# Patient Record
Sex: FEMALE | Race: WHITE | Employment: OTHER | URBAN - METROPOLITAN AREA
[De-identification: names, ages, dates, MRNs, and addresses within clinical notes are randomized per-mention and may not be internally consistent; named-entity substitution may affect disease eponyms.]

---

## 2017-01-12 ENCOUNTER — PATIENT MESSAGE (OUTPATIENT)
Dept: NEPHROLOGY | Facility: CLINIC | Age: 62
End: 2017-01-12

## 2017-01-12 ENCOUNTER — PATIENT MESSAGE (OUTPATIENT)
Dept: INTERNAL MEDICINE CLINIC | Facility: CLINIC | Age: 62
End: 2017-01-12

## 2017-01-12 NOTE — TELEPHONE ENCOUNTER
From: Jesus Crane  To: Griselda Moncada MD  Sent: 1/12/2017 8:22 AM CST  Subject: Non-Urgent Medical Question    I have mouth sores that continue to worsen along with dry mouth. I started an antiseptic mouth wash. Not sure if that will even work.  I a

## 2017-01-13 RX ORDER — LEVOTHYROXINE SODIUM 0.03 MG/1
TABLET ORAL
Qty: 90 TABLET | Refills: 1 | Status: SHIPPED | OUTPATIENT
Start: 2017-01-13 | End: 2017-08-14

## 2017-01-13 NOTE — TELEPHONE ENCOUNTER
Discussed with patient about mouth sore - given on prednisone will prescribe nystatin oral suspension BID swish and swallow    Weight stable. Instructed to follow low salt diet and monitor fluid intake    Take OTC ranitidine daily .  If that doesn't help

## 2017-01-14 ENCOUNTER — PATIENT MESSAGE (OUTPATIENT)
Dept: INTERNAL MEDICINE CLINIC | Facility: CLINIC | Age: 62
End: 2017-01-14

## 2017-01-14 NOTE — TELEPHONE ENCOUNTER
From: Chidi Carrillo  To: Liya Peña MD  Sent: 1/12/2017 7:59 PM CST  Subject: Non-Urgent Medical Question    [facility] levothoroxine. I tried to order on the refilll tab and it does not work. Please refill.

## 2017-01-14 NOTE — TELEPHONE ENCOUNTER
From: Antwan Simental  To: Greta Callahan MD  Sent: 1/14/2017 6:38 AM CST  Subject: Non-Urgent Medical Question    I spoke with Dr. Christianne Cheney, about some issues I am having with the prednisone.  She indicated to me that you have ordered some blood work that

## 2017-01-14 NOTE — TELEPHONE ENCOUNTER
See 1/10/17 refill encounter. Levothyroxine already refilled. Email sent to patient. Pharmacy      [id number] - [place], [address] [address]  [address], [phone], [phone]       Medication Detail         Disp

## 2017-01-14 NOTE — TELEPHONE ENCOUNTER
Please see patient email. Patient just had similar labs done by Dr Dev Medina on 12/26/16. When would you like patient to get your labs done? Please advise.

## 2017-01-19 ENCOUNTER — TELEPHONE (OUTPATIENT)
Dept: NEPHROLOGY | Facility: CLINIC | Age: 62
End: 2017-01-19

## 2017-01-19 NOTE — TELEPHONE ENCOUNTER
Prescription sent on 1/13/17 never went to the pharmacy due to some typo in the directions. Re ordered and re sent to [facility]. Patient contacted and advised.

## 2017-01-19 NOTE — TELEPHONE ENCOUNTER
Pt states that she still has sores in mouth and nose and thought Dr. Marcos Sanabria was going to call in RX. Please advise. See also 1/14/17 patient email.

## 2017-01-23 ENCOUNTER — LAB ENCOUNTER (OUTPATIENT)
Dept: LAB | Age: 62
End: 2017-01-23
Attending: INTERNAL MEDICINE
Payer: MEDICARE

## 2017-01-23 DIAGNOSIS — I10 ESSENTIAL HYPERTENSION: ICD-10-CM

## 2017-01-23 DIAGNOSIS — N04.9 NEPHROTIC SYNDROME: ICD-10-CM

## 2017-01-23 DIAGNOSIS — E78.2 MIXED HYPERLIPIDEMIA: ICD-10-CM

## 2017-01-23 DIAGNOSIS — N05.1 FSGS (FOCAL SEGMENTAL GLOMERULOSCLEROSIS): ICD-10-CM

## 2017-01-23 LAB
ALBUMIN SERPL BCP-MCNC: 3.4 G/DL (ref 3.5–4.8)
ALBUMIN/GLOB SERPL: 1.4 {RATIO} (ref 1–2)
ALP SERPL-CCNC: 53 U/L (ref 32–100)
ALT SERPL-CCNC: 46 U/L (ref 14–54)
ANION GAP SERPL CALC-SCNC: 11 MMOL/L (ref 0–18)
AST SERPL-CCNC: 17 U/L (ref 15–41)
BASOPHILS # BLD: 0 K/UL (ref 0–0.2)
BASOPHILS NFR BLD: 0 %
BILIRUB SERPL-MCNC: 0.7 MG/DL (ref 0.3–1.2)
BUN SERPL-MCNC: 29 MG/DL (ref 8–20)
BUN/CREAT SERPL: 31.9 (ref 10–20)
CALCIUM SERPL-MCNC: 9.2 MG/DL (ref 8.5–10.5)
CHLORIDE SERPL-SCNC: 99 MMOL/L (ref 95–110)
CHOLEST SERPL-MCNC: 249 MG/DL (ref 110–200)
CO2 SERPL-SCNC: 27 MMOL/L (ref 22–32)
CREAT SERPL-MCNC: 0.91 MG/DL (ref 0.5–1.5)
CREAT UR-MCNC: 89.1 MG/DL
EOSINOPHIL # BLD: 0 K/UL (ref 0–0.7)
EOSINOPHIL NFR BLD: 0 %
ERYTHROCYTE [DISTWIDTH] IN BLOOD BY AUTOMATED COUNT: 14.5 % (ref 11–15)
GLOBULIN PLAS-MCNC: 2.5 G/DL (ref 2.5–3.7)
GLUCOSE SERPL-MCNC: 290 MG/DL (ref 70–99)
HCT VFR BLD AUTO: 41.9 % (ref 35–48)
HDLC SERPL-MCNC: 46 MG/DL
HGB BLD-MCNC: 14.4 G/DL (ref 12–16)
LDLC SERPL CALC-MCNC: 135 MG/DL (ref 0–99)
LYMPHOCYTES # BLD: 1.7 K/UL (ref 1–4)
LYMPHOCYTES NFR BLD: 13 %
MCH RBC QN AUTO: 32.1 PG (ref 27–32)
MCHC RBC AUTO-ENTMCNC: 34.3 G/DL (ref 32–37)
MCV RBC AUTO: 93.5 FL (ref 80–100)
MICROALBUMIN UR-MCNC: 100.8 MG/DL (ref 0–1.8)
MICROALBUMIN/CREAT UR: 1131.3 MG/G{CREAT} (ref 0–20)
MONOCYTES # BLD: 0.6 K/UL (ref 0–1)
MONOCYTES NFR BLD: 4 %
NEUTROPHILS # BLD AUTO: 10.6 K/UL (ref 1.8–7.7)
NEUTROPHILS NFR BLD: 82 %
NONHDLC SERPL-MCNC: 203 MG/DL
OSMOLALITY UR CALC.SUM OF ELEC: 300 MOSM/KG (ref 275–295)
PHOSPHATE SERPL-MCNC: 3.3 MG/DL (ref 2.4–4.7)
PLATELET # BLD AUTO: 259 K/UL (ref 140–400)
PMV BLD AUTO: 8.7 FL (ref 7.4–10.3)
POTASSIUM SERPL-SCNC: 4.1 MMOL/L (ref 3.3–5.1)
PROT SERPL-MCNC: 5.9 G/DL (ref 5.9–8.4)
RBC # BLD AUTO: 4.49 M/UL (ref 3.7–5.4)
SODIUM SERPL-SCNC: 137 MMOL/L (ref 136–144)
TRIGL SERPL-MCNC: 340 MG/DL (ref 1–149)
WBC # BLD AUTO: 12.9 K/UL (ref 4–11)

## 2017-01-23 PROCEDURE — 82043 UR ALBUMIN QUANTITATIVE: CPT

## 2017-01-23 PROCEDURE — 84100 ASSAY OF PHOSPHORUS: CPT

## 2017-01-23 PROCEDURE — 80061 LIPID PANEL: CPT

## 2017-01-23 PROCEDURE — 36415 COLL VENOUS BLD VENIPUNCTURE: CPT

## 2017-01-23 PROCEDURE — 80053 COMPREHEN METABOLIC PANEL: CPT

## 2017-01-23 PROCEDURE — 82570 ASSAY OF URINE CREATININE: CPT

## 2017-01-23 PROCEDURE — 85025 COMPLETE CBC W/AUTO DIFF WBC: CPT

## 2017-01-26 ENCOUNTER — OFFICE VISIT (OUTPATIENT)
Dept: NEPHROLOGY | Facility: CLINIC | Age: 62
End: 2017-01-26

## 2017-01-26 VITALS
DIASTOLIC BLOOD PRESSURE: 78 MMHG | WEIGHT: 178.38 LBS | HEIGHT: 62.25 IN | SYSTOLIC BLOOD PRESSURE: 122 MMHG | HEART RATE: 73 BPM | BODY MASS INDEX: 32.41 KG/M2 | TEMPERATURE: 99 F

## 2017-01-26 DIAGNOSIS — N05.1 FSGS (FOCAL SEGMENTAL GLOMERULOSCLEROSIS): Primary | ICD-10-CM

## 2017-01-26 PROCEDURE — 99214 OFFICE O/P EST MOD 30 MIN: CPT | Performed by: INTERNAL MEDICINE

## 2017-01-26 PROCEDURE — G0463 HOSPITAL OUTPT CLINIC VISIT: HCPCS | Performed by: INTERNAL MEDICINE

## 2017-01-26 RX ORDER — RANITIDINE 150 MG/1
150 TABLET ORAL AS NEEDED
COMMUNITY
End: 2017-08-14

## 2017-01-26 RX ORDER — FUROSEMIDE 40 MG/1
40 TABLET ORAL DAILY
Qty: 30 TABLET | Refills: 1 | Status: SHIPPED | OUTPATIENT
Start: 2017-01-26 | End: 2017-03-23

## 2017-01-26 RX ORDER — PREDNISONE 10 MG/1
20 TABLET ORAL 2 TIMES DAILY
COMMUNITY
End: 2017-01-27

## 2017-01-26 NOTE — PATIENT INSTRUCTIONS
Follow up in 4 weeks  Call back in 2 weeks with BP readings  Reduce lasix to 40 mg daily   Reduce prednisone to 20 mg twice a day   Increase nystatin to four times a day   Blood and urine test prior to next visit

## 2017-01-26 NOTE — PROGRESS NOTES
Progress Note     Allie Hair is a 64 yrs old female with pmh of HL, multiple sclerosis (35 yrs ), restless leg syndrome, nephrolithiasis x 3, tobacco abuse who presented today for follow up    Recent lab work showed BUN/Cr 32/0.86 mg/dl with an eGF H/H. RF wnl and ESR 23. 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  22% of gloms globally scle by mouth 2 (two) times daily. Disp: 180 tablet Rfl: 2   Potassium Chloride ER (KLOR-CON M10) 10 MEQ Oral Tab CR Take 1 tablet (10 mEq total) by mouth 2 (two) times daily.  Disp: 60 tablet Rfl: 5   carvedilol (COREG) 6.25 MG Oral Tab Take 1 tablet (6.25 mg t for ear drainage, hearing loss and nasal drainage  Eyes:  Negative for eye discharge and vision loss  Cardiovascular:  Negative for chest pain, sob, irregular heartbeat/palpitations  Respiratory:  Negative for cough, dyspnea and wheezing  Gastrointestinal: BID   - pending response duration of treatment varies from 4 months to longer  - negative VANESSA, ANCA and Hepatitis B and C and HIV non reactive. - C3 and C4 within range.  CBC normal H/H and coags wnl  - RF wnl and ESR 23  - SPEP/DARRYL negative for monoclonal

## 2017-01-27 DIAGNOSIS — I10 ESSENTIAL HYPERTENSION: Primary | ICD-10-CM

## 2017-01-27 RX ORDER — PREDNISONE 10 MG/1
20 TABLET ORAL 2 TIMES DAILY
Qty: 60 TABLET | Refills: 2 | Status: SHIPPED | OUTPATIENT
Start: 2017-01-27 | End: 2017-01-31

## 2017-01-27 NOTE — TELEPHONE ENCOUNTER
Pt requesting refills for Lasix 20 mg, Prednisone, Nystatin, Cardevilol -- Please go over dosages with pt. Pls call. Thank you.

## 2017-01-27 NOTE — TELEPHONE ENCOUNTER
Furosemide was refilled on 1/26/17 #30 + 1 refill. Carvedilol was refilled on 12/28/16 #60 + 6 refills by Dr. Terry Martino. LOV 1/26/17.

## 2017-01-30 ENCOUNTER — HOSPITAL ENCOUNTER (EMERGENCY)
Facility: HOSPITAL | Age: 62
Discharge: HOME | End: 2017-01-31
Attending: EMERGENCY MEDICINE
Payer: MEDICARE

## 2017-01-30 ENCOUNTER — TELEPHONE (OUTPATIENT)
Dept: INTERNAL MEDICINE CLINIC | Facility: CLINIC | Age: 62
End: 2017-01-30

## 2017-01-30 DIAGNOSIS — T38.0X5A STEROID-INDUCED HYPERGLYCEMIA: Primary | ICD-10-CM

## 2017-01-30 DIAGNOSIS — R73.9 STEROID-INDUCED HYPERGLYCEMIA: Primary | ICD-10-CM

## 2017-01-30 LAB — GLUCOSE BLDC GLUCOMTR-MCNC: 434 MG/DL (ref 70–99)

## 2017-01-30 PROCEDURE — 85007 BL SMEAR W/DIFF WBC COUNT: CPT | Performed by: EMERGENCY MEDICINE

## 2017-01-30 PROCEDURE — 96372 THER/PROPH/DIAG INJ SC/IM: CPT

## 2017-01-30 PROCEDURE — 85025 COMPLETE CBC W/AUTO DIFF WBC: CPT | Performed by: EMERGENCY MEDICINE

## 2017-01-30 PROCEDURE — 82962 GLUCOSE BLOOD TEST: CPT

## 2017-01-30 PROCEDURE — 81001 URINALYSIS AUTO W/SCOPE: CPT | Performed by: EMERGENCY MEDICINE

## 2017-01-30 PROCEDURE — 80048 BASIC METABOLIC PNL TOTAL CA: CPT | Performed by: EMERGENCY MEDICINE

## 2017-01-30 PROCEDURE — 96360 HYDRATION IV INFUSION INIT: CPT

## 2017-01-30 PROCEDURE — 99284 EMERGENCY DEPT VISIT MOD MDM: CPT

## 2017-01-30 NOTE — TELEPHONE ENCOUNTER
Patient stated since today she has been having vaginal itching with slight white discharge. Patient has been taking Prednisone since late November. She is also developing a swollen face in the last 2 weeks.   Patient has also started taking Nystatin for

## 2017-01-30 NOTE — TELEPHONE ENCOUNTER
Pt  Calling to discuss recent test results  said based on results  may need cholesterol medication adjusted    Is ready for refill  [facility]

## 2017-01-30 NOTE — TELEPHONE ENCOUNTER
Pt said she has possible yeast infection  Has 2/8 appt with Dr Evie Ordonez   [facility]

## 2017-01-30 NOTE — TELEPHONE ENCOUNTER
Patient stated wants Dr. Evie Ordonez to review her most recent labs. Instructed patient she has refills at the pharmacy for her Crestor and patient stated understood.

## 2017-01-31 ENCOUNTER — TELEPHONE (OUTPATIENT)
Dept: INTERNAL MEDICINE CLINIC | Facility: CLINIC | Age: 62
End: 2017-01-31

## 2017-01-31 ENCOUNTER — OFFICE VISIT (OUTPATIENT)
Dept: INTERNAL MEDICINE CLINIC | Facility: CLINIC | Age: 62
End: 2017-01-31

## 2017-01-31 VITALS
BODY MASS INDEX: 32.81 KG/M2 | WEIGHT: 182.88 LBS | HEART RATE: 79 BPM | RESPIRATION RATE: 18 BRPM | HEIGHT: 62.5 IN | DIASTOLIC BLOOD PRESSURE: 73 MMHG | SYSTOLIC BLOOD PRESSURE: 115 MMHG

## 2017-01-31 VITALS
OXYGEN SATURATION: 94 % | WEIGHT: 178 LBS | BODY MASS INDEX: 30.39 KG/M2 | DIASTOLIC BLOOD PRESSURE: 73 MMHG | RESPIRATION RATE: 20 BRPM | TEMPERATURE: 98 F | HEART RATE: 74 BPM | HEIGHT: 64 IN | SYSTOLIC BLOOD PRESSURE: 152 MMHG

## 2017-01-31 DIAGNOSIS — E11.9 TYPE 2 DIABETES MELLITUS WITHOUT COMPLICATION, WITH LONG-TERM CURRENT USE OF INSULIN (HCC): Primary | ICD-10-CM

## 2017-01-31 DIAGNOSIS — Z79.4 TYPE 2 DIABETES MELLITUS WITHOUT COMPLICATION, WITH LONG-TERM CURRENT USE OF INSULIN (HCC): Primary | ICD-10-CM

## 2017-01-31 LAB
ANION GAP SERPL CALC-SCNC: 11 MMOL/L (ref 0–18)
BACTERIA UR QL AUTO: NEGATIVE /HPF
BASOPHILS # BLD: 0 K/UL (ref 0–0.2)
BASOPHILS NFR BLD: 0 %
BILIRUB UR QL: NEGATIVE
BUN SERPL-MCNC: 27 MG/DL (ref 8–20)
BUN/CREAT SERPL: 31 (ref 10–20)
CALCIUM SERPL-MCNC: 8.8 MG/DL (ref 8.5–10.5)
CHLORIDE SERPL-SCNC: 92 MMOL/L (ref 95–110)
CLARITY UR: CLEAR
CO2 SERPL-SCNC: 27 MMOL/L (ref 22–32)
COLOR UR: YELLOW
CREAT SERPL-MCNC: 0.87 MG/DL (ref 0.5–1.5)
EOSINOPHIL # BLD: 0.1 K/UL (ref 0–0.7)
EOSINOPHIL NFR BLD: 1 %
ERYTHROCYTE [DISTWIDTH] IN BLOOD BY AUTOMATED COUNT: 14.3 % (ref 11–15)
GLUCOSE BLDC GLUCOMTR-MCNC: 369 MG/DL (ref 70–99)
GLUCOSE BLDC GLUCOMTR-MCNC: 391 MG/DL (ref 70–99)
GLUCOSE SERPL-MCNC: 474 MG/DL (ref 70–99)
GLUCOSE UR-MCNC: >=500 MG/DL
HCT VFR BLD AUTO: 44.6 % (ref 35–48)
HGB BLD-MCNC: 15.3 G/DL (ref 12–16)
KETONES UR-MCNC: NEGATIVE MG/DL
LEUKOCYTE ESTERASE UR QL STRIP.AUTO: NEGATIVE
LYMPHOCYTES # BLD: 1.2 K/UL (ref 1–4)
LYMPHOCYTES NFR BLD: 9 %
MCH RBC QN AUTO: 32.2 PG (ref 27–32)
MCHC RBC AUTO-ENTMCNC: 34.4 G/DL (ref 32–37)
MCV RBC AUTO: 93.7 FL (ref 80–100)
MONOCYTES # BLD: 0.5 K/UL (ref 0–1)
MONOCYTES NFR BLD: 4 %
NEUTROPHILS # BLD AUTO: 11.4 K/UL (ref 1.8–7.7)
NEUTROPHILS NFR BLD: 85 %
NEUTS BAND NFR BLD: 1 %
NITRITE UR QL STRIP.AUTO: NEGATIVE
OSMOLALITY UR CALC.SUM OF ELEC: 296 MOSM/KG (ref 275–295)
PH UR: 6 [PH] (ref 5–8)
PLATELET # BLD AUTO: 230 K/UL (ref 140–400)
PMV BLD AUTO: 9.1 FL (ref 7.4–10.3)
POTASSIUM SERPL-SCNC: 4.6 MMOL/L (ref 3.3–5.1)
PROT UR-MCNC: 30 MG/DL
RBC # BLD AUTO: 4.75 M/UL (ref 3.7–5.4)
RBC #/AREA URNS AUTO: 2 /HPF
SODIUM SERPL-SCNC: 130 MMOL/L (ref 136–144)
SP GR UR STRIP: 1.03 (ref 1–1.03)
UROBILINOGEN UR STRIP-ACNC: <2
VIT C UR-MCNC: NEGATIVE MG/DL
WBC # BLD AUTO: 13.3 K/UL (ref 4–11)
WBC #/AREA URNS AUTO: 0 /HPF

## 2017-01-31 PROCEDURE — G0463 HOSPITAL OUTPT CLINIC VISIT: HCPCS | Performed by: INTERNAL MEDICINE

## 2017-01-31 PROCEDURE — 82962 GLUCOSE BLOOD TEST: CPT

## 2017-01-31 PROCEDURE — 99214 OFFICE O/P EST MOD 30 MIN: CPT | Performed by: INTERNAL MEDICINE

## 2017-01-31 RX ORDER — INSULIN ASPART 100 [IU]/ML
10 INJECTION, SOLUTION INTRAVENOUS; SUBCUTANEOUS ONCE
Status: DISCONTINUED | OUTPATIENT
Start: 2017-01-31 | End: 2017-01-31

## 2017-01-31 RX ORDER — PEN NEEDLE, DIABETIC 30 GX3/16"
NEEDLE, DISPOSABLE MISCELLANEOUS
Qty: 100 EACH | Refills: 0 | Status: SHIPPED | OUTPATIENT
Start: 2017-01-31 | End: 2017-03-20

## 2017-01-31 RX ORDER — PREDNISONE 20 MG/1
20 TABLET ORAL 2 TIMES DAILY
COMMUNITY
End: 2017-02-23 | Stop reason: DRUGHIGH

## 2017-01-31 RX ORDER — INSULIN LISPRO 100 [IU]/ML
INJECTION, SUSPENSION SUBCUTANEOUS
Qty: 5 PEN | Refills: 3 | Status: SHIPPED | OUTPATIENT
Start: 2017-01-31 | End: 2017-03-21

## 2017-01-31 RX ORDER — FLUCONAZOLE 150 MG/1
150 TABLET ORAL ONCE
Qty: 1 TABLET | Refills: 0 | Status: SHIPPED | OUTPATIENT
Start: 2017-01-31 | End: 2017-01-31

## 2017-01-31 NOTE — ASSESSMENT & PLAN NOTE
Newly diagnosed blood sugars elevations. Most likely related to the prednisone. Home sugars yesterday about 500 and in the ER about 479. She was initially given 15 units of insulin and then another 10 units to return home.   She has been drinking plenty

## 2017-01-31 NOTE — TELEPHONE ENCOUNTER
Pt informed. Verbalized good understanding of all with intent to comply. rx's sent. Pt agreed to date and time.

## 2017-01-31 NOTE — ED PROVIDER NOTES
Patient Seen in: [facility] AND [facility] Emergency Department    History   Patient presents with:  Hyperglycemia (metabolic)    Stated Complaint: hyperglycemia    HPI    The patient is a 60-year-old female recently diagnosed with nephrotic syndrome and has bee Losartan Potassium 50 MG Oral Tab,  Take 1 tablet (50 mg total) by mouth 2 (two) times daily. Potassium Chloride ER (KLOR-CON M10) 10 MEQ Oral Tab CR,  Take 1 tablet (10 mEq total) by mouth 2 (two) times daily.    carvedilol (COREG) 6.25 MG Oral Tab,  Anaya Alberto stated complaint: hyperglycemia  Other systems are as noted in HPI. Constitutional and vital signs reviewed. All other systems reviewed and negative except as noted above.     PSFH elements reviewed from today and agreed except as otherwise stated in BUN 27 (*)     BUN/CREA Ratio 31.0 (*)     Calculated Osmolality 296 (*)     All other components within normal limits   URINALYSIS WITH CULTURE REFLEX - Abnormal; Notable for the following:     Protein Urine 30  (*)     Glucose Urine >=500 (*)     Blood U

## 2017-01-31 NOTE — PROGRESS NOTES
HPI:    Patient ID: Sulma Robles is a 58year old female. Diabetes  She presents for her initial diabetic visit. She has type 2 diabetes mellitus. Her disease course has been improving. There are no hypoglycemic associated symptoms.  Associated sympt Hematological: Negative. Psychiatric/Behavioral: Negative. Current Outpatient Prescriptions:  predniSONE 20 MG Oral Tab Take 20 mg by mouth 2 (two) times daily.  Disp:  Rfl:    Insulin Lispro Prot & Lispro (HUMALOG MIX 75/25 KWIKPEN) (75- by mouth nightly. Total of 6 mg. Disp:  Rfl:    Cholecalciferol (VITAMIN D) 2000 UNITS Oral Cap Take  by mouth.  Disp:  Rfl:    SPIRIVA HANDIHALER 18 MCG Inhalation Cap INHALE THE CONTENTS OF 1 CAPSULE VIA HANDIHALER SAME TIME EVERY DAY Disp: 90 capsule Rfl Normal range of motion. She exhibits edema. She exhibits no tenderness. Lymphadenopathy:     She has no cervical adenopathy. Neurological: She is alert and oriented to person, place, and time. She has normal reflexes. No cranial nerve deficit.  She exhi the defined types were placed in this encounter.        Meds This Visit:  Signed Prescriptions Disp Refills    Insulin Lispro Prot & Lispro (HUMALOG MIX 75/25 KWIKPEN) (75-25) 100 UNIT/ML SC SUPN 5 pen 3      Sig: 15 units 2 times a day with meals        Pa

## 2017-01-31 NOTE — TELEPHONE ENCOUNTER
Spoke with patient and advised of BOLIVAR message to take 20 units of Novolog Mix. Patient states did not give self insulin yet, called when she read the number as directed to call if over 250.      Should patient check before bedtime, asking what she should do

## 2017-01-31 NOTE — TELEPHONE ENCOUNTER
Patient calling her sugar is 347. Doctor directions are to call doctor if greater that 250 call doctor.

## 2017-01-31 NOTE — TELEPHONE ENCOUNTER
Dr. Gaetano Osman, please see message below and review pending rx. Thank you. Please advise on directions for new pen also.

## 2017-01-31 NOTE — ED INITIAL ASSESSMENT (HPI)
Pt checked her glucose at home and the reading was 471. Ate at 2130. Pt on prednisone for kidney disease. Dosage recently reduced. Sores in mouth. Red rash to chest. Dr Florez is her pmd.  Accucheck in triage [phone].

## 2017-01-31 NOTE — TELEPHONE ENCOUNTER
Patient states her blood sugar just now checked was 347    Denies any change in vision, lightheadedness, increases in urination.      Lynced BOLIVAR:   [1/31/2017 5:53 PM] Janusz Padilla MD, Karen Crabtree @[place]:   please adv to take 20 units of novolog mix if she has not

## 2017-01-31 NOTE — TELEPHONE ENCOUNTER
Doctor called the patient last night and was told to go to the ER. Patient Blood sugar were 500. She had to go to the ER had labs and got an insulin shot. It went down slowly.  This morning she  Took her blood sugar reading and it was 282 at 8:13 AM. They

## 2017-01-31 NOTE — TELEPHONE ENCOUNTER
Pt was seen in the office today. Per Shirin the nuvalog mix flex pen is not covered by the insurance. Pt humalog mix flex pen is covered. Does doctor want to change? Per Juanpablo LLANOS) they will call the pharmacy back.

## 2017-01-31 NOTE — ED NOTES
Dr. Hopson Beauty updated about pt's bg after insulin. Pt verbalized that she is going to eat when gets home. Yousif Joaquim said to give additional 5u of Novolin. Pt. Was given crackers and orange juice to go home. pt verbalized understanding about hypoglycemia.

## 2017-01-31 NOTE — PATIENT INSTRUCTIONS
Problem List Items Addressed This Visit        Unprioritized    T2DM (type 2 diabetes mellitus) ([facility] [address] - Primary     Newly diagnosed blood sugars elevations. Most likely related to the prednisone. Home sugars yesterday about 500 and in the ER about 479.

## 2017-01-31 NOTE — TELEPHONE ENCOUNTER
Please order novolog mix 70/30  15 units 2 times a day-5 pens  Have her  pens and see me at 12.45 pm at [facility] today.   Please order pen needles for  -use bid- 100

## 2017-01-31 NOTE — TELEPHONE ENCOUNTER
BOLIVAR pls see note below. Pt states seen in ER as advised, was given insulin SQ and instructed to f/u with PCP today. No availability and only wants to see BOLIVAR. Pt reports glucose this AM fasting-282. Pt does not have insulin at home, requesting rx from BOLIVAR.

## 2017-01-31 NOTE — ED NOTES
Pt had her lab drawn a week ago,and was calling to find out what were her certain labs, and got a call from the dr monsivais her bg reading was high. The pt checked her bg at home with her 's accu check machine, and it was reading \"high\".  Pt verbalized r

## 2017-02-01 NOTE — TELEPHONE ENCOUNTER
Pt calling back stating that she is waiting for the nurse to call back. .. please advise pt state that she has'nt eating anything

## 2017-02-01 NOTE — TELEPHONE ENCOUNTER
Via VoAPPs with BOLIVAR:     [1/31/2017 6:23 PM] Janusz Padilla MD, Karen Crabtree @[place]:   no check tomorrow before breakfast  [1/31/2017 6:25 PM] Phuongaidee Yaniv @[place]:   ok , I will let her know.  Thank You Doctor   [1/31/2017 6:25 PM] Janusz Padilla MD, Karen Crabtree @[place]:

## 2017-02-01 NOTE — TELEPHONE ENCOUNTER
Please note/advise. Thank you. Pt seeking insulin dose advice. Pt states that she has not given any insulin this morning. Pt called with her BS results.      this morning    BS yesterday was 347 before dinner   BS at 9:30pm 391  BS at 11pm 337

## 2017-02-06 ENCOUNTER — OFFICE VISIT (OUTPATIENT)
Dept: DERMATOLOGY CLINIC | Facility: CLINIC | Age: 62
End: 2017-02-06

## 2017-02-06 DIAGNOSIS — L57.0 ACTINIC KERATOSIS: ICD-10-CM

## 2017-02-06 DIAGNOSIS — B37.0 ORAL CANDIDIASIS: ICD-10-CM

## 2017-02-06 DIAGNOSIS — B07.9 VIRAL WARTS, UNSPECIFIED TYPE: Primary | ICD-10-CM

## 2017-02-06 DIAGNOSIS — D23.9 BENIGN NEOPLASM OF SKIN, UNSPECIFIED LOCATION: ICD-10-CM

## 2017-02-06 PROCEDURE — 99213 OFFICE O/P EST LOW 20 MIN: CPT | Performed by: DERMATOLOGY

## 2017-02-06 PROCEDURE — 17110 DESTRUCTION B9 LES UP TO 14: CPT | Performed by: DERMATOLOGY

## 2017-02-06 RX ORDER — FLUCONAZOLE 150 MG/1
TABLET ORAL
Qty: 2 TABLET | Refills: 3 | Status: SHIPPED | OUTPATIENT
Start: 2017-02-06 | End: 2017-05-03

## 2017-02-06 RX ORDER — FLUCONAZOLE 150 MG/1
TABLET ORAL
Refills: 0 | COMMUNITY
Start: 2017-01-31 | End: 2017-02-06

## 2017-02-06 NOTE — PROGRESS NOTES
rf diflucan--take weekly x 1--still red inflamed at lateral tongue and tender per pt--.   Pt to f/u with Dr. Kaylee Mahmood

## 2017-02-07 ENCOUNTER — TELEPHONE (OUTPATIENT)
Dept: DERMATOLOGY CLINIC | Facility: CLINIC | Age: 62
End: 2017-02-07

## 2017-02-07 RX ORDER — UREA 40 %
CREAM (GRAM) TOPICAL
Qty: 90 G | Refills: 12 | Status: SHIPPED | OUTPATIENT
Start: 2017-02-07 | End: 2017-02-08

## 2017-02-07 NOTE — TELEPHONE ENCOUNTER
Patient informed with verbal understanding.  She will get OTC cream. Per patient, rx cream too expensive

## 2017-02-07 NOTE — TELEPHONE ENCOUNTER
Discussed over-the-counter urea cream Rx sent for 40% -20% over-the-counter generally with foot care products for dry skin, warts cracking peeling

## 2017-02-07 NOTE — TELEPHONE ENCOUNTER
Pt states KMT mentioned a cream (RX or OTC) that she was going to prescribe but pharmacy doesn't have it - kindly advise. Routine post-cryotherapy care discussed with pt.

## 2017-02-08 ENCOUNTER — OFFICE VISIT (OUTPATIENT)
Dept: INTERNAL MEDICINE CLINIC | Facility: CLINIC | Age: 62
End: 2017-02-08

## 2017-02-08 VITALS
HEIGHT: 62.5 IN | SYSTOLIC BLOOD PRESSURE: 114 MMHG | BODY MASS INDEX: 32.54 KG/M2 | OXYGEN SATURATION: 94 % | RESPIRATION RATE: 24 BRPM | WEIGHT: 181.38 LBS | HEART RATE: 75 BPM | DIASTOLIC BLOOD PRESSURE: 72 MMHG | TEMPERATURE: 98 F

## 2017-02-08 DIAGNOSIS — Z79.4 TYPE 2 DIABETES MELLITUS WITHOUT COMPLICATION, WITH LONG-TERM CURRENT USE OF INSULIN (HCC): Primary | ICD-10-CM

## 2017-02-08 DIAGNOSIS — E11.9 TYPE 2 DIABETES MELLITUS WITHOUT COMPLICATION, WITH LONG-TERM CURRENT USE OF INSULIN (HCC): Primary | ICD-10-CM

## 2017-02-08 DIAGNOSIS — T38.0X5A STEROID MYOPATHY: ICD-10-CM

## 2017-02-08 DIAGNOSIS — Z78.0 MENOPAUSE: ICD-10-CM

## 2017-02-08 DIAGNOSIS — G72.0 STEROID MYOPATHY: ICD-10-CM

## 2017-02-08 DIAGNOSIS — E78.2 MIXED HYPERLIPIDEMIA: ICD-10-CM

## 2017-02-08 PROCEDURE — G0463 HOSPITAL OUTPT CLINIC VISIT: HCPCS | Performed by: INTERNAL MEDICINE

## 2017-02-08 PROCEDURE — 99214 OFFICE O/P EST MOD 30 MIN: CPT | Performed by: INTERNAL MEDICINE

## 2017-02-08 RX ORDER — CLONAZEPAM 0.5 MG/1
0.5 TABLET ORAL NIGHTLY PRN
Qty: 30 TABLET | Refills: 3 | Status: SHIPPED | OUTPATIENT
Start: 2017-02-08 | End: 2017-07-06

## 2017-02-09 NOTE — ASSESSMENT & PLAN NOTE
Continue the Crestor at 20 mg 1 tablet once daily. We will continue to monitor diet and treat the diabetes to help the triglycerides improved. Recheck the lipid panel in about 8 weeks.

## 2017-02-09 NOTE — ASSESSMENT & PLAN NOTE
Blood sugars are still quite elevated. She has been following the sliding scale as per her last instructions. Changed the sliding scale to specific numbers at this time. Take 20 units of the Humalog mix with breakfast as well as with dinner.   Follow a s

## 2017-02-09 NOTE — ASSESSMENT & PLAN NOTE
Bilateral lower extremity weakness and difficulty standing up and climbing. This is expected from having been on steroids for a little while now.   Lower extremity exercises to strengthen the thighs–the quadriceps as well as the hamstrings and calf muscles

## 2017-02-09 NOTE — PATIENT INSTRUCTIONS
Problem List Items Addressed This Visit        Unprioritized    Hyperlipidemia     Continue the Crestor at 20 mg 1 tablet once daily. We will continue to monitor diet and treat the diabetes to help the triglycerides improved.   Recheck the lipid panel in a

## 2017-02-15 ENCOUNTER — PATIENT MESSAGE (OUTPATIENT)
Dept: INTERNAL MEDICINE CLINIC | Facility: CLINIC | Age: 62
End: 2017-02-15

## 2017-02-15 NOTE — TELEPHONE ENCOUNTER
From: Jenny Ortiz  To: Yaneth Anna MD  Sent: 2/15/2017 1:09 AM CST  Subject: Non-Urgent Medical Question     2/10 units 2/11 units 2/12 units 2/13 units 2/14 units   Before Breakfast 161 24 138 22 155 24 163 24 168 24  Before Dinner 122 22 100 22 9

## 2017-02-15 NOTE — PROGRESS NOTES
Vladimir Glasgow is a 58year old female. HPI:     CC:  Patient presents with:  Warts: LOV 3/13/2015. Pt presenting for f/u with warts to bilateral hands. Pt had warts treated in the past with cryotherapy.         Allergies:  Review of patient's allergies tablet (50 mg total) by mouth 2 (two) times daily. Disp: 180 tablet Rfl: 2   Potassium Chloride ER (KLOR-CON M10) 10 MEQ Oral Tab CR Take 1 tablet (10 mEq total) by mouth 2 (two) times daily.  Disp: 60 tablet Rfl: 5   carvedilol (COREG) 6.25 MG Oral Tab Nicolette Hong Past Surgical History    APPENDECTOMY  1985    KNEE SURGERY            APPENDECTOMY         Social History   Marital Status:   Spouse Name: N/A    Years of Education: N/A  Number of Children: N/A     Occupational History  None o Multiple light to medium brown, well marginated, uniformly pigmented, macules and papules 6 mm and less scattered on exam. pigmented lesions examined with dermoscopy benign-appearing patterns.      Waxy tannish keratotic papules scattered, cherry-red vasc or p.r.n.

## 2017-02-20 ENCOUNTER — LAB ENCOUNTER (OUTPATIENT)
Dept: LAB | Age: 62
End: 2017-02-20
Attending: INTERNAL MEDICINE
Payer: MEDICARE

## 2017-02-20 DIAGNOSIS — N05.1 FSGS (FOCAL SEGMENTAL GLOMERULOSCLEROSIS): ICD-10-CM

## 2017-02-20 DIAGNOSIS — E78.2 MIXED HYPERLIPIDEMIA: ICD-10-CM

## 2017-02-20 LAB
ALBUMIN SERPL BCP-MCNC: 3.2 G/DL (ref 3.5–4.8)
ALBUMIN/GLOB SERPL: 1.2 {RATIO} (ref 1–2)
ALP SERPL-CCNC: 60 U/L (ref 32–100)
ALT SERPL-CCNC: 52 U/L (ref 14–54)
ANION GAP SERPL CALC-SCNC: 9 MMOL/L (ref 0–18)
AST SERPL-CCNC: 18 U/L (ref 15–41)
BASOPHILS # BLD: 0 K/UL (ref 0–0.2)
BASOPHILS NFR BLD: 0 %
BILIRUB SERPL-MCNC: 0.6 MG/DL (ref 0.3–1.2)
BUN SERPL-MCNC: 22 MG/DL (ref 8–20)
BUN/CREAT SERPL: 30.1 (ref 10–20)
CALCIUM SERPL-MCNC: 8.8 MG/DL (ref 8.5–10.5)
CHLORIDE SERPL-SCNC: 97 MMOL/L (ref 95–110)
CHOLEST SERPL-MCNC: 225 MG/DL (ref 110–200)
CO2 SERPL-SCNC: 29 MMOL/L (ref 22–32)
CREAT SERPL-MCNC: 0.73 MG/DL (ref 0.5–1.5)
CREAT UR-MCNC: 141.1 MG/DL
EOSINOPHIL # BLD: 0 K/UL (ref 0–0.7)
EOSINOPHIL NFR BLD: 0 %
ERYTHROCYTE [DISTWIDTH] IN BLOOD BY AUTOMATED COUNT: 14.4 % (ref 11–15)
GLOBULIN PLAS-MCNC: 2.6 G/DL (ref 2.5–3.7)
GLUCOSE SERPL-MCNC: 98 MG/DL (ref 70–99)
HCT VFR BLD AUTO: 42.9 % (ref 35–48)
HDLC SERPL-MCNC: 44 MG/DL
HGB BLD-MCNC: 14.7 G/DL (ref 12–16)
LDLC SERPL CALC-MCNC: 142 MG/DL (ref 0–99)
LYMPHOCYTES # BLD: 2.9 K/UL (ref 1–4)
LYMPHOCYTES NFR BLD: 22 %
MAGNESIUM SERPL-MCNC: 2.1 MG/DL (ref 1.8–2.5)
MCH RBC QN AUTO: 32.7 PG (ref 27–32)
MCHC RBC AUTO-ENTMCNC: 34.2 G/DL (ref 32–37)
MCV RBC AUTO: 95.9 FL (ref 80–100)
MICROALBUMIN UR-MCNC: 62.4 MG/DL (ref 0–1.8)
MICROALBUMIN/CREAT UR: 442.2 MG/G{CREAT} (ref 0–20)
MONOCYTES # BLD: 0.8 K/UL (ref 0–1)
MONOCYTES NFR BLD: 6 %
NEUTROPHILS # BLD AUTO: 9.1 K/UL (ref 1.8–7.7)
NEUTROPHILS NFR BLD: 71 %
NONHDLC SERPL-MCNC: 181 MG/DL
OSMOLALITY UR CALC.SUM OF ELEC: 283 MOSM/KG (ref 275–295)
PHOSPHATE SERPL-MCNC: 3.5 MG/DL (ref 2.4–4.7)
PLATELET # BLD AUTO: 275 K/UL (ref 140–400)
PMV BLD AUTO: 8.6 FL (ref 7.4–10.3)
POTASSIUM SERPL-SCNC: 3.8 MMOL/L (ref 3.3–5.1)
PROT SERPL-MCNC: 5.8 G/DL (ref 5.9–8.4)
RBC # BLD AUTO: 4.48 M/UL (ref 3.7–5.4)
SODIUM SERPL-SCNC: 135 MMOL/L (ref 136–144)
TRIGL SERPL-MCNC: 197 MG/DL (ref 1–149)
WBC # BLD AUTO: 12.8 K/UL (ref 4–11)

## 2017-02-20 PROCEDURE — 84100 ASSAY OF PHOSPHORUS: CPT

## 2017-02-20 PROCEDURE — 80053 COMPREHEN METABOLIC PANEL: CPT

## 2017-02-20 PROCEDURE — 80061 LIPID PANEL: CPT

## 2017-02-20 PROCEDURE — 82570 ASSAY OF URINE CREATININE: CPT

## 2017-02-20 PROCEDURE — 85025 COMPLETE CBC W/AUTO DIFF WBC: CPT

## 2017-02-20 PROCEDURE — 83735 ASSAY OF MAGNESIUM: CPT

## 2017-02-20 PROCEDURE — 82043 UR ALBUMIN QUANTITATIVE: CPT

## 2017-02-20 PROCEDURE — 36415 COLL VENOUS BLD VENIPUNCTURE: CPT

## 2017-02-23 ENCOUNTER — TELEPHONE (OUTPATIENT)
Dept: INTERNAL MEDICINE CLINIC | Facility: CLINIC | Age: 62
End: 2017-02-23

## 2017-02-23 ENCOUNTER — OFFICE VISIT (OUTPATIENT)
Dept: NEPHROLOGY | Facility: CLINIC | Age: 62
End: 2017-02-23

## 2017-02-23 ENCOUNTER — PATIENT MESSAGE (OUTPATIENT)
Dept: INTERNAL MEDICINE CLINIC | Facility: CLINIC | Age: 62
End: 2017-02-23

## 2017-02-23 VITALS
HEART RATE: 76 BPM | TEMPERATURE: 98 F | DIASTOLIC BLOOD PRESSURE: 74 MMHG | WEIGHT: 184 LBS | HEIGHT: 64 IN | BODY MASS INDEX: 31.41 KG/M2 | SYSTOLIC BLOOD PRESSURE: 115 MMHG

## 2017-02-23 DIAGNOSIS — I10 ESSENTIAL HYPERTENSION: Primary | ICD-10-CM

## 2017-02-23 DIAGNOSIS — N04.9 NEPHROTIC SYNDROME: ICD-10-CM

## 2017-02-23 PROCEDURE — 99215 OFFICE O/P EST HI 40 MIN: CPT | Performed by: INTERNAL MEDICINE

## 2017-02-23 PROCEDURE — G0463 HOSPITAL OUTPT CLINIC VISIT: HCPCS | Performed by: INTERNAL MEDICINE

## 2017-02-23 RX ORDER — PREDNISONE 10 MG/1
10 TABLET ORAL 2 TIMES DAILY
Qty: 60 TABLET | Refills: 1 | Status: SHIPPED | OUTPATIENT
Start: 2017-02-23 | End: 2017-04-13 | Stop reason: DRUGHIGH

## 2017-02-23 RX ORDER — LORATADINE 10 MG/1
10 CAPSULE, LIQUID FILLED ORAL DAILY
Qty: 10 CAPSULE | Refills: 0 | Status: SHIPPED | OUTPATIENT
Start: 2017-02-23 | End: 2017-03-25

## 2017-02-23 RX ORDER — LEVOFLOXACIN 500 MG/1
500 TABLET, FILM COATED ORAL DAILY
Qty: 7 TABLET | Refills: 0 | Status: SHIPPED | OUTPATIENT
Start: 2017-02-23 | End: 2017-03-05

## 2017-02-23 RX ORDER — CARVEDILOL 6.25 MG/1
3.12 TABLET ORAL 2 TIMES DAILY WITH MEALS
Qty: 60 TABLET | Refills: 6 | Status: SHIPPED | OUTPATIENT
Start: 2017-02-23 | End: 2017-02-27 | Stop reason: DRUGHIGH

## 2017-02-23 NOTE — TELEPHONE ENCOUNTER
Levaquin 500 mg p.o. daily for 7 days. Claritin 10 mg 1 tablet once daily for 10 days.   Needs to be seen in about 5-7 days

## 2017-02-23 NOTE — PROGRESS NOTES
Progress Note     Brad Larson is a 64 yrs old female with pmh of HL, multiple sclerosis (35 yrs ), restless leg syndrome, nephrolithiasis x 3, tobacco abuse who presented today for follow up    Recent lab work showed BUN/Cr 32/0.86 mg/dl with an eGF H/H. RF wnl and ESR 23. 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  22% of gloms globally scle Anxiety.  Disp: 30 tablet Rfl: 3   Insulin Pen Needle (PEN NEEDLES) 31G X 8 MM Does not apply Misc 15 units BID  PLEASE DISPENSE PEN NEEDLES APPROPRIATE FOR NOVOLOG FLEXPEN Disp: 100 each Rfl: 0   Insulin Lispro Prot & Lispro (HUMALOG MIX 75/25 KWIKPEN) (75 Rfl: SPIRIVA HANDIHALER 18 MCG Inhalation Cap INHALE THE CONTENTS OF 1 CAPSULE VIA HANDIHALER SAME TIME EVERY DAY Disp: 90 capsule Rfl: 2   aspirin 81 MG Oral Tab Take 81 mg by mouth daily.  Disp:  Rfl:    SPIRIVA HANDIHALER 18 MCG Inhalation Cap INHALE responsive no acute distress noted  Head/Face: normocephalic  Eyes/Vision: normal extraocular motion is intact  Nose/Mouth/Throat: mucous membranes are moist    Neck/Thyroid: neck is supple without adenopathy  Lymphatic: no abnormal cervical, supraclavicul Encounter  Microalb/Creat Ratio, Random Urine [E]    Meds This Visit:    Signed Prescriptions Disp Refills    predniSONE 10 MG Oral Tab 60 tablet 1      Sig: Take 1 tablet (10 mg total) by mouth 2 (two) times daily.       PredniSONE 5 MG Oral Tab EC 60 tabl

## 2017-02-23 NOTE — TELEPHONE ENCOUNTER
Reason for Call/Chief Complaint: runny nose and cough  Onset: 1 week  Nursing Assessment/Associated Symptoms: runny nose, productive cough, green/yellow and brown mucous. Pt denies fever, sinus pressure, HA. Has coughing fits.   Requesting rx for Levaquin

## 2017-02-23 NOTE — TELEPHONE ENCOUNTER
Pt states has possible sinus infection  Declined appt-requesting antibiotic   requesting same antibiotic previously prescribed-did not have name of medication       Confirmed [facility]

## 2017-02-23 NOTE — PATIENT INSTRUCTIONS
Reduce carvedilol to 3.125 mg twice a day dose   Reduce prednisone to 15 mg twice a day   Monitor blood sugars and follow up with Dr. Mancia Keep for insulin dosing  Follow up in 4 weeks    urine test before next visit

## 2017-02-24 ENCOUNTER — PATIENT MESSAGE (OUTPATIENT)
Dept: NEPHROLOGY | Facility: CLINIC | Age: 62
End: 2017-02-24

## 2017-02-24 ENCOUNTER — TELEPHONE (OUTPATIENT)
Dept: NEPHROLOGY | Facility: CLINIC | Age: 62
End: 2017-02-24

## 2017-02-24 NOTE — TELEPHONE ENCOUNTER
From: Vladimir Glasgow  To: David Rodriguez MD  Sent: 2/24/2017 12:58 AM CST  Subject: Non-Urgent Medical Question    I can not split the carvedilol 6.25mg in half. I can cut it cleanly but it crumbles a bit. Plus I do not have many left.  Could you just

## 2017-02-24 NOTE — TELEPHONE ENCOUNTER
Patient sent a My Chart message to Dr. Mayda Cervantes to address this morning.  Waiting for Dr. Rudolph Marquez's response

## 2017-02-27 ENCOUNTER — TELEPHONE (OUTPATIENT)
Dept: INTERNAL MEDICINE CLINIC | Facility: CLINIC | Age: 62
End: 2017-02-27

## 2017-02-27 RX ORDER — LOSARTAN POTASSIUM 50 MG/1
50 TABLET ORAL DAILY
Qty: 90 TABLET | Refills: 2 | Status: SHIPPED | OUTPATIENT
Start: 2017-02-27 | End: 2018-02-23

## 2017-02-27 RX ORDER — CARVEDILOL 3.12 MG/1
3.12 TABLET ORAL 2 TIMES DAILY WITH MEALS
Qty: 60 TABLET | Refills: 2 | Status: SHIPPED | OUTPATIENT
Start: 2017-02-27 | End: 2017-05-24

## 2017-02-27 RX ORDER — LOSARTAN POTASSIUM 25 MG/1
25 TABLET ORAL EVERY EVENING
Qty: 30 TABLET | Refills: 1 | Status: SHIPPED | OUTPATIENT
Start: 2017-02-27 | End: 2017-04-21

## 2017-02-27 NOTE — PROGRESS NOTES
HPI:    Patient ID: Chidi Carrillo is a 58year old female. Diabetes  She presents for her follow-up diabetic visit. She has type 2 diabetes mellitus. Her disease course has been improving. There are no hypoglycemic associated symptoms.  Pertinent nega Positive for appetite change and fatigue. HENT: Negative. Eyes: Negative. Respiratory: Negative. Cardiovascular: Negative. Gastrointestinal: Negative. Endocrine: Negative. Genitourinary: Negative.     Musculoskeletal: Positive for back p Breath Activated INHALE 1 PUFF BY MOUTH DAILY Disp:  Rfl: 0   PRAMIPEXOLE DIHYDROCHLORIDE 1 MG Oral Tab TAKE 1 TABLET BY MOUTH 3 TIMES A DAY (Patient taking differently: Two tablets at nighttime) Disp: 270 tablet Rfl: 2   TRAZODONE  MG Oral Tab TAKE height 5' 2.5\" (1.588 m), weight 181 lb 6.4 oz (82.283 kg), SpO2 94 %, not currently breastfeeding. Body mass index is 32.63 kg/(m^2). PHYSICAL EXAM:   Physical Exam   Constitutional: She is oriented to person, place, and time.  She appears well-develope -weakness-unable to stand up from chair,difficulty with climbing stairs. unsteady gait. Skin: No rash noted. No erythema. Psychiatric: She has a normal mood and affect. Her behavior is normal. Thought content normal.   Nursing note and vitals reviewed. Menopause         Relevant Orders     XR DEXA BONE DENSITOMETRY (CPT=77080)          Return in about 6 weeks (around 3/22/2017).   PT UNDERSTANDS AND AGREES TO FOLLOW DIRECTIONS AND ADVICE     Orders Placed This Encounter  CBC W Differential W Platelet [E]

## 2017-02-27 NOTE — TELEPHONE ENCOUNTER
Sent the new prescription for coreg 3.125 mg BID    If you taking losartan 50 mg in am and 25 mg in evening than continue that dose.   I Sent the prescription of losartan 25 mg in evening

## 2017-02-27 NOTE — TELEPHONE ENCOUNTER
From: Chidi Carrillo  To: Liya Peña MD  Sent: 2/23/2017 3:38 PM CST  Subject: Non-Urgent Medical Question    I met with Dr. Roxy De Dios today and my protein continues to go down so she will be reducing the steroid from 20 mg 2x per day to 15 mg 2x per day.

## 2017-02-27 NOTE — TELEPHONE ENCOUNTER
Pt is asking if Pasha Guerrier really needs to see her 5-7 days. Pt states she is feeling better. Pt states she emailed a [employer] message also.      Encounter Messages  Expand All  Collapse All     Non-Urgent Medical Question      From   Nathan Morales    T

## 2017-02-27 NOTE — TELEPHONE ENCOUNTER
Dr. Rojas Goetz sent a new prescription for Carvedilil 3.125mg tablet to [facility] in [place].

## 2017-03-01 NOTE — TELEPHONE ENCOUNTER
The blood sugar starts dropping very slowly after the prednisone dose is lowered. I will need some blood sugar readings to drop this insulins. I do need to see her back to evaluate.   She does have a sliding scale to follow sewed please advised not to red

## 2017-03-01 NOTE — TELEPHONE ENCOUNTER
See 2/23/17 patient email encounter. From   Moku    To   Ron Martell MD    Sent   2/28/2017 12:13 AM         Yes the antibiotic along with the Claritin started working immediately.  What a relief.  And the cough is subsiding.  The real thi

## 2017-03-01 NOTE — TELEPHONE ENCOUNTER
[facility] Please transfer to ext [phone] or [phone]

## 2017-03-06 RX ORDER — TIOTROPIUM BROMIDE 18 UG/1
CAPSULE ORAL; RESPIRATORY (INHALATION)
Qty: 90 CAPSULE | Refills: 2 | Status: SHIPPED | OUTPATIENT
Start: 2017-03-06 | End: 2017-03-20

## 2017-03-08 ENCOUNTER — TELEPHONE (OUTPATIENT)
Dept: INTERNAL MEDICINE CLINIC | Facility: CLINIC | Age: 62
End: 2017-03-08

## 2017-03-08 NOTE — TELEPHONE ENCOUNTER
Cheryle/KHADAR stating that pt will need a PA on rx crestor.  They also would like to know if pt has tried and failed another other medication request?

## 2017-03-10 NOTE — TELEPHONE ENCOUNTER
Contacted [employer] spoke with rep Monk additional information provided to rep, claim under clinical review response time up to 48 hours. REF# [id number].

## 2017-03-13 ENCOUNTER — HOSPITAL ENCOUNTER (OUTPATIENT)
Dept: BONE DENSITY | Facility: HOSPITAL | Age: 62
Discharge: HOME | End: 2017-03-13
Attending: INTERNAL MEDICINE
Payer: MEDICARE

## 2017-03-13 DIAGNOSIS — G72.0 STEROID MYOPATHY: ICD-10-CM

## 2017-03-13 DIAGNOSIS — Z78.0 MENOPAUSE: ICD-10-CM

## 2017-03-13 DIAGNOSIS — T38.0X5A STEROID MYOPATHY: ICD-10-CM

## 2017-03-13 PROCEDURE — 77080 DXA BONE DENSITY AXIAL: CPT

## 2017-03-15 ENCOUNTER — PATIENT MESSAGE (OUTPATIENT)
Dept: INTERNAL MEDICINE CLINIC | Facility: CLINIC | Age: 62
End: 2017-03-15

## 2017-03-15 RX ORDER — OXCARBAZEPINE 300 MG/1
TABLET, FILM COATED ORAL
Qty: 405 TABLET | Refills: 1 | Status: SHIPPED | OUTPATIENT
Start: 2017-03-15 | End: 2017-07-11

## 2017-03-20 ENCOUNTER — OFFICE VISIT (OUTPATIENT)
Dept: INTERNAL MEDICINE CLINIC | Facility: CLINIC | Age: 62
End: 2017-03-20

## 2017-03-20 VITALS
HEIGHT: 64 IN | DIASTOLIC BLOOD PRESSURE: 76 MMHG | WEIGHT: 188 LBS | RESPIRATION RATE: 18 BRPM | HEART RATE: 87 BPM | SYSTOLIC BLOOD PRESSURE: 137 MMHG | BODY MASS INDEX: 32.1 KG/M2

## 2017-03-20 DIAGNOSIS — T38.0X5A STEROID MYOPATHY: ICD-10-CM

## 2017-03-20 DIAGNOSIS — E78.2 MIXED HYPERLIPIDEMIA: Primary | ICD-10-CM

## 2017-03-20 DIAGNOSIS — E11.9 TYPE 2 DIABETES MELLITUS WITHOUT COMPLICATION, WITH LONG-TERM CURRENT USE OF INSULIN (HCC): ICD-10-CM

## 2017-03-20 DIAGNOSIS — G72.0 STEROID MYOPATHY: ICD-10-CM

## 2017-03-20 DIAGNOSIS — G35 MULTIPLE SCLEROSIS (HCC): ICD-10-CM

## 2017-03-20 DIAGNOSIS — Z79.4 TYPE 2 DIABETES MELLITUS WITHOUT COMPLICATION, WITH LONG-TERM CURRENT USE OF INSULIN (HCC): ICD-10-CM

## 2017-03-20 PROCEDURE — 99214 OFFICE O/P EST MOD 30 MIN: CPT | Performed by: INTERNAL MEDICINE

## 2017-03-20 PROCEDURE — G0463 HOSPITAL OUTPT CLINIC VISIT: HCPCS | Performed by: INTERNAL MEDICINE

## 2017-03-20 RX ORDER — BUDESONIDE AND FORMOTEROL FUMARATE DIHYDRATE 160; 4.5 UG/1; UG/1
AEROSOL RESPIRATORY (INHALATION)
Qty: 1 INHALER | Refills: 6 | Status: SHIPPED | OUTPATIENT
Start: 2017-03-20 | End: 2017-05-03

## 2017-03-20 RX ORDER — LEVOFLOXACIN 500 MG/1
500 TABLET, FILM COATED ORAL DAILY
Qty: 10 TABLET | Refills: 0 | Status: SHIPPED | OUTPATIENT
Start: 2017-03-20 | End: 2017-03-30

## 2017-03-20 RX ORDER — PEN NEEDLE, DIABETIC 30 GX3/16"
NEEDLE, DISPOSABLE MISCELLANEOUS
Qty: 200 EACH | Refills: 3 | Status: SHIPPED | OUTPATIENT
Start: 2017-03-20 | End: 2017-06-14

## 2017-03-21 ENCOUNTER — PATIENT MESSAGE (OUTPATIENT)
Dept: INTERNAL MEDICINE CLINIC | Facility: CLINIC | Age: 62
End: 2017-03-21

## 2017-03-21 ENCOUNTER — APPOINTMENT (OUTPATIENT)
Dept: LAB | Age: 62
End: 2017-03-21
Attending: INTERNAL MEDICINE
Payer: MEDICARE

## 2017-03-21 DIAGNOSIS — N04.9 NEPHROTIC SYNDROME: ICD-10-CM

## 2017-03-21 LAB
CREAT UR-MCNC: 65.5 MG/DL
MICROALBUMIN UR-MCNC: 26.3 MG/DL (ref 0–1.8)
MICROALBUMIN/CREAT UR: 401.5 MG/G{CREAT} (ref 0–20)

## 2017-03-21 PROCEDURE — 82570 ASSAY OF URINE CREATININE: CPT

## 2017-03-21 PROCEDURE — 82043 UR ALBUMIN QUANTITATIVE: CPT

## 2017-03-21 NOTE — TELEPHONE ENCOUNTER
From: Shyam Méndez  To: Jose Sidhu MD  Sent: 3/21/2017 7:58 AM CDT  Subject: Non-Urgent Medical Question    I do not need any additional diabetes supplies other than the pen needles which I believe you ordered.  I do need the insulin, I think that m

## 2017-03-21 NOTE — ASSESSMENT & PLAN NOTE
Ongoing problems with muscle cramps, restless leg syndrome as well as muscle aches and pains–gradually worsening weakness after starting on the steroids. Referral for evaluation per Dr. GUTIERREZ–[phone] for an appointment.

## 2017-03-21 NOTE — ASSESSMENT & PLAN NOTE
Patient has been on rosuvastatin at 20 mg 1 tablet once daily and tolerating it well. Numerous crypts and has been requested as her insurance preferences have changed.   Will wait to see if the forms provided to her today will be effective in prior authori

## 2017-03-21 NOTE — ASSESSMENT & PLAN NOTE
Sugars seem stable. She has been on Humalog mix 7525 about 22 units 2 times daily with a sliding scale for sugars higher or lower. She has not had any significant low sugar reactions below ED. All blood sugars are below 180 for most part.   She does have

## 2017-03-21 NOTE — PROGRESS NOTES
HPI:    Patient ID: Glenroy Posadas is a 58year old female. HPI Comments: dexa scan    CONCLUSION:   1. Measured values for the are and PA lumbar spine are consistent with osteopenia and are associated with mildly elevated fracture risk for age.   2. M glucose is taken between 6-7 am. Her breakfast blood glucose range is generally 140-180 mg/dl. Her dinner blood glucose is taken between 6-7 pm. Her dinner blood glucose range is generally 140-180 mg/dl.  An ACE inhibitor/angiotensin II receptor blocker is OXCARBAZEPINE 300 MG Oral Tab TAKE 2 TABLETS BY MOUTH EVERY MORNING AND 2 & 1/2 TABLETS AT BEDTIME Disp: 405 tablet Rfl: 1   carvedilol (COREG) 3.125 MG Oral Tab Take 1 tablet (3.125 mg total) by mouth 2 (two) times daily with meals.  Disp: 60 tablet Rfl: Disp:  Rfl:    SPIRIVA HANDIHALER 18 MCG Inhalation Cap INHALE THE CONTENTS OF 1 CAPSULE VIA HANDIHALER SAME TIME EVERY DAY Disp: 90 capsule Rfl: 2   aspirin 81 MG Oral Tab Take 81 mg by mouth daily.  Disp:  Rfl:    Insulin Lispro Prot & Lispro (HUMALOG MIX person, place, and time. She has normal reflexes. No cranial nerve deficit. She exhibits normal muscle tone. Coordination normal.   Skin: No rash noted. No erythema. Psychiatric: She has a normal mood and affect.  Her behavior is normal. Thought content n completed per Dr. Patricia Haywood. Proteinuria still continues to be an issue. 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               Return in about 4 weeks (around 4/17/2

## 2017-03-21 NOTE — TELEPHONE ENCOUNTER
Called patient printed after visit summary from yesterday 3/20/17 and mailed to patient. Gave patient Dr Teodora Bull number per note below. Pen needles already sent to pharmacy on 3/20/17. Advised to return in about 4 weeks (around 4/17/2017).  Appointment made f

## 2017-03-21 NOTE — ASSESSMENT & PLAN NOTE
Increasing weakness both limb as well as pelvic girdle. Patient can tolerate some amount of exercise but has had increasing reluctance to follow through. Advised to follow-up with Dr. Tiffanie Smart for evaluation for physical therapy.

## 2017-03-21 NOTE — TELEPHONE ENCOUNTER
From: Vladimir Glasgow  To: Travis Cheney MD  Sent: 3/15/2017 11:40 PM CDT  Subject: Non-Urgent Medical Question    I just recently discovered that [facility] is not a preferred provider, therefore my costs were higher,under [facility].  So, effective March

## 2017-03-21 NOTE — PATIENT INSTRUCTIONS
Problem List Items Addressed This Visit        Unprioritized    Hyperlipidemia - Primary     Patient has been on rosuvastatin at 20 mg 1 tablet once daily and tolerating it well.   Numerous crypts and has been requested as her insurance preferences have alexus

## 2017-03-23 ENCOUNTER — OFFICE VISIT (OUTPATIENT)
Dept: NEPHROLOGY | Facility: CLINIC | Age: 62
End: 2017-03-23

## 2017-03-23 VITALS
BODY MASS INDEX: 32.44 KG/M2 | SYSTOLIC BLOOD PRESSURE: 115 MMHG | RESPIRATION RATE: 18 BRPM | WEIGHT: 190 LBS | HEIGHT: 64 IN | HEART RATE: 97 BPM | DIASTOLIC BLOOD PRESSURE: 80 MMHG | TEMPERATURE: 98 F

## 2017-03-23 DIAGNOSIS — N18.30 CKD (CHRONIC KIDNEY DISEASE), STAGE III (HCC): ICD-10-CM

## 2017-03-23 DIAGNOSIS — N05.1 FSGS (FOCAL SEGMENTAL GLOMERULOSCLEROSIS): Primary | ICD-10-CM

## 2017-03-23 PROCEDURE — 99214 OFFICE O/P EST MOD 30 MIN: CPT | Performed by: INTERNAL MEDICINE

## 2017-03-23 PROCEDURE — G0463 HOSPITAL OUTPT CLINIC VISIT: HCPCS | Performed by: INTERNAL MEDICINE

## 2017-03-23 RX ORDER — FUROSEMIDE 40 MG/1
TABLET ORAL
Qty: 135 TABLET | Refills: 1 | Status: SHIPPED | OUTPATIENT
Start: 2017-03-23 | End: 2017-04-21

## 2017-03-23 RX ORDER — FUROSEMIDE 40 MG/1
60 TABLET ORAL DAILY
Qty: 30 TABLET | Refills: 1 | Status: SHIPPED | OUTPATIENT
Start: 2017-03-23 | End: 2017-03-23

## 2017-03-23 NOTE — PROGRESS NOTES
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 22% of gloms globally sclerosed.  Negative IFA and EM showed diffuse effacement of podocyte foot process and no immune deposits iden Potassium 25 MG Oral Tab Take 1 tablet (25 mg total) by mouth every evening. In addition to 50 mg in morning Disp: 30 tablet Rfl: 1   Losartan Potassium 50 MG Oral Tab Take 1 tablet (50 mg total) by mouth daily.  Disp: 90 tablet Rfl: 2   predniSONE 10 MG Or INHALE THE CONTENTS OF 1 CAPSULE VIA HANDIHALER SAME TIME EVERY DAY Disp: 90 capsule Rfl: 2   aspirin 81 MG Oral Tab Take 81 mg by mouth daily.  Disp:  Rfl:    fluconazole 150 MG Oral Tab Take 1 po x 1, repeat in 1 week Disp: 2 tablet Rfl: 3   RaNITidine HC pulses normal  Skin/Hair: no abnormal bruising noted  Back/Spine: no abnormalities noted  Musculoskeletal: joint aches - linda knee   Extremities: + 2-3 edema  - bit worse  Neurological:  Grossly normal       ASSESSMENT/PLAN:     1.  Nephrotic syndome: initia

## 2017-03-23 NOTE — PATIENT INSTRUCTIONS
Reduce prednisone to 10 mg twice a day for 2 weeks  Reduce to prednisone to 5 mg BID   Follow up in 4 weeks   Increase lasix 60 mg daily  Daily weights  Blood work and urine test

## 2017-03-23 NOTE — TELEPHONE ENCOUNTER
Called [employer] spoke with rep Jerrica Goddard to check status of PA for Rosuvastatin Calcium; it was denied. Rep is faxing over denial letter.

## 2017-03-24 RX ORDER — INSULIN LISPRO 100 [IU]/ML
INJECTION, SUSPENSION SUBCUTANEOUS
Qty: 10 PEN | Refills: 3 | Status: SHIPPED | OUTPATIENT
Start: 2017-03-24 | End: 2017-11-01

## 2017-03-24 NOTE — TELEPHONE ENCOUNTER
PA denied. Plan states patient must try and fail two formulary alternatives; atorvastatin, lovastatin, pravastatin, and simvastatin.

## 2017-03-25 NOTE — TELEPHONE ENCOUNTER
Please let pt know this. change crestor to lipitor 10 mgs a day and recheck cmp,lipid panel and cpk in 6 weeks

## 2017-03-28 NOTE — TELEPHONE ENCOUNTER
Spoke with patient and she was able to get the Rosuvastatin. She cannot take lipitor because it does not work for her. Patient states lipid levels stayed elevated and she was not successful on that medication.  Instructed to have labs done in 6 weeks; hamida

## 2017-04-05 ENCOUNTER — TELEPHONE (OUTPATIENT)
Dept: INTERNAL MEDICINE CLINIC | Facility: CLINIC | Age: 62
End: 2017-04-05

## 2017-04-05 NOTE — TELEPHONE ENCOUNTER
Per she an appt in 4 wks but BOLIVAR told her to do her 6 wks blood test.  Pt would rayo to know what to do then. Visit first then blood test or move her appt in 2 wlks?   Pt would like to know also what consist in Physical and Paps visit compare to an Office v

## 2017-04-13 ENCOUNTER — PATIENT MESSAGE (OUTPATIENT)
Dept: NEPHROLOGY | Facility: CLINIC | Age: 62
End: 2017-04-13

## 2017-04-13 RX ORDER — PREDNISONE 1 MG/1
5 TABLET ORAL 2 TIMES DAILY
Qty: 180 TABLET | Refills: 0 | Status: SHIPPED | OUTPATIENT
Start: 2017-04-13 | End: 2017-04-21

## 2017-04-13 NOTE — TELEPHONE ENCOUNTER
From: Bernice Garcia  To: Timothy Dubose MD  Sent: 4/13/2017 7:09 AM CDT  Subject: Non-Urgent Medical Question    The increase in lasix from 40 to 60, did not result in a decrease in ankle swelling. I started 5mg of predisone as of 4/7/17.  I will n

## 2017-04-13 NOTE — TELEPHONE ENCOUNTER
Please sent prednisone 5 mg twice a day for 90 days prescription and update med list and let patient know     thanks

## 2017-04-18 ENCOUNTER — APPOINTMENT (OUTPATIENT)
Dept: LAB | Age: 62
End: 2017-04-18
Attending: INTERNAL MEDICINE
Payer: MEDICARE

## 2017-04-18 DIAGNOSIS — N05.1 FSGS (FOCAL SEGMENTAL GLOMERULOSCLEROSIS): ICD-10-CM

## 2017-04-18 DIAGNOSIS — N18.30 CKD (CHRONIC KIDNEY DISEASE), STAGE III (HCC): ICD-10-CM

## 2017-04-18 PROCEDURE — 82570 ASSAY OF URINE CREATININE: CPT

## 2017-04-18 PROCEDURE — 82043 UR ALBUMIN QUANTITATIVE: CPT

## 2017-04-18 PROCEDURE — 36415 COLL VENOUS BLD VENIPUNCTURE: CPT

## 2017-04-18 PROCEDURE — 80069 RENAL FUNCTION PANEL: CPT

## 2017-04-21 ENCOUNTER — OFFICE VISIT (OUTPATIENT)
Dept: NEPHROLOGY | Facility: CLINIC | Age: 62
End: 2017-04-21

## 2017-04-21 ENCOUNTER — TELEPHONE (OUTPATIENT)
Dept: NEPHROLOGY | Facility: CLINIC | Age: 62
End: 2017-04-21

## 2017-04-21 VITALS
SYSTOLIC BLOOD PRESSURE: 96 MMHG | WEIGHT: 191 LBS | TEMPERATURE: 98 F | HEIGHT: 64 IN | BODY MASS INDEX: 32.61 KG/M2 | HEART RATE: 76 BPM | DIASTOLIC BLOOD PRESSURE: 62 MMHG

## 2017-04-21 DIAGNOSIS — N05.1 FSGS (FOCAL SEGMENTAL GLOMERULOSCLEROSIS): Primary | ICD-10-CM

## 2017-04-21 DIAGNOSIS — R80.9 PROTEINURIA, UNSPECIFIED TYPE: ICD-10-CM

## 2017-04-21 PROCEDURE — G0463 HOSPITAL OUTPT CLINIC VISIT: HCPCS | Performed by: INTERNAL MEDICINE

## 2017-04-21 PROCEDURE — 99214 OFFICE O/P EST MOD 30 MIN: CPT | Performed by: INTERNAL MEDICINE

## 2017-04-21 RX ORDER — FUROSEMIDE 20 MG/1
20 TABLET ORAL DAILY
Qty: 90 TABLET | Refills: 1 | Status: SHIPPED | OUTPATIENT
Start: 2017-04-21 | End: 2017-08-24 | Stop reason: DRUGHIGH

## 2017-04-21 RX ORDER — FUROSEMIDE 40 MG/1
40 TABLET ORAL DAILY
Qty: 90 TABLET | Refills: 1 | Status: SHIPPED | OUTPATIENT
Start: 2017-04-21 | End: 2017-08-24 | Stop reason: ALTCHOICE

## 2017-04-21 RX ORDER — LOSARTAN POTASSIUM 25 MG/1
25 TABLET ORAL EVERY EVENING
Qty: 90 TABLET | Refills: 1 | Status: SHIPPED | OUTPATIENT
Start: 2017-04-21 | End: 2017-10-17

## 2017-04-21 RX ORDER — PREDNISONE 1 MG/1
5 TABLET ORAL 2 TIMES DAILY
Qty: 180 TABLET | Refills: 0 | Status: SHIPPED | OUTPATIENT
Start: 2017-04-21 | End: 2017-04-24

## 2017-04-21 NOTE — PROGRESS NOTES
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 22% of gloms globally sclerosed.  Negative IFA and EM showed diffuse effacement of podocyte foot process and no immune deposits iden carvedilol (COREG) 3.125 MG Oral Tab Take 1 tablet (3.125 mg total) by mouth 2 (two) times daily with meals. Disp: 60 tablet Rfl: 2   Losartan Potassium 50 MG Oral Tab Take 1 tablet (50 mg total) by mouth daily.  Disp: 90 tablet Rfl: 2   ClonazePAM 0.5 MG Inhaler Rfl: 6   fluconazole 150 MG Oral Tab Take 1 po x 1, repeat in 1 week Disp: 2 tablet Rfl: 3   Methylcellulose, Laxative, (CITRUCEL) 500 MG Oral Tab Take 500 mg by mouth as needed.  Disp:  Rfl:    Dicyclomine HCl 20 MG Oral Tab TAKE 1 TABLET BY MOUTH noted  Back/Spine: no abnormalities noted  Musculoskeletal: joint aches - linda knee   Extremities: + 2-3 edema  - bit worse  Neurological:  Grossly normal       ASSESSMENT/PLAN:     1.  Nephrotic syndome: initial 24 hrs urine protein 3 grams and UACR 8 gms by mouth daily.          Imaging & Referrals:  None     4/21/2017  Leonel Rodriguez MD

## 2017-04-21 NOTE — PATIENT INSTRUCTIONS
Prednisone 5 mg in am and 2.5 mg in pm  Restrict water to 1.5 liters a day   Blood and urine test ordered

## 2017-04-21 NOTE — TELEPHONE ENCOUNTER
Dr. Maris Hylton printed a script for pt's furosemide. Not sure if pt needs it faxed to pharmacy or if she wanted the script? LMTCB.

## 2017-04-21 NOTE — TELEPHONE ENCOUNTER
Pt retd call. She would like RX for furosemide 20mg #30 w/ refills sent to pharmacy. She also wanted to make sure that RX for losartan was called into pharmacy. No need to call back unless further questions.       Current outpatient prescriptions:   •  L

## 2017-04-23 ENCOUNTER — PATIENT MESSAGE (OUTPATIENT)
Dept: NEPHROLOGY | Facility: CLINIC | Age: 62
End: 2017-04-23

## 2017-04-24 ENCOUNTER — OFFICE VISIT (OUTPATIENT)
Dept: INTERNAL MEDICINE CLINIC | Facility: CLINIC | Age: 62
End: 2017-04-24

## 2017-04-24 VITALS
DIASTOLIC BLOOD PRESSURE: 77 MMHG | RESPIRATION RATE: 16 BRPM | HEIGHT: 64 IN | BODY MASS INDEX: 32.27 KG/M2 | SYSTOLIC BLOOD PRESSURE: 136 MMHG | HEART RATE: 73 BPM | WEIGHT: 189 LBS

## 2017-04-24 DIAGNOSIS — T38.0X5A STEROID MYOPATHY: ICD-10-CM

## 2017-04-24 DIAGNOSIS — Z79.4 TYPE 2 DIABETES MELLITUS WITHOUT COMPLICATION, WITH LONG-TERM CURRENT USE OF INSULIN (HCC): Primary | ICD-10-CM

## 2017-04-24 DIAGNOSIS — E11.9 TYPE 2 DIABETES MELLITUS WITHOUT COMPLICATION, WITH LONG-TERM CURRENT USE OF INSULIN (HCC): Primary | ICD-10-CM

## 2017-04-24 DIAGNOSIS — R06.02 SHORTNESS OF BREATH: ICD-10-CM

## 2017-04-24 DIAGNOSIS — G72.0 STEROID MYOPATHY: ICD-10-CM

## 2017-04-24 DIAGNOSIS — M54.16 LUMBAR RADICULOPATHY: ICD-10-CM

## 2017-04-24 PROCEDURE — G0463 HOSPITAL OUTPT CLINIC VISIT: HCPCS | Performed by: INTERNAL MEDICINE

## 2017-04-24 PROCEDURE — 99214 OFFICE O/P EST MOD 30 MIN: CPT | Performed by: INTERNAL MEDICINE

## 2017-04-24 RX ORDER — PREDNISONE 1 MG/1
TABLET ORAL
Qty: 45 TABLET | Refills: 3 | COMMUNITY
Start: 2017-04-24 | End: 2017-10-04 | Stop reason: ALTCHOICE

## 2017-04-24 NOTE — TELEPHONE ENCOUNTER
From: Hoang Pradhan  To: Rachel Grayson MD  Sent: 4/23/2017 11:36 PM CDT  Subject: Non-Urgent Medical Question    I do no find notes from my Friday appointment with Dr. Maris Hylton for Friday April 21st. I do not believe that I received a copy.  Just wan

## 2017-04-25 NOTE — PATIENT INSTRUCTIONS
Problem List Items Addressed This Visit        Unprioritized    Dyspnea     Ongoing problems with cough–deep with yellow white expectoration. Air entry is very poor bilateral lungs.   Last chest x-ray done in September 2016 showed an enlarged heart with po between 101 120. Drink plenty of fluids.   Watch the diet is doing at this time and will consider taking the insulin doses down every 2 weeks if sugars permit         Relevant Medications    Glucose Blood (FREESTYLE LITE TEST) In Vitro Strip    Other Relev

## 2017-04-25 NOTE — ASSESSMENT & PLAN NOTE
Back pain with radiation down to the right buttock and right thigh and leg. Unable to bear weight on the leg. Some weakness worsening the baseline weakness at this time.   MRI of the lumbar spine has been ordered

## 2017-04-25 NOTE — ASSESSMENT & PLAN NOTE
Ongoing problems with cough–deep with yellow white expectoration. Air entry is very poor bilateral lungs. Last chest x-ray done in September 2016 showed an enlarged heart with possible congestive heart failure-like findings.   Will recheck a chest x-ray t

## 2017-04-25 NOTE — PROGRESS NOTES
HPI:    Patient ID: Harry Rowland is a 58year old female.     HPI Comments: Component                                      Latest Ref Rng               4/18/2017             3/21/2017             2/20/2017             1/23/2017                CREATININE nothing for the symptoms. The treatment provided moderate relief. Family history includes rheumatoid arthritis. Her past medical history is significant for diabetes and osteoarthritis. Cough  This is a new problem.  The current episode started more than 1 furosemide 40 MG Oral Tab Take 1 tablet (40 mg total) by mouth daily. Disp: 90 tablet Rfl: 1   Losartan Potassium 25 MG Oral Tab Take 1 tablet (25 mg total) by mouth every evening.  In addition to 50 mg in morning Disp: 90 tablet Rfl: 1   furosemide 20 MG TAKE 1 TABLET BY MOUTH 3 TIMES A DAY AS NEEDED FOR ABDOMINAL PAIN Disp:  Rfl: 0   PRAMIPEXOLE DIHYDROCHLORIDE 1 MG Oral Tab TAKE 1 TABLET BY MOUTH 3 TIMES A DAY (Patient taking differently: Two tablets at nighttime) Disp: 270 tablet Rfl: 2   TRAZODONE HCL Musculoskeletal: Normal range of motion. She exhibits no edema or tenderness. Lymphadenopathy:     She has no cervical adenopathy. Neurological: She is alert and oriented to person, place, and time. She has normal reflexes. No cranial nerve deficit. cervical.  Patient has have an appointment with Dr. Brad Arellano in the next few weeks–will follow-up after evaluation is completed. We will consider increasing physical therapy after evaluation.          Relevant Orders    MRI SPINE LUMBAR (CPT=72148) (Completed)

## 2017-04-25 NOTE — ASSESSMENT & PLAN NOTE
Ongoing weakness bilateral limbs as well as the pelvic girdle. Complicated presentation as has weakness from multiple sclerosis in addition. Currently with worsening right hip and thigh pain–radicular symptoms.   Does have a history of spinal stenosis bot

## 2017-04-26 ENCOUNTER — OFFICE VISIT (OUTPATIENT)
Dept: GASTROENTEROLOGY | Facility: CLINIC | Age: 62
End: 2017-04-26

## 2017-04-26 VITALS
BODY MASS INDEX: 32.27 KG/M2 | SYSTOLIC BLOOD PRESSURE: 90 MMHG | WEIGHT: 189 LBS | HEIGHT: 64 IN | DIASTOLIC BLOOD PRESSURE: 60 MMHG

## 2017-04-26 VITALS
WEIGHT: 189 LBS | SYSTOLIC BLOOD PRESSURE: 90 MMHG | DIASTOLIC BLOOD PRESSURE: 64 MMHG | HEIGHT: 64 IN | BODY MASS INDEX: 32.27 KG/M2

## 2017-04-26 DIAGNOSIS — Z80.0 FAMILY HISTORY OF COLON CANCER: Primary | ICD-10-CM

## 2017-04-26 DIAGNOSIS — R19.4 CHANGE IN BOWEL HABITS: ICD-10-CM

## 2017-04-26 DIAGNOSIS — Z86.010 PERSONAL HISTORY OF COLONIC POLYPS: ICD-10-CM

## 2017-04-26 DIAGNOSIS — K52.838 OTHER MICROSCOPIC COLITIS: ICD-10-CM

## 2017-04-26 DIAGNOSIS — R19.7 DIARRHEA, UNSPECIFIED TYPE: ICD-10-CM

## 2017-04-26 DIAGNOSIS — K59.09 CHRONIC CONSTIPATION: Primary | ICD-10-CM

## 2017-04-26 DIAGNOSIS — K59.01 SLOW TRANSIT CONSTIPATION: ICD-10-CM

## 2017-04-26 DIAGNOSIS — Z80.0 FAMILY HISTORY OF COLON CANCER: ICD-10-CM

## 2017-04-26 PROCEDURE — 99214 OFFICE O/P EST MOD 30 MIN: CPT | Performed by: PHYSICIAN ASSISTANT

## 2017-04-26 PROCEDURE — G0463 HOSPITAL OUTPT CLINIC VISIT: HCPCS | Performed by: PHYSICIAN ASSISTANT

## 2017-04-26 NOTE — H&P
jillian   Microscopic colitis  Started having diarrhea   June of 2016 --> went into the kidney disease  Started steroids in December   Still on prednisone - now on 7 mg a day   Loose stools now and going more frequently     August 2016 - recall in 5 years

## 2017-04-26 NOTE — PATIENT INSTRUCTIONS
1. Daily laxative regimen to make you more regular. Goal is 1-2 easy bowel movements per day. Options:    A. Stool softeners - Colace/docusate - 2-6 pills per day (all at once or 1-2 twice per day)    B.   Daily dose of fiber - helps some people and davey

## 2017-04-26 NOTE — PROGRESS NOTES
HPI:    Patient ID: Sulma Robles is a 58year old woman with extremely complicated history of COPD/emphysema, multiple sclerosis, under therapy with Dr. Vee Singh for recent diagnosis nephrotic syndrome and focal segmental glomerular sclerosis shiva 20 suffered severe  watery nonbloody diarrhea June 2016 onwards. Colonoscopy exam in August apparently showed microscopic colitis. She was prescribed Lialda which she took for several weeks, possibly the 30 day prescription.   When it was time to refill, she mouth 2 (two) times daily with meals. Disp: 60 tablet Rfl: 2   Losartan Potassium 50 MG Oral Tab Take 1 tablet (50 mg total) by mouth daily.  Disp: 90 tablet Rfl: 2   ClonazePAM 0.5 MG Oral Tab Take 1 tablet (0.5 mg total) by mouth nightly as needed for Anx ASSESSMENT/PLAN:   No diagnosis found. 1.  Importance of smoking cessation discussed today. 2.  If diarrhea increases, could consider repeating the colonoscopy or checking fecal calprotectin.   She comes in asking about a very complex battery of f

## 2017-04-27 NOTE — PROGRESS NOTES
HPI:    Patient ID: Kellie Hung is a 58year old female. Duplicate office consult has been opened.   Please see other entry of today's date.    ======================    Nathalie Osuna MD at 4/26/2017  4:29 PM      Status: Sign at close e extensive renovations on her kitchen.      Long history of constipation before June 2016.  Previous use of laxatives including Senokot.     She suffered severe  watery diarrhea June 2016 onwards.  Colonoscopy exam in August apparently showed microscopic col BY MOUTH EVERY MORNING AND 2 & 1/2 TABLETS AT BEDTIME  Disp: 405 tablet  Rfl: 1    carvedilol (COREG) 3.125 MG Oral Tab  Take 1 tablet (3.125 mg total) by mouth 2 (two) times daily with meals.  Disp: 60 tablet  Rfl: 2    Losartan Potassium 50 MG Oral Tab  SAME TIME EVERY DAY  Disp: 80 capsule  Rfl: 2    aspirin 81 MG Oral Tab  Take 81 mg by mouth daily.  Disp:   Rfl:       Allergies:No Known Allergies   PHYSICAL EXAM:    Physical Exam            ASSESSMENT/PLAN:    No diagnosis found.       1.  Importance of

## 2017-04-29 ENCOUNTER — HOSPITAL ENCOUNTER (OUTPATIENT)
Dept: CV DIAGNOSTICS | Facility: HOSPITAL | Age: 62
Discharge: HOME | End: 2017-04-29
Attending: INTERNAL MEDICINE
Payer: MEDICARE

## 2017-04-29 ENCOUNTER — HOSPITAL ENCOUNTER (OUTPATIENT)
Dept: GENERAL RADIOLOGY | Facility: HOSPITAL | Age: 62
Discharge: HOME | End: 2017-04-29
Attending: INTERNAL MEDICINE
Payer: MEDICARE

## 2017-04-29 DIAGNOSIS — R06.02 SHORTNESS OF BREATH: ICD-10-CM

## 2017-04-29 PROCEDURE — 71020 XR CHEST PA + LAT CHEST (CPT=71020): CPT

## 2017-04-29 PROCEDURE — 93306 TTE W/DOPPLER COMPLETE: CPT

## 2017-04-29 PROCEDURE — 93306 TTE W/DOPPLER COMPLETE: CPT | Performed by: INTERNAL MEDICINE

## 2017-05-01 ENCOUNTER — HOSPITAL ENCOUNTER (OUTPATIENT)
Dept: MRI IMAGING | Facility: HOSPITAL | Age: 62
Discharge: HOME | End: 2017-05-01
Attending: INTERNAL MEDICINE
Payer: MEDICARE

## 2017-05-01 DIAGNOSIS — T38.0X5A STEROID MYOPATHY: ICD-10-CM

## 2017-05-01 DIAGNOSIS — M54.16 LUMBAR RADICULOPATHY: ICD-10-CM

## 2017-05-01 DIAGNOSIS — G72.0 STEROID MYOPATHY: ICD-10-CM

## 2017-05-01 PROCEDURE — 72148 MRI LUMBAR SPINE W/O DYE: CPT

## 2017-05-02 ENCOUNTER — TELEPHONE (OUTPATIENT)
Dept: NEUROLOGY | Facility: CLINIC | Age: 62
End: 2017-05-02

## 2017-05-03 ENCOUNTER — OFFICE VISIT (OUTPATIENT)
Dept: NEUROLOGY | Facility: CLINIC | Age: 62
End: 2017-05-03

## 2017-05-03 ENCOUNTER — TELEPHONE (OUTPATIENT)
Dept: NEUROLOGY | Facility: CLINIC | Age: 62
End: 2017-05-03

## 2017-05-03 ENCOUNTER — HOSPITAL ENCOUNTER (OUTPATIENT)
Dept: GENERAL RADIOLOGY | Facility: HOSPITAL | Age: 62
Discharge: HOME | End: 2017-05-03
Attending: PHYSICAL MEDICINE & REHABILITATION
Payer: MEDICARE

## 2017-05-03 VITALS
WEIGHT: 189 LBS | BODY MASS INDEX: 32.27 KG/M2 | RESPIRATION RATE: 16 BRPM | HEIGHT: 64 IN | DIASTOLIC BLOOD PRESSURE: 80 MMHG | SYSTOLIC BLOOD PRESSURE: 130 MMHG | HEART RATE: 80 BPM

## 2017-05-03 DIAGNOSIS — M85.861 OSTEOPENIA OF RIGHT LOWER LEG: ICD-10-CM

## 2017-05-03 DIAGNOSIS — M70.61 GREATER TROCHANTERIC BURSITIS OF RIGHT HIP: ICD-10-CM

## 2017-05-03 DIAGNOSIS — M54.16 LUMBAR RADICULOPATHY: Primary | ICD-10-CM

## 2017-05-03 DIAGNOSIS — M79.651 RIGHT THIGH PAIN: ICD-10-CM

## 2017-05-03 DIAGNOSIS — G35 MULTIPLE SCLEROSIS (HCC): ICD-10-CM

## 2017-05-03 DIAGNOSIS — M48.061 LUMBAR STENOSIS: ICD-10-CM

## 2017-05-03 PROCEDURE — 99204 OFFICE O/P NEW MOD 45 MIN: CPT | Performed by: PHYSICAL MEDICINE & REHABILITATION

## 2017-05-03 PROCEDURE — 73552 X-RAY EXAM OF FEMUR 2/>: CPT

## 2017-05-03 RX ORDER — TRAMADOL HYDROCHLORIDE 50 MG/1
50 TABLET ORAL EVERY 8 HOURS PRN
Qty: 90 TABLET | Refills: 0 | Status: SHIPPED | OUTPATIENT
Start: 2017-05-03 | End: 2017-07-04

## 2017-05-03 NOTE — TELEPHONE ENCOUNTER
[employer] for insurance coverage of right L3 & L4 TFESI cpt codes [id number], [id number], [id number]. Insurance was verified and procedure is a covered benefit and does not require authorization.   Pt. is scheduled for procedure on [id number]

## 2017-05-03 NOTE — PROGRESS NOTES
At end of visit, patient received printed and verbal instructions for lumbar epidural injection, to be done at outpatient surgery center on 5/15/17. Patient was instructed to avoid aspirin, NSAIDs, fish oil, multivit and vit.  E for one week before injectio

## 2017-05-03 NOTE — PATIENT INSTRUCTIONS
- get right thigh xray  - schedule emg for your right leg  - Consider the back injection - epidural.  Stop for 7 days before injection- all aspirin, advil, aleve, fish oil, vitamin E and similar products. Including any medications combined with ibuprofen. for controlled substances: Written prescriptions  · Written prescriptions must be picked up in office. · Please allow the office 48-72 hours to fill the prescription as our physicians rotate between multiple offices and procedure days in the hospitals.   ·

## 2017-05-03 NOTE — PROGRESS NOTES
History of Present Illness:   Patient presents with:  Leg Pain: Referred by Dr. Annita Keller for \"severe pain in my right leg\" for one month. Had L-spine MRI on 5/01/17. History of MS, being managed by [facility].    57 yo woman referred by Dr Annita Keller for right LITE TEST) In Vitro Strip 1 each by Other route 2 (two) times daily. Disp: 200 strip Rfl: 1   furosemide 40 MG Oral Tab Take 1 tablet (40 mg total) by mouth daily.  Disp: 90 tablet Rfl: 1   Losartan Potassium 25 MG Oral Tab Take 1 tablet (25 mg total) by mo Breath Activated INHALE 1 PUFF BY MOUTH DAILY Disp:  Rfl: 0   Dicyclomine HCl 20 MG Oral Tab TAKE 1 TABLET BY MOUTH 3 TIMES A DAY AS NEEDED FOR ABDOMINAL PAIN Disp:  Rfl: 0   PRAMIPEXOLE DIHYDROCHLORIDE 1 MG Oral Tab TAKE 1 TABLET BY MOUTH 3 TIMES A DAY (P forconstipation, diarrhea  Genitourinary:negative for urinary incontinence  Integument/breast: negative for rash  Hematologic/lymphatic: negative for bleeding  Musculoskeletal: see hpi  Neurological: negative for tremors  Behavioral/Psych: negative for sle leg  Right thigh pain  Greater trochanteric bursitis of right hip  Multiple sclerosis ([address]    Plan:  1. Reviewed mri lumbar for the mild stenosis and xrays for pelvic obliquity  2.  Xray right femur given history of steroid use, assess for fracture  3. emg

## 2017-05-09 ENCOUNTER — LAB ENCOUNTER (OUTPATIENT)
Dept: LAB | Age: 62
End: 2017-05-09
Attending: INTERNAL MEDICINE
Payer: MEDICARE

## 2017-05-09 DIAGNOSIS — R06.02 SHORTNESS OF BREATH: Primary | ICD-10-CM

## 2017-05-09 DIAGNOSIS — Z79.4 TYPE 2 DIABETES MELLITUS WITHOUT COMPLICATION, WITH LONG-TERM CURRENT USE OF INSULIN (HCC): ICD-10-CM

## 2017-05-09 DIAGNOSIS — E11.9 TYPE 2 DIABETES MELLITUS WITHOUT COMPLICATION, WITH LONG-TERM CURRENT USE OF INSULIN (HCC): ICD-10-CM

## 2017-05-09 PROCEDURE — 83036 HEMOGLOBIN GLYCOSYLATED A1C: CPT

## 2017-05-09 PROCEDURE — 80053 COMPREHEN METABOLIC PANEL: CPT

## 2017-05-09 PROCEDURE — 80061 LIPID PANEL: CPT

## 2017-05-09 PROCEDURE — 84443 ASSAY THYROID STIM HORMONE: CPT

## 2017-05-09 PROCEDURE — 83880 ASSAY OF NATRIURETIC PEPTIDE: CPT

## 2017-05-09 PROCEDURE — 85025 COMPLETE CBC W/AUTO DIFF WBC: CPT

## 2017-05-09 PROCEDURE — 36415 COLL VENOUS BLD VENIPUNCTURE: CPT

## 2017-05-11 ENCOUNTER — PATIENT MESSAGE (OUTPATIENT)
Dept: INTERNAL MEDICINE CLINIC | Facility: CLINIC | Age: 62
End: 2017-05-11

## 2017-05-13 NOTE — TELEPHONE ENCOUNTER
From: Peace Wing  To: Cherelle Anton MD  Sent: 5/11/2017 4:59 PM CDT  Subject: Non-Urgent Medical Question    Have you found out any information on the Cat Scan needed for my chest ex-ray?  I am planning a trip out to [place] on May 26, to see our gr

## 2017-05-15 ENCOUNTER — OFFICE VISIT (OUTPATIENT)
Dept: SURGERY | Facility: CLINIC | Age: 62
End: 2017-05-15

## 2017-05-15 ENCOUNTER — TELEPHONE (OUTPATIENT)
Dept: INTERNAL MEDICINE CLINIC | Facility: CLINIC | Age: 62
End: 2017-05-15

## 2017-05-15 DIAGNOSIS — N04.9 NEPHROTIC SYNDROME: Primary | ICD-10-CM

## 2017-05-15 DIAGNOSIS — M54.16 SPINAL STENOSIS OF LUMBAR REGION WITH RADICULOPATHY: Primary | ICD-10-CM

## 2017-05-15 DIAGNOSIS — M48.061 SPINAL STENOSIS OF LUMBAR REGION WITH RADICULOPATHY: Primary | ICD-10-CM

## 2017-05-15 PROCEDURE — 64484 NJX AA&/STRD TFRM EPI L/S EA: CPT | Performed by: PHYSICAL MEDICINE & REHABILITATION

## 2017-05-15 PROCEDURE — 64483 NJX AA&/STRD TFRM EPI L/S 1: CPT | Performed by: PHYSICAL MEDICINE & REHABILITATION

## 2017-05-15 NOTE — TELEPHONE ENCOUNTER
Pt states she had Spine Epidural today (Steroid Injection), states she was told her insulin might go up. Pt states she is asking to speak to nurse to know what to do, states she would like advise.    Pt also has a few other questions, please advise on prev

## 2017-05-15 NOTE — TELEPHONE ENCOUNTER
Notes Recorded by Greta Callahan MD on 5/9/2017 at 11:02 AM  We will need to do a CT scan–I will check on type of CAT scan needed as the contrast used for the CAT scan causes worsening kidney damage. Stop smoking please.     Please also refer to 05/06 enco

## 2017-05-16 NOTE — TELEPHONE ENCOUNTER
Pt contacted and ct scan has been ordered,check labs after test is completed in 3-5 days. hydrate well before and after tests

## 2017-05-18 ENCOUNTER — APPOINTMENT (OUTPATIENT)
Dept: LAB | Facility: HOSPITAL | Age: 62
End: 2017-05-18
Attending: INTERNAL MEDICINE
Payer: MEDICARE

## 2017-05-18 ENCOUNTER — HOSPITAL ENCOUNTER (OUTPATIENT)
Dept: CT IMAGING | Facility: HOSPITAL | Age: 62
Discharge: HOME | End: 2017-05-18
Attending: INTERNAL MEDICINE
Payer: MEDICARE

## 2017-05-18 DIAGNOSIS — R91.1 LUNG NODULE SEEN ON IMAGING STUDY: ICD-10-CM

## 2017-05-18 DIAGNOSIS — N04.9 NEPHROTIC SYNDROME: ICD-10-CM

## 2017-05-18 PROCEDURE — 82565 ASSAY OF CREATININE: CPT

## 2017-05-18 PROCEDURE — 36415 COLL VENOUS BLD VENIPUNCTURE: CPT

## 2017-05-18 PROCEDURE — 71260 CT THORAX DX C+: CPT | Performed by: INTERNAL MEDICINE

## 2017-05-18 PROCEDURE — 80048 BASIC METABOLIC PNL TOTAL CA: CPT

## 2017-05-18 NOTE — PROCEDURES
[facility]      PATIENT'S NAME:  Ry Woo [place] RECORD #:          DATE OF BIRTH:  2/1/1955  PROCEDURE DATE:  5/15/2017  OPERATING PHYSICIAN:  Oliver Galicia MD      OPERATIVE REPORT      PREOPERATIVE DIAGNOSIS: a cart. Patient was taken back to the recovery area and discharged from there when stable.

## 2017-05-19 ENCOUNTER — TELEPHONE (OUTPATIENT)
Dept: INTERNAL MEDICINE CLINIC | Facility: CLINIC | Age: 62
End: 2017-05-19

## 2017-05-23 ENCOUNTER — APPOINTMENT (OUTPATIENT)
Dept: LAB | Age: 62
End: 2017-05-23
Attending: INTERNAL MEDICINE
Payer: MEDICARE

## 2017-05-23 DIAGNOSIS — N04.9 NEPHROTIC SYNDROME: ICD-10-CM

## 2017-05-23 PROCEDURE — 80048 BASIC METABOLIC PNL TOTAL CA: CPT

## 2017-05-23 PROCEDURE — 36415 COLL VENOUS BLD VENIPUNCTURE: CPT

## 2017-05-25 ENCOUNTER — OFFICE VISIT (OUTPATIENT)
Dept: INTERNAL MEDICINE CLINIC | Facility: CLINIC | Age: 62
End: 2017-05-25

## 2017-05-25 VITALS
WEIGHT: 190.88 LBS | RESPIRATION RATE: 20 BRPM | OXYGEN SATURATION: 92 % | BODY MASS INDEX: 32.59 KG/M2 | HEART RATE: 93 BPM | DIASTOLIC BLOOD PRESSURE: 73 MMHG | TEMPERATURE: 98 F | HEIGHT: 64 IN | SYSTOLIC BLOOD PRESSURE: 122 MMHG

## 2017-05-25 DIAGNOSIS — M54.16 LUMBAR RADICULOPATHY: ICD-10-CM

## 2017-05-25 DIAGNOSIS — T38.0X5A STEROID MYOPATHY: ICD-10-CM

## 2017-05-25 DIAGNOSIS — E11.9 TYPE 2 DIABETES MELLITUS WITHOUT COMPLICATION, WITH LONG-TERM CURRENT USE OF INSULIN (HCC): ICD-10-CM

## 2017-05-25 DIAGNOSIS — Z79.4 TYPE 2 DIABETES MELLITUS WITHOUT COMPLICATION, WITH LONG-TERM CURRENT USE OF INSULIN (HCC): ICD-10-CM

## 2017-05-25 DIAGNOSIS — N04.9 NEPHROTIC SYNDROME: ICD-10-CM

## 2017-05-25 DIAGNOSIS — J43.9 PULMONARY EMPHYSEMA, UNSPECIFIED EMPHYSEMA TYPE (HCC): Primary | ICD-10-CM

## 2017-05-25 DIAGNOSIS — Z51.81 THERAPEUTIC DRUG MONITORING: ICD-10-CM

## 2017-05-25 DIAGNOSIS — G72.0 STEROID MYOPATHY: ICD-10-CM

## 2017-05-25 PROCEDURE — G0463 HOSPITAL OUTPT CLINIC VISIT: HCPCS | Performed by: INTERNAL MEDICINE

## 2017-05-25 PROCEDURE — 99214 OFFICE O/P EST MOD 30 MIN: CPT | Performed by: INTERNAL MEDICINE

## 2017-05-25 RX ORDER — CARVEDILOL 3.12 MG/1
TABLET ORAL
Qty: 180 TABLET | Refills: 1 | Status: SHIPPED | OUTPATIENT
Start: 2017-05-25 | End: 2017-11-16

## 2017-05-25 RX ORDER — LIDOCAINE 50 MG/G
1 PATCH TOPICAL EVERY 24 HOURS
Qty: 30 PATCH | Refills: 3 | Status: SHIPPED | OUTPATIENT
Start: 2017-05-25 | End: 2017-06-14

## 2017-05-25 NOTE — PROGRESS NOTES
HPI:    Patient ID: Kasey Zapata is a 58year old female.     HPI Comments: Ct chest to evaluate for nodules    CONCLUSION:   1.  No acute pulmonary embolus to the segmental pulmonary artery level.    2.  Pulmonary emphysema.  Airway changes compatible mg/dl. Her dinner blood glucose is taken between 6-7 pm. Her dinner blood glucose range is generally 140-180 mg/dl. An ACE inhibitor/angiotensin II receptor blocker is being taken. She does not see a podiatrist.Eye exam is not current.    Kidney Problem  Th meals (Patient taking differently: 10-12 units 2 times a day with meals ) Disp: 10 pen Rfl: 3   Insulin Pen Needle (PEN NEEDLES) 31G X 8 MM Does not apply Misc 15 units BID  PLEASE DISPENSE PEN NEEDLES APPROPRIATE FOR NOVOLOG FLEXPEN Disp: 200 each Rfl: 3 Disp:  Rfl:    CARVEDILOL 3.125 MG Oral Tab TAKE 1 TABLET BY MOUTH TWICE DAILY Disp: 180 tablet Rfl: 1     Allergies:No Known Allergies    Blood pressure 122/73, pulse 93, temperature 97.8 °F (36.6 °C), temperature source Oral, resp.  rate 20, height 5' 4\" complication, with long-term current use of insulin (hcc)  Nephrotic syndrome  Lumbar radiculopathy  Therapeutic drug monitoring    Problem List Items Addressed This Visit        Unprioritized    Emphysema lung ([address] - Primary     Last pft 2016  LUNG VOLUM times a day for mild pain. She also has been struggling with MS and small fiber neuropathy related pain. She has been on Trileptal for management of the pain 300 mg 2 tablets in the morning and 2-1/2 tablets at bedtime.   Due for recheck labs for evaluati AND AGREES TO FOLLOW DIRECTIONS AND ADVICE      Orders Placed This Encounter  Oxcarbazepine (Trileptal), Serum  CBC W Differential W Platelet [E]  Comp Metabolic Panel (14) [E]  Hemoglobin A1C [E]  Lipid Panel [E]  TSH [E]  Urinalysis, Routine [E]    Meds

## 2017-05-25 NOTE — ASSESSMENT & PLAN NOTE
With fatigue, lower extremity weakness and decreased exercise tolerance. Advised to start daily exercise like fidencio chi to slowly build up strength in the legs the lower extremity muscles.   As the doses of the steroid is being gradually tapered down this sh

## 2017-05-25 NOTE — ASSESSMENT & PLAN NOTE
TIPS variant FSGS per kidney biopsy causing her current problems with nephrotic syndrome. Microalbuminuria has gradually improved. She is on tapering doses of prednisone. Kidney functions–creatinine and EGFR have remained stable.   We will continue to mo

## 2017-05-25 NOTE — ASSESSMENT & PLAN NOTE
Last pft 2016  LUNG VOLUMES  TLC is 4.79 liters, 98% of predicted. RV is 1.88 liters, 102% of predicted. DIFFUSION CAPACITY: DLCO is 11.0 or 48% of predicted. Looking at the flow-volume loops, there is evidence of obstruction seen.     IMPRESSION  1.

## 2017-05-25 NOTE — TELEPHONE ENCOUNTER
Pt checking status on medication refills - pt states she does not have any more medication. Pls call. Thank you.

## 2017-05-25 NOTE — ASSESSMENT & PLAN NOTE
Home blood sugars seem much improved at this time. Currently dose of insulin is at about 10 units per day with blood sugars between . Advised to cut down the insulin to 8 units per day and watch the sugars carefully.   Let me know if persistent elev

## 2017-05-26 NOTE — ASSESSMENT & PLAN NOTE
Ongoing problems with low back pain radiating into the hip. Has been evaluated by both chiropractor as well as Dr. Laz Porras. She has not had much improvement with the initial steroid injections. Follow-up evaluation has been advised.   Lidocaine patches to th

## 2017-05-26 NOTE — PATIENT INSTRUCTIONS
Problem List Items Addressed This Visit        Unprioritized    Emphysema lung ([facility] [address] - Primary     Last pft 2016  LUNG VOLUMES  TLC is 4.79 liters, 98% of predicted. RV is 1.88 liters, 102% of predicted.     DIFFUSION CAPACITY: DLCO is 11.0 or 48% of predi for management of the pain 300 mg 2 tablets in the morning and 2-1/2 tablets at bedtime. Due for recheck labs for evaluation of the Trileptal levels. Orders have been provided today.   She is also been advised to follow-up with neurology for an evaluation

## 2017-06-07 ENCOUNTER — TELEPHONE (OUTPATIENT)
Dept: NEUROLOGY | Facility: CLINIC | Age: 62
End: 2017-06-07

## 2017-06-08 ENCOUNTER — OFFICE VISIT (OUTPATIENT)
Dept: NEUROLOGY | Facility: CLINIC | Age: 62
End: 2017-06-08

## 2017-06-08 VITALS — BODY MASS INDEX: 32.44 KG/M2 | HEIGHT: 64 IN | WEIGHT: 190 LBS

## 2017-06-08 DIAGNOSIS — G35 MULTIPLE SCLEROSIS (HCC): ICD-10-CM

## 2017-06-08 DIAGNOSIS — G62.9 NEUROPATHY: Primary | ICD-10-CM

## 2017-06-08 PROCEDURE — 95908 NRV CNDJ TST 3-4 STUDIES: CPT | Performed by: PHYSICAL MEDICINE & REHABILITATION

## 2017-06-08 PROCEDURE — 95886 MUSC TEST DONE W/N TEST COMP: CPT | Performed by: PHYSICAL MEDICINE & REHABILITATION

## 2017-06-09 NOTE — PROGRESS NOTES
See procedure note section for emg results -jenesn    emg showed neuropathy in the right leg but no radiculopathy or myopathy. -mk

## 2017-06-09 NOTE — PROCEDURES
[address]  [place] Reyna  Phone: [phone]  Fax: [phone]    ELECTRODIAGNOSTIC REPORT          Patient: Caden March YOB: 1955  Patient ID: [id number] Hand Dominance: rig 4. There is no electrodiagnostic evidence of a lumbosacral radiculopathy or myopathy of the right lower extremity.      Thank you for the referral.     Arya Lora MD  Diplomate, American Board of Physical Medicine and Rehabilitation           [employer]

## 2017-06-12 NOTE — PROGRESS NOTES
Left detailed message for patient notifying her of EMG results as noted by Dr. Aguilar Soto. Asked her to call the office if she had questions.

## 2017-06-14 ENCOUNTER — TELEPHONE (OUTPATIENT)
Dept: NEUROLOGY | Facility: CLINIC | Age: 62
End: 2017-06-14

## 2017-06-14 ENCOUNTER — OFFICE VISIT (OUTPATIENT)
Dept: NEUROLOGY | Facility: CLINIC | Age: 62
End: 2017-06-14

## 2017-06-14 VITALS
OXYGEN SATURATION: 93 % | RESPIRATION RATE: 18 BRPM | DIASTOLIC BLOOD PRESSURE: 68 MMHG | HEART RATE: 79 BPM | BODY MASS INDEX: 32 KG/M2 | SYSTOLIC BLOOD PRESSURE: 142 MMHG | WEIGHT: 189 LBS

## 2017-06-14 DIAGNOSIS — M48.061 LUMBAR STENOSIS: ICD-10-CM

## 2017-06-14 DIAGNOSIS — M47.816 LUMBAR FACET ARTHROPATHY: ICD-10-CM

## 2017-06-14 DIAGNOSIS — M54.16 LUMBAR RADICULOPATHY: Primary | ICD-10-CM

## 2017-06-14 PROCEDURE — 99214 OFFICE O/P EST MOD 30 MIN: CPT | Performed by: PHYSICAL MEDICINE & REHABILITATION

## 2017-06-14 NOTE — PATIENT INSTRUCTIONS
- Consider the back injection - epidural.  Stop for 7 days before injection- all aspirin, advil, aleve, fish oil, vitamin E and similar products. Including any medications combined with ibuprofen.      You need a  to get home even if you are not being follow up appointment. · To best provide you care, patients receiving routine medications need to be seen at least once a year.  protocol for controlled substances: Written prescriptions  · Written prescriptions must be picked up in office.   ·

## 2017-06-14 NOTE — PROGRESS NOTES
Patient has been scheduled for a L3-4 interlaminar epidural steroid injection under fluoroscopy  on 6/23/17 at the [facility]. Medications and allergies reviewed.  Patient informed to hold aspirins, nsaids, blood thinners, vitamins and fish oils 5-7 days prior to

## 2017-06-14 NOTE — TELEPHONE ENCOUNTER
L3-4 Interlaminar epidural steroid injecion is a covered benefit, patient was schedule for injection procedure 06/23/2017 before she left the office

## 2017-06-14 NOTE — PROGRESS NOTES
History of Present Illness:   Patient presents with:  Low Back Pain: pt here for follow up after RLE EMG on 6/8/17. pt c/o right side lower back pain and right outer right thigh. pain increases with walking and bending.    she still has right low back and r Take 5mg in the morning and 2.5mg in the evening Disp: 45 tablet Rfl: 3   furosemide 40 MG Oral Tab Take 1 tablet (40 mg total) by mouth daily. Disp: 90 tablet Rfl: 1   Losartan Potassium 25 MG Oral Tab Take 1 tablet (25 mg total) by mouth every evening.  I tablet Rfl: 2   TRAZODONE  MG Oral Tab TAKE 1 TABLET BY MOUTH AT BEDTIME (Patient taking differently: take 1/2 - 1 tablet by mouth at bedtime) Disp: 90 tablet Rfl: 2   Cholecalciferol (VITAMIN D) 2000 UNITS Oral Cap Take 2,000 Units by mouth daily. range of motion to flexion and extension, bilateral sidebending of lumbar spine. Worse right glute pain with right sidebending. No change to thigh pain. Posture: head and shoulders forward  Single leg stance:   bella decr balance  Gait: abnormal gait.  Trunc

## 2017-06-19 ENCOUNTER — APPOINTMENT (OUTPATIENT)
Dept: LAB | Age: 62
End: 2017-06-19
Attending: INTERNAL MEDICINE
Payer: MEDICARE

## 2017-06-19 DIAGNOSIS — R80.9 PROTEINURIA, UNSPECIFIED TYPE: ICD-10-CM

## 2017-06-19 DIAGNOSIS — N05.1 FSGS (FOCAL SEGMENTAL GLOMERULOSCLEROSIS): ICD-10-CM

## 2017-06-19 PROCEDURE — 80069 RENAL FUNCTION PANEL: CPT

## 2017-06-19 PROCEDURE — 82570 ASSAY OF URINE CREATININE: CPT

## 2017-06-19 PROCEDURE — 82043 UR ALBUMIN QUANTITATIVE: CPT

## 2017-06-19 PROCEDURE — 36415 COLL VENOUS BLD VENIPUNCTURE: CPT

## 2017-06-22 ENCOUNTER — OFFICE VISIT (OUTPATIENT)
Dept: NEPHROLOGY | Facility: CLINIC | Age: 62
End: 2017-06-22

## 2017-06-22 VITALS
HEART RATE: 76 BPM | DIASTOLIC BLOOD PRESSURE: 81 MMHG | TEMPERATURE: 98 F | BODY MASS INDEX: 33 KG/M2 | WEIGHT: 194.63 LBS | SYSTOLIC BLOOD PRESSURE: 143 MMHG

## 2017-06-22 DIAGNOSIS — N05.1 FSGS (FOCAL SEGMENTAL GLOMERULOSCLEROSIS): Primary | ICD-10-CM

## 2017-06-22 PROCEDURE — G0463 HOSPITAL OUTPT CLINIC VISIT: HCPCS | Performed by: INTERNAL MEDICINE

## 2017-06-22 PROCEDURE — 99214 OFFICE O/P EST MOD 30 MIN: CPT | Performed by: INTERNAL MEDICINE

## 2017-06-22 RX ORDER — METOLAZONE 2.5 MG/1
2.5 TABLET ORAL EVERY OTHER DAY
Qty: 30 TABLET | Refills: 0 | Status: SHIPPED | OUTPATIENT
Start: 2017-06-22 | End: 2017-07-11

## 2017-06-22 NOTE — PATIENT INSTRUCTIONS
Metolazone 2.5 mg everyother day for 3 doses   Daily weight at home - call back in 10 days with home weights  Follow up in 2 months  Reduce prednisone to 5 mg daily   Blood and urine test as ordered

## 2017-06-22 NOTE — PROGRESS NOTES
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 22% of gloms globally sclerosed.  Negative IFA and EM showed diffuse effacement of podocyte foot process and no immune deposits iden daily.  Taking with a 40mg, for total of 60mg per day ) Disp: 90 tablet Rfl: 1   Insulin Lispro Prot & Lispro (HUMALOG MIX 75/25 KWIKPEN) (75-25) 100 UNIT/ML SC SUPN 22 units 2 times a day with meals (Patient taking differently: 8-10 units 2 times a day wit Disp:  Rfl:    Dicyclomine HCl 20 MG Oral Tab TAKE 1 TABLET BY MOUTH 3 TIMES A DAY AS NEEDED FOR ABDOMINAL PAIN Disp:  Rfl: 0       Allergies:  No Known Allergies      ROS:     Constitutional:  Negative for decreased activity, fever, irritability and nate mg/dl with an eGFR >60 ml/min  - improve serum albumin and ACR reduce to 500 mg and remain stable   - 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

## 2017-06-23 ENCOUNTER — OFFICE VISIT (OUTPATIENT)
Dept: SURGERY | Facility: CLINIC | Age: 62
End: 2017-06-23

## 2017-06-23 ENCOUNTER — TELEPHONE (OUTPATIENT)
Dept: NEPHROLOGY | Facility: CLINIC | Age: 62
End: 2017-06-23

## 2017-06-23 DIAGNOSIS — M54.16 LUMBAR RADICULOPATHY: ICD-10-CM

## 2017-06-23 DIAGNOSIS — M48.061 LUMBAR STENOSIS: Primary | ICD-10-CM

## 2017-06-23 PROCEDURE — 62323 NJX INTERLAMINAR LMBR/SAC: CPT | Performed by: PHYSICAL MEDICINE & REHABILITATION

## 2017-06-23 NOTE — TELEPHONE ENCOUNTER
Current Outpatient Prescriptions:  metolazone 2.5 MG Oral Tab Take 1 tablet (2.5 mg total) by mouth every other day.  Disp: 30 tablet Rfl: 0     PA request pls call [phone] PT ID# [id number]

## 2017-06-23 NOTE — TELEPHONE ENCOUNTER
Received fax from insurance. Metolazone denied. Patient has to try Chlothaladone and fail before insurance will approve metolazone. Fax placed on Dr. Turcios Husbands desk to inform her of the decision.

## 2017-06-24 NOTE — PROCEDURES
[facility]      PATIENT'S NAME:  [address] [place] [address] MEDICAL RECORD #:   [id number]       DATE OF BIRTH:  2/1/1955  PROCEDURE DATE:  6/23/2017  OPERATING PHYSICIAN:  Jean Rao MD      OPERATIVE REPORT    PREOPERATIVE Nilay Jamia

## 2017-06-24 NOTE — PROGRESS NOTES
L3-4 ilesi under fluoro. Patient's  said she got better with last injection but didn't last.  She feels she had no relief. I informed her she had less right thigh pain.  She reports she had chronic low back pain - her goal is to have no pain and to

## 2017-07-06 RX ORDER — TRAMADOL HYDROCHLORIDE 50 MG/1
50 TABLET ORAL EVERY 8 HOURS PRN
Qty: 90 TABLET | Refills: 0
Start: 2017-07-06 | End: 2017-08-29

## 2017-07-06 NOTE — TELEPHONE ENCOUNTER
From: Glenroy Posadas  Sent: 7/4/2017 10:54 PM CDT  Subject: Medication Renewal Request    Glenroy Posadas would like a refill of the following medications:  TraMADol HCl 50 MG Oral Tab [Oliver Loera MD]    Preferred pharmacy: [place] [phone]

## 2017-07-06 NOTE — TELEPHONE ENCOUNTER
Medication request: Tramadol 50mg q8h prn pain    LOV 6/14/17 had L3-4 ILESI on 6/23/17  NOV 7/28/17    [place] 5[place]3/17 #90

## 2017-07-07 RX ORDER — CLONAZEPAM 0.5 MG/1
TABLET ORAL
Qty: 30 TABLET | Refills: 5 | OUTPATIENT
Start: 2017-07-07 | End: 2018-04-25

## 2017-07-10 ENCOUNTER — TELEPHONE (OUTPATIENT)
Dept: NEUROLOGY | Facility: CLINIC | Age: 62
End: 2017-07-10

## 2017-07-11 ENCOUNTER — OFFICE VISIT (OUTPATIENT)
Dept: NEUROLOGY | Facility: CLINIC | Age: 62
End: 2017-07-11

## 2017-07-11 VITALS
DIASTOLIC BLOOD PRESSURE: 74 MMHG | WEIGHT: 192 LBS | HEART RATE: 76 BPM | RESPIRATION RATE: 16 BRPM | BODY MASS INDEX: 32.78 KG/M2 | HEIGHT: 64 IN | SYSTOLIC BLOOD PRESSURE: 120 MMHG

## 2017-07-11 DIAGNOSIS — G35 MULTIPLE SCLEROSIS (HCC): ICD-10-CM

## 2017-07-11 DIAGNOSIS — M25.551 RIGHT HIP PAIN: Primary | ICD-10-CM

## 2017-07-11 PROCEDURE — 99214 OFFICE O/P EST MOD 30 MIN: CPT | Performed by: OTHER

## 2017-07-11 RX ORDER — OXCARBAZEPINE 300 MG/1
900 TABLET, FILM COATED ORAL 2 TIMES DAILY
Qty: 540 TABLET | Refills: 3 | Status: SHIPPED | OUTPATIENT
Start: 2017-07-11 | End: 2017-08-25

## 2017-07-11 NOTE — PROGRESS NOTES
Lopez Miller : 1955     HPI:   Patient presents with:  Multiple Sclerosis: LOV: 16. Patient states that she has a history of MS, diagnosed in [de-identified].  Patient states that she has been experiencing pain in front of right thigh that is constant w Her main complaint had been neck pain, and it was recommended that she have surgery for cervical stenosis.   She was reluctant to have any surgery, and eventually found some relief with Trileptal.  Currently she takes 600 in the morning and 750 at night, an Rfl:     ADVAIR DISKUS 250-50 MCG/DOSE Inhalation Aerosol Powder, Breath Activated INHALE 1 PUFF BY MOUTH DAILY Disp:  Rfl: 0   Dicyclomine HCl 20 MG Oral Tab TAKE 1 TABLET BY MOUTH 3 TIMES A DAY AS NEEDED FOR ABDOMINAL PAIN Disp:  Rfl: 0   Cholecalciferol Smoker                                                     Packs/day: 1.00      Years: 37.00          Types: Cigarettes    Smokeless tobacco: Current User                      Alcohol use:  No              Drug use: No            Other Topics            Con Pupil react to light. Fundoscopic exam reveals pale disc margins bilaterally. III,IV,VI- EOM full, with normal pursuit. V-Facial sensation intact, with symmetric corneal reflex. VII- face symmetric. VIII- Auditory acuity symmetric.  IX,X- Palate elevates

## 2017-07-12 ENCOUNTER — PATIENT MESSAGE (OUTPATIENT)
Dept: INTERNAL MEDICINE CLINIC | Facility: CLINIC | Age: 62
End: 2017-07-12

## 2017-07-12 ENCOUNTER — PATIENT MESSAGE (OUTPATIENT)
Dept: NEPHROLOGY | Facility: CLINIC | Age: 62
End: 2017-07-12

## 2017-07-12 DIAGNOSIS — M25.559 ARTHRALGIA OF HIP, UNSPECIFIED LATERALITY: Primary | ICD-10-CM

## 2017-07-12 DIAGNOSIS — Z51.89 ENCOUNTER FOR MEDICATION ADJUSTMENT: ICD-10-CM

## 2017-07-13 NOTE — TELEPHONE ENCOUNTER
From: Alanna Becerra  To: Jhonatan Jacobo MD  Sent: 7/12/2017 11:29 PM CDT  Subject: Non-Urgent Medical Question    I sent you a message on July 6th about [employer] and my weight.  Since I didn't hear back from you I checked to see my message and can't

## 2017-07-15 ENCOUNTER — PATIENT MESSAGE (OUTPATIENT)
Dept: INTERNAL MEDICINE CLINIC | Facility: CLINIC | Age: 62
End: 2017-07-15

## 2017-07-15 NOTE — TELEPHONE ENCOUNTER
This [employer] message copied and pasted in other encounter as the 2 are a continuum of questions that go together.

## 2017-07-15 NOTE — TELEPHONE ENCOUNTER
See mychart message and advise. Referral and Lab order pended if MD approves. Please also advise on last question about crestor.       From: Berenice Peterch />To: Betty Eller MD<BR />Sent: 7/12/2017 10:28 PM CDT<BR />Subject: Non-Urgent Medical Ira Isaac

## 2017-07-17 NOTE — TELEPHONE ENCOUNTER
I will advise to continue metolazone 2.5 mg every other day for now. Find out from pharmacy which alternate is covered through your insurance so we can substitute for that one. Weight is down to 192 from 195 kg which is an improvement.  We are not look

## 2017-07-18 DIAGNOSIS — I10 ESSENTIAL HYPERTENSION: ICD-10-CM

## 2017-07-18 NOTE — TELEPHONE ENCOUNTER
See 7/12/17 patient email encounter. Adán Davis MD   to Jesus Crane          7:22 PM   Berenice,   Try to stop the crestor for about 4 weeks to check if that helps. Labs have been ordered.       Last read by Jesus Crane at 3:19 PM on 7/17

## 2017-07-18 NOTE — TELEPHONE ENCOUNTER
From: Campos Rooney  To: Zeenat Andrade MD  Sent: 7/15/2017 10:58 PM CDT  Subject: Non-Urgent Medical Question    Is it possible that the muscle pain could be a result of the generic Crestor that I started in December 2016.  I'm trying to leave no stone

## 2017-07-19 ENCOUNTER — PATIENT MESSAGE (OUTPATIENT)
Dept: NEUROLOGY | Facility: CLINIC | Age: 62
End: 2017-07-19

## 2017-07-19 NOTE — TELEPHONE ENCOUNTER
From: Loreta Jewell  To: Ángel Hernandez MD  Sent: 7/19/2017 12:10 AM CDT  Subject: Non-Urgent Medical Question    It's been one week since I increase my Trileptal by 1 1/2 pills or 450 mg. I have only had slight relief provided I take the Tramodol.  I

## 2017-07-20 ENCOUNTER — TELEPHONE (OUTPATIENT)
Dept: NEUROLOGY | Facility: CLINIC | Age: 62
End: 2017-07-20

## 2017-07-21 ENCOUNTER — PATIENT MESSAGE (OUTPATIENT)
Dept: INTERNAL MEDICINE CLINIC | Facility: CLINIC | Age: 62
End: 2017-07-21

## 2017-07-21 RX ORDER — POTASSIUM CHLORIDE 750 MG/1
TABLET, EXTENDED RELEASE ORAL
Qty: 60 TABLET | Refills: 2 | Status: SHIPPED | OUTPATIENT
Start: 2017-07-21 | End: 2017-08-19 | Stop reason: DRUGHIGH

## 2017-07-21 NOTE — TELEPHONE ENCOUNTER
Left detailed message for patient notifying patient that Dr. Thais shaffer will re-evaluate her at her next office visit on Friday 7/28 and if needed can do a hip injection that day. I asked her to call the office if she had questions.        Pao Resendiz MD   to

## 2017-07-21 NOTE — TELEPHONE ENCOUNTER
Hypertensive Medications  Protocol Criteria:  · Appointment scheduled in the past 6 months or in the next 3 months  · BMP or CMP in the past 12 months  · Creatinine result < 2  Recent Outpatient Visits            1 week ago Right hip pain    [facility]

## 2017-07-24 ENCOUNTER — TELEPHONE (OUTPATIENT)
Dept: NEPHROLOGY | Facility: CLINIC | Age: 62
End: 2017-07-24

## 2017-07-24 NOTE — TELEPHONE ENCOUNTER
Spoke to Quinn Ellis at [facility] and clinical information relayed per Dr. Christina Brewster. Office will receive decision by fax.

## 2017-07-24 NOTE — TELEPHONE ENCOUNTER
PA has already been started. Insurance wants clinical reason why you chose this medication (non formulary ) over another medication.

## 2017-07-26 ENCOUNTER — TELEPHONE (OUTPATIENT)
Dept: NEUROLOGY | Facility: CLINIC | Age: 62
End: 2017-07-26

## 2017-07-26 RX ORDER — METOLAZONE 2.5 MG/1
2.5 TABLET ORAL
Qty: 15 TABLET | Refills: 0 | COMMUNITY
Start: 2017-07-26 | End: 2017-10-26

## 2017-07-26 NOTE — TELEPHONE ENCOUNTER
Medicare Online for insurance coverage of right hip joint steroid injection cpt codes 04246,16415,. Insurance was verified and procedure is a covered benefit and does not require authorization. Will inform Nursing.

## 2017-07-26 NOTE — TELEPHONE ENCOUNTER
Metolazone 2.5 mg tablets has been approved by insurance from 4/26/17 through 7/25/18. Pharmacy informed by fax.  Med list updated to include this information

## 2017-07-28 ENCOUNTER — OFFICE VISIT (OUTPATIENT)
Dept: NEUROLOGY | Facility: CLINIC | Age: 62
End: 2017-07-28

## 2017-07-28 VITALS
SYSTOLIC BLOOD PRESSURE: 132 MMHG | HEIGHT: 64 IN | DIASTOLIC BLOOD PRESSURE: 72 MMHG | BODY MASS INDEX: 33.29 KG/M2 | HEART RATE: 68 BPM | WEIGHT: 195 LBS

## 2017-07-28 DIAGNOSIS — M16.11 PRIMARY OSTEOARTHRITIS OF RIGHT HIP: Primary | ICD-10-CM

## 2017-07-28 DIAGNOSIS — M70.61 GREATER TROCHANTERIC BURSITIS OF RIGHT HIP: ICD-10-CM

## 2017-07-28 DIAGNOSIS — M48.061 LUMBAR STENOSIS: ICD-10-CM

## 2017-07-28 DIAGNOSIS — M95.5 PELVIC OBLIQUITY: ICD-10-CM

## 2017-07-28 DIAGNOSIS — M54.16 LUMBAR RADICULOPATHY: ICD-10-CM

## 2017-07-28 DIAGNOSIS — G35 MULTIPLE SCLEROSIS (HCC): ICD-10-CM

## 2017-07-28 PROCEDURE — 20611 DRAIN/INJ JOINT/BURSA W/US: CPT | Performed by: PHYSICAL MEDICINE & REHABILITATION

## 2017-07-28 PROCEDURE — 99213 OFFICE O/P EST LOW 20 MIN: CPT | Performed by: PHYSICAL MEDICINE & REHABILITATION

## 2017-07-28 RX ORDER — LIDOCAINE HYDROCHLORIDE 10 MG/ML
6 INJECTION, SOLUTION INFILTRATION; PERINEURAL ONCE
Status: COMPLETED | OUTPATIENT
Start: 2017-07-28 | End: 2017-07-28

## 2017-07-28 RX ORDER — TRIAMCINOLONE ACETONIDE 40 MG/ML
40 INJECTION, SUSPENSION INTRA-ARTICULAR; INTRAMUSCULAR ONCE
Status: COMPLETED | OUTPATIENT
Start: 2017-07-28 | End: 2017-07-28

## 2017-07-28 NOTE — PATIENT INSTRUCTIONS
As of October 6th 2014, the Drug Enforcement Agency [facility]) is reclassifying all hydrocodone combination medications from Schedule III to Schedule II. This includes medications such as Norco, Vicodin, Lortab, Zohydro, and Vicoprofen.     What this means for y

## 2017-07-28 NOTE — PROGRESS NOTES
History of Present Illness:   Patient presents with:   Injection: pt is here for right hip steroid injection with ultrasound guidance, pt states she has severe pain, trouble walking and standing due to hip pain,  Oxcarbazepine and Tramadol and states meds a needed for Pain.  Disp: 90 tablet Rfl: 0   CARVEDILOL 3.125 MG Oral Tab TAKE 1 TABLET BY MOUTH TWICE DAILY Disp: 180 tablet Rfl: 1   predniSONE 5 MG Oral Tab Take 5mg in the morning  Disp: 45 tablet Rfl: 3   furosemide 40 MG Oral Tab Take 1 tablet (40 mg to HANDIHALER SAME TIME EVERY DAY Disp: 90 capsule Rfl: 2   aspirin 81 MG Oral Tab Take 81 mg by mouth daily. Disp:  Rfl:    Rosuvastatin Calcium 20 MG Oral Tab Take 1 tablet (20 mg total) by mouth nightly.  Disp: 90 tablet Rfl: 1         Review of Systems:  C new  2016 right hip xray - minimal oa  2016 ct abd pelvis moderate right hip oa and mild left hip oa  2017 right femur - mild right hip and knee oa    Impression:  Primary osteoarthritis of right hip  (primary encounter diagnosis)  Lumbar stenosis  Lumbar

## 2017-07-30 ENCOUNTER — TELEPHONE (OUTPATIENT)
Dept: NEUROLOGY | Facility: CLINIC | Age: 62
End: 2017-07-30

## 2017-07-31 ENCOUNTER — MED REC SCAN ONLY (OUTPATIENT)
Dept: NEUROLOGY | Facility: CLINIC | Age: 62
End: 2017-07-31

## 2017-07-31 NOTE — TELEPHONE ENCOUNTER
Patient will be calling back in 2 weeks with condition update after right hip injection on 7/28/17. Determination on next injection will be made at that time.

## 2017-08-08 ENCOUNTER — TELEPHONE (OUTPATIENT)
Dept: NEUROLOGY | Facility: CLINIC | Age: 62
End: 2017-08-08

## 2017-08-08 ENCOUNTER — LAB ENCOUNTER (OUTPATIENT)
Dept: LAB | Age: 62
End: 2017-08-08
Attending: INTERNAL MEDICINE
Payer: MEDICARE

## 2017-08-08 DIAGNOSIS — Z51.81 THERAPEUTIC DRUG MONITORING: ICD-10-CM

## 2017-08-08 DIAGNOSIS — Z79.4 TYPE 2 DIABETES MELLITUS WITHOUT COMPLICATION, WITH LONG-TERM CURRENT USE OF INSULIN (HCC): ICD-10-CM

## 2017-08-08 DIAGNOSIS — M51.26 BULGING LUMBAR DISC: Primary | ICD-10-CM

## 2017-08-08 DIAGNOSIS — E11.9 TYPE 2 DIABETES MELLITUS WITHOUT COMPLICATION, WITH LONG-TERM CURRENT USE OF INSULIN (HCC): ICD-10-CM

## 2017-08-08 DIAGNOSIS — M25.559 ARTHRALGIA OF HIP, UNSPECIFIED LATERALITY: ICD-10-CM

## 2017-08-08 DIAGNOSIS — Z51.89 ENCOUNTER FOR MEDICATION ADJUSTMENT: ICD-10-CM

## 2017-08-08 DIAGNOSIS — M54.16 LUMBAR RADICULOPATHY: ICD-10-CM

## 2017-08-08 LAB
ALBUMIN SERPL BCP-MCNC: 3.4 G/DL (ref 3.5–4.8)
ALBUMIN/GLOB SERPL: 1.1 {RATIO} (ref 1–2)
ALP SERPL-CCNC: 110 U/L (ref 32–100)
ALT SERPL-CCNC: 21 U/L (ref 14–54)
ANION GAP SERPL CALC-SCNC: 7 MMOL/L (ref 0–18)
AST SERPL-CCNC: 18 U/L (ref 15–41)
BASOPHILS # BLD: 0.1 K/UL (ref 0–0.2)
BASOPHILS NFR BLD: 1 %
BILIRUB SERPL-MCNC: 0.6 MG/DL (ref 0.3–1.2)
BILIRUB UR QL: NEGATIVE
BUN SERPL-MCNC: 24 MG/DL (ref 8–20)
BUN/CREAT SERPL: 34.3 (ref 10–20)
CALCIUM SERPL-MCNC: 8.8 MG/DL (ref 8.5–10.5)
CHLORIDE SERPL-SCNC: 100 MMOL/L (ref 95–110)
CHOLEST SERPL-MCNC: 332 MG/DL (ref 110–200)
CO2 SERPL-SCNC: 31 MMOL/L (ref 22–32)
COLOR UR: YELLOW
CREAT SERPL-MCNC: 0.7 MG/DL (ref 0.5–1.5)
EOSINOPHIL # BLD: 0.1 K/UL (ref 0–0.7)
EOSINOPHIL NFR BLD: 1 %
ERYTHROCYTE [DISTWIDTH] IN BLOOD BY AUTOMATED COUNT: 13.9 % (ref 11–15)
GLOBULIN PLAS-MCNC: 3.1 G/DL (ref 2.5–3.7)
GLUCOSE SERPL-MCNC: 119 MG/DL (ref 70–99)
GLUCOSE UR-MCNC: NEGATIVE MG/DL
HCT VFR BLD AUTO: 42.8 % (ref 35–48)
HDLC SERPL-MCNC: 38 MG/DL
HGB BLD-MCNC: 14.7 G/DL (ref 12–16)
HGB UR QL STRIP.AUTO: NEGATIVE
HYALINE CASTS #/AREA URNS AUTO: 4 /LPF
KETONES UR-MCNC: NEGATIVE MG/DL
LDLC SERPL CALC-MCNC: 254 MG/DL (ref 0–99)
LYMPHOCYTES # BLD: 1.6 K/UL (ref 1–4)
LYMPHOCYTES NFR BLD: 24 %
MCH RBC QN AUTO: 31.5 PG (ref 27–32)
MCHC RBC AUTO-ENTMCNC: 34.2 G/DL (ref 32–37)
MCV RBC AUTO: 92 FL (ref 80–100)
MONOCYTES # BLD: 0.5 K/UL (ref 0–1)
MONOCYTES NFR BLD: 7 %
NEUTROPHILS # BLD AUTO: 4.6 K/UL (ref 1.8–7.7)
NEUTROPHILS NFR BLD: 67 %
NITRITE UR QL STRIP.AUTO: NEGATIVE
NONHDLC SERPL-MCNC: 294 MG/DL
OSMOLALITY UR CALC.SUM OF ELEC: 291 MOSM/KG (ref 275–295)
PH UR: 6 [PH] (ref 5–8)
PLATELET # BLD AUTO: 243 K/UL (ref 140–400)
PMV BLD AUTO: 8.7 FL (ref 7.4–10.3)
POTASSIUM SERPL-SCNC: 3.7 MMOL/L (ref 3.3–5.1)
PROT SERPL-MCNC: 6.5 G/DL (ref 5.9–8.4)
PROT UR-MCNC: 100 MG/DL
RBC # BLD AUTO: 4.66 M/UL (ref 3.7–5.4)
RBC #/AREA URNS AUTO: 1 /HPF
SODIUM SERPL-SCNC: 138 MMOL/L (ref 136–144)
SP GR UR STRIP: 1.02 (ref 1–1.03)
TRIGL SERPL-MCNC: 202 MG/DL (ref 1–149)
TSH SERPL-ACNC: 0.82 UIU/ML (ref 0.45–5.33)
UROBILINOGEN UR STRIP-ACNC: <2
VIT C UR-MCNC: NEGATIVE MG/DL
WBC # BLD AUTO: 6.9 K/UL (ref 4–11)
WBC #/AREA URNS AUTO: 3 /HPF

## 2017-08-08 PROCEDURE — 36415 COLL VENOUS BLD VENIPUNCTURE: CPT

## 2017-08-08 PROCEDURE — 80183 DRUG SCRN QUANT OXCARBAZEPIN: CPT

## 2017-08-08 PROCEDURE — 83036 HEMOGLOBIN GLYCOSYLATED A1C: CPT

## 2017-08-08 PROCEDURE — 81001 URINALYSIS AUTO W/SCOPE: CPT

## 2017-08-08 PROCEDURE — 85025 COMPLETE CBC W/AUTO DIFF WBC: CPT

## 2017-08-08 PROCEDURE — 84443 ASSAY THYROID STIM HORMONE: CPT

## 2017-08-08 PROCEDURE — 80053 COMPREHEN METABOLIC PANEL: CPT

## 2017-08-08 PROCEDURE — 80061 LIPID PANEL: CPT

## 2017-08-08 NOTE — TELEPHONE ENCOUNTER
Medicare Online for insurance coverage of right L2 & L5 TFESIs  cpt codes 56629,21865,84559. Insurance was verified and procedure is a covered benefit and does not require autorization. Will inform Nursing.

## 2017-08-08 NOTE — TELEPHONE ENCOUNTER
Patient had right  hip joint injection done under ultrasound guidance on 7/28. Spoke to patient. She states that it is hard to tell if she had any improvement with the injection.  She states that when she got the injection she was able to walk very happy ab

## 2017-08-09 LAB — HBA1C MFR BLD: 6.5 % (ref 4–6)

## 2017-08-11 LAB — OXCARBAZEPINE METABOLITE: 50 UG/ML

## 2017-08-14 ENCOUNTER — OFFICE VISIT (OUTPATIENT)
Dept: INTERNAL MEDICINE CLINIC | Facility: CLINIC | Age: 62
End: 2017-08-14

## 2017-08-14 VITALS
DIASTOLIC BLOOD PRESSURE: 80 MMHG | SYSTOLIC BLOOD PRESSURE: 131 MMHG | RESPIRATION RATE: 18 BRPM | HEART RATE: 69 BPM | WEIGHT: 191 LBS | HEIGHT: 64 IN | BODY MASS INDEX: 32.61 KG/M2

## 2017-08-14 DIAGNOSIS — Z79.4 TYPE 2 DIABETES MELLITUS WITHOUT COMPLICATION, WITH LONG-TERM CURRENT USE OF INSULIN (HCC): Primary | ICD-10-CM

## 2017-08-14 DIAGNOSIS — E11.9 TYPE 2 DIABETES MELLITUS WITHOUT COMPLICATION, WITH LONG-TERM CURRENT USE OF INSULIN (HCC): Primary | ICD-10-CM

## 2017-08-14 DIAGNOSIS — G35 MULTIPLE SCLEROSIS (HCC): ICD-10-CM

## 2017-08-14 DIAGNOSIS — N04.9 NEPHROTIC SYNDROME: ICD-10-CM

## 2017-08-14 DIAGNOSIS — M25.551 HIP PAIN, RIGHT: ICD-10-CM

## 2017-08-14 DIAGNOSIS — E78.2 MIXED HYPERLIPIDEMIA: ICD-10-CM

## 2017-08-14 PROCEDURE — G0463 HOSPITAL OUTPT CLINIC VISIT: HCPCS | Performed by: INTERNAL MEDICINE

## 2017-08-14 PROCEDURE — 99214 OFFICE O/P EST MOD 30 MIN: CPT | Performed by: INTERNAL MEDICINE

## 2017-08-14 RX ORDER — BLOOD-GLUCOSE METER
KIT MISCELLANEOUS
Refills: 1 | COMMUNITY
Start: 2017-07-24 | End: 2017-10-26

## 2017-08-14 RX ORDER — ROSUVASTATIN CALCIUM 20 MG/1
20 TABLET, COATED ORAL NIGHTLY
Qty: 90 TABLET | Refills: 1 | Status: SHIPPED
Start: 2017-08-14 | End: 2017-09-16

## 2017-08-14 RX ORDER — LEVOTHYROXINE SODIUM 0.03 MG/1
25 TABLET ORAL
Qty: 90 TABLET | Refills: 1 | Status: SHIPPED | OUTPATIENT
Start: 2017-08-14 | End: 2018-02-23

## 2017-08-14 RX ORDER — TRAZODONE HYDROCHLORIDE 100 MG/1
100 TABLET ORAL NIGHTLY
Qty: 90 TABLET | Refills: 2 | Status: SHIPPED | OUTPATIENT
Start: 2017-08-14 | End: 2018-05-23

## 2017-08-14 NOTE — TELEPHONE ENCOUNTER
Injection order verified with Dr Robyn Ratliff, both in office trochanteric bursal steroid injection and TFESI ordered and authorized. Dr Robyn Ratliff requests TFESI be scheduled. Last steroid injection 7/28/17.     Spoke to patient and scheduled for right L2 and L5 TFESI und

## 2017-08-15 ENCOUNTER — HOSPITAL ENCOUNTER (OUTPATIENT)
Dept: GENERAL RADIOLOGY | Age: 62
Discharge: HOME | End: 2017-08-15
Attending: INTERNAL MEDICINE
Payer: MEDICARE

## 2017-08-15 ENCOUNTER — OFFICE VISIT (OUTPATIENT)
Dept: PHYSICAL THERAPY | Age: 62
End: 2017-08-15
Attending: PHYSICAL MEDICINE & REHABILITATION
Payer: MEDICARE

## 2017-08-15 DIAGNOSIS — M54.16 LUMBAR RADICULOPATHY: ICD-10-CM

## 2017-08-15 DIAGNOSIS — M16.11 PRIMARY OSTEOARTHRITIS OF RIGHT HIP: ICD-10-CM

## 2017-08-15 DIAGNOSIS — M95.5 PELVIC OBLIQUITY: ICD-10-CM

## 2017-08-15 DIAGNOSIS — G35 MULTIPLE SCLEROSIS (HCC): ICD-10-CM

## 2017-08-15 DIAGNOSIS — M70.61 GREATER TROCHANTERIC BURSITIS OF RIGHT HIP: ICD-10-CM

## 2017-08-15 DIAGNOSIS — M48.061 LUMBAR STENOSIS: ICD-10-CM

## 2017-08-15 DIAGNOSIS — M25.551 HIP PAIN, RIGHT: ICD-10-CM

## 2017-08-15 PROCEDURE — 97163 PT EVAL HIGH COMPLEX 45 MIN: CPT

## 2017-08-15 PROCEDURE — 73502 X-RAY EXAM HIP UNI 2-3 VIEWS: CPT | Performed by: INTERNAL MEDICINE

## 2017-08-15 PROCEDURE — 97530 THERAPEUTIC ACTIVITIES: CPT

## 2017-08-15 PROCEDURE — 97110 THERAPEUTIC EXERCISES: CPT

## 2017-08-15 NOTE — PATIENT INSTRUCTIONS
Problem List Items Addressed This Visit        Unprioritized    Hip pain, right     Ongoing problems with sharp shooting hip pain unrelated to the lumbar radiculopathy. Pain worse with weightbearing.   Last hip x-rays done in 2016 looked stable excepting f low-dose prednisone 5 mg daily. Creatinine and EGFR have remained stable. Has been followed up by nephrology.   Ongoing issues with lower extremity swelling– has improved with the Lasix and additional metolazone         T2DM (type 2 diabetes mellitus) (HC

## 2017-08-15 NOTE — PROGRESS NOTES
HPI:    Patient ID: Cory Crawford is a 58year old female. Labs discussed–extremely elevated triglycerides as well as LDL as has been off the Crestor.   Trileptal levels–toxic at this time and hence advised to reduce or hold the medications–advised to 140-180 mg/dl. Her dinner blood glucose is taken between 6-7 pm. Her dinner blood glucose range is generally 140-180 mg/dl. An ACE inhibitor/angiotensin II receptor blocker is being taken. She does not see a podiatrist.Eye exam is not current.    Leg Pain Cardiovascular: Negative. Gastrointestinal: Negative. Endocrine: Negative. Genitourinary: Negative. Musculoskeletal: Positive for stiffness. Skin: Negative. Allergic/Immunologic: Negative.     Neurological: Positive for weakness (b/l le-w Oral Tab TAKE 1 TABLET BY MOUTH 3 TIMES A DAY (Patient taking differently: Two tablets at nighttime) Disp: 270 tablet Rfl: 2   Cholecalciferol (VITAMIN D) 2000 UNITS Oral Cap Take 2,000 Units by mouth daily.    Disp:  Rfl:    SPIRIVA HANDIHALER 18 MCG Inhal has no cervical adenopathy. Neurological: She is alert and oriented to person, place, and time. She displays no atrophy. No cranial nerve deficit. She exhibits normal muscle tone.  Coordination and gait abnormal.   Reflex Scores:       Tricep reflexes are small fiber neuropathy causing muscle aches and pains. She has been evaluated per Dr. Mansi Chacon and doses of her Trileptal will gradually increase. Currently doses are toxic and hence advised to contact Dr. Mansi Chacon for adjustment down.   Okay to cut down to Refills    TraZODone HCl 100 MG Oral Tab 90 tablet 2      Sig: Take 1 tablet (100 mg total) by mouth nightly. At Bedtime      Levothyroxine Sodium 25 MCG Oral Tab 90 tablet 1      Sig: Take 1 tablet (25 mcg total) by mouth once daily.       Rosuvastatin Olvin

## 2017-08-15 NOTE — ASSESSMENT & PLAN NOTE
The blood sugars seem a lot better after the doses of prednisone have been reduced to 5 mg daily. The hemoglobin A1c is at 6.5 and hence advised to reduce the insulin to 5 units with breakfast and 5 units with dinner.   Advised to check the blood sugars at

## 2017-08-15 NOTE — ASSESSMENT & PLAN NOTE
Tips variant FSGS per kidney biopsy. Gradually improving. Albumin in the urine has reduced considerably. She is currently on low-dose prednisone 5 mg daily. Creatinine and EGFR have remained stable. Has been followed up by nephrology.   Ongoing issues

## 2017-08-15 NOTE — ASSESSMENT & PLAN NOTE
Ongoing problems with muscle cramps, restless leg syndrome, small fiber neuropathy causing muscle aches and pains. She has been evaluated per Dr. Hollis Sadler and doses of her Trileptal will gradually increase.   Currently doses are toxic and hence advised to c

## 2017-08-15 NOTE — ASSESSMENT & PLAN NOTE
Ongoing problems with sharp shooting hip pain unrelated to the lumbar radiculopathy. Pain worse with weightbearing. Last hip x-rays done in 2016 looked stable excepting for demineralization.   Patient however has been on large dose steroids since then and

## 2017-08-15 NOTE — PROGRESS NOTES
LUMBAR SPINE EVALUATION:   Referring Physician: Dr. Geoff Young  Date of Onset: 3/1/2017 Date of Service: 8/15/2017   Dx: Primary osteoarthritis of right hip (M16.11)  Lumbar stenosis (M48.06)  Lumbar radiculopathy (M54.16)  [facility] (Wilmar Ramirez)  Chucky Grace difficulty with sitting, getting up from chair/toilet, walking, gardening, cooking. Randall Ibarra describes prior level of function: has been using W/C since 2008. Gardening. Able to ambulate room to room in her home. Independent with ADLs and IADLs.  Pt goals feet; R LE lags behind L and remains in extended position    Trunk ROM: deferred today due to time restraints of eval      MYOTOME STRENGTH:   -/5 MMT   Right Left Comments   L2 - Hip Flex 2+/5 (pain) 4/5    L3 - Knee Ext 4/5 5/5    L4 - Ankle DF 5/5 5/5 standing for at least 5 minutes without provocation of R hip pain to facilitate better ease with cooking.   5. Patient will ambulate at least 50 feet with RW with swing-through pattern and adequate foot clearance to improve efficiency and reduce risk for fa

## 2017-08-16 ENCOUNTER — TELEPHONE (OUTPATIENT)
Dept: INTERNAL MEDICINE CLINIC | Facility: CLINIC | Age: 62
End: 2017-08-16

## 2017-08-17 ENCOUNTER — OFFICE VISIT (OUTPATIENT)
Dept: PHYSICAL THERAPY | Age: 62
End: 2017-08-17
Attending: PHYSICAL MEDICINE & REHABILITATION
Payer: MEDICARE

## 2017-08-17 DIAGNOSIS — M48.061 LUMBAR STENOSIS: ICD-10-CM

## 2017-08-17 DIAGNOSIS — M16.11 PRIMARY OSTEOARTHRITIS OF RIGHT HIP: ICD-10-CM

## 2017-08-17 DIAGNOSIS — G35 MULTIPLE SCLEROSIS (HCC): ICD-10-CM

## 2017-08-17 DIAGNOSIS — M54.16 LUMBAR RADICULOPATHY: ICD-10-CM

## 2017-08-17 DIAGNOSIS — M70.61 GREATER TROCHANTERIC BURSITIS OF RIGHT HIP: ICD-10-CM

## 2017-08-17 DIAGNOSIS — M95.5 PELVIC OBLIQUITY: ICD-10-CM

## 2017-08-17 PROCEDURE — 97112 NEUROMUSCULAR REEDUCATION: CPT

## 2017-08-17 PROCEDURE — 97110 THERAPEUTIC EXERCISES: CPT

## 2017-08-17 PROCEDURE — 97140 MANUAL THERAPY 1/> REGIONS: CPT

## 2017-08-17 NOTE — PROGRESS NOTES
Dx:   Primary osteoarthritis of right hip (M16.11)  Lumbar stenosis (M48.06)  Lumbar radiculopathy (M54.16)  Multiple sclerosis (HCC) (G35)  Greater trochanteric bursitis of right hip (M70.61)  Pelvic obliquity (M95.5)       Authorized # of Visits:  2/10 ( independent with HEP to optimized gains made in PT to home and community settings and for self management of prophylactic care.   2. Patient will increased R hip flexion to at least 100 degrees to allow for improved transfers and ability to negotiate stairs

## 2017-08-18 ENCOUNTER — PATIENT MESSAGE (OUTPATIENT)
Dept: NEUROLOGY | Facility: CLINIC | Age: 62
End: 2017-08-18

## 2017-08-18 ENCOUNTER — APPOINTMENT (OUTPATIENT)
Dept: LAB | Age: 62
End: 2017-08-18
Attending: INTERNAL MEDICINE
Payer: MEDICARE

## 2017-08-18 DIAGNOSIS — N05.1 FSGS (FOCAL SEGMENTAL GLOMERULOSCLEROSIS): ICD-10-CM

## 2017-08-18 LAB
ALBUMIN SERPL BCP-MCNC: 3.9 G/DL (ref 3.5–4.8)
ANION GAP SERPL CALC-SCNC: 8 MMOL/L (ref 0–18)
BUN SERPL-MCNC: 30 MG/DL (ref 8–20)
BUN/CREAT SERPL: 34.5 (ref 10–20)
CALCIUM SERPL-MCNC: 9.4 MG/DL (ref 8.5–10.5)
CHLORIDE SERPL-SCNC: 92 MMOL/L (ref 95–110)
CO2 SERPL-SCNC: 37 MMOL/L (ref 22–32)
CREAT SERPL-MCNC: 0.87 MG/DL (ref 0.5–1.5)
CREAT UR-MCNC: 34.4 MG/DL
GLUCOSE SERPL-MCNC: 136 MG/DL (ref 70–99)
MICROALBUMIN UR-MCNC: 11.3 MG/DL (ref 0–1.8)
MICROALBUMIN/CREAT UR: 328.5 MG/G{CREAT} (ref 0–20)
OSMOLALITY UR CALC.SUM OF ELEC: 292 MOSM/KG (ref 275–295)
PHOSPHATE SERPL-MCNC: 3.9 MG/DL (ref 2.4–4.7)
POTASSIUM SERPL-SCNC: 2.8 MMOL/L (ref 3.3–5.1)
SODIUM SERPL-SCNC: 137 MMOL/L (ref 136–144)

## 2017-08-18 PROCEDURE — 82043 UR ALBUMIN QUANTITATIVE: CPT

## 2017-08-18 PROCEDURE — 36415 COLL VENOUS BLD VENIPUNCTURE: CPT

## 2017-08-18 PROCEDURE — 82570 ASSAY OF URINE CREATININE: CPT

## 2017-08-18 PROCEDURE — 80069 RENAL FUNCTION PANEL: CPT

## 2017-08-19 ENCOUNTER — TELEPHONE (OUTPATIENT)
Dept: NEPHROLOGY | Facility: CLINIC | Age: 62
End: 2017-08-19

## 2017-08-19 DIAGNOSIS — E87.6 HYPOKALEMIA: Primary | ICD-10-CM

## 2017-08-19 DIAGNOSIS — I10 ESSENTIAL HYPERTENSION: ICD-10-CM

## 2017-08-19 RX ORDER — POTASSIUM CHLORIDE 20 MEQ/1
20 TABLET, EXTENDED RELEASE ORAL 2 TIMES DAILY
Qty: 180 TABLET | Refills: 1 | Status: SHIPPED | OUTPATIENT
Start: 2017-08-19 | End: 2018-02-23

## 2017-08-19 NOTE — TELEPHONE ENCOUNTER
Lab results noted. State swelling is better but stable weight. Currently on 10 meq BID dose    Called and inform patient to take 40 meq in now and 20 meq later today.  Take 40 meq tomorrow morning and 10 meq in evening     Increase to 20 meq BID from [facility]

## 2017-08-21 ENCOUNTER — OFFICE VISIT (OUTPATIENT)
Dept: SURGERY | Facility: CLINIC | Age: 62
End: 2017-08-21

## 2017-08-21 DIAGNOSIS — M54.16 LUMBAR RADICULOPATHY: ICD-10-CM

## 2017-08-21 DIAGNOSIS — M51.26 BULGING LUMBAR DISC: Primary | ICD-10-CM

## 2017-08-21 PROCEDURE — 64484 NJX AA&/STRD TFRM EPI L/S EA: CPT | Performed by: PHYSICAL MEDICINE & REHABILITATION

## 2017-08-21 PROCEDURE — 64483 NJX AA&/STRD TFRM EPI L/S 1: CPT | Performed by: PHYSICAL MEDICINE & REHABILITATION

## 2017-08-21 NOTE — TELEPHONE ENCOUNTER
From: Maximino Santiago  To: Patricia Cho MD  Sent: 8/18/2017 9:56 PM CDT  Subject: Non-Urgent Medical Question    Did you see my information on my high toxic levels for the trileptal. Can you respond to my questions and if there is anything I need to

## 2017-08-22 ENCOUNTER — APPOINTMENT (OUTPATIENT)
Dept: LAB | Age: 62
End: 2017-08-22
Attending: INTERNAL MEDICINE
Payer: MEDICARE

## 2017-08-22 DIAGNOSIS — E87.6 HYPOKALEMIA: ICD-10-CM

## 2017-08-22 LAB
ANION GAP SERPL CALC-SCNC: 7 MMOL/L (ref 0–18)
BUN SERPL-MCNC: 19 MG/DL (ref 8–20)
BUN/CREAT SERPL: 25.3 (ref 10–20)
CALCIUM SERPL-MCNC: 8.7 MG/DL (ref 8.5–10.5)
CHLORIDE SERPL-SCNC: 101 MMOL/L (ref 95–110)
CO2 SERPL-SCNC: 30 MMOL/L (ref 22–32)
CREAT SERPL-MCNC: 0.75 MG/DL (ref 0.5–1.5)
GLUCOSE SERPL-MCNC: 117 MG/DL (ref 70–99)
OSMOLALITY UR CALC.SUM OF ELEC: 289 MOSM/KG (ref 275–295)
POTASSIUM SERPL-SCNC: 3.9 MMOL/L (ref 3.3–5.1)
SODIUM SERPL-SCNC: 138 MMOL/L (ref 136–144)

## 2017-08-22 PROCEDURE — 36415 COLL VENOUS BLD VENIPUNCTURE: CPT

## 2017-08-22 PROCEDURE — 80048 BASIC METABOLIC PNL TOTAL CA: CPT

## 2017-08-22 RX ORDER — ROSUVASTATIN CALCIUM 20 MG/1
TABLET, COATED ORAL
Qty: 30 TABLET | Refills: 0 | OUTPATIENT
Start: 2017-08-22

## 2017-08-22 NOTE — PROCEDURES
[facility]      PATIENT'S NAME:  Franko Malik MEDICAL RECORD #:         DATE OF BIRTH:  2/1/1955  PROCEDURE DATE:  8/21/2017  OPERATING PHYSICIAN:  Peyman Malik MD      OPERATIVE REPORT    PREOPERATIVE DIAGNOSIS:    given within the muscle bulk.  The areas were cleaned.  Band-Aids were applied.  Patient was rolled back onto a cart. Patient was taken back to the recovery area and discharged from there when stable.

## 2017-08-22 NOTE — PROGRESS NOTES
Right L2 + L5 tfesi, local  Patient reported yesterday was her last day of smoking since her granddaughter was born yesterday.    Right thigh provocation of pain with L2  Right buttock provocation of pain with L5  Recommended she keep her own pain diary aft

## 2017-08-24 ENCOUNTER — OFFICE VISIT (OUTPATIENT)
Dept: NEPHROLOGY | Facility: CLINIC | Age: 62
End: 2017-08-24

## 2017-08-24 ENCOUNTER — OFFICE VISIT (OUTPATIENT)
Dept: PHYSICAL THERAPY | Age: 62
End: 2017-08-24
Attending: PHYSICAL MEDICINE & REHABILITATION
Payer: MEDICARE

## 2017-08-24 VITALS
HEART RATE: 76 BPM | SYSTOLIC BLOOD PRESSURE: 104 MMHG | WEIGHT: 194.19 LBS | DIASTOLIC BLOOD PRESSURE: 65 MMHG | BODY MASS INDEX: 33.15 KG/M2 | HEIGHT: 64 IN | TEMPERATURE: 98 F

## 2017-08-24 DIAGNOSIS — G35 MULTIPLE SCLEROSIS (HCC): ICD-10-CM

## 2017-08-24 DIAGNOSIS — M95.5 PELVIC OBLIQUITY: ICD-10-CM

## 2017-08-24 DIAGNOSIS — R80.0 ISOLATED NON-NEPHROTIC PROTEINURIA: ICD-10-CM

## 2017-08-24 DIAGNOSIS — M70.61 GREATER TROCHANTERIC BURSITIS OF RIGHT HIP: ICD-10-CM

## 2017-08-24 DIAGNOSIS — M54.16 LUMBAR RADICULOPATHY: ICD-10-CM

## 2017-08-24 DIAGNOSIS — M16.11 PRIMARY OSTEOARTHRITIS OF RIGHT HIP: ICD-10-CM

## 2017-08-24 DIAGNOSIS — N05.1 FOCAL SEGMENTAL GLOMERULOSCLEROSIS: Primary | ICD-10-CM

## 2017-08-24 DIAGNOSIS — M48.061 LUMBAR STENOSIS: ICD-10-CM

## 2017-08-24 PROCEDURE — 97140 MANUAL THERAPY 1/> REGIONS: CPT

## 2017-08-24 PROCEDURE — 99214 OFFICE O/P EST MOD 30 MIN: CPT | Performed by: INTERNAL MEDICINE

## 2017-08-24 PROCEDURE — 97110 THERAPEUTIC EXERCISES: CPT

## 2017-08-24 PROCEDURE — G0463 HOSPITAL OUTPT CLINIC VISIT: HCPCS | Performed by: INTERNAL MEDICINE

## 2017-08-24 PROCEDURE — 97530 THERAPEUTIC ACTIVITIES: CPT

## 2017-08-24 RX ORDER — BUMETANIDE 1 MG/1
1 TABLET ORAL DAILY
Qty: 60 TABLET | Refills: 1 | Status: SHIPPED | OUTPATIENT
Start: 2017-08-24 | End: 2017-09-01

## 2017-08-24 NOTE — PATIENT INSTRUCTIONS
Stop furosemide   Start on bumetanidine 1 mg twice a day   Continue metolazone to 2.5 mg twice a week  Renal function in 7-10 days  Reduce prednisone to 2.5 mg daily for 2 weeks and than stop   Follow up in 8 weeks

## 2017-08-24 NOTE — PROGRESS NOTES
Dx:   Primary osteoarthritis of right hip (M16.11)  Lumbar stenosis (M48.06)  Lumbar radiculopathy (M54.16)  Multiple sclerosis (HCC) (G35)  Greater trochanteric bursitis of right hip (M70.61)  Pelvic obliquity (M95.5)       Authorized # of Visits:  3/10 ( Supine: at R hip, gentle harmonics (LAD, approximation; Abd/ADD); MFR at R TFL and iliopsoas - Supine: at R hip, gentle harmonics (LAD, approximation; Abd/ADD); MFR at R TFL and iliopsoas; PROM for R hip flex/ext & ER   Neuro Re-ed - posture ed: sitting wi will have at least 4/5 R hip strength to rise up from seat and toilet safely and without increased symptoms. 4. Patient will tolerate standing for at least 5 minutes without provocation of R hip pain to facilitate better ease with cooking.   5. Patient aron

## 2017-08-24 NOTE — PROGRESS NOTES
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 22% of gloms globally sclerosed.  Negative IFA and EM showed diffuse effacement of podocyte foot process and no immune depos predniSONE 5 MG Oral Tab Take 5mg in the morning  Disp: 45 tablet Rfl: 3   Losartan Potassium 25 MG Oral Tab Take 1 tablet (25 mg total) by mouth every evening.  In addition to 50 mg in morning Disp: 90 tablet Rfl: 1   Insulin Lispro Prot & Lispro (HUMALO for abdominal pain, constipation, decreased appetite, diarrhea and vomiting  Genitourinary:  Negative for dysuria and hematuria  Endocrine:  Negative for abnormal sleep patterns, increased activity  Hema/Lymph:  Negative for easy bleeding and easy bruising showed right kidney 11.9 cm or left kidney 10.7 cm with good cortical medullary differentation    2.  Bilateral Leg swelling:  - weight stable increased since last visit - has been more inactive given leg and back pain  - change furosemide to bumex 1 mg BID

## 2017-08-25 ENCOUNTER — PATIENT MESSAGE (OUTPATIENT)
Dept: INTERNAL MEDICINE CLINIC | Facility: CLINIC | Age: 62
End: 2017-08-25

## 2017-08-25 ENCOUNTER — PATIENT MESSAGE (OUTPATIENT)
Dept: NEUROLOGY | Facility: CLINIC | Age: 62
End: 2017-08-25

## 2017-08-25 RX ORDER — OXCARBAZEPINE 300 MG/1
600 TABLET, FILM COATED ORAL 2 TIMES DAILY
Qty: 360 TABLET | Refills: 1 | Status: SHIPPED | OUTPATIENT
Start: 2017-08-25 | End: 2018-02-23

## 2017-08-25 NOTE — TELEPHONE ENCOUNTER
Patient requesting corrected script.      Request for oxcarbazepine 300 mg, 2 po BID, #360, 1 refill     LOV: 7/11/17  NOV: None

## 2017-08-25 NOTE — TELEPHONE ENCOUNTER
From: Lopez Miller  To: Nick Castro MD  Sent: 8/25/2017 1:38 AM CDT  Subject: Non-Urgent Medical Question    With the increase in trileptal from 600 2x per day to 900 2 xper day you sent a script to [facility] with this new directive.  After the hi

## 2017-08-25 NOTE — TELEPHONE ENCOUNTER
From: Glenroy Posadas  To: Beatriz Pak MD  Sent: 8/25/2017 1:23 AM CDT  Subject: Non-Urgent Medical Question    Why did you submit a script for potsssium albert micro 10 meq.  Dr. Christiano Stark just increased my dose to 20 2x per day, I was previously already o

## 2017-08-28 ENCOUNTER — TELEPHONE (OUTPATIENT)
Dept: NEUROLOGY | Facility: CLINIC | Age: 62
End: 2017-08-28

## 2017-08-28 ENCOUNTER — OFFICE VISIT (OUTPATIENT)
Dept: ORTHOPEDICS CLINIC | Facility: CLINIC | Age: 62
End: 2017-08-28

## 2017-08-28 ENCOUNTER — PATIENT MESSAGE (OUTPATIENT)
Dept: NEUROLOGY | Facility: CLINIC | Age: 62
End: 2017-08-28

## 2017-08-28 DIAGNOSIS — M16.11 PRIMARY OSTEOARTHRITIS OF RIGHT HIP: Primary | ICD-10-CM

## 2017-08-28 PROCEDURE — G0463 HOSPITAL OUTPT CLINIC VISIT: HCPCS | Performed by: ORTHOPAEDIC SURGERY

## 2017-08-28 PROCEDURE — 99203 OFFICE O/P NEW LOW 30 MIN: CPT | Performed by: ORTHOPAEDIC SURGERY

## 2017-08-28 NOTE — PROGRESS NOTES
Patient is a 55-year-old female with multiple medical issues who is had multiple epidural steroid injections as well as one right hip injection by the pain clinic.   The hip injection was temporarily beneficial she states for less than half an hour to an [facility] diuretic potassium she has been weaned off of redness all more recently she has taken tramadol    On physical exam she is in a wheelchair today my focus was on her hip she did have some leg edema pitting and she states this is improved but she states she s

## 2017-08-29 ENCOUNTER — OFFICE VISIT (OUTPATIENT)
Dept: PHYSICAL THERAPY | Age: 62
End: 2017-08-29
Attending: PHYSICAL MEDICINE & REHABILITATION
Payer: MEDICARE

## 2017-08-29 ENCOUNTER — TELEPHONE (OUTPATIENT)
Dept: PAIN CLINIC | Facility: HOSPITAL | Age: 62
End: 2017-08-29

## 2017-08-29 DIAGNOSIS — G35 MULTIPLE SCLEROSIS (HCC): ICD-10-CM

## 2017-08-29 DIAGNOSIS — M48.061 LUMBAR STENOSIS: ICD-10-CM

## 2017-08-29 DIAGNOSIS — M54.16 LUMBAR RADICULOPATHY: ICD-10-CM

## 2017-08-29 DIAGNOSIS — M95.5 PELVIC OBLIQUITY: ICD-10-CM

## 2017-08-29 DIAGNOSIS — M70.61 GREATER TROCHANTERIC BURSITIS OF RIGHT HIP: ICD-10-CM

## 2017-08-29 DIAGNOSIS — M16.11 PRIMARY OSTEOARTHRITIS OF RIGHT HIP: ICD-10-CM

## 2017-08-29 PROCEDURE — 97140 MANUAL THERAPY 1/> REGIONS: CPT

## 2017-08-29 PROCEDURE — 97110 THERAPEUTIC EXERCISES: CPT

## 2017-08-29 RX ORDER — TRAMADOL HYDROCHLORIDE 50 MG/1
50 TABLET ORAL EVERY 8 HOURS PRN
Qty: 90 TABLET | Refills: 0 | OUTPATIENT
Start: 2017-08-29 | End: 2017-10-17

## 2017-08-29 NOTE — TELEPHONE ENCOUNTER
Refill request for tramadol 50 mg, take 1 tab every 8 hrs as needed, #90, no refills    LOV: 7/28/17  NOV: 9/11/17  Last [place]

## 2017-08-29 NOTE — TELEPHONE ENCOUNTER
From: Lopez Miller  To: Maria Del Rosario Vega MD  Sent: 8/28/2017 11:55 PM CDT  Subject: Non-Urgent Medical Question    So far the pain in my right thigh remains. In the mean time could you please refill my Tramadol.     Thanks, Braden Magana

## 2017-08-29 NOTE — PROGRESS NOTES
Dx:   Primary osteoarthritis of right hip (M16.11)  Lumbar stenosis (M48.06)  Lumbar radiculopathy (M54.16)  Multiple sclerosis (HCC) (G35)  Greater trochanteric bursitis of right hip (M70.61)  Pelvic obliquity (M95.5)       Authorized # of Visits:  4/10 ( dir at R  - Sitting: Piriformis stretch with belt & pt education for self care to wipe feet/don socks  - Sitting: hip ext isometrics (push heel to ground);  5\"x5, 2 sets - Sitting Ankle DF/PF; 10x1  - Sitting: heel lift and oscillations at R; 10x2  - supin cessation program with handout provided                      Assessment: R hip and knee ROM has significantly improved compared to initial evaluation but she continues to report pain.  Pt able to independently transfer w/c to/from mat table without LOB; how

## 2017-09-01 ENCOUNTER — PATIENT MESSAGE (OUTPATIENT)
Dept: NEPHROLOGY | Facility: CLINIC | Age: 62
End: 2017-09-01

## 2017-09-01 ENCOUNTER — TELEPHONE (OUTPATIENT)
Dept: NEPHROLOGY | Facility: CLINIC | Age: 62
End: 2017-09-01

## 2017-09-01 RX ORDER — BUMETANIDE 1 MG/1
1 TABLET ORAL 2 TIMES DAILY
Qty: 60 TABLET | Refills: 1 | Status: SHIPPED | OUTPATIENT
Start: 2017-09-01 | End: 2017-10-26

## 2017-09-01 NOTE — TELEPHONE ENCOUNTER
From: Kasey Zapata  To: Rachel Salinas MD  Sent: 9/1/2017 1:43 AM CDT  Subject: Prescription Question    Based on your written instructions and my notes Bumetanidine 1 mg is to be taken twice a day.  Based on the medication listing and the prescripti

## 2017-09-05 ENCOUNTER — TELEPHONE (OUTPATIENT)
Dept: NEPHROLOGY | Facility: CLINIC | Age: 62
End: 2017-09-05

## 2017-09-05 ENCOUNTER — OFFICE VISIT (OUTPATIENT)
Dept: PHYSICAL THERAPY | Age: 62
End: 2017-09-05
Attending: PHYSICAL MEDICINE & REHABILITATION
Payer: MEDICARE

## 2017-09-05 DIAGNOSIS — M95.5 PELVIC OBLIQUITY: ICD-10-CM

## 2017-09-05 DIAGNOSIS — M54.16 LUMBAR RADICULOPATHY: ICD-10-CM

## 2017-09-05 DIAGNOSIS — M16.11 PRIMARY OSTEOARTHRITIS OF RIGHT HIP: ICD-10-CM

## 2017-09-05 DIAGNOSIS — M70.61 GREATER TROCHANTERIC BURSITIS OF RIGHT HIP: ICD-10-CM

## 2017-09-05 DIAGNOSIS — G35 MULTIPLE SCLEROSIS (HCC): ICD-10-CM

## 2017-09-05 DIAGNOSIS — M48.061 LUMBAR STENOSIS: ICD-10-CM

## 2017-09-05 PROCEDURE — 97140 MANUAL THERAPY 1/> REGIONS: CPT

## 2017-09-05 PROCEDURE — 97110 THERAPEUTIC EXERCISES: CPT

## 2017-09-05 NOTE — PROGRESS NOTES
Dx:   Primary osteoarthritis of right hip (M16.11)  Lumbar stenosis (M48.06)  Lumbar radiculopathy (M54.16)  Multiple sclerosis (HCC) (G35)  Greater trochanteric bursitis of right hip (M70.61)  Pelvic obliquity (M95.5)       Authorized # of Visits:  5/10 ( 10x2  Supine: BKFO; 10x2  Supine:  Heel slides; 10x AROM; 10x AAROM  Sitting piriformis stretch; 5xB     Manual Therapy - Supine: at R hip, gentle harmonics (LAD, approximation; Abd/ADD); MFR at R TFL and iliopsoas; PROM for R hip flex/ext & ER  - Sidelyin

## 2017-09-05 NOTE — TELEPHONE ENCOUNTER
Pt called to find out if she is supposed to take bumetanide twice daily and if so, how far apart should she take medication? Please call. Pt sent pt email and received response but still needs clarification.

## 2017-09-06 NOTE — TELEPHONE ENCOUNTER
Patient contacted. Dr. Sailaja Marquez's advice relayed to patient. She understands the directions. She had no further questions.

## 2017-09-07 ENCOUNTER — OFFICE VISIT (OUTPATIENT)
Dept: ORTHOPEDICS CLINIC | Facility: CLINIC | Age: 62
End: 2017-09-07

## 2017-09-07 ENCOUNTER — OFFICE VISIT (OUTPATIENT)
Dept: PHYSICAL THERAPY | Age: 62
End: 2017-09-07
Attending: PHYSICAL MEDICINE & REHABILITATION
Payer: MEDICARE

## 2017-09-07 DIAGNOSIS — M16.11 PRIMARY OSTEOARTHRITIS OF RIGHT HIP: Primary | ICD-10-CM

## 2017-09-07 DIAGNOSIS — M70.61 GREATER TROCHANTERIC BURSITIS OF RIGHT HIP: ICD-10-CM

## 2017-09-07 DIAGNOSIS — M16.11 PRIMARY OSTEOARTHRITIS OF RIGHT HIP: ICD-10-CM

## 2017-09-07 DIAGNOSIS — G35 MULTIPLE SCLEROSIS (HCC): ICD-10-CM

## 2017-09-07 DIAGNOSIS — M48.061 LUMBAR STENOSIS: ICD-10-CM

## 2017-09-07 DIAGNOSIS — M95.5 PELVIC OBLIQUITY: ICD-10-CM

## 2017-09-07 DIAGNOSIS — M54.16 LUMBAR RADICULOPATHY: ICD-10-CM

## 2017-09-07 PROCEDURE — 99213 OFFICE O/P EST LOW 20 MIN: CPT | Performed by: ORTHOPAEDIC SURGERY

## 2017-09-07 PROCEDURE — 97110 THERAPEUTIC EXERCISES: CPT

## 2017-09-07 PROCEDURE — 97140 MANUAL THERAPY 1/> REGIONS: CPT

## 2017-09-07 PROCEDURE — G0463 HOSPITAL OUTPT CLINIC VISIT: HCPCS | Performed by: ORTHOPAEDIC SURGERY

## 2017-09-07 RX ORDER — POTASSIUM CHLORIDE 750 MG/1
TABLET, EXTENDED RELEASE ORAL
Refills: 2 | COMMUNITY
Start: 2017-08-24 | End: 2017-10-04

## 2017-09-07 NOTE — PROGRESS NOTES
Patient Name: Alanna Becerra  YOB: 1955          MRN number:  [id number]  Date:  9/10/2017  Referring Physician:  Arabella Montaño  Dx:      Primary osteoarthritis of right hip (M16.11)  Lumbar stenosis (M48.06)  Lumbar radiculopathy (M54.16)  Jin Tiwari states that she will consult her MDs to discuss her options for pain management with pain medications and/or injections. She will continue to benefit from PT to address deficits stated above to work towards improving functional mobility.      Objective:   L Therapy 2 x/week or a total of 10 visits over a 90 day period. Treatment will include: Patient Education; Home Exercise Program; Therapeutic Exercise; Therapeutic Activity;  Neuromuscular Re-education; gait and balance training;  Manual Therapy; Modalities,

## 2017-09-11 ENCOUNTER — OFFICE VISIT (OUTPATIENT)
Dept: NEUROLOGY | Facility: CLINIC | Age: 62
End: 2017-09-11

## 2017-09-11 ENCOUNTER — TELEPHONE (OUTPATIENT)
Dept: NEUROLOGY | Facility: CLINIC | Age: 62
End: 2017-09-11

## 2017-09-11 VITALS
HEART RATE: 72 BPM | HEIGHT: 64 IN | SYSTOLIC BLOOD PRESSURE: 140 MMHG | WEIGHT: 195 LBS | RESPIRATION RATE: 18 BRPM | BODY MASS INDEX: 33.29 KG/M2 | DIASTOLIC BLOOD PRESSURE: 80 MMHG

## 2017-09-11 DIAGNOSIS — M47.816 LUMBAR FACET ARTHROPATHY: ICD-10-CM

## 2017-09-11 DIAGNOSIS — I73.9 PERIPHERAL VASCULAR DISEASE (HCC): ICD-10-CM

## 2017-09-11 DIAGNOSIS — M48.061 LUMBAR STENOSIS: ICD-10-CM

## 2017-09-11 DIAGNOSIS — M16.11 PRIMARY OSTEOARTHRITIS OF RIGHT HIP: Primary | ICD-10-CM

## 2017-09-11 DIAGNOSIS — M41.26 OTHER IDIOPATHIC SCOLIOSIS, LUMBAR REGION: ICD-10-CM

## 2017-09-11 DIAGNOSIS — M54.16 LUMBAR RADICULOPATHY: ICD-10-CM

## 2017-09-11 PROCEDURE — 99215 OFFICE O/P EST HI 40 MIN: CPT | Performed by: PHYSICAL MEDICINE & REHABILITATION

## 2017-09-11 NOTE — PATIENT INSTRUCTIONS
- our office will call you to schedule the hip injection with the xray machine  - try a back brace while walking  - go to bed at the same time every night.  - take the trazodone 100mg every night.   - you can take 2 tramadol pills together for a maximum of prescriptions  · Written prescriptions must be picked up in office. · Please allow the office 48-72 hours to fill the prescription as our physicians rotate between multiple offices and procedure days in the hospitals.   · Patient must present photo ID at t

## 2017-09-11 NOTE — PROGRESS NOTES
History of Present Illness:   Patient presents with:  Leg Pain: LOV 7/28/17 for right hip injection with relief until walked to parking lot after injection then pain returned. R L2 + L5 TFESI 8/21/17 with no relief offered.  Has been using wheelchair all th Prescriptions:  bumetanide 1 MG Oral Tab Take 1 tablet (1 mg total) by mouth 2 (two) times daily. Disp: 60 tablet Rfl: 1   TraMADol HCl 50 MG Oral Tab Take 1 tablet (50 mg total) by mouth every 8 (eight) hours as needed for Pain.  Disp: 90 tablet Rfl: 0   O (VITAMIN D) 2000 UNITS Oral Cap Take 2,000 Units by mouth daily.    Disp:  Rfl:    SPIRIVA HANDIHALER 18 MCG Inhalation Cap INHALE THE CONTENTS OF 1 CAPSULE VIA HANDIHALER SAME TIME EVERY DAY Disp: 90 capsule Rfl: 2   aspirin 81 MG Oral Tab Take 81 mg by mo bilateral sidebending of lumbar spine . Posture: slouching  Single leg stance:   bella decr balance  Gait:  right antalgic gait. Sit to stand with mild difficulty.       Testing/Imaging: none new    Impression:  Primary osteoarthritis of right hip  (primar

## 2017-09-11 NOTE — TELEPHONE ENCOUNTER
Medicare Online for insurance coverage of right hip joint steroid injection under fluoroscopy cpt codes 93501, [facility], . Insurance was verified and procedure is a covered benefit and does not require authorization. Will inform Nursing.

## 2017-09-12 ENCOUNTER — OFFICE VISIT (OUTPATIENT)
Dept: PHYSICAL THERAPY | Age: 62
End: 2017-09-12
Attending: PHYSICAL MEDICINE & REHABILITATION
Payer: MEDICARE

## 2017-09-12 DIAGNOSIS — M48.061 LUMBAR STENOSIS: ICD-10-CM

## 2017-09-12 DIAGNOSIS — M70.61 GREATER TROCHANTERIC BURSITIS OF RIGHT HIP: ICD-10-CM

## 2017-09-12 DIAGNOSIS — M95.5 PELVIC OBLIQUITY: ICD-10-CM

## 2017-09-12 DIAGNOSIS — M54.16 LUMBAR RADICULOPATHY: ICD-10-CM

## 2017-09-12 DIAGNOSIS — M16.11 PRIMARY OSTEOARTHRITIS OF RIGHT HIP: ICD-10-CM

## 2017-09-12 DIAGNOSIS — G35 MULTIPLE SCLEROSIS (HCC): ICD-10-CM

## 2017-09-12 PROCEDURE — 97110 THERAPEUTIC EXERCISES: CPT

## 2017-09-12 PROCEDURE — 97140 MANUAL THERAPY 1/> REGIONS: CPT

## 2017-09-12 NOTE — PROGRESS NOTES
Patient Name: Hoang Pradhan  YOB: 1955          MRN number:  [id number]  Referring Physician:  Benjamin Hyatt  Dx:      Primary osteoarthritis of right hip (M16.11)  Lumbar stenosis (M48.06)  Lumbar radiculopathy (M54.16)  Multiple sclerosis Abd/ADD); MFR at R TFL and iliopsoas; PROM for R hip flex/ext & ER  - Sidelying: lumbar distraction; L5/S1 distraction  - Sidelying: MFR at R lumbar paraspinals   Neuro Re-ed - gait with RW ~ 100' with SBA cueing provided to increase hip flexion and stride

## 2017-09-13 NOTE — TELEPHONE ENCOUNTER
Patient was scheduled for right hip joint steroid injection under fluoroscopy on Friday, September 15, 2017. Patient informed that [facility] would call her tomorrow and let her know exactly what time to arrive. She was understanding.

## 2017-09-14 ENCOUNTER — OFFICE VISIT (OUTPATIENT)
Dept: PHYSICAL THERAPY | Age: 62
End: 2017-09-14
Attending: PHYSICAL MEDICINE & REHABILITATION
Payer: MEDICARE

## 2017-09-14 ENCOUNTER — HOSPITAL ENCOUNTER (OUTPATIENT)
Dept: ULTRASOUND IMAGING | Facility: HOSPITAL | Age: 62
Discharge: HOME | End: 2017-09-14
Attending: PHYSICAL MEDICINE & REHABILITATION
Payer: MEDICARE

## 2017-09-14 DIAGNOSIS — I73.9 PERIPHERAL VASCULAR DISEASE (HCC): ICD-10-CM

## 2017-09-14 DIAGNOSIS — M70.61 GREATER TROCHANTERIC BURSITIS OF RIGHT HIP: ICD-10-CM

## 2017-09-14 DIAGNOSIS — M95.5 PELVIC OBLIQUITY: ICD-10-CM

## 2017-09-14 DIAGNOSIS — M54.16 LUMBAR RADICULOPATHY: ICD-10-CM

## 2017-09-14 DIAGNOSIS — M16.11 PRIMARY OSTEOARTHRITIS OF RIGHT HIP: ICD-10-CM

## 2017-09-14 DIAGNOSIS — M48.061 LUMBAR STENOSIS: ICD-10-CM

## 2017-09-14 DIAGNOSIS — G35 MULTIPLE SCLEROSIS (HCC): ICD-10-CM

## 2017-09-14 PROCEDURE — 93923 UPR/LXTR ART STDY 3+ LVLS: CPT | Performed by: PHYSICAL MEDICINE & REHABILITATION

## 2017-09-14 PROCEDURE — 97140 MANUAL THERAPY 1/> REGIONS: CPT

## 2017-09-14 PROCEDURE — 97110 THERAPEUTIC EXERCISES: CPT

## 2017-09-14 NOTE — PROGRESS NOTES
Patient Name: Kellie Hung  YOB: 1955          MRN number:  [id number]  Referring Physician:  Paul Rojas  Dx:      Primary osteoarthritis of right hip (M16.11)  Lumbar stenosis (M48.06)  Lumbar radiculopathy (M54.16)  Multiple sclerosis Therapy - Supine: at R hip, gentle harmonics (LAD, approximation; Abd/ADD); MFR at R TFL and iliopsoas; PROM for R hip flex/ext & ER  - Sidelying: lumbar distraction; L5/S1 distraction  - Sidelying: quad stretch  - Sidelying: MFR at R lumbar paraspinals - (PARTIALLY MET)  6. NEW GOAL: pt will be able to sit and hold her  grandson without increased back or R hip pain. 2017: Reviewed goals with pt and she is in agreement. Plan: Continue to work on ROM and strengthening.  Attempt to progress to

## 2017-09-15 ENCOUNTER — OFFICE VISIT (OUTPATIENT)
Dept: SURGERY | Facility: CLINIC | Age: 62
End: 2017-09-15

## 2017-09-15 DIAGNOSIS — M16.11 PRIMARY OSTEOARTHRITIS OF RIGHT HIP: ICD-10-CM

## 2017-09-15 PROCEDURE — 20610 DRAIN/INJ JOINT/BURSA W/O US: CPT | Performed by: PHYSICAL MEDICINE & REHABILITATION

## 2017-09-15 PROCEDURE — 77002 NEEDLE LOCALIZATION BY XRAY: CPT | Performed by: PHYSICAL MEDICINE & REHABILITATION

## 2017-09-15 NOTE — PROGRESS NOTES
right hip inj under fluoro per Dr Mauricio Hands order except eosc had 6mg celestone not 8mg. Patient reported she was not feeling the immediate relief after the injection like she did in the office with the us and [facility] med.  Asked patient to call in 2 weeks to gi

## 2017-09-15 NOTE — PROCEDURES
[facility]      PATIENT'S NAME:  Ry Woo Loleta RECORD #:        DATE OF BIRTH:  1/25/1959  PROCEDURE DATE:  9/15/2017  OPERATING PHYSICIAN:  Peyman Malik MD      OPERATIVE REPORT    PREOPERATIVE DIAGNOSIS:

## 2017-09-17 ENCOUNTER — PATIENT MESSAGE (OUTPATIENT)
Dept: INTERNAL MEDICINE CLINIC | Facility: CLINIC | Age: 62
End: 2017-09-17

## 2017-09-17 RX ORDER — ROSUVASTATIN CALCIUM 20 MG/1
TABLET, COATED ORAL
Qty: 90 TABLET | Refills: 1 | Status: SHIPPED | OUTPATIENT
Start: 2017-09-17 | End: 2017-10-04

## 2017-09-17 RX ORDER — ROSUVASTATIN CALCIUM 20 MG/1
TABLET, COATED ORAL
Qty: 30 TABLET | Refills: 0 | Status: SHIPPED | OUTPATIENT
Start: 2017-09-17 | End: 2017-10-13

## 2017-09-18 ENCOUNTER — OFFICE VISIT (OUTPATIENT)
Dept: DERMATOLOGY CLINIC | Facility: CLINIC | Age: 62
End: 2017-09-18

## 2017-09-18 DIAGNOSIS — D23.70 BENIGN NEOPLASM OF SKIN OF LOWER LIMB, INCLUDING HIP, UNSPECIFIED LATERALITY: ICD-10-CM

## 2017-09-18 DIAGNOSIS — L82.1 SEBORRHEIC KERATOSES: Primary | ICD-10-CM

## 2017-09-18 DIAGNOSIS — K13.0 CHEILITIS: ICD-10-CM

## 2017-09-18 DIAGNOSIS — D23.30 BENIGN NEOPLASM OF SKIN OF FACE: ICD-10-CM

## 2017-09-18 DIAGNOSIS — B07.8 OTHER VIRAL WARTS: ICD-10-CM

## 2017-09-18 DIAGNOSIS — L30.9 DERMATITIS: ICD-10-CM

## 2017-09-18 DIAGNOSIS — D23.5 BENIGN NEOPLASM OF SKIN OF TRUNK, EXCEPT SCROTUM: ICD-10-CM

## 2017-09-18 DIAGNOSIS — D23.60 BENIGN NEOPLASM OF SKIN OF UPPER LIMB, INCLUDING SHOULDER, UNSPECIFIED LATERALITY: ICD-10-CM

## 2017-09-18 DIAGNOSIS — D23.4 BENIGN NEOPLASM OF SCALP AND SKIN OF NECK: ICD-10-CM

## 2017-09-18 PROCEDURE — 99213 OFFICE O/P EST LOW 20 MIN: CPT | Performed by: DERMATOLOGY

## 2017-09-18 PROCEDURE — 17110 DESTRUCTION B9 LES UP TO 14: CPT | Performed by: DERMATOLOGY

## 2017-09-18 NOTE — TELEPHONE ENCOUNTER
From: Peace Wing  To: Cherelle Anton MD  Sent: 9/17/2017 12:08 AM CDT  Subject: Prescription Question    I requested a refill of Rosuvastatin Calcium 20mg (generic for Crestor) a week ago. Sky indicated pending refill instructions.  I did not kn

## 2017-09-19 ENCOUNTER — OFFICE VISIT (OUTPATIENT)
Dept: PHYSICAL THERAPY | Age: 62
End: 2017-09-19
Attending: PHYSICAL MEDICINE & REHABILITATION
Payer: MEDICARE

## 2017-09-19 PROCEDURE — 97140 MANUAL THERAPY 1/> REGIONS: CPT

## 2017-09-19 PROCEDURE — 97110 THERAPEUTIC EXERCISES: CPT

## 2017-09-19 NOTE — PROGRESS NOTES
Patient Name: Ru Painter  YOB: 1955          MRN number:  [id number]  Referring Physician:  Prince Mayen  Dx:      Primary osteoarthritis of right hip (M16.11)  Lumbar stenosis (M48.06)  Lumbar radiculopathy (M54.16)  Multiple sclerosis lumbar distraction; L5/S1 distraction  - Sidelying: MFR at R lumbar paraspinals   Neuro Re-ed - gait with RW  200' with SBA cueing provided to increase hip flexion and stride Gait with RW and without RW       Assessment: pt showing increased functional str

## 2017-09-21 ENCOUNTER — OFFICE VISIT (OUTPATIENT)
Dept: PHYSICAL THERAPY | Age: 62
End: 2017-09-21
Attending: INTERNAL MEDICINE
Payer: MEDICARE

## 2017-09-21 PROCEDURE — 97110 THERAPEUTIC EXERCISES: CPT

## 2017-09-21 PROCEDURE — 97140 MANUAL THERAPY 1/> REGIONS: CPT

## 2017-09-21 NOTE — PROGRESS NOTES
Patient Name: Chidi Carrillo  YOB: 1955          MRN number:  [id number]  Referring Physician:  No ref.  provider found  Dx:      Primary osteoarthritis of right hip (M16.11)  Lumbar stenosis (M48.06)  Lumbar radiculopathy (M54.16)  Multiple s Standing toe taps; 5x2, B  - Standing: lateral weight shift M/L plane  - Sitting SKTC; 5x2 at R  - supine: hip flex stretch at R at edge of mat table Ranelle [place] position)   Manual Therapy - Supine: at R hip, gentle harmonics (LAD, approximation; Abd/ADD);  MFR clearance to improve efficiency and reduce risk for falls. (PARTIALLY MET)  6. pt will be able to sit and hold her  grandson without increased back or R hip pain. (IN PROGRESS)  2017: Reviewed goals with pt.     Plan: Continue to work on ROM and

## 2017-09-26 ENCOUNTER — OFFICE VISIT (OUTPATIENT)
Dept: PHYSICAL THERAPY | Age: 62
End: 2017-09-26
Attending: PHYSICAL MEDICINE & REHABILITATION
Payer: MEDICARE

## 2017-09-26 PROCEDURE — 97110 THERAPEUTIC EXERCISES: CPT

## 2017-09-26 PROCEDURE — 97140 MANUAL THERAPY 1/> REGIONS: CPT

## 2017-09-26 NOTE — PROGRESS NOTES
Patient Name: Antwan Simental  YOB: 1955          MRN number:  [id number]  Referring Physician:  No ref.  provider found  Dx:      Primary osteoarthritis of right hip (M16.11)  Lumbar stenosis (M48.06)  Lumbar radiculopathy (M54.16)  Multiple s laps  Toe taps frontal & lateral  - supine: heel slides AROM x10 & AAROM x 10  - supine BKFOs; 10x1  - Supine: heel slides; 10x2, B  - standing: prone over table for trunk distraction and hip unloading  - Supine: LTR; 10xB  -  Sci fit: x 6.5 minutes (seat tolerate standing for at least 5 minutes without provocation of R hip pain to facilitate better ease with cooking. (IN PROGRESS)  5.  Patient will ambulate at least 50 feet with RW with swing-through pattern and adequate foot clearance to improve efficiency

## 2017-09-28 ENCOUNTER — OFFICE VISIT (OUTPATIENT)
Dept: PHYSICAL THERAPY | Age: 62
End: 2017-09-28
Attending: PHYSICAL MEDICINE & REHABILITATION
Payer: MEDICARE

## 2017-09-28 ENCOUNTER — TELEPHONE (OUTPATIENT)
Dept: OTHER | Age: 62
End: 2017-09-28

## 2017-09-28 PROCEDURE — 97110 THERAPEUTIC EXERCISES: CPT

## 2017-09-28 PROCEDURE — 97112 NEUROMUSCULAR REEDUCATION: CPT

## 2017-09-28 PROCEDURE — 97140 MANUAL THERAPY 1/> REGIONS: CPT

## 2017-09-28 NOTE — PROGRESS NOTES
Patient Name: Jesus Cousin  YOB: 1955          MRN number:  [id number]  Referring Physician:  Dr. Desire Green MD  Dx:      Primary osteoarthritis of right hip (M16.11)  Lumbar stenosis (M48.06)  Lumbar radiculopathy (M54.16)  Multiple sclerosis (H approximation; Abd/ADD); MFR at R TFL and iliopsoas; PROM for R hip flex/ext & ER  - Sidelying: lumbar distraction; L5/S1 distraction  - Sidelying: MFR at R lumbar paraspinals   Neuro Re-ed  Gait training with RW: cueing to increased stride/hip ext; neutra

## 2017-09-28 NOTE — TELEPHONE ENCOUNTER
Pt is at lab now, asking if Dr. Jameel Patiño had placed any lab orders for her. Only lab orders noted were ones placed by Dr. Dev Medina. Pt states she had started a med for high cholesterol and thought she needed to have levels checked again.

## 2017-09-29 NOTE — TELEPHONE ENCOUNTER
ISMAELTCB.  When pt calls back pls inform her that [facility] has ordered additional labs that require fasting for 12 hours before getting drawn, and to also stay hydrated with water

## 2017-09-30 ENCOUNTER — LAB ENCOUNTER (OUTPATIENT)
Dept: LAB | Age: 62
End: 2017-09-30
Attending: INTERNAL MEDICINE
Payer: MEDICARE

## 2017-09-30 DIAGNOSIS — N05.1 FOCAL SEGMENTAL GLOMERULOSCLEROSIS: ICD-10-CM

## 2017-09-30 DIAGNOSIS — E78.2 MIXED HYPERLIPIDEMIA: ICD-10-CM

## 2017-09-30 DIAGNOSIS — R80.0 ISOLATED NON-NEPHROTIC PROTEINURIA: ICD-10-CM

## 2017-09-30 PROCEDURE — 80061 LIPID PANEL: CPT

## 2017-09-30 PROCEDURE — 85025 COMPLETE CBC W/AUTO DIFF WBC: CPT

## 2017-09-30 PROCEDURE — 80053 COMPREHEN METABOLIC PANEL: CPT

## 2017-09-30 PROCEDURE — 36415 COLL VENOUS BLD VENIPUNCTURE: CPT

## 2017-10-01 NOTE — PROGRESS NOTES
Sulma Robles is a 58year old female. HPI:     CC:  Patient presents with:  Warts: LOV- 2-6-17. Pt presents today for f/u with warts to bilateral hands. Pt notes were treated with cryo in feb 2017.    Derm Problem: Pt concerned with lips constantly sca by mouth every 8 (eight) hours as needed for Pain. Disp: 90 tablet Rfl: 0   OXcarbazepine 300 MG Oral Tab Take 2 tablets (600 mg total) by mouth 2 (two) times daily.  Disp: 360 tablet Rfl: 1   Potassium Chloride ER 20 MEQ Oral Tab CR Take 1 tablet (20 mEq t HANDIHALER SAME TIME EVERY DAY Disp: 90 capsule Rfl: 2   aspirin 81 MG Oral Tab Take 81 mg by mouth daily.  Disp:  Rfl:      Allergies:   No Known Allergies    Past Medical History:   Diagnosis Date   • Anxiety state, unspecified    • Depression    • Diabet 5 months dry. Patient presents with concerns above. Patient has been in their usual state of health. History, medications, allergies reviewed as noted. ROS:  Denies any other systemic complaints.   No new or changeing lesions other than noted trunk, except scrotum  Benign neoplasm of skin of upper limb, including shoulder, unspecified laterality  Other viral warts  Cheilitis  Dermatitis    See details on map. Remarkable for:      Dermatitis. Meds in grid. Skin care instructions reviewed.

## 2017-10-02 ENCOUNTER — PATIENT OUTREACH (OUTPATIENT)
Dept: INTERNAL MEDICINE CLINIC | Facility: CLINIC | Age: 62
End: 2017-10-02

## 2017-10-03 ENCOUNTER — OFFICE VISIT (OUTPATIENT)
Dept: PHYSICAL THERAPY | Age: 62
End: 2017-10-03
Attending: INTERNAL MEDICINE
Payer: MEDICARE

## 2017-10-03 PROCEDURE — 97140 MANUAL THERAPY 1/> REGIONS: CPT

## 2017-10-03 PROCEDURE — 97110 THERAPEUTIC EXERCISES: CPT

## 2017-10-03 NOTE — PROGRESS NOTES
Patient Name: Alanna Becerra  YOB: 1955          MRN number:  [id number]  Referring Physician:  Dr. Roc Henson MD  Dx:      Primary osteoarthritis of right hip (M16.11)  Lumbar stenosis (M48.06)  Lumbar radiculopathy (M54.16)  Multiple sclerosis (H Sidelying: MFR at R lumbar paraspinals Sitting: Retrograde massage at B feet/ankle to decrease edema and improve proprioception  - Supine: at R hip, gentle harmonics (LAD, approximation; Abd/ADD); MFR at R TFL and iliopsoas; PROM for R hip flex/ext & ER  - improve efficiency and reduce risk for falls. (PARTIALLY MET)  6. pt will be able to sit and hold her  grandson without increased back or R hip pain. (IN PROGRESS)  2017: Reviewed goals with pt.     Plan: Continue to work on ROM and strengthenin

## 2017-10-04 ENCOUNTER — OFFICE VISIT (OUTPATIENT)
Dept: INTERNAL MEDICINE CLINIC | Facility: CLINIC | Age: 62
End: 2017-10-04

## 2017-10-04 VITALS
SYSTOLIC BLOOD PRESSURE: 127 MMHG | HEART RATE: 78 BPM | DIASTOLIC BLOOD PRESSURE: 73 MMHG | BODY MASS INDEX: 33.4 KG/M2 | RESPIRATION RATE: 22 BRPM | TEMPERATURE: 98 F | WEIGHT: 195.63 LBS | HEIGHT: 64 IN

## 2017-10-04 DIAGNOSIS — Z12.31 VISIT FOR SCREENING MAMMOGRAM: ICD-10-CM

## 2017-10-04 DIAGNOSIS — R60.9 EDEMA, UNSPECIFIED TYPE: ICD-10-CM

## 2017-10-04 DIAGNOSIS — I10 ESSENTIAL HYPERTENSION: ICD-10-CM

## 2017-10-04 DIAGNOSIS — G35 MULTIPLE SCLEROSIS (HCC): ICD-10-CM

## 2017-10-04 DIAGNOSIS — R06.02 SHORTNESS OF BREATH: ICD-10-CM

## 2017-10-04 DIAGNOSIS — E11.9 TYPE 2 DIABETES MELLITUS WITHOUT COMPLICATION, WITH LONG-TERM CURRENT USE OF INSULIN (HCC): Primary | ICD-10-CM

## 2017-10-04 DIAGNOSIS — J43.9 PULMONARY EMPHYSEMA, UNSPECIFIED EMPHYSEMA TYPE (HCC): ICD-10-CM

## 2017-10-04 DIAGNOSIS — E78.2 MIXED HYPERLIPIDEMIA: ICD-10-CM

## 2017-10-04 DIAGNOSIS — Z79.4 TYPE 2 DIABETES MELLITUS WITHOUT COMPLICATION, WITH LONG-TERM CURRENT USE OF INSULIN (HCC): Primary | ICD-10-CM

## 2017-10-04 PROCEDURE — 99214 OFFICE O/P EST MOD 30 MIN: CPT | Performed by: INTERNAL MEDICINE

## 2017-10-04 PROCEDURE — G0463 HOSPITAL OUTPT CLINIC VISIT: HCPCS | Performed by: INTERNAL MEDICINE

## 2017-10-04 NOTE — PROGRESS NOTES
HPI:    Patient ID: Hawa Andrea is a 58year old female. Diabetes   She presents for her follow-up diabetic visit. She has type 2 diabetes mellitus. Her disease course has been improving.  Hypoglycemia symptoms include headaches and nervousness/anxi are no known factors aggravating her hyperlipidemia. Associated symptoms include leg pain and myalgias. Current antihyperlipidemic treatment includes diet change and exercise. The current treatment provides moderate improvement of lipids.  Compliance proble occurs constantly. The problem has been gradually improving. Associated symptoms include arthralgias, fatigue, headaches, myalgias and weakness (b/l le-wheel chair bound). Associated symptoms comments: Has been gradually improving.   Steroid doses reduced t total) by mouth once daily. Disp: 90 tablet Rfl: 1   metolazone 2.5 MG Oral Tab Take 2.5 mg by mouth twice a week.    Disp: 15 tablet Rfl: 0   CLONAZEPAM 0.5 MG Oral Tab TAKE 1 TABLET BY MOUTH AT BEDTIME AS NEEDED FOR ANXIETY Disp: 30 tablet Rfl: 5   CARVED is clear and moist. No oropharyngeal exudate. Eyes: Conjunctivae and EOM are normal. Pupils are equal, round, and reactive to light. Right eye exhibits no discharge. Left eye exhibits no discharge. Neck: Normal range of motion. Neck supple.  No JVD pres PULMONARY - INTERNAL    Edema     Chronic lower extremity edema without stasis dermatitis at this time. Will refer to lymphedema therapy for management as patient has had increasing leg pain and difficulty with ambulation.          Relevant Orders    OP RE albert that is about 25-30 g. Snacks can be only lean protein and vegetables with no starches. May need about 2-3 snacks per day as needed. Drink plenty of water through the day to keep well hydrated. Exercise for 20-30 minutes 3-5 days a week. peer         Relevant Medications    insulin glargine (LANTUS SOLOSTAR) 100 UNIT/ML Subcutaneous Solution Pen-injector    Other Relevant Orders    COMP METABOLIC PANEL (14)    LIPID PANEL    HEMOGLOBIN A1C    ASSAY, THYROID STIM HORMONE      Other Visit Pardeep Palma

## 2017-10-04 NOTE — ASSESSMENT & PLAN NOTE
Ongoing problems with dyspnea on exertion and shortness of breath that has been gradually worsening. She has been on inhalers–Spiriva and Symbicort though she has not used his Symbicort on a regular basis.   Last PFTs done in 2017 showed moderate obstructi

## 2017-10-04 NOTE — ASSESSMENT & PLAN NOTE
Blood pressure 127/73, pulse 78, temperature 98.1 °F (36.7 °C), temperature source Oral, resp. rate 22, height 5' 4\" (1.626 m), weight 195 lb 9.6 oz (88.7 kg), not currently breastfeeding.   Blood pressures look stable on losartan at 75 mg daily–split this

## 2017-10-04 NOTE — ASSESSMENT & PLAN NOTE
With small fiber neuropathy, restless leg syndrome and muscle cramps as well as aches and pains. She has had significant worsening of the quadriceps strength after her use of steroids for the kidney disease.   She has been in physical therapy with some imp

## 2017-10-04 NOTE — ASSESSMENT & PLAN NOTE
Significant improvement in the lipid panel–both the triglycerides as well as LDL after restarting on Crestor.   Muscle aches and pains existed prior to starting on the Crestor and hence this is most likely not related to the medication however will check a

## 2017-10-04 NOTE — PATIENT INSTRUCTIONS
Problem List Items Addressed This Visit        Unprioritized    Dyspnea    Relevant Orders    PULMONARY - INTERNAL    Edema     Chronic lower extremity edema without stasis dermatitis at this time.   Will refer to lymphedema therapy for management as boris directed. All meals need to contain vegetables–2 cups, lean protein–15 g, a small starch–150 albert that is about 25-30 g. Snacks can be only lean protein and vegetables with no starches. May need about 2-3 snacks per day as needed.   Drink plenty of water evaluation for progression of cataracts peer         Relevant Orders    COMP METABOLIC PANEL (14)    LIPID PANEL    HEMOGLOBIN A1C    ASSAY, THYROID STIM HORMONE      Other Visit Diagnoses     Visit for screening mammogram        Relevant Orders    MEGHA SCR

## 2017-10-04 NOTE — ASSESSMENT & PLAN NOTE
Chronic lower extremity edema without stasis dermatitis at this time. Will refer to lymphedema therapy for management as patient has had increasing leg pain and difficulty with ambulation.

## 2017-10-04 NOTE — ASSESSMENT & PLAN NOTE
Patient has been off her prednisone at this time. Her hemoglobin A1c's looked great at 6.5. Her blood sugars look normal.  She is currently on Humalog mix 75/25 5 units twice daily. She has had a few sugars around 150- 190.   Advised to stop the Humalog

## 2017-10-05 ENCOUNTER — OFFICE VISIT (OUTPATIENT)
Dept: PHYSICAL THERAPY | Age: 62
End: 2017-10-05
Attending: PHYSICAL MEDICINE & REHABILITATION
Payer: MEDICARE

## 2017-10-05 PROCEDURE — 97110 THERAPEUTIC EXERCISES: CPT

## 2017-10-05 PROCEDURE — 97140 MANUAL THERAPY 1/> REGIONS: CPT

## 2017-10-05 NOTE — PROGRESS NOTES
Patient Name: Shyam Méndez  YOB: 1955          MRN number:  [id number]  Referring Physician:  Dr. Everardo Sun MD  Dx:      Primary osteoarthritis of right hip (M16.11)  Lumbar stenosis (M48.06)  Lumbar radiculopathy (M54.16)  Multiple sclerosis (H proprioception  - Supine: at R hip, gentle harmonics (LAD, approximation; Abd/ADD); MFR at R TFL and iliopsoas; PROM for R hip flex/ext & ER  - Sidelying: lumbar distraction; L5/S1 distraction  - Sidelying: MFR at R lumbar paraspinals Sitting: Retrograde m Patient will tolerate standing for at least 5 minutes without provocation of R hip pain to facilitate better ease with cooking. (IN PROGRESS)  5.  Patient will ambulate at least 50 feet with RW with swing-through pattern and adequate foot clearance to impro

## 2017-10-10 ENCOUNTER — OFFICE VISIT (OUTPATIENT)
Dept: PHYSICAL THERAPY | Age: 62
End: 2017-10-10
Attending: PHYSICAL MEDICINE & REHABILITATION
Payer: MEDICARE

## 2017-10-10 PROCEDURE — 97140 MANUAL THERAPY 1/> REGIONS: CPT

## 2017-10-10 PROCEDURE — 97110 THERAPEUTIC EXERCISES: CPT

## 2017-10-10 NOTE — PROGRESS NOTES
Patient Name: Hoang Pradhan  YOB: 1955          MRN number:  [id number]  Referring Physician:  Dr. Flex Young MD  Dx:      Primary osteoarthritis of right hip (M16.11)  Lumbar stenosis (M48.06)  Lumbar radiculopathy (M54.16)  Multiple sclerosis (H this first before committing. Goals:  1. Patient will be independent with HEP to optimized gains made in PT to home and community settings and for self management of prophylactic care.  (IN PROGRESS)  2. Patient will increased R hip flexion to at least 100

## 2017-10-12 ENCOUNTER — OFFICE VISIT (OUTPATIENT)
Dept: PHYSICAL THERAPY | Age: 62
End: 2017-10-12
Attending: PHYSICAL MEDICINE & REHABILITATION
Payer: MEDICARE

## 2017-10-12 PROCEDURE — 97110 THERAPEUTIC EXERCISES: CPT

## 2017-10-12 PROCEDURE — 97140 MANUAL THERAPY 1/> REGIONS: CPT

## 2017-10-12 NOTE — PROGRESS NOTES
Patient Name: Hawa Andrea  YOB: 1955          MRN number:  [id number]  Date:  10/12/2017  Referring Physician:  Dr. Reena Solo MD  Dx:      Primary osteoarthritis of right hip (M16.11)  Lumbar stenosis (M48.06)  Lumbar radiculopathy (M54.16) that she will try to work on these areas. Patient continues to demonstrates pitting edema at B lower legs (L more significant than R), which may contribute to her difficulty with mobility.  She has been referred to see a lymphedema specialist and is schedul care. (IN PROGRESS)  2. Patient will increased R hip flexion to at least 100 degrees to allow for improved transfers and ability to negotiate stairs at home without increased symptoms. (PARTIALLY MET)  3.  Patient will have at least 4/5 R hip strength to ri Keila    Electronically signed by therapist: Adrian Gilmore, PT, DPT, Cert. MDT    [de-identified] certification required: Yes  I certify the need for these services furnished under this plan of treatment and while under my care.     X__________________

## 2017-10-13 RX ORDER — ROSUVASTATIN CALCIUM 20 MG/1
TABLET, COATED ORAL
Qty: 30 TABLET | Refills: 5 | Status: SHIPPED | OUTPATIENT
Start: 2017-10-13 | End: 2018-04-25

## 2017-10-16 ENCOUNTER — TELEPHONE (OUTPATIENT)
Dept: OTHER | Age: 62
End: 2017-10-16

## 2017-10-16 RX ORDER — CIPROFLOXACIN 500 MG/1
500 TABLET, FILM COATED ORAL 2 TIMES DAILY
Qty: 14 TABLET | Refills: 0 | Status: SHIPPED | OUTPATIENT
Start: 2017-10-16 | End: 2017-11-01 | Stop reason: ALTCHOICE

## 2017-10-16 NOTE — TELEPHONE ENCOUNTER
Pt requesting Tx for s/s UTI since Friday     S/s frequency / burning ,cloudy urine no odor    No home care,unable to come for Appt-Pharmacy pending

## 2017-10-17 ENCOUNTER — OFFICE VISIT (OUTPATIENT)
Dept: PHYSICAL THERAPY | Age: 62
End: 2017-10-17
Attending: PHYSICAL MEDICINE & REHABILITATION
Payer: MEDICARE

## 2017-10-17 DIAGNOSIS — Z79.4 TYPE 2 DIABETES MELLITUS WITHOUT COMPLICATION, WITH LONG-TERM CURRENT USE OF INSULIN (HCC): ICD-10-CM

## 2017-10-17 DIAGNOSIS — E11.9 TYPE 2 DIABETES MELLITUS WITHOUT COMPLICATION, WITH LONG-TERM CURRENT USE OF INSULIN (HCC): ICD-10-CM

## 2017-10-17 PROCEDURE — 97140 MANUAL THERAPY 1/> REGIONS: CPT

## 2017-10-17 PROCEDURE — 97110 THERAPEUTIC EXERCISES: CPT

## 2017-10-17 PROCEDURE — 97112 NEUROMUSCULAR REEDUCATION: CPT

## 2017-10-17 RX ORDER — METOLAZONE 2.5 MG/1
TABLET ORAL
Qty: 26 TABLET | Refills: 0 | Status: SHIPPED | OUTPATIENT
Start: 2017-10-17 | End: 2017-10-26

## 2017-10-17 RX ORDER — METOLAZONE 2.5 MG/1
TABLET ORAL
Qty: 8 TABLET | Refills: 1 | Status: SHIPPED | OUTPATIENT
Start: 2017-10-17 | End: 2017-10-17

## 2017-10-17 RX ORDER — LOSARTAN POTASSIUM 25 MG/1
TABLET ORAL
Qty: 270 TABLET | Refills: 0 | Status: SHIPPED | OUTPATIENT
Start: 2017-10-17 | End: 2018-10-01

## 2017-10-17 RX ORDER — LOSARTAN POTASSIUM 25 MG/1
TABLET ORAL
Qty: 30 TABLET | Refills: 1 | Status: SHIPPED | OUTPATIENT
Start: 2017-10-17 | End: 2017-10-17

## 2017-10-17 NOTE — TELEPHONE ENCOUNTER
LOV 8/24/17. Office note states to continue Metolazone 2.5 mg 2 times per week. No mention of any change in Losartan prescription.

## 2017-10-17 NOTE — TELEPHONE ENCOUNTER
LOV 8/24/17. Has upcoming appt scheduled 10/26/17. According to [address] [place] [address] notes:    - on losartan to 50 mg in am and 25 mg qpm  Continue metolazone to 2.5 mg twice a week    No changes in meds in [place].  I have corrected sig for metolazone since it was saying t

## 2017-10-17 NOTE — TELEPHONE ENCOUNTER
Refill request for tramadol 50 mg, take 1 tab every 8 hrs as needed, #90, no refills    LOV: 9/11//17  NOV: 11/1/17  [place]

## 2017-10-18 ENCOUNTER — PATIENT MESSAGE (OUTPATIENT)
Dept: NEPHROLOGY | Facility: CLINIC | Age: 62
End: 2017-10-18

## 2017-10-18 RX ORDER — BLOOD-GLUCOSE METER
KIT MISCELLANEOUS
Qty: 200 STRIP | Refills: 1 | Status: SHIPPED | OUTPATIENT
Start: 2017-10-18 | End: 2017-11-01

## 2017-10-18 NOTE — TELEPHONE ENCOUNTER
From: Antwan Simental  To: Donna Quiroga MD  Sent: 10/18/2017 1:17 PM CDT  Subject: Prescription Question    I know [facility] is asking you for refills. Which I have asked them not to for some time. They just don't listen.  Many of my medications change

## 2017-10-19 ENCOUNTER — OFFICE VISIT (OUTPATIENT)
Dept: PHYSICAL THERAPY | Age: 62
End: 2017-10-19
Attending: PHYSICAL MEDICINE & REHABILITATION
Payer: MEDICARE

## 2017-10-19 PROCEDURE — 97112 NEUROMUSCULAR REEDUCATION: CPT

## 2017-10-19 PROCEDURE — 97140 MANUAL THERAPY 1/> REGIONS: CPT

## 2017-10-19 PROCEDURE — 97110 THERAPEUTIC EXERCISES: CPT

## 2017-10-19 RX ORDER — TRAMADOL HYDROCHLORIDE 50 MG/1
TABLET ORAL
Qty: 90 TABLET | Refills: 0 | OUTPATIENT
Start: 2017-10-19 | End: 2017-12-05

## 2017-10-19 NOTE — PROGRESS NOTES
Patient Name: Maria De Jesus Zimmerman  YOB: 1955          MRN number:  [id number]  Date:  10/12/2017  Referring Physician:  Dr. Saloni Alvarenga MD  Dx:      Primary osteoarthritis of right hip (M16.11)  Lumbar stenosis (M48.06)  Lumbar radiculopathy (M54.16) Supine: at R hip, gentle harmonics (LAD, approximation; Abd/ADD); MFR at R TFL and iliopsoas; PROM for R hip flex/ext & ER  - Sidelying: lumbar distraction; L5/S1 distraction  - Sidelying: MFR at R lumbar paraspinals - Supine: at R hip, gentle harmonics (L for improved transfers and ability to negotiate stairs at home without increased symptoms. (PARTIALLY MET)  3. Patient will have at least 4/5 R hip strength to rise up from seat and toilet safely and without increased symptoms.  (IN PROGRESS)  4. Patient wi

## 2017-10-20 NOTE — TELEPHONE ENCOUNTER
Spoke to pt. Notified her RSA is out of the office so I'm not sure if she plans on having her continue metolazone.  She would like to just  a 30 day supply for now as she's not sure if she'll continue metolazone since her swelling isn't going done so

## 2017-10-20 NOTE — TELEPHONE ENCOUNTER
Pt would like to receive a call regarding medication if she should have a 30 day or 90 day refill. Pt is not sure how long she would need to be on medication. Pt would also like to discuss Lymph edema therapy. Please call [phone].  Regarding medication

## 2017-10-23 ENCOUNTER — APPOINTMENT (OUTPATIENT)
Dept: LAB | Facility: HOSPITAL | Age: 62
End: 2017-10-23
Attending: INTERNAL MEDICINE
Payer: MEDICARE

## 2017-10-23 ENCOUNTER — OFFICE VISIT (OUTPATIENT)
Dept: PHYSICAL THERAPY | Age: 62
End: 2017-10-23
Attending: INTERNAL MEDICINE
Payer: MEDICARE

## 2017-10-23 DIAGNOSIS — R60.9 EDEMA, UNSPECIFIED TYPE: ICD-10-CM

## 2017-10-23 PROCEDURE — 97163 PT EVAL HIGH COMPLEX 45 MIN: CPT

## 2017-10-23 PROCEDURE — 82043 UR ALBUMIN QUANTITATIVE: CPT

## 2017-10-23 PROCEDURE — 82570 ASSAY OF URINE CREATININE: CPT

## 2017-10-23 PROCEDURE — 36415 COLL VENOUS BLD VENIPUNCTURE: CPT

## 2017-10-23 PROCEDURE — 80069 RENAL FUNCTION PANEL: CPT

## 2017-10-23 NOTE — PROGRESS NOTES
PHYSICAL THERAPY LE LYMPHEDEMA EVALUATION:   Referring Physician: Dr. Rebecca Young  Diagnosis: Edema, unspecified type   Date of Onset: 10/04/17 Date of Service: 10/23/2017     PATIENT SUMMARY   Lopez Miller is a 58year old y/o female who presents to thera emphysema,dyspnea,MS,R hip pain,restless leg syndrome, chronic fatigue, nephrotic syndrome    Patient/Family Goal: to get rid of water       ASSESSMENT:   [address] presented to therapy with diagnosis of  Edema, unspecified type .  [address] presented with followin at home for compression garment,decongestive exercises and MLD. She would also benefit with education on lifestyle modifications/positioning. She has limited ROM/strength and gait skills and this is being addressed ,pt received 18 visits.     Education elevate LE with exercises to assist with managing swelling. PT discussed different options multilayer bandaging that will affect her mobility so opted to try and do a ready wrap.  PT discussed POC like manual lymph drainage and teaching pt and designated ca Jessica Postal    Physician's certification required: Yes  I certify the need for these services furnished under this plan of treatment and while under my care.     X___________________________________________________ Date____________________    Certification

## 2017-10-24 ENCOUNTER — APPOINTMENT (OUTPATIENT)
Dept: PHYSICAL THERAPY | Age: 62
End: 2017-10-24
Payer: MEDICARE

## 2017-10-24 ENCOUNTER — TELEPHONE (OUTPATIENT)
Dept: PHYSICAL THERAPY | Age: 62
End: 2017-10-24

## 2017-10-24 NOTE — TELEPHONE ENCOUNTER
Spoke with pt to review her PT plan of care. Pt states that she still needs to call the compression wrap vendor to discuss pricing of garments. She will see Dr. Colletta Keepers on Thursday.  She states that she has started to elevate her legs more as recommended b

## 2017-10-24 NOTE — PROGRESS NOTES
(Please note that this evaluation was completed yesterday and was re-wriitten this day)   PHYSICAL THERAPY LE LYMPHEDEMA EVALUATION:   Referring Physician: Dr. Tom Pisano  Diagnosis: Edema unspecified  Date of Onset: 10/04/17 Date of Service: 10/23/2017     PA medical history was reviewed with Berenice.  Significant findings include emphysema,dyspnea,MD,R hip pain ,restless leg syndrome ,chronic fatigue,nephrotic syndrome    Patient/Family Goal: to get rid of water               ASSESSMENT:   [address] presented to ther disorders. She or a designated family memebr could be instructed for self management at home for compression garment,decongestive exercises and MLD. She would benefit with education on lifestyle modifications and positioning  She has limited ROM /strength a Re-education, Orthotic Management and Training    Current status G Code: {G Code Status:3151} {G Code Limitation:3152} {G Code Modifier:3154}  Goal status G Code: {G Code Goal:3155} {G Code Modifier:3154}    Patient/Family/Caregiver *** was advised of thes

## 2017-10-26 ENCOUNTER — TELEPHONE (OUTPATIENT)
Dept: PHYSICAL THERAPY | Age: 62
End: 2017-10-26

## 2017-10-26 ENCOUNTER — TELEPHONE (OUTPATIENT)
Dept: NEPHROLOGY | Facility: CLINIC | Age: 62
End: 2017-10-26

## 2017-10-26 ENCOUNTER — APPOINTMENT (OUTPATIENT)
Dept: PHYSICAL THERAPY | Age: 62
End: 2017-10-26
Payer: MEDICARE

## 2017-10-26 ENCOUNTER — OFFICE VISIT (OUTPATIENT)
Dept: NEPHROLOGY | Facility: CLINIC | Age: 62
End: 2017-10-26

## 2017-10-26 VITALS
BODY MASS INDEX: 34.21 KG/M2 | WEIGHT: 200.38 LBS | HEART RATE: 78 BPM | SYSTOLIC BLOOD PRESSURE: 130 MMHG | TEMPERATURE: 98 F | DIASTOLIC BLOOD PRESSURE: 78 MMHG | HEIGHT: 64 IN

## 2017-10-26 DIAGNOSIS — R80.1 PERSISTENT PROTEINURIA: ICD-10-CM

## 2017-10-26 DIAGNOSIS — N05.1 FOCAL SEGMENTAL GLOMERULOSCLEROSIS: Primary | ICD-10-CM

## 2017-10-26 PROCEDURE — G0463 HOSPITAL OUTPT CLINIC VISIT: HCPCS | Performed by: INTERNAL MEDICINE

## 2017-10-26 PROCEDURE — 99214 OFFICE O/P EST MOD 30 MIN: CPT | Performed by: INTERNAL MEDICINE

## 2017-10-26 RX ORDER — METOLAZONE 2.5 MG/1
2.5 TABLET ORAL
Qty: 36 TABLET | Refills: 1 | COMMUNITY
Start: 2017-10-27 | End: 2018-05-15

## 2017-10-26 RX ORDER — METOLAZONE 2.5 MG/1
2.5 TABLET ORAL
Qty: 270 TABLET | Refills: 1 | Status: SHIPPED | OUTPATIENT
Start: 2017-10-27 | End: 2017-10-26

## 2017-10-26 RX ORDER — PEN NEEDLE, DIABETIC 31 GX5/16"
NEEDLE, DISPOSABLE MISCELLANEOUS
Refills: 3 | COMMUNITY
Start: 2017-10-13 | End: 2017-11-01

## 2017-10-26 RX ORDER — BUMETANIDE 1 MG/1
2 TABLET ORAL DAILY
Qty: 180 TABLET | Refills: 1 | Status: SHIPPED | OUTPATIENT
Start: 2017-10-26 | End: 2018-10-01

## 2017-10-26 RX ORDER — METOLAZONE 2.5 MG/1
2.5 TABLET ORAL 3 TIMES DAILY
Qty: 36 TABLET | Refills: 1 | Status: SHIPPED | OUTPATIENT
Start: 2017-10-26 | End: 2017-10-26

## 2017-10-26 NOTE — TELEPHONE ENCOUNTER
Pt states she saw Dr. Ross Crawley who states that pt needs to quit smoking as it's a likely contributing factor to her leg swelling. Pt states that MD recommends that she get leg wraps and return to lymphedema specialist for PT.  She is in agreement to hold on

## 2017-10-26 NOTE — PATIENT INSTRUCTIONS
Change bumex to 1 mg - 2 tablets once a day   Increased metolazone to 2.5 mg three times a week  Lab test in 10 days   Urine test in 3 months   Daily weights  Call back in 10 days with home weights   Follow up in 3 months

## 2017-10-26 NOTE — TELEPHONE ENCOUNTER
LOV 10/26/17. Office note states Metolazone 2.5mg increased to 3 times per week. Prescription is written as take 3 times daily.

## 2017-10-26 NOTE — PROGRESS NOTES
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 22% of gloms globally sclerosed.  Negative IFA and EM showed diffuse effacement of podocyte foot process and no immune depos Tab TAKE 1 TABLET BY MOUTH EVERY NIGHT Disp: 30 tablet Rfl: 5   insulin glargine (LANTUS SOLOSTAR) 100 UNIT/ML Subcutaneous Solution Pen-injector 10 units subcutaneous once daily with dinner Disp: 5 pen Rfl: 3   OXcarbazepine 300 MG Oral Tab Take 2 tablets 250-50 MCG/DOSE Inhalation Aerosol Powder, Breath Activated INHALE 1 PUFF BY MOUTH DAILY Disp:  Rfl: 0       Allergies:  No Known Allergies      ROS:     Constitutional:  Negative for decreased activity, fever, irritability and lethargy  ENMT:  Negative fo ml/min  - improve serum albumin and ACR reduce to 500 mg -> <100 mg .  Albumin 3.6 mg  - s/p kidney biopsy which showed FSGS with tip variant  - initially started on prednisone 30 mg BID on 12/8 - -  Off prednisone as of September - total course of 9 months

## 2017-10-26 NOTE — TELEPHONE ENCOUNTER
Spoke to Pharmacist at [place] [id number] to clarify prescription for metolazone directions and quantity.

## 2017-10-30 RX ORDER — FUROSEMIDE 40 MG/1
TABLET ORAL
Qty: 90 TABLET | Refills: 0 | OUTPATIENT
Start: 2017-10-30

## 2017-10-31 ENCOUNTER — APPOINTMENT (OUTPATIENT)
Dept: PHYSICAL THERAPY | Age: 62
End: 2017-10-31
Payer: MEDICARE

## 2017-10-31 ENCOUNTER — TELEPHONE (OUTPATIENT)
Dept: PHYSICAL THERAPY | Age: 62
End: 2017-10-31

## 2017-10-31 ENCOUNTER — TELEPHONE (OUTPATIENT)
Dept: INTERNAL MEDICINE CLINIC | Facility: CLINIC | Age: 62
End: 2017-10-31

## 2017-10-31 NOTE — TELEPHONE ENCOUNTER
Barbara from [facility] rehab calling and stts she faxed over an order for the dr to sign regarding knee high wraps for the pt. Please advise.

## 2017-11-01 ENCOUNTER — MED REC SCAN ONLY (OUTPATIENT)
Dept: NEUROLOGY | Facility: CLINIC | Age: 62
End: 2017-11-01

## 2017-11-01 ENCOUNTER — OFFICE VISIT (OUTPATIENT)
Dept: NEUROLOGY | Facility: CLINIC | Age: 62
End: 2017-11-01

## 2017-11-01 VITALS
RESPIRATION RATE: 20 BRPM | HEART RATE: 74 BPM | SYSTOLIC BLOOD PRESSURE: 128 MMHG | DIASTOLIC BLOOD PRESSURE: 80 MMHG | WEIGHT: 197 LBS | BODY MASS INDEX: 34 KG/M2

## 2017-11-01 DIAGNOSIS — M70.61 GREATER TROCHANTERIC BURSITIS OF RIGHT HIP: ICD-10-CM

## 2017-11-01 DIAGNOSIS — G35 MULTIPLE SCLEROSIS (HCC): ICD-10-CM

## 2017-11-01 DIAGNOSIS — M48.061 SPINAL STENOSIS OF LUMBAR REGION WITHOUT NEUROGENIC CLAUDICATION: Primary | ICD-10-CM

## 2017-11-01 DIAGNOSIS — M54.16 LUMBAR RADICULOPATHY: ICD-10-CM

## 2017-11-01 DIAGNOSIS — R26.9 NEUROLOGIC GAIT DYSFUNCTION: ICD-10-CM

## 2017-11-01 DIAGNOSIS — M16.9 DEGENERATIVE LOCALIZED ARTHRITIS OF HIP: ICD-10-CM

## 2017-11-01 PROCEDURE — 99214 OFFICE O/P EST MOD 30 MIN: CPT | Performed by: PHYSICAL MEDICINE & REHABILITATION

## 2017-11-01 RX ORDER — HYDROCODONE BITARTRATE AND ACETAMINOPHEN 5; 325 MG/1; MG/1
1 TABLET ORAL NIGHTLY PRN
Qty: 30 TABLET | Refills: 0 | Status: SHIPPED | OUTPATIENT
Start: 2017-11-01 | End: 2018-12-27

## 2017-11-01 RX ORDER — BUDESONIDE AND FORMOTEROL FUMARATE DIHYDRATE 160; 4.5 UG/1; UG/1
AEROSOL RESPIRATORY (INHALATION) 2 TIMES DAILY
COMMUNITY
End: 2018-07-05

## 2017-11-01 NOTE — PATIENT INSTRUCTIONS
- squeeze your belly while you are walking. You can practice while sitting in a chair. And squeeze belly when rising from a chair and sitting down in a chair.   - walk with your feet apart.    - if your back spasms while you are standing, lean over on the k weekends. · No narcotics or controlled substances are refilled after noon on Fridays or by on call physicians. · If your prescription is due for a refill, you may be due for a follow up appointment.   · To best provide you care, patients receiving routine

## 2017-11-01 NOTE — TELEPHONE ENCOUNTER
ISMAEL Jimenez from rehab dept informing her that we did not receive the fax.  Requested for form to be resent to [facility].   -[id number]

## 2017-11-01 NOTE — PROGRESS NOTES
History of Present Illness:   Patient presents with:  Hip Pain: pt here for follow up after right hip joint steroid injection on 9/15/17 with almost 50% relief in right thigh bone pain .  pt c/o lower back pain and spasms with right hip pain with muscle wea Tab Take 1 tablet (2.5 mg total) by mouth 3 (three) times a week.  Disp: 36 tablet Rfl: 1   TRAMADOL HCL 50 MG Oral Tab TAKE 1 TABLET BY MOUTH EVERY 8 HOURS AS NEEDED Disp: 90 tablet Rfl: 0   LOSARTAN POTASSIUM 25 MG Oral Tab TAKE 1 TABLET(25 MG) BY MOUTH E VIA HANDIHALER SAME TIME EVERY DAY Disp: 90 capsule Rfl: 2   aspirin 81 MG Oral Tab Take 81 mg by mouth daily.  Disp:  Rfl:          Review of Systems:  Constitutional: negative for fevers  Eyes: negative for drainage  Ears, nose, mouth, throat, and face: n new    Impression:  Spinal stenosis of lumbar region without neurogenic claudication  (primary encounter diagnosis)  Lumbar radiculopathy  Degenerative localized arthritis of hip  Greater trochanteric bursitis of right hip  Neurologic gait dysfunction  Mul

## 2017-11-02 NOTE — TELEPHONE ENCOUNTER
Barbara was called back again. She stated that she already received the signed order back from [facility]. No further actions needed at this time.

## 2017-11-07 ENCOUNTER — APPOINTMENT (OUTPATIENT)
Dept: PHYSICAL THERAPY | Age: 62
End: 2017-11-07
Attending: INTERNAL MEDICINE
Payer: MEDICARE

## 2017-11-07 RX ORDER — PRAMIPEXOLE DIHYDROCHLORIDE 1 MG/1
TABLET ORAL
Qty: 270 TABLET | Refills: 0 | Status: SHIPPED | OUTPATIENT
Start: 2017-11-07 | End: 2017-12-14

## 2017-11-07 NOTE — TELEPHONE ENCOUNTER
Refill Protocol Appointment Criteria: Refilled per protocol    · Appointment scheduled in the past 6 months or in the next 3 months  Recent Outpatient Visits            6 days ago Spinal stenosis of lumbar region without neurogenic claudication    [facility]

## 2017-11-10 ENCOUNTER — APPOINTMENT (OUTPATIENT)
Dept: LAB | Age: 62
End: 2017-11-10
Attending: INTERNAL MEDICINE
Payer: MEDICARE

## 2017-11-10 ENCOUNTER — OFFICE VISIT (OUTPATIENT)
Dept: PHYSICAL THERAPY | Age: 62
End: 2017-11-10
Attending: INTERNAL MEDICINE
Payer: MEDICARE

## 2017-11-10 DIAGNOSIS — R80.1 PERSISTENT PROTEINURIA: ICD-10-CM

## 2017-11-10 DIAGNOSIS — N05.1 FOCAL SEGMENTAL GLOMERULOSCLEROSIS: ICD-10-CM

## 2017-11-10 PROCEDURE — 80069 RENAL FUNCTION PANEL: CPT

## 2017-11-10 PROCEDURE — 97535 SELF CARE MNGMENT TRAINING: CPT

## 2017-11-10 PROCEDURE — 97140 MANUAL THERAPY 1/> REGIONS: CPT

## 2017-11-10 PROCEDURE — 36415 COLL VENOUS BLD VENIPUNCTURE: CPT

## 2017-11-10 NOTE — PROGRESS NOTES
Dx: Edema, unspecified type                              Next MD visit: none scheduled  Fall Risk: standard         Precautions: n/a           Medications: Yes/no:no  Subjective: Pt relates that she feels like the left leg is more swollen .  She states that measurement:     L measurement:     M measurement:     Total volume 3600.801 3343.075   Total difference 7.256693      Thigh was measured from the 45 cm point x 10 cm  and every 5:      left right   10A measurement: 50 48.5   15B measurement: 53.5 53   20C

## 2017-11-14 ENCOUNTER — OFFICE VISIT (OUTPATIENT)
Dept: PHYSICAL THERAPY | Age: 62
End: 2017-11-14
Attending: INTERNAL MEDICINE
Payer: MEDICARE

## 2017-11-14 PROCEDURE — 97140 MANUAL THERAPY 1/> REGIONS: CPT

## 2017-11-14 NOTE — PROGRESS NOTES
Dx: Edema, unspecified type                              Next MD visit: none scheduled  Fall Risk: standard         Precautions: n/a           Medications: Yes/no:no  Subjective: pt came to therapy without her .  She was not wearing the farrow garmen the time to educate patient how the CDT would work best. Pt reiterated that they need to follow up at home as instructed and that MLD is most effective when followed up with the compression garment.  She verbalizes understanding but has given reasons why th

## 2017-11-16 ENCOUNTER — TELEPHONE (OUTPATIENT)
Dept: PHYSICAL THERAPY | Age: 62
End: 2017-11-16

## 2017-11-16 RX ORDER — CARVEDILOL 3.12 MG/1
TABLET ORAL
Qty: 180 TABLET | Refills: 0 | Status: SHIPPED | OUTPATIENT
Start: 2017-11-16 | End: 2018-02-23

## 2017-11-16 NOTE — TELEPHONE ENCOUNTER
Pt called to provide status update. She reports that lymphedema therapy is going well and she understands that she needs to be more consistent with compliance. She admits that the swelling in her leg is better. She states that she is able to walk more.  She

## 2017-11-17 ENCOUNTER — OFFICE VISIT (OUTPATIENT)
Dept: PHYSICAL THERAPY | Age: 62
End: 2017-11-17
Attending: INTERNAL MEDICINE
Payer: MEDICARE

## 2017-11-17 PROCEDURE — 97140 MANUAL THERAPY 1/> REGIONS: CPT

## 2017-11-17 NOTE — PROGRESS NOTES
Dx: Edema, unspecified type                              Next MD visit: none scheduled  Fall Risk: standard         Precautions: n/a           Medications: Yes/no:no  Subjective: Pt came to therapy today not wearing the compression garment.  She states that Precautions to decrease risk factors discussed                                     Assessment: Pt came to day with some indentation on midlower leg bilaterally. She reports wearing farrow grament on the L but symmetrical indentations.  She states that [facility]

## 2017-11-22 ENCOUNTER — APPOINTMENT (OUTPATIENT)
Dept: PHYSICAL THERAPY | Age: 62
End: 2017-11-22
Attending: INTERNAL MEDICINE
Payer: MEDICARE

## 2017-11-28 ENCOUNTER — APPOINTMENT (OUTPATIENT)
Dept: PHYSICAL THERAPY | Age: 62
End: 2017-11-28
Attending: INTERNAL MEDICINE
Payer: MEDICARE

## 2017-11-30 ENCOUNTER — APPOINTMENT (OUTPATIENT)
Dept: PHYSICAL THERAPY | Age: 62
End: 2017-11-30
Attending: INTERNAL MEDICINE
Payer: MEDICARE

## 2017-12-01 ENCOUNTER — APPOINTMENT (OUTPATIENT)
Dept: LAB | Age: 62
End: 2017-12-01
Attending: INTERNAL MEDICINE
Payer: MEDICARE

## 2017-12-01 DIAGNOSIS — E78.2 MIXED HYPERLIPIDEMIA: ICD-10-CM

## 2017-12-01 DIAGNOSIS — Z79.4 TYPE 2 DIABETES MELLITUS WITHOUT COMPLICATION, WITH LONG-TERM CURRENT USE OF INSULIN (HCC): ICD-10-CM

## 2017-12-01 DIAGNOSIS — E11.9 TYPE 2 DIABETES MELLITUS WITHOUT COMPLICATION, WITH LONG-TERM CURRENT USE OF INSULIN (HCC): ICD-10-CM

## 2017-12-01 PROCEDURE — 80053 COMPREHEN METABOLIC PANEL: CPT

## 2017-12-01 PROCEDURE — 84443 ASSAY THYROID STIM HORMONE: CPT

## 2017-12-01 PROCEDURE — 82550 ASSAY OF CK (CPK): CPT

## 2017-12-01 PROCEDURE — 83036 HEMOGLOBIN GLYCOSYLATED A1C: CPT

## 2017-12-01 PROCEDURE — 80061 LIPID PANEL: CPT

## 2017-12-01 PROCEDURE — 36415 COLL VENOUS BLD VENIPUNCTURE: CPT

## 2017-12-04 ENCOUNTER — OFFICE VISIT (OUTPATIENT)
Dept: INTERNAL MEDICINE CLINIC | Facility: CLINIC | Age: 62
End: 2017-12-04

## 2017-12-04 VITALS
DIASTOLIC BLOOD PRESSURE: 68 MMHG | SYSTOLIC BLOOD PRESSURE: 107 MMHG | HEIGHT: 64 IN | WEIGHT: 193 LBS | BODY MASS INDEX: 32.95 KG/M2 | TEMPERATURE: 98 F

## 2017-12-04 DIAGNOSIS — Z79.4 TYPE 2 DIABETES MELLITUS WITHOUT COMPLICATION, WITH LONG-TERM CURRENT USE OF INSULIN (HCC): ICD-10-CM

## 2017-12-04 DIAGNOSIS — J43.9 PULMONARY EMPHYSEMA, UNSPECIFIED EMPHYSEMA TYPE (HCC): ICD-10-CM

## 2017-12-04 DIAGNOSIS — M54.16 LUMBAR RADICULOPATHY: ICD-10-CM

## 2017-12-04 DIAGNOSIS — E11.9 TYPE 2 DIABETES MELLITUS WITHOUT COMPLICATION, WITH LONG-TERM CURRENT USE OF INSULIN (HCC): ICD-10-CM

## 2017-12-04 DIAGNOSIS — E78.2 MIXED HYPERLIPIDEMIA: ICD-10-CM

## 2017-12-04 DIAGNOSIS — R25.2 MUSCLE CRAMPS: Primary | ICD-10-CM

## 2017-12-04 DIAGNOSIS — I10 ESSENTIAL HYPERTENSION: ICD-10-CM

## 2017-12-04 PROCEDURE — 99214 OFFICE O/P EST MOD 30 MIN: CPT | Performed by: INTERNAL MEDICINE

## 2017-12-04 PROCEDURE — G0463 HOSPITAL OUTPT CLINIC VISIT: HCPCS | Performed by: INTERNAL MEDICINE

## 2017-12-04 RX ORDER — OXCARBAZEPINE 300 MG/1
600 TABLET, FILM COATED ORAL 2 TIMES DAILY
Refills: 1 | COMMUNITY
Start: 2017-11-17 | End: 2018-03-05

## 2017-12-04 RX ORDER — TIZANIDINE 4 MG/1
TABLET ORAL
Qty: 60 TABLET | Refills: 3 | Status: SHIPPED | OUTPATIENT
Start: 2017-12-04 | End: 2018-03-05

## 2017-12-04 RX ORDER — LEVOFLOXACIN 500 MG/1
500 TABLET, FILM COATED ORAL DAILY
Qty: 7 TABLET | Refills: 0 | Status: SHIPPED | OUTPATIENT
Start: 2017-12-04 | End: 2017-12-14

## 2017-12-04 RX ORDER — PREGABALIN 25 MG/1
CAPSULE ORAL
Qty: 120 CAPSULE | Refills: 3 | Status: SHIPPED | OUTPATIENT
Start: 2017-12-04 | End: 2018-03-05

## 2017-12-05 ENCOUNTER — APPOINTMENT (OUTPATIENT)
Dept: PHYSICAL THERAPY | Age: 62
End: 2017-12-05
Attending: INTERNAL MEDICINE
Payer: MEDICARE

## 2017-12-05 ENCOUNTER — TELEPHONE (OUTPATIENT)
Dept: PHYSICAL THERAPY | Age: 62
End: 2017-12-05

## 2017-12-05 ENCOUNTER — OFFICE VISIT (OUTPATIENT)
Dept: PULMONOLOGY | Facility: CLINIC | Age: 62
End: 2017-12-05

## 2017-12-05 VITALS
OXYGEN SATURATION: 92 % | SYSTOLIC BLOOD PRESSURE: 95 MMHG | DIASTOLIC BLOOD PRESSURE: 60 MMHG | BODY MASS INDEX: 33.2 KG/M2 | WEIGHT: 194.5 LBS | HEART RATE: 70 BPM | HEIGHT: 64 IN

## 2017-12-05 DIAGNOSIS — J44.9 MODERATE COPD (CHRONIC OBSTRUCTIVE PULMONARY DISEASE) (HCC): Primary | ICD-10-CM

## 2017-12-05 PROCEDURE — G0463 HOSPITAL OUTPT CLINIC VISIT: HCPCS | Performed by: INTERNAL MEDICINE

## 2017-12-05 PROCEDURE — 94761 N-INVAS EAR/PLS OXIMETRY MLT: CPT | Performed by: INTERNAL MEDICINE

## 2017-12-05 PROCEDURE — 99213 OFFICE O/P EST LOW 20 MIN: CPT | Performed by: INTERNAL MEDICINE

## 2017-12-05 RX ORDER — IPRATROPIUM BROMIDE AND ALBUTEROL SULFATE 2.5; .5 MG/3ML; MG/3ML
3 SOLUTION RESPIRATORY (INHALATION) 2 TIMES DAILY
Qty: 60 VIAL | Refills: 5 | Status: SHIPPED | OUTPATIENT
Start: 2017-12-05 | End: 2021-01-05

## 2017-12-05 NOTE — PATIENT INSTRUCTIONS
Problem List Items Addressed This Visit        Unprioritized    Emphysema lung ([address]     Patient with a history of COPD–emphysema. Last PFT showed significant reduction in the diffusion capacity to about 48%.   Currently has a cough with yellow-green expec without much improvement in pain at this time.   Start on Lyrica 25 mg 1-2 times daily and then increase to 50 mg 2 times daily as tolerated           Muscle cramps - Primary     Started on type tizanidine 4 mg 1 tablet 1-2 times daily as needed for muscle

## 2017-12-05 NOTE — ASSESSMENT & PLAN NOTE
Lipid panel and liver function test looked great on Crestor. CPK levels have remained normal.  Do not think that the current muscle aches and pains are related to the Crestor at this time .

## 2017-12-05 NOTE — ASSESSMENT & PLAN NOTE
Blood pressure 107/68, temperature 98.1 °F (36.7 °C), temperature source Oral, height 5' 4\" (1.626 m), weight 193 lb (87.5 kg), not currently breastfeeding.   Blood pressures look stable at this time on losartan at 75 mg–taken as 50 mg and 25 mg, Coreg and

## 2017-12-05 NOTE — ASSESSMENT & PLAN NOTE
The blood sugar seems stable and well controlled. Patient has been on Lantus at 10 units daily. Advised to cut back to 5 units.   Check the blood sugars random either before breakfast or before dinner and consider stopping the insulin entirely in 2 weeks

## 2017-12-05 NOTE — PROGRESS NOTES
Referring Physician  Heena Villatoro MD    History of Present Illness  She is seen today for follow-up appointment pulmonary clinic. She admits to overall worsening dyspnea over the course of last year.   She admits to noncompliance with her maintenance inha LOSARTAN POTASSIUM 25 MG Oral Tab TAKE 1 TABLET(25 MG) BY MOUTH EVERY EVENING IN ADDITION TO 50 MG IN THE MORNING Disp: 270 tablet Rfl: 0   ROSUVASTATIN CALCIUM 20 MG Oral Tab TAKE 1 TABLET BY MOUTH EVERY NIGHT Disp: 30 tablet Rfl: 5   insulin glargine ( Assessment  1. Moderate COPD  2. Dyspnea with exertion  3. Nicotine dependence  4. Chronic hypoxemic respiratory failure    Plan  -Patient with evidence of progressive dyspnea with exertion likely secondary to underlying COPD.   I encouraged rome

## 2017-12-05 NOTE — ASSESSMENT & PLAN NOTE
Low back pain radiating into the right hip, right thigh. Weakness and pain especially with use on the right leg. Walking causes pain in the right side.   She has had an MRI completed in May 2017 which did show disc bulge with slight indentation of the the

## 2017-12-05 NOTE — ASSESSMENT & PLAN NOTE
Started on type tizanidine 4 mg 1 tablet 1-2 times daily as needed for muscle cramps, call if symptoms do not improve.

## 2017-12-05 NOTE — ASSESSMENT & PLAN NOTE
Patient with a history of COPD–emphysema. Last PFT showed significant reduction in the diffusion capacity to about 48%. Currently has a cough with yellow-green expectoration and sinus pressure. Reduced air entry bilaterally.   Started on Levaquin 500 mg

## 2017-12-07 ENCOUNTER — TELEPHONE (OUTPATIENT)
Dept: PULMONOLOGY | Facility: CLINIC | Age: 62
End: 2017-12-07

## 2017-12-07 NOTE — TELEPHONE ENCOUNTER
Per [facility] ox needs to be sent to [facility]. Al also requesting O2 form to be signed by physician. Form placed in  folder for review and signing. Pt informed.

## 2017-12-14 ENCOUNTER — TELEPHONE (OUTPATIENT)
Dept: PULMONOLOGY | Facility: CLINIC | Age: 62
End: 2017-12-14

## 2017-12-14 ENCOUNTER — TELEPHONE (OUTPATIENT)
Dept: PHYSICAL THERAPY | Age: 62
End: 2017-12-14

## 2017-12-14 RX ORDER — PRAMIPEXOLE DIHYDROCHLORIDE 1 MG/1
TABLET ORAL
Qty: 270 TABLET | Refills: 0 | Status: SHIPPED | OUTPATIENT
Start: 2017-12-14 | End: 2018-04-02

## 2017-12-14 RX ORDER — TRAMADOL HYDROCHLORIDE 50 MG/1
TABLET ORAL
Qty: 90 TABLET | Refills: 0 | OUTPATIENT
Start: 2017-12-14 | End: 2018-01-17

## 2017-12-14 NOTE — TELEPHONE ENCOUNTER
Pt questioning when RT from [address] is coming out to test pt for portable pack oxygen, instructed pt to call Diane Red pt a nurse visit for neb education, pt declined stts she was educated at office visit the machine just looks a little diff

## 2017-12-14 NOTE — TELEPHONE ENCOUNTER
Refilled per protocol  Refill Protocol Appointment Criteria  · Appointment scheduled in the past 6 months or in the next 3 months  Recent Outpatient Visits            1 week ago Moderate COPD (chronic obstructive pulmonary disease) [facility])    [facility]

## 2017-12-14 NOTE — TELEPHONE ENCOUNTER
Refill request for Tramadol HCL 50 mg take 1 tab q8h prn, qt:90 0 refills    LOV: 11/1/17  NOV: 1/3/18  [place]     S/w pt to clarify if she discontinued Tramadol as stated in chart.  Pt stated she is still taking Tramadol and

## 2017-12-18 ENCOUNTER — TELEPHONE (OUTPATIENT)
Dept: PHYSICAL THERAPY | Age: 62
End: 2017-12-18

## 2017-12-18 NOTE — TELEPHONE ENCOUNTER
Pt states that her O2 is has been low ~ < 88% and her lymphedema PT has been placed on hold. She states that her R leg continues to be painful. She was given Lyrica from [facility] to address the pain. She will see Dr. Chele Milligan on 1/3/18.  I advised pt to continue t

## 2017-12-27 ENCOUNTER — TELEPHONE (OUTPATIENT)
Dept: PULMONOLOGY | Facility: CLINIC | Age: 62
End: 2017-12-27

## 2017-12-27 NOTE — TELEPHONE ENCOUNTER
Explained O2 Sat Study results to [facility] & that these results are from same office visit. She verbalized understanding. Irma Hopper she will d/w Billing Mgr & let us know if anything further including fax completion necessary.

## 2017-12-28 NOTE — TELEPHONE ENCOUNTER
[address] they are moving forward w/ rx per  Dept & nothing further is needed from our office @ this time.

## 2018-01-03 ENCOUNTER — OFFICE VISIT (OUTPATIENT)
Dept: NEUROLOGY | Facility: CLINIC | Age: 63
End: 2018-01-03

## 2018-01-03 VITALS
HEIGHT: 64 IN | WEIGHT: 202.81 LBS | SYSTOLIC BLOOD PRESSURE: 122 MMHG | BODY MASS INDEX: 34.62 KG/M2 | RESPIRATION RATE: 16 BRPM | HEART RATE: 88 BPM | DIASTOLIC BLOOD PRESSURE: 80 MMHG

## 2018-01-03 DIAGNOSIS — M16.9 DEGENERATIVE LOCALIZED ARTHRITIS OF HIP: ICD-10-CM

## 2018-01-03 DIAGNOSIS — M54.16 LUMBAR RADICULOPATHY: ICD-10-CM

## 2018-01-03 DIAGNOSIS — M48.061 SPINAL STENOSIS OF LUMBAR REGION WITHOUT NEUROGENIC CLAUDICATION: Primary | ICD-10-CM

## 2018-01-03 DIAGNOSIS — R26.9 NEUROLOGIC GAIT DYSFUNCTION: ICD-10-CM

## 2018-01-03 DIAGNOSIS — M70.61 GREATER TROCHANTERIC BURSITIS OF RIGHT HIP: ICD-10-CM

## 2018-01-03 DIAGNOSIS — M41.26 OTHER IDIOPATHIC SCOLIOSIS, LUMBAR REGION: ICD-10-CM

## 2018-01-03 DIAGNOSIS — G35 MULTIPLE SCLEROSIS (HCC): ICD-10-CM

## 2018-01-03 PROCEDURE — 99214 OFFICE O/P EST MOD 30 MIN: CPT | Performed by: PHYSICAL MEDICINE & REHABILITATION

## 2018-01-03 NOTE — PATIENT INSTRUCTIONS
- keep a sleep diary of times you go to sleep and wake up - 2 weeks  - one week  Cut out pizza, hunt's breakfast wrap, cookies.   - restart the wraps on the legs  - put your feet on a stool while on the computer  - use the oxygen while you are walking a must be picked up in office. · Please allow the office 48-72 hours to fill the prescription as our physicians rotate between multiple offices and procedure days in the hospitals. · Patient must present photo ID at time of .  If a designated family

## 2018-01-03 NOTE — PROGRESS NOTES
History of Present Illness:   Patient presents with:  Hip Pain: Patient is here for a follow up on hip pain, stated its not getting better since last ov on 11/1/17. Has difficulty getting up to walk. Pain in right hip does not radiate down right leg.  Rated overwhelmed with all the medical things she has to take care of for herself. She cannot take care of her grandson by herself because she cannot walk with him. She increased the cigarette smoking and doesn't want to say how much.      She is taking tramado bumetanide 1 MG Oral Tab Take 2 tablets (2 mg total) by mouth daily. Disp: 180 tablet Rfl: 1   metolazone 2.5 MG Oral Tab Take 1 tablet (2.5 mg total) by mouth 3 (three) times a week.  Disp: 36 tablet Rfl: 1   LOSARTAN POTASSIUM 25 MG Oral Tab TAKE 1 TABL drainage  Ears, nose, mouth, throat, and face: negative for ear drainage  Respiratory: negative for chest pain  Cardiovascular: negative for chest pain  Gastrointestinal: negative forconstipation, diarrhea  Genitourinary:negative for urinary incontinence localized arthritis of hip  Greater trochanteric bursitis of right hip  Neurologic gait dysfunction  Multiple sclerosis ([address]  Other idiopathic scoliosis, lumbar region    Plan:  1.  Reviewed findings with patient that she seems to be tolerataing walking an

## 2018-01-17 RX ORDER — TRAMADOL HYDROCHLORIDE 50 MG/1
TABLET ORAL
Qty: 90 TABLET | Refills: 2 | OUTPATIENT
Start: 2018-01-20 | End: 2018-03-30

## 2018-01-17 NOTE — TELEPHONE ENCOUNTER
Refill request for tramadol 50 mg, take 1 tab every 8 hrs as needed, #90, no refills    LOV: 1/3/18  NOV: 2/5/18  [place]

## 2018-01-24 ENCOUNTER — APPOINTMENT (OUTPATIENT)
Dept: LAB | Age: 63
End: 2018-01-24
Attending: INTERNAL MEDICINE
Payer: MEDICARE

## 2018-01-24 DIAGNOSIS — R80.1 PERSISTENT PROTEINURIA: ICD-10-CM

## 2018-01-24 DIAGNOSIS — N05.1 FOCAL SEGMENTAL GLOMERULOSCLEROSIS: ICD-10-CM

## 2018-01-24 LAB
CREAT UR-MCNC: 35 MG/DL
MICROALBUMIN UR-MCNC: 2.2 MG/DL (ref 0–1.8)
MICROALBUMIN/CREAT UR: 62.9 MG/G{CREAT} (ref 0–20)

## 2018-01-24 PROCEDURE — 82043 UR ALBUMIN QUANTITATIVE: CPT

## 2018-01-24 PROCEDURE — 82570 ASSAY OF URINE CREATININE: CPT

## 2018-01-25 ENCOUNTER — OFFICE VISIT (OUTPATIENT)
Dept: NEPHROLOGY | Facility: CLINIC | Age: 63
End: 2018-01-25

## 2018-01-25 VITALS
TEMPERATURE: 99 F | SYSTOLIC BLOOD PRESSURE: 101 MMHG | HEIGHT: 64 IN | HEART RATE: 73 BPM | BODY MASS INDEX: 33.26 KG/M2 | WEIGHT: 194.81 LBS | DIASTOLIC BLOOD PRESSURE: 67 MMHG

## 2018-01-25 DIAGNOSIS — N05.1 FSGS (FOCAL SEGMENTAL GLOMERULOSCLEROSIS): Primary | ICD-10-CM

## 2018-01-25 PROCEDURE — 99214 OFFICE O/P EST MOD 30 MIN: CPT | Performed by: INTERNAL MEDICINE

## 2018-01-25 PROCEDURE — G0463 HOSPITAL OUTPT CLINIC VISIT: HCPCS | Performed by: INTERNAL MEDICINE

## 2018-01-25 NOTE — PROGRESS NOTES
Progress Note     Jr Bell is a 64 yrs old female with pmh of HL, multiple sclerosis (35 yrs ), restless leg syndrome, back pain,nephrolithiasis x 3, tobacco abuse who presented today for follow up    Recent lab work showed BUN/Cr 32/0.86 mg/dl wit in 110- 130s range. Quit smoking but restarted and was smoking pack and half/day - now 10 cigarettes a day. Started on oxygen by Dr. Sofia Ortiz for chronic cough and hypoxia. Off insulin and Aic is well control. Had steroid induced DM.  Taking bumex 2 mg skye nightly as needed for Pain. Disp: 30 tablet Rfl: 0   bumetanide 1 MG Oral Tab Take 2 tablets (2 mg total) by mouth daily. Disp: 180 tablet Rfl: 1   metolazone 2.5 MG Oral Tab Take 1 tablet (2.5 mg total) by mouth 3 (three) times a week.  Disp: 36 tablet Rfl pain, sob  Respiratory:  + exertional dyspnea - on oxygen  Gastrointestinal:  Negative for abdominal pain, constipation  Genitourinary:  Negative for dysuria and hematuria  Hema/Lymph:  Negative for easy bleeding and easy bruising  Integumentary:  Negative or left kidney 10.7 cm with good cortical medullary differentation    2.  Bilateral Leg swelling:  - weight stable increased since last visit  - on bumex 2 mg daily-  metolazone 2.5 mg three times a week - will try to take her off of metolazone   - low sodi

## 2018-01-25 NOTE — PROGRESS NOTES
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 22% of gloms globally sclerosed.  Negative IFA and EM showed diffuse effacement of p External Ointment [facility] EXT AA BID Disp:  Rfl: 3   pregabalin (LYRICA) 25 MG Oral Cap 2 capsules by mouth 2 times daily.  Disp: 120 capsule Rfl: 3   TiZANidine HCl 4 MG Oral Tab 1 tablet 2 times daily Disp: 60 tablet Rfl: 3   CARVEDILOL 3.125 MG Oral Tab TAKE HANDIHALER 18 MCG Inhalation Cap INHALE THE CONTENTS OF 1 CAPSULE VIA HANDIHALER SAME TIME EVERY DAY Disp: 90 capsule Rfl: 2   aspirin 81 MG Oral Tab Take 81 mg by mouth daily.  Disp:  Rfl:    insulin glargine (LANTUS SOLOSTAR) 100 UNIT/ML Subcutaneous Solu joint aches - linda knee   Extremities: + 2-3 edema   Neurological:  Grossly normal       ASSESSMENT/PLAN:     1.  Nephrotic syndome: initial 24 hrs urine protein 3 grams and UACR 8 gms  - creatinine stable at 0.8 mg/dl with an eGFR >60 ml/min  - improve seru

## 2018-01-31 ENCOUNTER — TELEPHONE (OUTPATIENT)
Dept: OTHER | Age: 63
End: 2018-01-31

## 2018-01-31 NOTE — TELEPHONE ENCOUNTER
Pt stts she has left wrist pain when she moves it  x 2 days. No injury or swelling. Pt has splint on wrist and is making it feel better. Pt wondering what else she can do. Does not want to come in. Please advise.

## 2018-01-31 NOTE — TELEPHONE ENCOUNTER
Per patient she states insurance is requesting a PA for Lyrica 25 mg cap. Insurance is [employer].

## 2018-02-01 ENCOUNTER — TELEPHONE (OUTPATIENT)
Dept: OTHER | Age: 63
End: 2018-02-01

## 2018-02-01 NOTE — TELEPHONE ENCOUNTER
This is most likely due to overuse-tendinitis. see dr George Doing if any worse. use the splint and restrict use

## 2018-02-01 NOTE — TELEPHONE ENCOUNTER
Igor Henson I received a call from [facility] therapeutic [employer] They are asking if  pregabalin (LYRICA) 25 MG  can be changed to a 50 mg Capsule instead of 25 mg take 2 capsules.  Please Advise

## 2018-02-01 NOTE — TELEPHONE ENCOUNTER
PA for Lyrica 25 mg cap completed with [employer] via CMM response time 3-5 business days [address]

## 2018-02-02 NOTE — TELEPHONE ENCOUNTER
[facility] calling for lyrica 25 mg cap, pt needs to take 50 MG cap instead of 2 25mg in order to cover med. Please advise.

## 2018-02-02 NOTE — TELEPHONE ENCOUNTER
LMTCB. Pls transfer call to ext [phone]. Need to know where she would like new script to be sent to.   Mail order or local pharmacy

## 2018-02-05 ENCOUNTER — OFFICE VISIT (OUTPATIENT)
Dept: NEUROLOGY | Facility: CLINIC | Age: 63
End: 2018-02-05

## 2018-02-05 ENCOUNTER — HOSPITAL ENCOUNTER (OUTPATIENT)
Dept: GENERAL RADIOLOGY | Facility: HOSPITAL | Age: 63
Discharge: HOME | End: 2018-02-05
Attending: PHYSICAL MEDICINE & REHABILITATION
Payer: MEDICARE

## 2018-02-05 VITALS
DIASTOLIC BLOOD PRESSURE: 66 MMHG | BODY MASS INDEX: 34.15 KG/M2 | SYSTOLIC BLOOD PRESSURE: 118 MMHG | HEIGHT: 64 IN | WEIGHT: 200 LBS

## 2018-02-05 DIAGNOSIS — M16.10 ARTHRITIS PAIN OF HIP: Primary | ICD-10-CM

## 2018-02-05 DIAGNOSIS — G35 MULTIPLE SCLEROSIS (HCC): ICD-10-CM

## 2018-02-05 DIAGNOSIS — M48.061 SPINAL STENOSIS OF LUMBAR REGION WITHOUT NEUROGENIC CLAUDICATION: ICD-10-CM

## 2018-02-05 DIAGNOSIS — M54.16 LUMBAR RADICULOPATHY: ICD-10-CM

## 2018-02-05 DIAGNOSIS — M25.532 ARTHRALGIA OF LEFT WRIST: ICD-10-CM

## 2018-02-05 PROCEDURE — 73110 X-RAY EXAM OF WRIST: CPT | Performed by: PHYSICAL MEDICINE & REHABILITATION

## 2018-02-05 PROCEDURE — 99214 OFFICE O/P EST MOD 30 MIN: CPT | Performed by: PHYSICAL MEDICINE & REHABILITATION

## 2018-02-05 NOTE — PROGRESS NOTES
History of Present Illness:   Patient presents with:  Hip Pain: LOV 1/3/18, pt states she still has pain in right hip, c/o trouble walking due to pain, rate pain 5/10 today, takes Lyrica and Oxycarbazepine and states Lyrica is helping but not covered by in MCG/INH Inhalation Aerosol Powder, Breath Activated Inhale 1 puff into the lungs daily. Disp: 1 each Rfl: 5   ipratropium-albuterol 0.5-2.5 (3) MG/3ML Inhalation Solution Take 3 mL by nebulization 2 (two) times daily.  Disp: 60 vial Rfl: 5   OXcarbazepine 3 AT BEDTIME AS NEEDED FOR ANXIETY) Disp: 30 tablet Rfl: 5   Losartan Potassium 50 MG Oral Tab Take 1 tablet (50 mg total) by mouth daily. Disp: 90 tablet Rfl: 2   magnesium 250 MG Oral Tab Take 250 mg by mouth daily.    Disp:  Rfl:    Cholecalciferol (VITAMI right  Range of Motion thoracolumbar spine:  decr range of motion to flexion and extension, bilateral sidebending of lumbar spine  Posture:  head and shoulders forward  Single leg stance:   bella decr balance  Gait:  Antalgic gait.   Sit to stand with no diff

## 2018-02-05 NOTE — PATIENT INSTRUCTIONS
- schedule water therapy  - find out 3 songs for timer  - use kitchen timer for the medications in the morning  - try food diary  - follow up in 4 weeks  - try taking a nap in the bed      As of October 6th 2014, the Drug [employer] ([address]) is reclas office must be given name of individual in advance and they must present an ID as well. · The name of the person picking up your prescription must be documented in your chart.

## 2018-02-08 ENCOUNTER — TELEPHONE (OUTPATIENT)
Dept: INTERNAL MEDICINE CLINIC | Facility: CLINIC | Age: 63
End: 2018-02-08

## 2018-02-09 ENCOUNTER — NURSE TRIAGE (OUTPATIENT)
Dept: OTHER | Age: 63
End: 2018-02-09

## 2018-02-09 RX ORDER — LEVOFLOXACIN 500 MG/1
500 TABLET, FILM COATED ORAL DAILY
Qty: 7 TABLET | Refills: 0 | Status: SHIPPED | OUTPATIENT
Start: 2018-02-09 | End: 2018-03-05

## 2018-02-09 NOTE — TELEPHONE ENCOUNTER
Action Requested: Summary for Provider     []  Critical Lab, Recommendations Needed  [] Need Additional Advice  []   FYI    []   Need Orders  [] Need Medications Sent to Pharmacy  []  Other     SUMMARY: Pt started with sore throat and left sinus pressure y

## 2018-02-09 NOTE — TELEPHONE ENCOUNTER
Advised patient of Dr. Jacinto Peralta note. Patient indicated that a Z-Paolo never works for her. Only antibiotic that works is levaquin. Please advise.

## 2018-02-09 NOTE — TELEPHONE ENCOUNTER
Advised of the levaquin; she had no questions. Script created, sent to pharmacy. Advised to call back if needed; she agreed.

## 2018-02-10 NOTE — TELEPHONE ENCOUNTER
Called patient(verified name and ), states that she received a letter from her insurance and it is requiring prior authorization for lyrica, [facility], states that she only has 3 pills left.  Advised that  [address] [place] [address]

## 2018-02-15 ENCOUNTER — PATIENT MESSAGE (OUTPATIENT)
Dept: INTERNAL MEDICINE CLINIC | Facility: CLINIC | Age: 63
End: 2018-02-15

## 2018-02-15 ENCOUNTER — NURSE TRIAGE (OUTPATIENT)
Dept: OTHER | Age: 63
End: 2018-02-15

## 2018-02-15 NOTE — TELEPHONE ENCOUNTER
From: Kellie Hung  To: Charles Rivas MD  Sent: 2/15/2018 9:24 AM CST  Subject: Non-Urgent Medical Question    Dr. Bridger Borja,  I started Lyrica in December for my pain. 25mg once, then twice a day/to 50mg. Medicare denies, then approved 50mg only.  [employer]

## 2018-02-15 NOTE — TELEPHONE ENCOUNTER
Action Requested: Summary for Provider     []  Critical Lab, Recommendations Needed  [] Need Additional Advice  []   FYI    []   Need Orders  [] Need Medications Sent to Pharmacy  []  Other     SUMMARY:  Offered an appointment and the Patient refused.  She understanding and agrees with plan.     Reason for call: Acute ("[employer]hart message on 2/15/18)  Onset: Feb 15, 2018      General Assessment (questions to ask the caller)  Do you consider this a medical emergency?: No  Can you access 911?: Yes  Do you have any

## 2018-02-15 NOTE — TELEPHONE ENCOUNTER
Lyrica does not relieve/cause joint pain. It was meant to be given for nerve related pain. So does not explain the joint pain that she has and  the joint stiffness that she has.   The generalized aches and pains as well as burning, tingling, numbness, mus

## 2018-02-15 NOTE — TELEPHONE ENCOUNTER
ISAIAH    Patient called and informed. She agrees to stay off the Lyrica. Patient stated she is doing better. Now it is just getting out of the chair which hurts. Patient will think about the Gabapentin and will call us back if she decides to go on it.

## 2018-02-15 NOTE — TELEPHONE ENCOUNTER
----- Message from Kristine Chamorro sent at 2/15/2018  9:24 AM CST -----  Regarding: Non-Urgent Medical Question  Contact: [phone]  Dr. Issa Marley started Lyrica in December for my pain. 25mg once, then twice a day/to 50mg.  Medicare denies, then ap

## 2018-02-20 RX ORDER — PREGABALIN 50 MG/1
50 CAPSULE ORAL 2 TIMES DAILY
Qty: 180 CAPSULE | Refills: 3 | Status: SHIPPED | OUTPATIENT
Start: 2018-02-20 | End: 2018-03-05

## 2018-02-21 NOTE — TELEPHONE ENCOUNTER
LMTCB as needed. Please transfer to RN Triage. Thank you. Left general VM that the Rx that was discussed yesterday was sent by Dr. Flora Bruner yesterday to the pharmacy provided and to call back if there were further questions or concerns.

## 2018-02-23 RX ORDER — LEVOTHYROXINE SODIUM 0.03 MG/1
TABLET ORAL
Qty: 90 TABLET | Refills: 1 | Status: SHIPPED | OUTPATIENT
Start: 2018-02-23 | End: 2018-10-01

## 2018-02-26 RX ORDER — POTASSIUM CHLORIDE 20 MEQ/1
TABLET, EXTENDED RELEASE ORAL
Qty: 180 TABLET | Refills: 0 | Status: SHIPPED | OUTPATIENT
Start: 2018-02-26 | End: 2018-06-16

## 2018-02-26 RX ORDER — LOSARTAN POTASSIUM 50 MG/1
TABLET ORAL
Qty: 90 TABLET | Refills: 0 | Status: SHIPPED | OUTPATIENT
Start: 2018-02-26 | End: 2018-06-16

## 2018-02-26 RX ORDER — CARVEDILOL 3.12 MG/1
TABLET ORAL
Qty: 180 TABLET | Refills: 0 | Status: SHIPPED | OUTPATIENT
Start: 2018-02-26 | End: 2018-06-21

## 2018-02-26 RX ORDER — OXCARBAZEPINE 300 MG/1
TABLET, FILM COATED ORAL
Qty: 360 TABLET | Refills: 0 | Status: SHIPPED | OUTPATIENT
Start: 2018-02-26 | End: 2018-03-06

## 2018-02-26 NOTE — TELEPHONE ENCOUNTER
Refill request for oxcarbazepine 300 mg, 2 po BID, #360, no refills    LOV: 7/11/17  NOV: 3/6/18 with Dr. Pedro Tovar

## 2018-03-05 ENCOUNTER — OFFICE VISIT (OUTPATIENT)
Dept: INTERNAL MEDICINE CLINIC | Facility: CLINIC | Age: 63
End: 2018-03-05

## 2018-03-05 VITALS — SYSTOLIC BLOOD PRESSURE: 110 MMHG | HEART RATE: 66 BPM | HEIGHT: 64 IN | DIASTOLIC BLOOD PRESSURE: 60 MMHG

## 2018-03-05 DIAGNOSIS — E11.9 TYPE 2 DIABETES MELLITUS WITHOUT COMPLICATION, WITH LONG-TERM CURRENT USE OF INSULIN (HCC): ICD-10-CM

## 2018-03-05 DIAGNOSIS — N04.9 NEPHROTIC SYNDROME: ICD-10-CM

## 2018-03-05 DIAGNOSIS — J43.9 PULMONARY EMPHYSEMA, UNSPECIFIED EMPHYSEMA TYPE (HCC): Primary | ICD-10-CM

## 2018-03-05 DIAGNOSIS — Z79.4 TYPE 2 DIABETES MELLITUS WITHOUT COMPLICATION, WITH LONG-TERM CURRENT USE OF INSULIN (HCC): ICD-10-CM

## 2018-03-05 DIAGNOSIS — G35 MULTIPLE SCLEROSIS (HCC): ICD-10-CM

## 2018-03-05 DIAGNOSIS — E55.9 VITAMIN D DEFICIENCY: ICD-10-CM

## 2018-03-05 DIAGNOSIS — E78.2 MIXED HYPERLIPIDEMIA: ICD-10-CM

## 2018-03-05 DIAGNOSIS — I10 ESSENTIAL HYPERTENSION: ICD-10-CM

## 2018-03-05 DIAGNOSIS — E03.9 HYPOTHYROIDISM, UNSPECIFIED TYPE: ICD-10-CM

## 2018-03-05 PROCEDURE — G0463 HOSPITAL OUTPT CLINIC VISIT: HCPCS | Performed by: INTERNAL MEDICINE

## 2018-03-05 PROCEDURE — 99215 OFFICE O/P EST HI 40 MIN: CPT | Performed by: INTERNAL MEDICINE

## 2018-03-06 ENCOUNTER — OFFICE VISIT (OUTPATIENT)
Dept: PULMONOLOGY | Facility: CLINIC | Age: 63
End: 2018-03-06

## 2018-03-06 ENCOUNTER — OFFICE VISIT (OUTPATIENT)
Dept: NEUROLOGY | Facility: CLINIC | Age: 63
End: 2018-03-06

## 2018-03-06 VITALS
SYSTOLIC BLOOD PRESSURE: 120 MMHG | HEART RATE: 79 BPM | DIASTOLIC BLOOD PRESSURE: 73 MMHG | HEIGHT: 64 IN | WEIGHT: 195.38 LBS | OXYGEN SATURATION: 93 % | BODY MASS INDEX: 33.35 KG/M2 | RESPIRATION RATE: 18 BRPM

## 2018-03-06 VITALS
DIASTOLIC BLOOD PRESSURE: 60 MMHG | HEART RATE: 75 BPM | SYSTOLIC BLOOD PRESSURE: 90 MMHG | HEIGHT: 64 IN | BODY MASS INDEX: 33.19 KG/M2 | WEIGHT: 194.38 LBS | RESPIRATION RATE: 16 BRPM

## 2018-03-06 DIAGNOSIS — G35 MULTIPLE SCLEROSIS (HCC): Primary | ICD-10-CM

## 2018-03-06 DIAGNOSIS — M62.838 MUSCLE SPASTICITY: ICD-10-CM

## 2018-03-06 DIAGNOSIS — J44.9 MODERATE COPD (CHRONIC OBSTRUCTIVE PULMONARY DISEASE) (HCC): Primary | ICD-10-CM

## 2018-03-06 PROCEDURE — G0463 HOSPITAL OUTPT CLINIC VISIT: HCPCS | Performed by: INTERNAL MEDICINE

## 2018-03-06 PROCEDURE — 99213 OFFICE O/P EST LOW 20 MIN: CPT | Performed by: INTERNAL MEDICINE

## 2018-03-06 PROCEDURE — 99214 OFFICE O/P EST MOD 30 MIN: CPT | Performed by: OTHER

## 2018-03-06 RX ORDER — OXCARBAZEPINE 300 MG/1
TABLET, FILM COATED ORAL
Qty: 405 TABLET | Refills: 3 | Status: SHIPPED | OUTPATIENT
Start: 2018-03-06 | End: 2019-09-10

## 2018-03-06 NOTE — PROGRESS NOTES
Referring Physician  Maggie Bains MD    History of Present Illness  She is seen today for follow-up appointment pulmonary clinic. She admits to overall worsening dyspnea over the course of last year.   She admits to noncompliance with her maintenance inha 270 tablet Rfl: 0   ROSUVASTATIN CALCIUM 20 MG Oral Tab TAKE 1 TABLET BY MOUTH EVERY NIGHT Disp: 30 tablet Rfl: 5   TraZODone HCl 100 MG Oral Tab Take 1 tablet (100 mg total) by mouth nightly.  At Bedtime (Patient taking differently: Take 100 mg by mouth ni therapy with Anjelica Hasten along with nebulizer treatments.  -I encouraged tobacco cessation. Patient currently smoking 10 cigarettes per day. Patient admits to use of electronic cigarettes.   I discussed nicotine patch and gum which patient states she montrell

## 2018-03-06 NOTE — ASSESSMENT & PLAN NOTE
Lipid panel and liver function tests have been stable on Crestor at 20 mg daily. She has tolerated medications well thus far but does need recheck labs including the CPK levels which have been ordered today .

## 2018-03-06 NOTE — ASSESSMENT & PLAN NOTE
Patient sugars have been extremely well controlled especially after discontinuation of the steroids. She is due for recheck labs and will follow-up after completion. She is on strict diet control at this time.   Her eye exam has been completed at the [facility]

## 2018-03-06 NOTE — PATIENT INSTRUCTIONS
Problem List Items Addressed This Visit        Unprioritized    Emphysema lung ([facility] [address] - Primary     With a history of smoking related COPD and emphysema. Continues to smoke about 5-10 per day. She has been consult and requested to stop smoking.   She has b to see Dr. Vladimir Patton. She has been having ongoing pain bilateral knees which have been stiffer and more painful. Advised to consider a PRP for bilateral knees. Nephrotic syndrome     Tips variant of FSGS per kidney biopsy done.   She has been off of

## 2018-03-06 NOTE — PROGRESS NOTES
HPI:    Patient ID: Mary Herbert is a 61year old female. Hypertension   This is a chronic problem. The current episode started more than 1 year ago. The problem has been gradually improving since onset. The problem is controlled.  Associated symptom ACE inhibitor/angiotensin II receptor blocker is being taken. She does not see a podiatrist.Eye exam is not current. Hyperlipidemia   This is a chronic problem. The current episode started more than 1 year ago. The problem is uncontrolled.  Recent lipid t bulge. There is effacement of the ventral thecal sac. There is mild central stenosis related to disc bulge, facet arthropathy and dorsal epidural lipomatosis. There is no significant foraminal narrowing. L3-L4:   Broad disc bulge.  Effacement of ventral th Negative. Psychiatric/Behavioral: The patient is nervous/anxious.                Current Outpatient Prescriptions:  OXcarbazepine 300 MG Oral Tab 2 tablets in the am and 2.5 tablets in the pm Disp: 405 tablet Rfl: 3   CARVEDILOL 3.125 MG Oral Tab TAKE 1 nightly. At Bedtime (Patient taking differently: Take 100 mg by mouth nightly.  Alternate 1 tab one day and half tab the next day ) Disp: 90 tablet Rfl: 2   CLONAZEPAM 0.5 MG Oral Tab TAKE 1 TABLET BY MOUTH AT BEDTIME AS NEEDED FOR ANXIETY (Patient taking d cervical adenopathy. Neurological: She is alert and oriented to person, place, and time. She has normal reflexes. No cranial nerve deficit. She exhibits normal muscle tone. Coordination normal.   Skin: No rash noted. No erythema.    Psychiatric: She has a She has an upcoming appointment with Dr. Randy Thomson. She has had multiple injections in her lower back with very benefits. She has been in physical therapy– [facility] at this time. She is planning to go for individual therapy soon.   Continue on the [employer] RANDOM URINE    URINALYSIS, ROUTINE    ASSAY, THYROID STIM HORMONE      Other Visit Diagnoses     Vitamin D deficiency        Relevant Orders    VITAMIN B12    VITAMIN D, 25-HYDROXY          Return in about 4 weeks (around 4/2/2018).     PT UNDERSTANDS AND

## 2018-03-06 NOTE — ASSESSMENT & PLAN NOTE
Blood pressure 110/60, pulse 66, height 5' 4\" (1.626 m), not currently breastfeeding. Stable blood pressure, well controlled on losartan–50 mg in the a.m. and 25 mg in p.m., Coreg, Bumex and metolazone. Leg swelling has improved significantly.   Kenna

## 2018-03-06 NOTE — ASSESSMENT & PLAN NOTE
MS with small fiber neuropathy, restless leg syndrome, muscle cramps and chronic aches and pains–fibromyalgia-like. Patient has been seen and treated per Dr. Roc Henson. She has an upcoming appointment with Dr. Amari Winters.   She has had multiple injections in her l

## 2018-03-06 NOTE — ASSESSMENT & PLAN NOTE
With a history of smoking related COPD and emphysema. Continues to smoke about 5-10 per day. She has been consult and requested to stop smoking. She has been trying but has not been successful.   She has tried a variety of agents to help quit smoking but

## 2018-03-06 NOTE — ASSESSMENT & PLAN NOTE
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 She has been off of all steroids at this time. Kidney functions–creatinine levels as well as urine protein levels have improved significantly. Lower extremity swelling has improved. Weight has come down.   Sh

## 2018-03-06 NOTE — ASSESSMENT & PLAN NOTE
Thyroid function tests are stable on levothyroxine at 25 mcg once daily.   She has tolerated medications well thus far, recheck labs have been ordered

## 2018-03-12 NOTE — PROGRESS NOTES
[facility]    Li Rutledge is a 61year old female. HPI:     Patient is being seen in follow-up. She has seen doctors carleen and Mary Nelson in the past, last in July 2017. Notes reviewed.     In brief, she initially saw Dr. Reid Avila at [facility]. MRI thoracic spine with and without contrast 2010: Mild DJD, otherwise normal EMG left upper extremity 2010: Normal      Workup reviewed, has included:      MRI lumbar spine 2017  CONCLUSION:      L2-L3: Mild central stenosis.   L3-L4: Mild to m mg total) by mouth 3 (three) times a week.  Disp: 36 tablet Rfl: 1   LOSARTAN POTASSIUM 25 MG Oral Tab TAKE 1 TABLET(25 MG) BY MOUTH EVERY EVENING IN ADDITION TO 50 MG IN THE MORNING Disp: 270 tablet Rfl: 0   ROSUVASTATIN CALCIUM 20 MG Oral Tab TAKE 1 TABLE Nerves: pupils equal, round, and reactive to light; extraocular movements intact; facial sensation intact V1-3, face symmetric, hearing intact, no dysarthria or dysphonia  Motor: power 4/5 RUE and 4+/5 LUE, 4/5 bilateral lower extremities; mild R>LLE spast

## 2018-03-14 ENCOUNTER — TELEPHONE (OUTPATIENT)
Dept: NEUROLOGY | Facility: CLINIC | Age: 63
End: 2018-03-14

## 2018-03-14 NOTE — TELEPHONE ENCOUNTER
Medicare Online for insurance coverage of Botox 200 units x 1 vial cpt codes , [id number], [id number]   Insurance was verified and procedure is a covered benefit under Part B. And does not require authorization. Will inform Pt.   Pt. informed no authorizatio

## 2018-03-21 ENCOUNTER — MED REC SCAN ONLY (OUTPATIENT)
Dept: NEUROLOGY | Facility: CLINIC | Age: 63
End: 2018-03-21

## 2018-03-30 ENCOUNTER — NURSE TRIAGE (OUTPATIENT)
Dept: OTHER | Age: 63
End: 2018-03-30

## 2018-03-30 RX ORDER — LEVOFLOXACIN 500 MG/1
500 TABLET, FILM COATED ORAL DAILY
Qty: 10 TABLET | Refills: 0 | Status: SHIPPED | OUTPATIENT
Start: 2018-03-30 | End: 2018-05-15

## 2018-03-30 RX ORDER — AZITHROMYCIN 250 MG/1
TABLET, FILM COATED ORAL
Qty: 1 PACKAGE | Refills: 0 | Status: SHIPPED | OUTPATIENT
Start: 2018-03-30 | End: 2018-03-30

## 2018-03-30 NOTE — TELEPHONE ENCOUNTER
Spoke with patient, states she recently started aqua therapy and is not even half way done. Advised to continue per last order and consult with therapist regarding the need for a new order.

## 2018-03-30 NOTE — TELEPHONE ENCOUNTER
Action Requested: Summary for Provider     []  Critical Lab, Recommendations Needed  [] Need Additional Advice  []   FYI    []   Need Orders  [x] Need Medications Sent to Pharmacy  []  Other     SUMMARY: requesting levaquin to pharmacy declined OV for eval

## 2018-03-30 NOTE — TELEPHONE ENCOUNTER
Medication request: Tramadol 50 mg Take 1 tablet every 8 hours as needed.  Qt 90 Refills 0    LOV: 2/5/18 ( )  NOV: 5/7/18 (Johanna Blue)    Last refill: 1/20/18

## 2018-03-30 NOTE — TELEPHONE ENCOUNTER
Relayed Dr Hayes -Pt stated in the Past you had to prescribed  Levaquin because the Campbell Moran did not work,asking if she should try it and call back if not better Or can it be changed

## 2018-03-30 NOTE — TELEPHONE ENCOUNTER
Spoke with patient and relayed BOLIVAR message below--patient verbalizes understanding and agreement. Verified pharmacy and Rx for Levaquin sent per BOLIVAR. No further questions/concerns at this time.

## 2018-04-03 RX ORDER — TRAMADOL HYDROCHLORIDE 50 MG/1
TABLET ORAL
Qty: 90 TABLET | Refills: 0 | Status: SHIPPED | OUTPATIENT
Start: 2018-04-03 | End: 2018-06-18

## 2018-04-04 RX ORDER — PRAMIPEXOLE DIHYDROCHLORIDE 1 MG/1
TABLET ORAL
Qty: 270 TABLET | Refills: 1 | Status: SHIPPED | OUTPATIENT
Start: 2018-04-04 | End: 2018-12-30

## 2018-04-04 RX ORDER — TIOTROPIUM BROMIDE 18 UG/1
CAPSULE ORAL; RESPIRATORY (INHALATION)
Qty: 90 CAPSULE | Refills: 1 | Status: SHIPPED | OUTPATIENT
Start: 2018-04-04 | End: 2019-02-28

## 2018-04-06 ENCOUNTER — PATIENT MESSAGE (OUTPATIENT)
Dept: INTERNAL MEDICINE CLINIC | Facility: CLINIC | Age: 63
End: 2018-04-06

## 2018-04-06 ENCOUNTER — TELEPHONE (OUTPATIENT)
Dept: OTHER | Age: 63
End: 2018-04-06

## 2018-04-06 NOTE — TELEPHONE ENCOUNTER
Addressed at acute telephone encounter on 4/6/18.     From: Cory Crawford  To: Carlos Kinsey MD  Sent: 4/6/2018  4:23 AM CDT  Subject: Non-Urgent Medical Question    Dr. Aquiles Mittal,    I still have a deep sore throat that is not getting better, I have been

## 2018-04-30 NOTE — TELEPHONE ENCOUNTER
LOV: 3/5/18 Last Rx: 7/7/17      Cholesterol Medications  Protocol Criteria:  · Appointment scheduled in the past 12 months or in the next 3 months  · ALT & LDL on file in the past 12 months  · ALT result < 80  · LDL result <130   Recent Outpatient Visits

## 2018-05-01 ENCOUNTER — MED REC SCAN ONLY (OUTPATIENT)
Dept: NEUROLOGY | Facility: CLINIC | Age: 63
End: 2018-05-01

## 2018-05-03 RX ORDER — ROSUVASTATIN CALCIUM 20 MG/1
TABLET, COATED ORAL
Qty: 30 TABLET | Refills: 5 | Status: SHIPPED | OUTPATIENT
Start: 2018-05-03 | End: 2019-03-13

## 2018-05-03 RX ORDER — CLONAZEPAM 0.5 MG/1
TABLET ORAL
Qty: 30 TABLET | Refills: 5 | OUTPATIENT
Start: 2018-05-03 | End: 2018-05-15

## 2018-05-07 ENCOUNTER — OFFICE VISIT (OUTPATIENT)
Dept: NEUROLOGY | Facility: CLINIC | Age: 63
End: 2018-05-07

## 2018-05-07 ENCOUNTER — HOSPITAL ENCOUNTER (OUTPATIENT)
Dept: GENERAL RADIOLOGY | Facility: HOSPITAL | Age: 63
Discharge: HOME | End: 2018-05-07
Attending: PHYSICAL MEDICINE & REHABILITATION
Payer: MEDICARE

## 2018-05-07 VITALS
HEART RATE: 60 BPM | DIASTOLIC BLOOD PRESSURE: 68 MMHG | SYSTOLIC BLOOD PRESSURE: 120 MMHG | HEIGHT: 64 IN | RESPIRATION RATE: 16 BRPM | WEIGHT: 200 LBS | BODY MASS INDEX: 34.15 KG/M2

## 2018-05-07 DIAGNOSIS — M25.562 CHRONIC PAIN OF BOTH KNEES: Primary | ICD-10-CM

## 2018-05-07 DIAGNOSIS — G89.29 CHRONIC PAIN OF BOTH KNEES: Primary | ICD-10-CM

## 2018-05-07 DIAGNOSIS — M25.562 CHRONIC PAIN OF BOTH KNEES: ICD-10-CM

## 2018-05-07 DIAGNOSIS — M79.651 RIGHT THIGH PAIN: ICD-10-CM

## 2018-05-07 DIAGNOSIS — G89.29 CHRONIC PAIN OF BOTH KNEES: ICD-10-CM

## 2018-05-07 DIAGNOSIS — M25.561 CHRONIC PAIN OF BOTH KNEES: ICD-10-CM

## 2018-05-07 DIAGNOSIS — M25.561 CHRONIC PAIN OF BOTH KNEES: Primary | ICD-10-CM

## 2018-05-07 PROCEDURE — 73560 X-RAY EXAM OF KNEE 1 OR 2: CPT | Performed by: PHYSICAL MEDICINE & REHABILITATION

## 2018-05-07 PROCEDURE — 99213 OFFICE O/P EST LOW 20 MIN: CPT | Performed by: PHYSICAL MEDICINE & REHABILITATION

## 2018-05-07 PROCEDURE — 73565 X-RAY EXAM OF KNEES: CPT | Performed by: PHYSICAL MEDICINE & REHABILITATION

## 2018-05-07 NOTE — PROGRESS NOTES
HPI:    Patient ID: Hawa Andrea is a 61year old female. This is a 60-year-old female with a past medical history of multiple sclerosis who was previously seen by Dr. Reena Solo. She has undergone numerous injection/procedures as follows:    1.   Right in Positive for weakness. Negative for numbness.             Current Outpatient Prescriptions:  ROSUVASTATIN CALCIUM 20 MG Oral Tab TAKE 1 TABLET BY MOUTH EVERY NIGHT Disp: 30 tablet Rfl: 5   SPIRIVA HANDIHALER 18 MCG Inhalation Cap INHALE THE CONTENTS OF 1 CA and half tab the next day ) Disp: 90 tablet Rfl: 2   magnesium 250 MG Oral Tab Take 250 mg by mouth daily. Disp:  Rfl:    Cholecalciferol (VITAMIN D) 2000 UNITS Oral Cap Take 2,000 Units by mouth daily.    Disp:  Rfl:    aspirin 81 MG Oral Tab Take 81 mg joint injections under ultrasound guidance. We will contact patients with the results through [address]     Sofiya Wall DO  Physical Medicine and [address]

## 2018-05-07 NOTE — PATIENT INSTRUCTIONS
As of October 6th 2014, the Drug Enforcement Agency [facility]) is reclassifying all hydrocodone combination medications from Schedule III to Schedule II. This includes medications such as Norco, Vicodin, Lortab, Zohydro, and Vicoprofen.     What this means for y

## 2018-05-09 ENCOUNTER — LAB ENCOUNTER (OUTPATIENT)
Dept: LAB | Age: 63
End: 2018-05-09
Attending: INTERNAL MEDICINE
Payer: MEDICARE

## 2018-05-09 DIAGNOSIS — E55.9 VITAMIN D DEFICIENCY: ICD-10-CM

## 2018-05-09 DIAGNOSIS — E78.2 MIXED HYPERLIPIDEMIA: ICD-10-CM

## 2018-05-09 DIAGNOSIS — Z79.4 TYPE 2 DIABETES MELLITUS WITHOUT COMPLICATION, WITH LONG-TERM CURRENT USE OF INSULIN (HCC): ICD-10-CM

## 2018-05-09 DIAGNOSIS — E11.9 TYPE 2 DIABETES MELLITUS WITHOUT COMPLICATION, WITH LONG-TERM CURRENT USE OF INSULIN (HCC): ICD-10-CM

## 2018-05-09 PROCEDURE — 82607 VITAMIN B-12: CPT

## 2018-05-09 PROCEDURE — 82306 VITAMIN D 25 HYDROXY: CPT

## 2018-05-09 PROCEDURE — 84443 ASSAY THYROID STIM HORMONE: CPT

## 2018-05-09 PROCEDURE — 85025 COMPLETE CBC W/AUTO DIFF WBC: CPT

## 2018-05-09 PROCEDURE — 83036 HEMOGLOBIN GLYCOSYLATED A1C: CPT

## 2018-05-09 PROCEDURE — 80061 LIPID PANEL: CPT

## 2018-05-09 PROCEDURE — 80053 COMPREHEN METABOLIC PANEL: CPT

## 2018-05-09 PROCEDURE — 82570 ASSAY OF URINE CREATININE: CPT

## 2018-05-09 PROCEDURE — 36415 COLL VENOUS BLD VENIPUNCTURE: CPT

## 2018-05-09 PROCEDURE — 82043 UR ALBUMIN QUANTITATIVE: CPT

## 2018-05-09 PROCEDURE — 82550 ASSAY OF CK (CPK): CPT

## 2018-05-09 PROCEDURE — 81001 URINALYSIS AUTO W/SCOPE: CPT

## 2018-05-15 ENCOUNTER — OFFICE VISIT (OUTPATIENT)
Dept: INTERNAL MEDICINE CLINIC | Facility: CLINIC | Age: 63
End: 2018-05-15

## 2018-05-15 VITALS
HEIGHT: 64 IN | BODY MASS INDEX: 34.12 KG/M2 | WEIGHT: 199.88 LBS | SYSTOLIC BLOOD PRESSURE: 122 MMHG | HEART RATE: 78 BPM | RESPIRATION RATE: 20 BRPM | DIASTOLIC BLOOD PRESSURE: 65 MMHG

## 2018-05-15 DIAGNOSIS — G35 MULTIPLE SCLEROSIS (HCC): ICD-10-CM

## 2018-05-15 DIAGNOSIS — E11.9 TYPE 2 DIABETES MELLITUS WITHOUT COMPLICATION, WITH LONG-TERM CURRENT USE OF INSULIN (HCC): Primary | ICD-10-CM

## 2018-05-15 DIAGNOSIS — N04.9 NEPHROTIC SYNDROME: ICD-10-CM

## 2018-05-15 DIAGNOSIS — B37.0 THRUSH: ICD-10-CM

## 2018-05-15 DIAGNOSIS — R09.89 BILATERAL CAROTID BRUITS: ICD-10-CM

## 2018-05-15 DIAGNOSIS — E78.2 MIXED HYPERLIPIDEMIA: ICD-10-CM

## 2018-05-15 DIAGNOSIS — Z79.4 TYPE 2 DIABETES MELLITUS WITHOUT COMPLICATION, WITH LONG-TERM CURRENT USE OF INSULIN (HCC): Primary | ICD-10-CM

## 2018-05-15 DIAGNOSIS — I10 ESSENTIAL HYPERTENSION: ICD-10-CM

## 2018-05-15 PROCEDURE — 99215 OFFICE O/P EST HI 40 MIN: CPT | Performed by: INTERNAL MEDICINE

## 2018-05-15 PROCEDURE — G0463 HOSPITAL OUTPT CLINIC VISIT: HCPCS | Performed by: INTERNAL MEDICINE

## 2018-05-15 RX ORDER — CLONAZEPAM 0.5 MG/1
TABLET ORAL
Qty: 30 TABLET | Refills: 5 | Status: SHIPPED | OUTPATIENT
Start: 2018-05-15 | End: 2019-05-22

## 2018-05-15 RX ORDER — FLUCONAZOLE 150 MG/1
TABLET ORAL
Qty: 3 TABLET | Refills: 0 | Status: SHIPPED | OUTPATIENT
Start: 2018-05-15 | End: 2018-07-05 | Stop reason: ALTCHOICE

## 2018-05-15 RX ORDER — METOLAZONE 2.5 MG/1
2.5 TABLET ORAL
Qty: 36 TABLET | Refills: 1 | Status: SHIPPED | OUTPATIENT
Start: 2018-05-16 | End: 2018-07-05

## 2018-05-16 RX ORDER — CLONAZEPAM 0.5 MG/1
TABLET ORAL
Qty: 30 TABLET | Refills: 0 | OUTPATIENT
Start: 2018-05-16

## 2018-05-16 NOTE — PROGRESS NOTES
.  HPI:    Patient ID: Shyam Méndez is a 61year old female. Blood sugars are high again-fasting at 160 and hemoglobin A1c has risen to 6.9.    The kidney functions-BUN and creatinine as well as EGFR looks normal with the urine protein levels are henrietta diabetic treatment includes diet and insulin injections (lantus 10 units a day). She is compliant with treatment most of the time. Her weight is stable. She is following a diabetic, generally healthy, high fiber, low fat/cholesterol and low salt diet.  Meal and pounding. The pain is at a severity of 6/10. The pain is moderate. Associated symptoms comments: Mri ls spine    LUMBAR DISC LEVELS:  The numbering scheme designates the last visualized intervertebral disc space as L5-S1.      L1-L2:   Broad disc bulge improving. Steroid doses reduced to 5 mgs a day. Review of Systems   Constitutional: Positive for malaise/fatigue. HENT: Negative. Eyes: Negative. Respiratory: Negative. Cardiovascular: Negative. Gastrointestinal: Negative.     Endocrin puff into the lungs daily. Disp: 1 each Rfl: 5   triamcinolone acetonide 0.1 % External Ointment BOLIVAR EXT AA BID Disp:  Rfl: 3   HYDROcodone-acetaminophen 5-325 MG Oral Tab Take 1 tablet by mouth nightly as needed for Pain.  Disp: 30 tablet Rfl: 0   bumetani heard.  Pulmonary/Chest: Effort normal and breath sounds normal. She has no wheezes. She has no rales. She exhibits no tenderness. Abdominal: Soft. Bowel sounds are normal. She exhibits no distension and no mass. There is no tenderness.  There is no rebou ([email or web address] 75.)     MS with small fiber neuropathy, restless leg syndrome, muscle cramps and chronic leg pain. She has been treated per Dr. Torrie Daniel and now per Dr. Monalisa Peguero.   She has completed the pool therapy with some improvement in the pain but continued weakness in the evening. She has tolerated medications well. Blood pressure 122/65, pulse 78, resp. rate 20, height 5' 4\" (1.626 m), weight 199 lb 14.4 oz (90.7 kg), not currently breastfeeding.             Relevant Orders    MICROALB/CREAT RATIO, RANDOM URINE    UR

## 2018-05-16 NOTE — PATIENT INSTRUCTIONS
Problem List Items Addressed This Visit        Unprioritized    Bilateral carotid bruits     Carotid Dopplers have been ordered.          Relevant Orders    US CAROTID DOPPLER BILAT - DIAG IMG (CPT=93880)    HTN (hypertension)     Blood pressure 122/65, pul a blood sugar at 160 with hemoglobin A1c at 6.9. Proteinuria at this time could be related to the elevation in the blood sugars or dehydration. Advised to recheck the urine test and metabolic panel in the next 4 weeks.   Will consider follow-up evaluation

## 2018-05-16 NOTE — ASSESSMENT & PLAN NOTE
Coated tongue, sore throat and a cough. Elevated blood sugars most likely the cause for the recent recurrence of thrush. Diflucan 150 mg 1 tablet once a week for the next 3 weeks.   Reassess at her next office visit peer

## 2018-05-16 NOTE — ASSESSMENT & PLAN NOTE
Lipid panel and liver function tests have been stable on Crestor 20 mg daily–advised to reduce to 10 mg daily to reduce the effects on the muscles and the weakness that she is experiencing at this time.   We will recheck the lipid panel within next blood dr

## 2018-05-16 NOTE — ASSESSMENT & PLAN NOTE
MS with small fiber neuropathy, restless leg syndrome, muscle cramps and chronic leg pain. She has been treated per Dr. Lindsay Cornell and now per Dr. Karely Maurice. She has completed the pool therapy with some improvement in the pain but continued weakness in the legs.

## 2018-05-16 NOTE — ASSESSMENT & PLAN NOTE
Blood pressure 122/65, pulse 78, resp. rate 20, height 5' 4\" (1.626 m), weight 199 lb 14.4 oz (90.7 kg), not currently breastfeeding. Blood pressure seems stable, well controlled on losartan 50 mg in a.m. and 25 mg in p.m., Coreg, Bumex .   Patient has st

## 2018-05-16 NOTE — ASSESSMENT & PLAN NOTE
Patient has not been checking her blood sugars at home regularly as she has discontinued her medications.   However she has not been watching her diet as well as she should and so may have had a recurrence of elevated sugars as noted on her hemoglobin A1c a

## 2018-05-16 NOTE — ASSESSMENT & PLAN NOTE
Patient diagnosed with tips variant of FSGS as per kidney biopsy. She was treated with steroids and the proteinuria had resolved completely. Last checked in January urine microalbumin was 2.2. Recently rechecked urine microalbumin was up to 33.3.   She h

## 2018-05-21 ENCOUNTER — TELEPHONE (OUTPATIENT)
Dept: NEUROLOGY | Facility: CLINIC | Age: 63
End: 2018-05-21

## 2018-05-21 ENCOUNTER — OFFICE VISIT (OUTPATIENT)
Dept: NEUROLOGY | Facility: CLINIC | Age: 63
End: 2018-05-21

## 2018-05-21 VITALS
WEIGHT: 200 LBS | HEIGHT: 64 IN | BODY MASS INDEX: 34.15 KG/M2 | HEART RATE: 80 BPM | RESPIRATION RATE: 16 BRPM | DIASTOLIC BLOOD PRESSURE: 62 MMHG | SYSTOLIC BLOOD PRESSURE: 92 MMHG

## 2018-05-21 DIAGNOSIS — M17.0 BILATERAL PRIMARY OSTEOARTHRITIS OF KNEE: ICD-10-CM

## 2018-05-21 PROCEDURE — 20611 DRAIN/INJ JOINT/BURSA W/US: CPT | Performed by: PHYSICAL MEDICINE & REHABILITATION

## 2018-05-21 RX ORDER — LIDOCAINE HYDROCHLORIDE 10 MG/ML
3 INJECTION, SOLUTION INFILTRATION; PERINEURAL ONCE
Status: COMPLETED | OUTPATIENT
Start: 2018-05-21 | End: 2018-05-21

## 2018-05-21 RX ORDER — TRIAMCINOLONE ACETONIDE 40 MG/ML
40 INJECTION, SUSPENSION INTRA-ARTICULAR; INTRAMUSCULAR ONCE
Status: COMPLETED | OUTPATIENT
Start: 2018-05-21 | End: 2018-05-21

## 2018-05-21 NOTE — TELEPHONE ENCOUNTER
Medicare Online for insurance coverage of bilateral knee joint injections cpt codes 69890, [id number]. Insurance was verified and procedure is a covered benefit and does not require authorization. Dr. Abiel Pena informed.

## 2018-05-21 NOTE — PROCEDURES
The patient is here for a bilateral knee injection done under ultrasound guidance. Under ultrasound guidance the bilateral suprapatellar bursa was identified and a annie was placed on the patient's skin. The skin was cleaned with betadine swabs x3 and anest

## 2018-05-21 NOTE — PATIENT INSTRUCTIONS
As of October 6th 2014, the Drug Enforcement Agency [facility]) is reclassifying all hydrocodone combination medications from Schedule III to Schedule II. This includes medications such as Norco, Vicodin, Lortab, Zohydro, and Vicoprofen.     What this means for y

## 2018-05-23 NOTE — TELEPHONE ENCOUNTER
Refill Protocol Appointment Criteria  · Appointment scheduled in the past 6 months or in the next 3 months  Recent Outpatient Visits            2 days ago Bilateral primary osteoarthritis of knee    EFRAIN Naik, [address] [facility], [address]

## 2018-05-24 RX ORDER — TRAZODONE HYDROCHLORIDE 100 MG/1
TABLET ORAL
Qty: 90 TABLET | Refills: 1 | Status: SHIPPED | OUTPATIENT
Start: 2018-05-24 | End: 2018-12-30

## 2018-05-29 ENCOUNTER — NURSE TRIAGE (OUTPATIENT)
Dept: OTHER | Age: 63
End: 2018-05-29

## 2018-05-29 ENCOUNTER — OFFICE VISIT (OUTPATIENT)
Dept: INTERNAL MEDICINE CLINIC | Facility: CLINIC | Age: 63
End: 2018-05-29

## 2018-05-29 VITALS
SYSTOLIC BLOOD PRESSURE: 135 MMHG | HEIGHT: 64 IN | BODY MASS INDEX: 34.15 KG/M2 | RESPIRATION RATE: 16 BRPM | WEIGHT: 200 LBS | HEART RATE: 72 BPM | DIASTOLIC BLOOD PRESSURE: 76 MMHG
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
## 2018-05-29 DIAGNOSIS — Z23 NEED FOR VACCINATION: ICD-10-CM

## 2018-05-29 DIAGNOSIS — S91.159A DOG BITE OF TOE, INITIAL ENCOUNTER: Primary | ICD-10-CM

## 2018-05-29 DIAGNOSIS — F17.200 TOBACCO USE DISORDER: ICD-10-CM

## 2018-05-29 PROCEDURE — 99214 OFFICE O/P EST MOD 30 MIN: CPT | Performed by: INTERNAL MEDICINE

## 2018-05-29 PROCEDURE — 90715 TDAP VACCINE 7 YRS/> IM: CPT | Performed by: INTERNAL MEDICINE

## 2018-05-29 PROCEDURE — 90471 IMMUNIZATION ADMIN: CPT | Performed by: INTERNAL MEDICINE

## 2018-05-29 PROCEDURE — G0463 HOSPITAL OUTPT CLINIC VISIT: HCPCS | Performed by: INTERNAL MEDICINE

## 2018-05-29 RX ORDER — AMOXICILLIN AND CLAVULANATE POTASSIUM 875; 125 MG/1; MG/1
1 TABLET, FILM COATED ORAL 2 TIMES DAILY
Qty: 14 TABLET | Refills: 0 | Status: SHIPPED | OUTPATIENT
Start: 2018-05-29 | End: 2018-06-08

## 2018-05-29 NOTE — TELEPHONE ENCOUNTER
Action Requested: Summary for Provider     []  Critical Lab, Recommendations Needed  [] Need Additional Advice  []   FYI    []   Need Orders  [] Need Medications Sent to Pharmacy  []  Other     SUMMARY: Pt was scheduled for appt this afternoon.     Pt was b

## 2018-05-29 NOTE — PROGRESS NOTES
Vladimir Glasgow is a 61year old female. HPI:     1. Dog bite of toe, initial encounter    Was at a friends house and her dog bit her on her great toe. Did not bleed much. Was totally unprovoked. Hurt at the time but feels less discomfort currently.  Anaya [place] Rfl: 0   LOSARTAN POTASSIUM 50 MG Oral Tab TAKE 1 TABLET BY MOUTH DAILY Disp: 90 tablet Rfl: 0   LEVOTHYROXINE SODIUM 25 MCG Oral Tab TAKE 1 TABLET(25 MCG) BY MOUTH EVERY DAY Disp: 90 tablet Rfl: 1   Fluticasone Furoate-Vilanterol (BREO ELLIPTA) 200-25 MCG lesions or rashes  RESPIRATORY: denies shortness of breath with exertion  CARDIOVASCULAR: denies chest pain on exertion  GI: denies abdominal pain and denies heartburn  NEURO: denies headaches    EXAM:   /76   Pulse 72   Resp 16   Ht 5' 4\" (1.626 m) (TDAP), >7 YEARS, IM USE    The patient indicates understanding of these issues and agrees to the plan. The patient is asked to return as needed.

## 2018-06-13 ENCOUNTER — APPOINTMENT (OUTPATIENT)
Dept: LAB | Age: 63
End: 2018-06-13
Attending: INTERNAL MEDICINE
Payer: MEDICARE

## 2018-06-13 ENCOUNTER — NURSE TRIAGE (OUTPATIENT)
Dept: INTERNAL MEDICINE CLINIC | Facility: CLINIC | Age: 63
End: 2018-06-13

## 2018-06-13 DIAGNOSIS — R30.0 BURNING WITH URINATION: Primary | ICD-10-CM

## 2018-06-13 DIAGNOSIS — R30.0 BURNING WITH URINATION: ICD-10-CM

## 2018-06-13 PROCEDURE — 81001 URINALYSIS AUTO W/SCOPE: CPT

## 2018-06-13 PROCEDURE — 87086 URINE CULTURE/COLONY COUNT: CPT

## 2018-06-13 RX ORDER — CIPROFLOXACIN 500 MG/1
500 TABLET, FILM COATED ORAL 2 TIMES DAILY
Qty: 10 TABLET | Refills: 0 | Status: SHIPPED | OUTPATIENT
Start: 2018-06-13 | End: 2018-07-05 | Stop reason: ALTCHOICE

## 2018-06-13 NOTE — TELEPHONE ENCOUNTER
Action Requested: Summary for Provider     []  Critical Lab, Recommendations Needed  [] Need Additional Advice  []   FYI    []   Need Orders  [x] Need Medications Sent to Pharmacy  []  Other     SUMMARY: Declines OV; asking for Rx.  (Pharmacy on file verifi

## 2018-06-13 NOTE — TELEPHONE ENCOUNTER
Urinalysis and culture and sensitivity.   Start on Cipro 500 mg p.o. twice daily for 5 days after urine tests are provided

## 2018-06-18 ENCOUNTER — TELEPHONE (OUTPATIENT)
Dept: NEUROLOGY | Facility: CLINIC | Age: 63
End: 2018-06-18

## 2018-06-18 ENCOUNTER — OFFICE VISIT (OUTPATIENT)
Dept: NEUROLOGY | Facility: CLINIC | Age: 63
End: 2018-06-18

## 2018-06-18 VITALS
WEIGHT: 197 LBS | HEART RATE: 70 BPM | BODY MASS INDEX: 33.63 KG/M2 | RESPIRATION RATE: 16 BRPM | HEIGHT: 64 IN | DIASTOLIC BLOOD PRESSURE: 56 MMHG | SYSTOLIC BLOOD PRESSURE: 96 MMHG

## 2018-06-18 DIAGNOSIS — M16.11 OSTEOARTHRITIS OF RIGHT HIP, UNSPECIFIED OSTEOARTHRITIS TYPE: Primary | ICD-10-CM

## 2018-06-18 DIAGNOSIS — M54.16 LUMBAR RADICULOPATHY: ICD-10-CM

## 2018-06-18 DIAGNOSIS — G35 MULTIPLE SCLEROSIS (HCC): ICD-10-CM

## 2018-06-18 PROCEDURE — 99213 OFFICE O/P EST LOW 20 MIN: CPT | Performed by: PHYSICAL MEDICINE & REHABILITATION

## 2018-06-18 RX ORDER — POTASSIUM CHLORIDE 20 MEQ/1
TABLET, EXTENDED RELEASE ORAL
Qty: 180 TABLET | Refills: 0 | Status: SHIPPED | OUTPATIENT
Start: 2018-06-18 | End: 2018-10-01

## 2018-06-18 RX ORDER — METOLAZONE 2.5 MG/1
TABLET ORAL
Qty: 36 TABLET | Refills: 0 | Status: SHIPPED | OUTPATIENT
Start: 2018-06-18 | End: 2018-10-01

## 2018-06-18 RX ORDER — TRAMADOL HYDROCHLORIDE 50 MG/1
TABLET ORAL
Qty: 90 TABLET | Refills: 0 | Status: SHIPPED | OUTPATIENT
Start: 2018-06-18 | End: 2019-02-28

## 2018-06-18 RX ORDER — LOSARTAN POTASSIUM 50 MG/1
TABLET ORAL
Qty: 90 TABLET | Refills: 0 | Status: SHIPPED | OUTPATIENT
Start: 2018-06-18 | End: 2018-10-01

## 2018-06-18 RX ORDER — MELATONIN
1000 DAILY
COMMUNITY

## 2018-06-18 NOTE — PROGRESS NOTES
HPI:    Patient ID: Sulma Robles is a 61year old female. 59-year-old female with history of multiple sclerosis, right hip osteoarthritis and L3-4, L4-5 mild/mod central stenosis presents for follow up.  States that the legs and back have begun to fe BEDTIME Disp: 90 tablet Rfl: 1   insulin glargine 100 UNIT/ML Subcutaneous Solution Inject 10 Units into the skin daily. Disp:  Rfl:    metolazone 2.5 MG Oral Tab Take 1 tablet (2.5 mg total) by mouth 3 (three) times a week.  Disp: 36 tablet Rfl: 1   Clonaz mouth daily. Disp:  Rfl:    aspirin 81 MG Oral Tab Take 81 mg by mouth daily.  Disp:  Rfl:    METOLAZONE 2.5 MG Oral Tab TAKE 1 TABLET BY MOUTH THREE TIMES A WEEK Disp: 36 tablet Rfl: 0   ipratropium-albuterol 0.5-2.5 (3) MG/3ML Inhalation Solution Take 3

## 2018-06-18 NOTE — TELEPHONE ENCOUNTER
LOV 1/25/18. Upcoming appt scheduled 7/26/18. Verified dosages of metolazone, potassium, and losartan in LOV. unknown

## 2018-06-18 NOTE — TELEPHONE ENCOUNTER
Medicare online for insurance coverage of Right intra-articular hip joint injection under fluoroscopy . With CPT codes: 80917, 43586. Insurance is verified and procedure is a covered benefit and does not require authorization.  Will inform nursing

## 2018-06-19 RX ORDER — TRAMADOL HYDROCHLORIDE 50 MG/1
TABLET ORAL
Qty: 90 TABLET | Refills: 0 | OUTPATIENT
Start: 2018-06-19

## 2018-06-19 NOTE — TELEPHONE ENCOUNTER
Patient has been scheduled for a Right intra-articular hip joint injection under fluoroscopy  on 6/27/18 at the [facility]. Medications and allergies reviewed.  Patient informed to hold aspirins, nsaids, blood thinners, vitamins and fish oils 3-7 days prior to pr

## 2018-06-22 RX ORDER — CARVEDILOL 3.12 MG/1
TABLET ORAL
Qty: 180 TABLET | Refills: 0 | Status: SHIPPED | OUTPATIENT
Start: 2018-06-22 | End: 2018-10-01

## 2018-06-22 NOTE — TELEPHONE ENCOUNTER
LOV 1/25/18. Carvedilol is noted in office note 3.125 BID. Refill pended and routed to Dr. Cheryl Cody to approve. Follow up in 6 months per office note. Appointment scheduled for 7/26/18.

## 2018-06-27 ENCOUNTER — OFFICE VISIT (OUTPATIENT)
Dept: SURGERY | Facility: CLINIC | Age: 63
End: 2018-06-27

## 2018-06-27 DIAGNOSIS — M16.11 PRIMARY OSTEOARTHRITIS OF RIGHT HIP: Primary | ICD-10-CM

## 2018-06-27 PROCEDURE — 20610 DRAIN/INJ JOINT/BURSA W/O US: CPT | Performed by: PHYSICAL MEDICINE & REHABILITATION

## 2018-06-27 PROCEDURE — 77002 NEEDLE LOCALIZATION BY XRAY: CPT | Performed by: PHYSICAL MEDICINE & REHABILITATION

## 2018-06-27 NOTE — PROCEDURES
Right intra-articular hip injection    Indication: Right hip osteoarthritis    Description of procedure: After discussion of the risks and benefits of the procedure, informed consent was signed and the patient was marked to confirm the correct hip to be in

## 2018-06-29 ENCOUNTER — TELEPHONE (OUTPATIENT)
Dept: NEUROLOGY | Facility: CLINIC | Age: 63
End: 2018-06-29

## 2018-06-29 ENCOUNTER — APPOINTMENT (OUTPATIENT)
Dept: LAB | Age: 63
End: 2018-06-29
Attending: INTERNAL MEDICINE
Payer: MEDICARE

## 2018-06-29 DIAGNOSIS — Z79.4 TYPE 2 DIABETES MELLITUS WITHOUT COMPLICATION, WITH LONG-TERM CURRENT USE OF INSULIN (HCC): ICD-10-CM

## 2018-06-29 DIAGNOSIS — E11.9 TYPE 2 DIABETES MELLITUS WITHOUT COMPLICATION, WITH LONG-TERM CURRENT USE OF INSULIN (HCC): ICD-10-CM

## 2018-06-29 DIAGNOSIS — E78.2 MIXED HYPERLIPIDEMIA: ICD-10-CM

## 2018-06-29 LAB
ALBUMIN SERPL BCP-MCNC: 3.7 G/DL (ref 3.5–4.8)
ALBUMIN/GLOB SERPL: 1.2 {RATIO} (ref 1–2)
ALP SERPL-CCNC: 100 U/L (ref 32–100)
ALT SERPL-CCNC: 23 U/L (ref 14–54)
ANION GAP SERPL CALC-SCNC: 11 MMOL/L (ref 0–18)
AST SERPL-CCNC: 18 U/L (ref 15–41)
BACTERIA UR QL AUTO: NEGATIVE /HPF
BILIRUB SERPL-MCNC: 0.7 MG/DL (ref 0.3–1.2)
BILIRUB UR QL: NEGATIVE
BUN SERPL-MCNC: 19 MG/DL (ref 8–20)
BUN/CREAT SERPL: 24.1 (ref 10–20)
CALCIUM SERPL-MCNC: 8.8 MG/DL (ref 8.5–10.5)
CHLORIDE SERPL-SCNC: 103 MMOL/L (ref 95–110)
CHOLEST SERPL-MCNC: 197 MG/DL (ref 110–200)
CLARITY UR: CLEAR
CO2 SERPL-SCNC: 21 MMOL/L (ref 22–32)
COLOR UR: YELLOW
CREAT SERPL-MCNC: 0.79 MG/DL (ref 0.5–1.5)
CREAT UR-MCNC: 50.5 MG/DL
GLOBULIN PLAS-MCNC: 3.2 G/DL (ref 2.5–3.7)
GLUCOSE SERPL-MCNC: 137 MG/DL (ref 70–99)
GLUCOSE UR-MCNC: NEGATIVE MG/DL
HDLC SERPL-MCNC: 40 MG/DL
HYALINE CASTS #/AREA URNS AUTO: 1 /LPF
KETONES UR-MCNC: NEGATIVE MG/DL
LDLC SERPL CALC-MCNC: 126 MG/DL (ref 0–99)
LEUKOCYTE ESTERASE UR QL STRIP.AUTO: NEGATIVE
MICROALBUMIN UR-MCNC: 11.2 MG/DL (ref 0–1.8)
MICROALBUMIN/CREAT UR: 221.8 MG/G{CREAT} (ref 0–20)
NITRITE UR QL STRIP.AUTO: NEGATIVE
NONHDLC SERPL-MCNC: 157 MG/DL
OSMOLALITY UR CALC.SUM OF ELEC: 284 MOSM/KG (ref 275–295)
PATIENT FASTING: YES
PH UR: 5 [PH] (ref 5–8)
POTASSIUM SERPL-SCNC: 3.7 MMOL/L (ref 3.3–5.1)
PROT SERPL-MCNC: 6.9 G/DL (ref 5.9–8.4)
PROT UR-MCNC: NEGATIVE MG/DL
RBC #/AREA URNS AUTO: <1 /HPF
SODIUM SERPL-SCNC: 135 MMOL/L (ref 136–144)
SP GR UR STRIP: 1.01 (ref 1–1.03)
TRIGL SERPL-MCNC: 157 MG/DL (ref 1–149)
UROBILINOGEN UR STRIP-ACNC: <2
VIT C UR-MCNC: NEGATIVE MG/DL
WBC #/AREA URNS AUTO: 1 /HPF

## 2018-06-29 PROCEDURE — 36415 COLL VENOUS BLD VENIPUNCTURE: CPT

## 2018-06-29 PROCEDURE — 80061 LIPID PANEL: CPT

## 2018-06-29 PROCEDURE — 80053 COMPREHEN METABOLIC PANEL: CPT

## 2018-06-29 PROCEDURE — 82570 ASSAY OF URINE CREATININE: CPT

## 2018-06-29 PROCEDURE — 81001 URINALYSIS AUTO W/SCOPE: CPT

## 2018-06-29 PROCEDURE — 82043 UR ALBUMIN QUANTITATIVE: CPT

## 2018-06-29 NOTE — TELEPHONE ENCOUNTER
Medicare online for insurance coverage of BILATERAL KNEE JOINT INJECTIONS UNDER ULTRASOUND GUIDANCE. CPT code/s: 04467, [id number]. Insurance is verified and procedure is a covered benefit and does not require authorization.      Will inform nursing

## 2018-07-05 ENCOUNTER — OFFICE VISIT (OUTPATIENT)
Dept: INTERNAL MEDICINE CLINIC | Facility: CLINIC | Age: 63
End: 2018-07-05

## 2018-07-05 VITALS
BODY MASS INDEX: 34.55 KG/M2 | WEIGHT: 202.38 LBS | DIASTOLIC BLOOD PRESSURE: 68 MMHG | TEMPERATURE: 98 F | SYSTOLIC BLOOD PRESSURE: 116 MMHG | HEART RATE: 80 BPM | HEIGHT: 64 IN | RESPIRATION RATE: 22 BRPM

## 2018-07-05 DIAGNOSIS — E55.9 VITAMIN D DEFICIENCY: ICD-10-CM

## 2018-07-05 DIAGNOSIS — R06.02 SHORTNESS OF BREATH: ICD-10-CM

## 2018-07-05 DIAGNOSIS — I10 ESSENTIAL HYPERTENSION: ICD-10-CM

## 2018-07-05 DIAGNOSIS — Z79.4 TYPE 2 DIABETES MELLITUS WITHOUT COMPLICATION, WITH LONG-TERM CURRENT USE OF INSULIN (HCC): ICD-10-CM

## 2018-07-05 DIAGNOSIS — B37.0 THRUSH: Primary | ICD-10-CM

## 2018-07-05 DIAGNOSIS — E11.9 TYPE 2 DIABETES MELLITUS WITHOUT COMPLICATION, WITH LONG-TERM CURRENT USE OF INSULIN (HCC): ICD-10-CM

## 2018-07-05 PROBLEM — T38.0X5A STEROID MYOPATHY: Status: RESOLVED | Noted: 2017-02-08 | Resolved: 2018-07-05

## 2018-07-05 PROBLEM — G72.0 STEROID MYOPATHY: Status: RESOLVED | Noted: 2017-02-08 | Resolved: 2018-07-05

## 2018-07-05 PROCEDURE — G0463 HOSPITAL OUTPT CLINIC VISIT: HCPCS | Performed by: INTERNAL MEDICINE

## 2018-07-05 PROCEDURE — 99214 OFFICE O/P EST MOD 30 MIN: CPT | Performed by: INTERNAL MEDICINE

## 2018-07-05 RX ORDER — FLUCONAZOLE 150 MG/1
TABLET ORAL
Qty: 3 TABLET | Refills: 0 | Status: SHIPPED | OUTPATIENT
Start: 2018-07-05 | End: 2018-10-01

## 2018-07-06 ENCOUNTER — TELEPHONE (OUTPATIENT)
Dept: INTERNAL MEDICINE CLINIC | Facility: CLINIC | Age: 63
End: 2018-07-06

## 2018-07-06 NOTE — ASSESSMENT & PLAN NOTE
Patient with a history of smoking, has increased her smoking habits recently. However is planning to cut back and slow down. She is planning to start using the E cigarettes.   Last pulmonary function tests were done in 2016, repeat order has been provided

## 2018-07-06 NOTE — ASSESSMENT & PLAN NOTE
Much improved thrush at this time. Will retreat with Diflucan for another 3 weeks.   Call if symptoms continue to persist.

## 2018-07-06 NOTE — PATIENT INSTRUCTIONS
Problem List Items Addressed This Visit        Unprioritized    Dyspnea     Patient with a history of smoking, has increased her smoking habits recently. However is planning to cut back and slow down. She is planning to start using the E cigarettes.   Sherren Rasher

## 2018-07-06 NOTE — PROGRESS NOTES
HPI:    Patient ID: Peace Wing is a 61year old female. Labs discussed–blood sugars have improved. Urine protein levels have improved. Blood sugars at home checked randomly have all been below 150.   Patient is currently on     Hypertension   Thi sweets and calorie counting. She has not had a previous visit with a dietitian. She never participates in exercise. Her home blood glucose trend is decreasing steadily. An ACE inhibitor/angiotensin II receptor blocker is being taken.  She does not see a pod indentation of the thecal sac ventrally without significant central stenosis. There is no facet arthropathy. No significant foraminal narrowing. L2-L3:   There is a broad disc bulge. There is effacement of the ventral thecal sac.  There is mild central felipe for arthralgias. Skin: Negative. Allergic/Immunologic: Negative. Neurological: Negative. Negative for dizziness. Hematological: Negative. Psychiatric/Behavioral: The patient is nervous/anxious.                Current Outpatient Prescriptions: Inhalation Solution Take 3 mL by nebulization 2 (two) times daily. Disp: 60 vial Rfl: 5   HYDROcodone-acetaminophen 5-325 MG Oral Tab Take 1 tablet by mouth nightly as needed for Pain.  Disp: 30 tablet Rfl: 0   bumetanide 1 MG Oral Tab Take 2 tablets (2 mg range of motion. She exhibits no edema or tenderness. Lumbar back: She exhibits deformity, pain and spasm. Lymphadenopathy:     She has no cervical adenopathy. Neurological: She is alert and oriented to person, place, and time.  Abnormal reflex: Relevant Orders    PODIATRY - INTERNAL    CBC WITH DIFFERENTIAL WITH PLATELET    COMP METABOLIC PANEL (14)    HEMOGLOBIN A1C    ASSAY, THYROID STIM HORMONE    Thrush - Primary     Much improved thrush at this time.   Will retreat with Diflucan for another 3

## 2018-07-06 NOTE — TELEPHONE ENCOUNTER
Pt. Called for her AVS from yesterday visit. I printed out and put at [facility].   Pt.  will come to pu

## 2018-07-06 NOTE — ASSESSMENT & PLAN NOTE
Blood sugars checked at home randomly are all below 150. She is currently on Lantus at 10 units daily. Kidney function seem to have stabilized. Urine protein levels have improved.   Patient is seen at the [facility] for eye exam encouraged to hav

## 2018-07-06 NOTE — ASSESSMENT & PLAN NOTE
Blood pressure 116/68, pulse 80, temperature 98.1 °F (36.7 °C), temperature source Oral, resp. rate 22, height 5' 4\" (1.626 m), weight 202 lb 6.4 oz (91.8 kg), not currently breastfeeding. Blood pressures look stable at this time.

## 2018-07-09 ENCOUNTER — HOSPITAL ENCOUNTER (OUTPATIENT)
Dept: ULTRASOUND IMAGING | Age: 63
Discharge: HOME | End: 2018-07-09
Attending: INTERNAL MEDICINE
Payer: MEDICARE

## 2018-07-09 ENCOUNTER — HOSPITAL ENCOUNTER (OUTPATIENT)
Dept: MAMMOGRAPHY | Age: 63
Discharge: HOME | End: 2018-07-09
Attending: INTERNAL MEDICINE
Payer: MEDICARE

## 2018-07-09 DIAGNOSIS — Z12.31 VISIT FOR SCREENING MAMMOGRAM: ICD-10-CM

## 2018-07-09 DIAGNOSIS — R09.89 BILATERAL CAROTID BRUITS: ICD-10-CM

## 2018-07-09 PROCEDURE — 77067 SCR MAMMO BI INCL CAD: CPT | Performed by: INTERNAL MEDICINE

## 2018-07-09 PROCEDURE — 93880 EXTRACRANIAL BILAT STUDY: CPT | Performed by: INTERNAL MEDICINE

## 2018-07-16 ENCOUNTER — HOSPITAL ENCOUNTER (OUTPATIENT)
Dept: CV DIAGNOSTICS | Facility: HOSPITAL | Age: 63
Discharge: HOME | End: 2018-07-16
Attending: INTERNAL MEDICINE
Payer: MEDICARE

## 2018-07-16 ENCOUNTER — HOSPITAL ENCOUNTER (OUTPATIENT)
Dept: RESPIRATORY THERAPY | Facility: HOSPITAL | Age: 63
Discharge: HOME | End: 2018-07-16
Attending: INTERNAL MEDICINE
Payer: MEDICARE

## 2018-07-16 DIAGNOSIS — R06.02 SHORTNESS OF BREATH: ICD-10-CM

## 2018-07-16 PROCEDURE — 93306 TTE W/DOPPLER COMPLETE: CPT | Performed by: INTERNAL MEDICINE

## 2018-07-16 PROCEDURE — 94060 EVALUATION OF WHEEZING: CPT | Performed by: INTERNAL MEDICINE

## 2018-07-17 ENCOUNTER — OFFICE VISIT (OUTPATIENT)
Dept: NEUROLOGY | Facility: CLINIC | Age: 63
End: 2018-07-17

## 2018-07-17 VITALS
DIASTOLIC BLOOD PRESSURE: 70 MMHG | HEIGHT: 64 IN | WEIGHT: 200 LBS | SYSTOLIC BLOOD PRESSURE: 110 MMHG | HEART RATE: 72 BPM | RESPIRATION RATE: 16 BRPM | BODY MASS INDEX: 34.15 KG/M2

## 2018-07-17 DIAGNOSIS — M54.16 LUMBAR RADICULOPATHY: Primary | ICD-10-CM

## 2018-07-17 DIAGNOSIS — M16.11 PRIMARY OSTEOARTHRITIS OF RIGHT HIP: ICD-10-CM

## 2018-07-17 PROCEDURE — 99213 OFFICE O/P EST LOW 20 MIN: CPT | Performed by: PHYSICAL MEDICINE & REHABILITATION

## 2018-07-17 NOTE — PROGRESS NOTES
HPI:    Patient ID: Ru Painter is a 61year old female with history of MS, lumbar stenosis and right hip osteoarthritis.     She returns for post procedure follow-up after having had a right articular hip joint injection on 6/27/2018 due to right butt 50 MG Oral Tab TAKE 1 TABLET BY MOUTH EVERY 8 HOURS AS NEEDED Disp: 90 tablet Rfl: 0   TRAZODONE  MG Oral Tab TAKE 1 TABLET(100 MG) BY MOUTH EVERY NIGHT AT BEDTIME Disp: 90 tablet Rfl: 1   insulin glargine 100 UNIT/ML Subcutaneous Solution Inject 10 by mouth daily. Disp:  Rfl:      Allergies:No Known Allergies   PHYSICAL EXAM:   Physical Exam   Constitutional: She is oriented to person, place, and time. She appears well-developed and well-nourished. HENT:   Head: Normocephalic and atraumatic.    Eyes

## 2018-07-18 NOTE — ADDENDUM NOTE
Encounter addended by: lAbert Mackey MD on: 7/18/2018  1:36 PM<BR>    Actions taken: Sign clinical note, Charge Capture section accepted

## 2018-07-18 NOTE — PROCEDURES
[address]     1955 MRN [id number]   Height   590 Age 61year old   Weight   202 pounds Sex Female         Spirometry:   FEV1 0.99 L which is 41%  FEV1/FVC 54%    Significant bronchodilato

## 2018-07-25 ENCOUNTER — APPOINTMENT (OUTPATIENT)
Dept: LAB | Facility: HOSPITAL | Age: 63
End: 2018-07-25
Attending: INTERNAL MEDICINE
Payer: MEDICARE

## 2018-07-25 DIAGNOSIS — N05.1 FSGS (FOCAL SEGMENTAL GLOMERULOSCLEROSIS): ICD-10-CM

## 2018-07-25 LAB
ALBUMIN SERPL BCP-MCNC: 3.5 G/DL (ref 3.5–4.8)
ANION GAP SERPL CALC-SCNC: 8 MMOL/L (ref 0–18)
BUN SERPL-MCNC: 15 MG/DL (ref 8–20)
BUN/CREAT SERPL: 20.5 (ref 10–20)
CALCIUM SERPL-MCNC: 9 MG/DL (ref 8.5–10.5)
CHLORIDE SERPL-SCNC: 104 MMOL/L (ref 95–110)
CO2 SERPL-SCNC: 27 MMOL/L (ref 22–32)
CREAT SERPL-MCNC: 0.73 MG/DL (ref 0.5–1.5)
GLUCOSE SERPL-MCNC: 149 MG/DL (ref 70–99)
OSMOLALITY UR CALC.SUM OF ELEC: 292 MOSM/KG (ref 275–295)
PHOSPHATE SERPL-MCNC: 3.7 MG/DL (ref 2.4–4.7)
POTASSIUM SERPL-SCNC: 3.9 MMOL/L (ref 3.3–5.1)
SODIUM SERPL-SCNC: 139 MMOL/L (ref 136–144)

## 2018-07-25 PROCEDURE — 80069 RENAL FUNCTION PANEL: CPT

## 2018-07-25 PROCEDURE — 36415 COLL VENOUS BLD VENIPUNCTURE: CPT

## 2018-07-26 ENCOUNTER — MED REC SCAN ONLY (OUTPATIENT)
Dept: INTERNAL MEDICINE CLINIC | Facility: CLINIC | Age: 63
End: 2018-07-26

## 2018-07-26 ENCOUNTER — OFFICE VISIT (OUTPATIENT)
Dept: NEPHROLOGY | Facility: CLINIC | Age: 63
End: 2018-07-26
Payer: MEDICARE

## 2018-07-26 VITALS
WEIGHT: 204.19 LBS | HEIGHT: 64 IN | HEART RATE: 73 BPM | SYSTOLIC BLOOD PRESSURE: 116 MMHG | DIASTOLIC BLOOD PRESSURE: 70 MMHG | BODY MASS INDEX: 34.86 KG/M2 | TEMPERATURE: 98 F

## 2018-07-26 DIAGNOSIS — N05.1 FSGS (FOCAL SEGMENTAL GLOMERULOSCLEROSIS): Primary | ICD-10-CM

## 2018-07-26 DIAGNOSIS — R80.1 PERSISTENT PROTEINURIA: ICD-10-CM

## 2018-07-26 DIAGNOSIS — N18.30 CKD (CHRONIC KIDNEY DISEASE), STAGE III (HCC): ICD-10-CM

## 2018-07-26 PROCEDURE — 99215 OFFICE O/P EST HI 40 MIN: CPT | Performed by: INTERNAL MEDICINE

## 2018-07-26 PROCEDURE — G0463 HOSPITAL OUTPT CLINIC VISIT: HCPCS | Performed by: INTERNAL MEDICINE

## 2018-07-26 NOTE — MR AVS SNAPSHOT
Internal Medicine [facility] SPECIALTY \[facility] [place] [facility]\"" - [place] [address] [place]  [id number] [phone]               Thank you for choosing us for your health care visit with Aldo Hill MD.  We are glad to serve you and happy to provide you with this summary of yo Assoc Dx:  Type 2 diabetes mellitus without complication, with long-term current use of insulin ([facility] [E11.9, Z79.4]           Hemoglobin A1C [E]    Complete by:  Apr 24, 2017 (Approximate)    Assoc Dx:  Type 2 diabetes mellitus without complication, with Instructions: To schedule a test at any [facility] Scheduling at   [phone].          Reason for Today's Visit     Hip Pain     Cough           Medical Issues Discussed Today     Dyspnea    Lumbar radiculopathy Blood pressure 136/77, pulse 73, resp. rate 16, height 5' 4\" (1.626 m), weight 189 lb (85.73 kg), not currently breastfeeding. The blood sugars seem very much better–almost all sugars below 100. Currently on insulin at about 22–20 units per day.   Yazmin Reich fluconazole 150 MG Tabs   Take 1 po x 1, repeat in 1 week   Commonly known as:  DIFLUCAN           * furosemide 40 MG Tabs   Take 1 tablet (40 mg total) by mouth daily.    Commonly known as:  LASIX           * furosemide 20 MG Tabs   Take 1 tablet (20 mg t Rosuvastatin Calcium 20 MG Tabs   Take 1 tablet (20 mg total) by mouth nightly.    Commonly known as:  CRESTOR           SPIRIVA HANDIHALER 18 MCG Caps   Generic drug:  Tiotropium Bromide Monohydrate   INHALE THE CONTENTS OF 1 CAPSULE VIA HANDIHALER SAME T

## 2018-07-26 NOTE — PROGRESS NOTES
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  Disp: 1 each Rfl: 5   ipratropium-albuterol 0.5-2.5 (3) MG/3ML Inhalation Solution Take 3 mL by nebulization 2 (two) times skye intact  Nose/Mouth/Throat: mucous membranes are moist    Neck/Thyroid: neck is supple without adenopathy  Lymphatic: no abnormal cervical, supraclavicular adenopathy is noted  Respiratory:  lungs are clear to auscultation bilaterally  Cardiovascular: regul Visit:  No orders of the defined types were placed in this encounter.       Meds This Visit:    No prescriptions requested or ordered in this encounter  Imaging & Referrals:  None     7/26/2018  Kimberli Beaver MD

## 2018-08-13 ENCOUNTER — LAB ENCOUNTER (OUTPATIENT)
Dept: LAB | Age: 63
End: 2018-08-13
Attending: INTERNAL MEDICINE
Payer: MEDICARE

## 2018-08-13 DIAGNOSIS — E11.9 TYPE 2 DIABETES MELLITUS WITHOUT COMPLICATION, WITH LONG-TERM CURRENT USE OF INSULIN (HCC): ICD-10-CM

## 2018-08-13 DIAGNOSIS — Z79.4 TYPE 2 DIABETES MELLITUS WITHOUT COMPLICATION, WITH LONG-TERM CURRENT USE OF INSULIN (HCC): ICD-10-CM

## 2018-08-13 DIAGNOSIS — E55.9 VITAMIN D DEFICIENCY: ICD-10-CM

## 2018-08-13 LAB
ALBUMIN SERPL BCP-MCNC: 3.9 G/DL (ref 3.5–4.8)
ALBUMIN/GLOB SERPL: 1.2 {RATIO} (ref 1–2)
ALP SERPL-CCNC: 119 U/L (ref 32–100)
ALT SERPL-CCNC: 30 U/L (ref 14–54)
ANION GAP SERPL CALC-SCNC: 9 MMOL/L (ref 0–18)
AST SERPL-CCNC: 22 U/L (ref 15–41)
BASOPHILS # BLD: 0.1 K/UL (ref 0–0.2)
BASOPHILS NFR BLD: 1 %
BILIRUB SERPL-MCNC: 0.6 MG/DL (ref 0.3–1.2)
BUN SERPL-MCNC: 20 MG/DL (ref 8–20)
BUN/CREAT SERPL: 25.6 (ref 10–20)
CALCIUM SERPL-MCNC: 8.9 MG/DL (ref 8.5–10.5)
CHLORIDE SERPL-SCNC: 100 MMOL/L (ref 95–110)
CO2 SERPL-SCNC: 27 MMOL/L (ref 22–32)
CREAT SERPL-MCNC: 0.78 MG/DL (ref 0.5–1.5)
EOSINOPHIL # BLD: 0.1 K/UL (ref 0–0.7)
EOSINOPHIL NFR BLD: 1 %
ERYTHROCYTE [DISTWIDTH] IN BLOOD BY AUTOMATED COUNT: 13.5 % (ref 11–15)
GLOBULIN PLAS-MCNC: 3.3 G/DL (ref 2.5–3.7)
GLUCOSE SERPL-MCNC: 134 MG/DL (ref 70–99)
HCT VFR BLD AUTO: 43.7 % (ref 35–48)
HGB BLD-MCNC: 14.9 G/DL (ref 12–16)
LYMPHOCYTES # BLD: 1.7 K/UL (ref 1–4)
LYMPHOCYTES NFR BLD: 23 %
MCH RBC QN AUTO: 31.8 PG (ref 27–32)
MCHC RBC AUTO-ENTMCNC: 34 G/DL (ref 32–37)
MCV RBC AUTO: 93.4 FL (ref 80–100)
MONOCYTES # BLD: 0.6 K/UL (ref 0–1)
MONOCYTES NFR BLD: 8 %
NEUTROPHILS # BLD AUTO: 4.9 K/UL (ref 1.8–7.7)
NEUTROPHILS NFR BLD: 67 %
OSMOLALITY UR CALC.SUM OF ELEC: 287 MOSM/KG (ref 275–295)
PATIENT FASTING: NO
PLATELET # BLD AUTO: 225 K/UL (ref 140–400)
PMV BLD AUTO: 9.1 FL (ref 7.4–10.3)
POTASSIUM SERPL-SCNC: 3.7 MMOL/L (ref 3.3–5.1)
PROT SERPL-MCNC: 7.2 G/DL (ref 5.9–8.4)
RBC # BLD AUTO: 4.68 M/UL (ref 3.7–5.4)
SODIUM SERPL-SCNC: 136 MMOL/L (ref 136–144)
TSH SERPL-ACNC: 0.88 UIU/ML (ref 0.45–5.33)
VIT B12 SERPL-MCNC: 609 PG/ML (ref 181–914)
WBC # BLD AUTO: 7.3 K/UL (ref 4–11)

## 2018-08-13 PROCEDURE — 82306 VITAMIN D 25 HYDROXY: CPT

## 2018-08-13 PROCEDURE — 85025 COMPLETE CBC W/AUTO DIFF WBC: CPT

## 2018-08-13 PROCEDURE — 36415 COLL VENOUS BLD VENIPUNCTURE: CPT

## 2018-08-13 PROCEDURE — 80053 COMPREHEN METABOLIC PANEL: CPT

## 2018-08-13 PROCEDURE — 83036 HEMOGLOBIN GLYCOSYLATED A1C: CPT

## 2018-08-13 PROCEDURE — 84443 ASSAY THYROID STIM HORMONE: CPT

## 2018-08-13 PROCEDURE — 82607 VITAMIN B-12: CPT

## 2018-08-14 LAB — HBA1C MFR BLD: 7.2 % (ref 4–6)

## 2018-08-15 LAB — 25(OH)D3 SERPL-MCNC: 45 NG/ML

## 2018-08-16 ENCOUNTER — OFFICE VISIT (OUTPATIENT)
Dept: INTERNAL MEDICINE CLINIC | Facility: CLINIC | Age: 63
End: 2018-08-16
Payer: MEDICARE

## 2018-08-16 VITALS
BODY MASS INDEX: 34.97 KG/M2 | OXYGEN SATURATION: 94 % | HEIGHT: 64 IN | SYSTOLIC BLOOD PRESSURE: 135 MMHG | HEART RATE: 81 BPM | DIASTOLIC BLOOD PRESSURE: 81 MMHG | RESPIRATION RATE: 22 BRPM | WEIGHT: 204.81 LBS

## 2018-08-16 DIAGNOSIS — E78.2 MIXED HYPERLIPIDEMIA: Primary | ICD-10-CM

## 2018-08-16 DIAGNOSIS — R30.0 DYSURIA: ICD-10-CM

## 2018-08-16 DIAGNOSIS — E11.9 TYPE 2 DIABETES MELLITUS WITHOUT COMPLICATION, WITH LONG-TERM CURRENT USE OF INSULIN (HCC): ICD-10-CM

## 2018-08-16 DIAGNOSIS — Z79.4 TYPE 2 DIABETES MELLITUS WITHOUT COMPLICATION, WITH LONG-TERM CURRENT USE OF INSULIN (HCC): ICD-10-CM

## 2018-08-16 DIAGNOSIS — J43.9 PULMONARY EMPHYSEMA, UNSPECIFIED EMPHYSEMA TYPE (HCC): ICD-10-CM

## 2018-08-16 DIAGNOSIS — I10 ESSENTIAL HYPERTENSION: ICD-10-CM

## 2018-08-16 PROCEDURE — 99215 OFFICE O/P EST HI 40 MIN: CPT | Performed by: INTERNAL MEDICINE

## 2018-08-16 PROCEDURE — G0463 HOSPITAL OUTPT CLINIC VISIT: HCPCS | Performed by: INTERNAL MEDICINE

## 2018-08-16 RX ORDER — INSULIN GLARGINE 100 [IU]/ML
INJECTION, SOLUTION SUBCUTANEOUS
Refills: 2 | COMMUNITY
Start: 2018-07-31 | End: 2019-05-22

## 2018-08-16 NOTE — ASSESSMENT & PLAN NOTE
Increase frequency of urination with burning sensation–intermittent episodes. Currently not symptomatic. Advised to check a UA and a culture when symptomatic to see if she needs treatment. Continue to monitor.   Pelvic floor exercises as discussed and ti

## 2018-08-16 NOTE — PROGRESS NOTES
HPI:    Patient ID: Cory Crawford is a 61year old female. PFT Interpretation    Cory Crawford     1955 MRN [id number]   Height   64. [de-identified]Age 61year old   Weight   202 pounds Sex Female         Spirometry:   FEV1 0.99 L which is 41%  FEV1/FVC stress and tobacco exposure. Current diabetic treatment includes diet and insulin injections (lantus 10 units a day). She is compliant with treatment most of the time. Her weight is stable.  She is following a diabetic, generally healthy, high fiber, low fa radiates into the r hip/leg and thigh.worse when standing up/walking,legs seems to cramp and get shaky when stands for any length of time). The quality of the pain is described as aching and pounding. The pain is at a severity of 6/10.  The pain is moderate 1 year ago. The problem occurs constantly. The problem has been gradually improving. Associated symptoms comments: Has been gradually improving. Steroid doses reduced to 5 mgs a day. Review of Systems   Constitutional: Negative. HENT: Negative. SPIRIVA HANDIHALER 18 MCG Inhalation Cap INHALE THE CONTENTS OF 1 CAPSULE VIA HANDIHALER SAME TIME EVERY DAY Disp: 90 capsule Rfl: 1   PRAMIPEXOLE DIHYDROCHLORIDE 1 MG Oral Tab TAKE 1 TABLET BY MOUTH THREE TIMES DAILY Disp: 270 tablet Rfl: 1   OXcarbazep Oropharynx is clear and moist. No oropharyngeal exudate. Eyes: Conjunctivae and EOM are normal. Pupils are equal, round, and reactive to light. Right eye exhibits no discharge. Left eye exhibits no discharge. Neck: Normal range of motion. Neck supple. Crestor 10 mg daily–the dose was reduced to help with the muscle aches and pains. We will continue the same dose at this time and continue to monitor labs. HTN (hypertension)     Blood pressure 135/81, pulse 81, resp.  rate 22, height 5' 4\" (1.626 inhaler and demonstrate use           Imaging & Referrals:  PULMONARY - INTERNAL       IQ#[id number]

## 2018-08-16 NOTE — ASSESSMENT & PLAN NOTE
Blood pressure 135/81, pulse 81, resp. rate 22, height 5' 4\" (1.626 m), weight 204 lb 12.8 oz (92.9 kg), SpO2 94 %, not currently breastfeeding. Stable blood pressure, well controlled on losartan at 50+25 mg daily.   Additionally she has been on Coreg

## 2018-08-16 NOTE — ASSESSMENT & PLAN NOTE
Blood sugars at home are about 120-140. She is currently on Lantus at 10 units daily. Kidney functions seem stable.   Hemoglobin A1c is at 7.3 which is higher than her past.  Advised to continue the same dose of insulin but start strict diet controlled to

## 2018-08-16 NOTE — PATIENT INSTRUCTIONS
Problem List Items Addressed This Visit        Unprioritized    Dysuria     Increase frequency of urination with burning sensation–intermittent episodes. Currently not symptomatic.   Advised to check a UA and a culture when symptomatic to see if she needs is at 7.3 which is higher than her past.  Advised to continue the same dose of insulin but start strict diet controlled to restrict fatty foods, sugars and starches in the diet. Exercise on a regular basis.          Relevant Medications    Sharmin Narvaez

## 2018-08-16 NOTE — ASSESSMENT & PLAN NOTE
Patient continues to smoke at this time. She does have severe COPD per recent pulmonary function test.  She has oxygen at home but does not use it on a regular basis. She does have significant problems with exercise tolerance.   She is currently on Brio a

## 2018-08-17 ENCOUNTER — TELEPHONE (OUTPATIENT)
Dept: INTERNAL MEDICINE CLINIC | Facility: CLINIC | Age: 63
End: 2018-08-17

## 2018-08-20 ENCOUNTER — TELEPHONE (OUTPATIENT)
Dept: INTERNAL MEDICINE CLINIC | Facility: CLINIC | Age: 63
End: 2018-08-20

## 2018-09-27 ENCOUNTER — OFFICE VISIT (OUTPATIENT)
Dept: NEPHROLOGY | Facility: CLINIC | Age: 63
End: 2018-09-27
Payer: MEDICARE

## 2018-09-27 VITALS
HEIGHT: 64 IN | DIASTOLIC BLOOD PRESSURE: 71 MMHG | HEART RATE: 76 BPM | WEIGHT: 213.81 LBS | SYSTOLIC BLOOD PRESSURE: 122 MMHG | BODY MASS INDEX: 36.5 KG/M2 | TEMPERATURE: 99 F

## 2018-09-27 DIAGNOSIS — N18.30 CKD (CHRONIC KIDNEY DISEASE), STAGE III (HCC): Primary | ICD-10-CM

## 2018-09-27 DIAGNOSIS — M79.89 LEG SWELLING: ICD-10-CM

## 2018-09-27 PROBLEM — N05.1 FSGS (FOCAL SEGMENTAL GLOMERULOSCLEROSIS): Status: ACTIVE | Noted: 2018-09-27

## 2018-09-27 PROBLEM — R60.0 BILATERAL LEG EDEMA: Status: ACTIVE | Noted: 2018-09-27

## 2018-09-27 PROCEDURE — 99215 OFFICE O/P EST HI 40 MIN: CPT | Performed by: INTERNAL MEDICINE

## 2018-09-27 PROCEDURE — G0463 HOSPITAL OUTPT CLINIC VISIT: HCPCS | Performed by: INTERNAL MEDICINE

## 2018-09-27 NOTE — PATIENT INSTRUCTIONS
Follow up in 2 weeks   Urine test and blood test as ordered   Take bumex 2 mg daily dose   Quit smoking   Daily weight - bring the readings on next visit

## 2018-09-27 NOTE — PROGRESS NOTES
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  (Patient taking differently: Take 1 mg by mouth daily.  ) Disp: 180 tablet Rfl: 1   LOSARTAN POTASSIUM 25 MG Oral Tab TAKE 1 TABLET(25 MG) BY MOUTH EVERY EVENING IN ADDITI walking long distance for pain   Psychiatric:  Negative for inappropriate interaction        09/27/18  1332   BP: 122/71   Pulse: 76   Temp: 98.5 °F (36.9 °C)       PHYSICAL EXAM:     Constitutional: appears well hydrated alert and responsive no acute dist goal is <130/80 mmhg  - low salt diet instructed and smoking cessation counseled at every visit   - on losartan to 50 mg in am and 25 mg qpm, carvedilol to 3.125 mg BID   - monitor weights and leg swelling at home - bring home weights on next visit       F

## 2018-10-01 RX ORDER — POTASSIUM CHLORIDE 20 MEQ/1
TABLET, EXTENDED RELEASE ORAL
Qty: 180 TABLET | Refills: 0 | Status: SHIPPED | OUTPATIENT
Start: 2018-10-01 | End: 2018-12-11

## 2018-10-01 RX ORDER — LOSARTAN POTASSIUM 25 MG/1
25 TABLET ORAL EVERY EVENING
Qty: 90 TABLET | Refills: 0 | Status: SHIPPED | OUTPATIENT
Start: 2018-10-01 | End: 2018-12-30

## 2018-10-01 RX ORDER — LOSARTAN POTASSIUM 50 MG/1
50 TABLET ORAL EVERY MORNING
Qty: 90 TABLET | Refills: 0 | Status: SHIPPED | OUTPATIENT
Start: 2018-10-01 | End: 2018-12-30

## 2018-10-01 RX ORDER — BUMETANIDE 1 MG/1
TABLET ORAL
Qty: 60 TABLET | Refills: 0 | OUTPATIENT
Start: 2018-10-01

## 2018-10-01 RX ORDER — BUMETANIDE 1 MG/1
TABLET ORAL
Qty: 180 TABLET | Refills: 0 | Status: SHIPPED | OUTPATIENT
Start: 2018-10-01 | End: 2018-12-30

## 2018-10-01 RX ORDER — CARVEDILOL 3.12 MG/1
TABLET ORAL
Qty: 180 TABLET | Refills: 0 | Status: SHIPPED | OUTPATIENT
Start: 2018-10-01 | End: 2018-12-30

## 2018-10-01 RX ORDER — METOLAZONE 2.5 MG/1
TABLET ORAL
Qty: 36 TABLET | Refills: 0 | Status: SHIPPED | OUTPATIENT
Start: 2018-10-01 | End: 2018-12-30

## 2018-10-01 NOTE — TELEPHONE ENCOUNTER
LOV 9/27/18. RTC 2 weeks. Upcoming appt scheduled 10/11/18. Confirmed meds & dosages in LOV. Rx refilled per [facility] refill protocol in her absence today.

## 2018-10-02 ENCOUNTER — TELEPHONE (OUTPATIENT)
Dept: INTERNAL MEDICINE CLINIC | Facility: CLINIC | Age: 63
End: 2018-10-02

## 2018-10-03 NOTE — TELEPHONE ENCOUNTER
Dr Tom Pisano, please advise. See message below. Called [facility], pharmacist stated this medication is a powder, no alternative, so order something different or do a prior authorization.

## 2018-10-03 NOTE — TELEPHONE ENCOUNTER
We can use any albuterol inhaler-available-does not need to be a powder-please check if any authorised

## 2018-10-03 NOTE — TELEPHONE ENCOUNTER
Dr Janusz Padilla, please advise. Called [facility], pharmacy technician stated that \"there is no formulary list at the pharmacy\" and that doctor's office can call the insurance company to learn the medication to prescribe.  I stated that pharmacies usually of

## 2018-10-04 RX ORDER — FLUCONAZOLE 150 MG/1
TABLET ORAL
Qty: 3 TABLET | Refills: 0 | Status: SHIPPED | OUTPATIENT
Start: 2018-10-04 | End: 2018-11-07 | Stop reason: ALTCHOICE

## 2018-10-04 RX ORDER — LEVOTHYROXINE SODIUM 0.03 MG/1
TABLET ORAL
Qty: 90 TABLET | Refills: 0 | Status: SHIPPED | OUTPATIENT
Start: 2018-10-04 | End: 2018-12-30

## 2018-10-09 ENCOUNTER — APPOINTMENT (OUTPATIENT)
Dept: LAB | Age: 63
End: 2018-10-09
Attending: INTERNAL MEDICINE
Payer: MEDICARE

## 2018-10-09 DIAGNOSIS — M79.89 LEG SWELLING: ICD-10-CM

## 2018-10-09 DIAGNOSIS — N18.30 CKD (CHRONIC KIDNEY DISEASE), STAGE III (HCC): ICD-10-CM

## 2018-10-09 PROCEDURE — 36415 COLL VENOUS BLD VENIPUNCTURE: CPT

## 2018-10-09 PROCEDURE — 80069 RENAL FUNCTION PANEL: CPT

## 2018-10-09 PROCEDURE — 84156 ASSAY OF PROTEIN URINE: CPT

## 2018-10-09 PROCEDURE — 82570 ASSAY OF URINE CREATININE: CPT

## 2018-10-09 PROCEDURE — 81001 URINALYSIS AUTO W/SCOPE: CPT

## 2018-10-09 PROCEDURE — 82043 UR ALBUMIN QUANTITATIVE: CPT

## 2018-10-11 ENCOUNTER — OFFICE VISIT (OUTPATIENT)
Dept: NEPHROLOGY | Facility: CLINIC | Age: 63
End: 2018-10-11
Payer: MEDICARE

## 2018-10-11 VITALS
WEIGHT: 207.38 LBS | SYSTOLIC BLOOD PRESSURE: 98 MMHG | DIASTOLIC BLOOD PRESSURE: 62 MMHG | HEIGHT: 64 IN | BODY MASS INDEX: 35.41 KG/M2 | TEMPERATURE: 98 F | HEART RATE: 65 BPM

## 2018-10-11 DIAGNOSIS — N05.1 FSGS (FOCAL SEGMENTAL GLOMERULOSCLEROSIS): Primary | ICD-10-CM

## 2018-10-11 PROCEDURE — G0463 HOSPITAL OUTPT CLINIC VISIT: HCPCS | Performed by: INTERNAL MEDICINE

## 2018-10-11 PROCEDURE — 99214 OFFICE O/P EST MOD 30 MIN: CPT | Performed by: INTERNAL MEDICINE

## 2018-10-11 NOTE — PROGRESS NOTES
Progress Note     Yulissa Rivera is a 64 yrs old female with pmh of HL, multiple sclerosis (35 yrs ), restless leg syndrome, back pain, nephrolithiasis x 3, tobacco abuse who presented today for follow up    Initial lab work showed BUN/Cr 32/0.86 mg/dl w induced by steroids   • Essential hypertension    • Hyperlipidemia    • Hypothyroidism    • KIDNEY STONE    • Multiple sclerosis ([facility]    • Renal disorder       Past Surgical History:   Procedure Laterality Date   • APPENDECTOMY  1985   • APPENDECTOMY MOUTH THREE TIMES DAILY Disp: 270 tablet Rfl: 1   OXcarbazepine 300 MG Oral Tab 2 tablets in the am and 2.5 tablets in the pm Disp: 405 tablet Rfl: 3   Fluticasone Furoate-Vilanterol (BREO ELLIPTA) 200-25 MCG/INH Inhalation Aerosol Powder, Breath Activated acute distress noted  Head/Face: normocephalic  Eyes/Vision: normal extraocular motion is intact  Nose/Mouth/Throat: mucous membranes are moist    Neck/Thyroid: neck is supple without adenopathy  Lymphatic: no abnormal cervical, supraclavicular adenopathy counseled at every visit   - on losartan to 50 mg in am and 25 mg qpm, carvedilol to 3.125 mg BID   - monitor weights and leg swelling at home - bring home weights on next visit     Follow up in 4 weeks       Orders This Visit:  Orders Placed This Encounte

## 2018-10-11 NOTE — PATIENT INSTRUCTIONS
Call me in 2 weeks with home weights   Stop smoking   High potassium diet and daily potassium supplement  Follow up in 4 weeks - blood test as ordered

## 2018-10-12 ENCOUNTER — NURSE TRIAGE (OUTPATIENT)
Dept: OTHER | Age: 63
End: 2018-10-12

## 2018-10-12 DIAGNOSIS — R10.10 PAIN OF UPPER ABDOMEN: Primary | ICD-10-CM

## 2018-10-12 NOTE — TELEPHONE ENCOUNTER
Action Requested: Summary for Provider     []  Critical Lab, Recommendations Needed  [x] Need Additional Advice  []   FYI    [x]   Need Orders  [] Need Medications Sent to Pharmacy  []  Other     SUMMARY: Patient requesting BOLIVAR advice and possible US order

## 2018-10-12 NOTE — TELEPHONE ENCOUNTER
Unable to order diagnostic studies without office visits–will need documentation of need.   Advised to check a CMP, amylase and lipase–right upper abdominal pain

## 2018-10-13 ENCOUNTER — APPOINTMENT (OUTPATIENT)
Dept: LAB | Age: 63
End: 2018-10-13
Attending: INTERNAL MEDICINE
Payer: MEDICARE

## 2018-10-13 DIAGNOSIS — R10.10 PAIN OF UPPER ABDOMEN: ICD-10-CM

## 2018-10-13 PROCEDURE — 36415 COLL VENOUS BLD VENIPUNCTURE: CPT

## 2018-10-13 PROCEDURE — 80053 COMPREHEN METABOLIC PANEL: CPT

## 2018-10-13 PROCEDURE — 82150 ASSAY OF AMYLASE: CPT

## 2018-10-13 PROCEDURE — 83690 ASSAY OF LIPASE: CPT

## 2018-11-05 ENCOUNTER — APPOINTMENT (OUTPATIENT)
Dept: LAB | Age: 63
End: 2018-11-05
Attending: INTERNAL MEDICINE
Payer: MEDICARE

## 2018-11-05 ENCOUNTER — TELEPHONE (OUTPATIENT)
Dept: PULMONOLOGY | Facility: CLINIC | Age: 63
End: 2018-11-05

## 2018-11-05 DIAGNOSIS — R30.0 DYSURIA: ICD-10-CM

## 2018-11-05 DIAGNOSIS — N05.1 FSGS (FOCAL SEGMENTAL GLOMERULOSCLEROSIS): ICD-10-CM

## 2018-11-05 PROCEDURE — 87077 CULTURE AEROBIC IDENTIFY: CPT

## 2018-11-05 PROCEDURE — 81001 URINALYSIS AUTO W/SCOPE: CPT

## 2018-11-05 PROCEDURE — 80048 BASIC METABOLIC PNL TOTAL CA: CPT

## 2018-11-05 PROCEDURE — 87086 URINE CULTURE/COLONY COUNT: CPT

## 2018-11-05 PROCEDURE — 87186 SC STD MICRODIL/AGAR DIL: CPT

## 2018-11-05 PROCEDURE — 36415 COLL VENOUS BLD VENIPUNCTURE: CPT

## 2018-11-05 PROCEDURE — 87088 URINE BACTERIA CULTURE: CPT

## 2018-11-07 ENCOUNTER — OFFICE VISIT (OUTPATIENT)
Dept: INTERNAL MEDICINE CLINIC | Facility: CLINIC | Age: 63
End: 2018-11-07
Payer: MEDICARE

## 2018-11-07 VITALS
SYSTOLIC BLOOD PRESSURE: 138 MMHG | WEIGHT: 213.69 LBS | OXYGEN SATURATION: 94 % | HEART RATE: 83 BPM | TEMPERATURE: 98 F | BODY MASS INDEX: 36.48 KG/M2 | RESPIRATION RATE: 24 BRPM | HEIGHT: 64 IN | DIASTOLIC BLOOD PRESSURE: 80 MMHG

## 2018-11-07 DIAGNOSIS — E11.9 TYPE 2 DIABETES MELLITUS WITHOUT COMPLICATION, WITH LONG-TERM CURRENT USE OF INSULIN (HCC): Primary | ICD-10-CM

## 2018-11-07 DIAGNOSIS — I10 ESSENTIAL HYPERTENSION: ICD-10-CM

## 2018-11-07 DIAGNOSIS — R10.11 RIGHT UPPER QUADRANT ABDOMINAL PAIN: ICD-10-CM

## 2018-11-07 DIAGNOSIS — R30.0 DYSURIA: ICD-10-CM

## 2018-11-07 DIAGNOSIS — N30.00 ACUTE CYSTITIS WITHOUT HEMATURIA: ICD-10-CM

## 2018-11-07 DIAGNOSIS — Z79.4 TYPE 2 DIABETES MELLITUS WITHOUT COMPLICATION, WITH LONG-TERM CURRENT USE OF INSULIN (HCC): Primary | ICD-10-CM

## 2018-11-07 DIAGNOSIS — E78.2 MIXED HYPERLIPIDEMIA: ICD-10-CM

## 2018-11-07 PROCEDURE — 99215 OFFICE O/P EST HI 40 MIN: CPT | Performed by: INTERNAL MEDICINE

## 2018-11-07 PROCEDURE — G0463 HOSPITAL OUTPT CLINIC VISIT: HCPCS | Performed by: INTERNAL MEDICINE

## 2018-11-07 RX ORDER — LEVOFLOXACIN 500 MG/1
TABLET, FILM COATED ORAL
Qty: 5 TABLET | Refills: 0 | Status: SHIPPED | OUTPATIENT
Start: 2018-11-07 | End: 2018-12-13 | Stop reason: ALTCHOICE

## 2018-11-08 ENCOUNTER — OFFICE VISIT (OUTPATIENT)
Dept: NEPHROLOGY | Facility: CLINIC | Age: 63
End: 2018-11-08
Payer: MEDICARE

## 2018-11-08 VITALS
DIASTOLIC BLOOD PRESSURE: 80 MMHG | TEMPERATURE: 99 F | HEART RATE: 71 BPM | BODY MASS INDEX: 36.88 KG/M2 | WEIGHT: 216 LBS | HEIGHT: 64 IN | SYSTOLIC BLOOD PRESSURE: 138 MMHG

## 2018-11-08 DIAGNOSIS — R80.1 PERSISTENT PROTEINURIA: Primary | ICD-10-CM

## 2018-11-08 PROCEDURE — G0463 HOSPITAL OUTPT CLINIC VISIT: HCPCS | Performed by: INTERNAL MEDICINE

## 2018-11-08 PROCEDURE — 99214 OFFICE O/P EST MOD 30 MIN: CPT | Performed by: INTERNAL MEDICINE

## 2018-11-08 NOTE — ASSESSMENT & PLAN NOTE
Blood sugars have remained stable. Recent hemoglobin A1c in August was at 7.2. It was at 7.3 in the past.  Renal functions have remained the same–stable. She does have a mild UTI at this point and hence will not comment on the urine protein levels.   Jesus Guardado

## 2018-11-08 NOTE — ASSESSMENT & PLAN NOTE
Lipid panel and liver function tests have been stable on Crestor 10 mg daily. Continue on the same dose of medications and recheck labs in the next 3 months .

## 2018-11-08 NOTE — PROGRESS NOTES
Progress Note     Matilda Pires is a 64 yrs old female with pmh of HL, multiple sclerosis (35 yrs ), restless leg syndrome, back pain, nephrolithiasis x 3, tobacco abuse who presented today for follow up    Initial lab work showed BUN/Cr 32/0.86 mg/dl w induced by steroids   • Essential hypertension    • Hyperlipidemia    • Hypothyroidism    • KIDNEY STONE    • Multiple sclerosis ([facility]    • Renal disorder       Past Surgical History:   Procedure Laterality Date   • APPENDECTOMY  1985   • APPENDECTOMY BY MOUTH THREE TIMES DAILY (Patient taking differently: TAKE 2 tablets nightly) Disp: 270 tablet Rfl: 1   OXcarbazepine 300 MG Oral Tab 2 tablets in the am and 2.5 tablets in the pm Disp: 405 tablet Rfl: 3   Fluticasone Furoate-Vilanterol (BREO ELLIPTA) 20 Negative for easy bleeding and easy bruising  Integumentary:  Negative for pruritus and rash  Musculoskeletal: back and joint aches.  Bilateral thigh pain -  Neurological:  difficulty walking long distance for pain   Psychiatric:  Negative for inappropriate gained  216 lbs <--207 lbs. Was 204 lbs in August  -  March weight 194 lbs  - bumex 2 mg daily with metolazone every other day - wasn't taking bumex regularly. cont. metolazone for 2 more weeks. - not totally compliant with salt and water intake     3.  H

## 2018-11-08 NOTE — ASSESSMENT & PLAN NOTE
Intermittent right upper quadrant pain–random occurrences. No specific association with meals or types of foods. Amylase and lipase as well as liver function test looked stable.   Ultrasound of the liver and gallbladder has been ordered and will follow-up

## 2018-11-08 NOTE — ASSESSMENT & PLAN NOTE
Blood pressure 138/80, pulse 83, temperature 97.8 °F (36.6 °C), temperature source Oral, resp. rate 24, height 5' 4\" (1.626 m), weight 213 lb 11.2 oz (96.9 kg), SpO2 94 %, not currently breastfeeding.   Pressure has been stable on losartan at 50+25 mg skye

## 2018-11-08 NOTE — ASSESSMENT & PLAN NOTE
Persistent urinary discomfort. Urine culture suggestive of an E. coli infection. Treated with Levaquin as directed. Advised to take the medication for about 5 days to be taken after meal and take an over-the-counter probiotic with medication.   Call if d

## 2018-11-08 NOTE — PATIENT INSTRUCTIONS
Problem List Items Addressed This Visit        Unprioritized    Acute cystitis without hematuria     Persistent urinary discomfort. Urine culture suggestive of an E. coli infection. Treated with Levaquin as directed.   Advised to take the medication for a strict diet controlled to restrict fatty foods as well as starches. She has not been taking her Lantus on a regular basis encouraged to take this on a regular basis to reduce risks for worsening kidney functions .          Relevant Orders    CBC WITH MACI

## 2018-11-12 NOTE — PROGRESS NOTES
HPI:    Patient ID: Mary Martell is a 61year old female.     pft 7/2018      Spirometry:   FEV1 0.99 L which is 41%  FEV1/FVC 54%    Significant bronchodilator respond noticed    FVL:   Significant decrease in expiratory flow in the mid and low-volume is stable. She is following a diabetic, generally healthy, high fiber, low fat/cholesterol and low salt diet. Meal planning includes carbohydrate counting, avoidance of concentrated sweets and calorie counting.  She has not had a previous visit with a dieti vomiting. Weakness: wheel chair bound. Associated symptoms comments: Has been gradually improving. Steroid doses reduced to 5 mgs a day. Review of Systems   Constitutional: Positive for chills. HENT: Negative. Eyes: Negative.     Respiratory: Ne CONTENTS OF 1 CAPSULE VIA HANDIHALER SAME TIME EVERY DAY Disp: 90 capsule Rfl: 1   PRAMIPEXOLE DIHYDROCHLORIDE 1 MG Oral Tab TAKE 1 TABLET BY MOUTH THREE TIMES DAILY (Patient taking differently: TAKE 2 tablets nightly) Disp: 270 tablet Rfl: 1   OXcarbazepi Known Allergies      11/07/18 2122   BP: 138/80   Pulse:    Resp:    Temp:      Body mass index is 36.68 kg/m². PHYSICAL EXAM:   Physical Exam   Constitutional: She is oriented to person, place, and time. She appears well-developed and well-nourished. m), weight 213 lb 11.2 oz (96.9 kg), SpO2 94 %, not currently breastfeeding. Pressure has been stable on losartan at 50+25 mg daily. Additionally she is on Coreg 3.125 mg 2 times daily and Bumex 1 mg daily.   She has been on a potassium supplement but not Relevant Orders    US LIVER (CPT=76705)          Return in about 4 weeks (around 12/5/2018).     PT UNDERSTANDS AND AGREES TO FOLLOW DIRECTIONS AND ADVICE    Orders Placed This Encounter      CBC W Differential W Platelet [E]      Comp Metabolic Panel (14)

## 2018-12-10 ENCOUNTER — APPOINTMENT (OUTPATIENT)
Dept: LAB | Age: 63
End: 2018-12-10
Attending: INTERNAL MEDICINE
Payer: MEDICARE

## 2018-12-10 DIAGNOSIS — R80.1 PERSISTENT PROTEINURIA: ICD-10-CM

## 2018-12-10 PROCEDURE — 36415 COLL VENOUS BLD VENIPUNCTURE: CPT

## 2018-12-10 PROCEDURE — 80069 RENAL FUNCTION PANEL: CPT

## 2018-12-10 PROCEDURE — 82043 UR ALBUMIN QUANTITATIVE: CPT

## 2018-12-10 PROCEDURE — 82570 ASSAY OF URINE CREATININE: CPT

## 2018-12-11 ENCOUNTER — TELEPHONE (OUTPATIENT)
Dept: NEPHROLOGY | Facility: CLINIC | Age: 63
End: 2018-12-11

## 2018-12-11 RX ORDER — POTASSIUM CHLORIDE 20 MEQ/1
20 TABLET, EXTENDED RELEASE ORAL 2 TIMES DAILY
Qty: 270 TABLET | Refills: 0 | COMMUNITY
Start: 2018-12-11 | End: 2019-04-04

## 2018-12-11 RX ORDER — POTASSIUM CHLORIDE 20 MEQ/1
TABLET, EXTENDED RELEASE ORAL
Qty: 180 TABLET | Refills: 0 | Status: SHIPPED | OUTPATIENT
Start: 2018-12-11 | End: 2018-12-11

## 2018-12-11 NOTE — TELEPHONE ENCOUNTER
Please advise patient to take potassium chloride 20 meq twice a day - lab test showed stable urine protein but low potassium

## 2018-12-11 NOTE — TELEPHONE ENCOUNTER
Contacted pt. Notified her of [facility]'s results message and instructions below. She states she is already taking KCl 20 meq BID. However, she admits that she occasionally doesn't take it, but takes it correctly 90% of the time.  She has an appt to see [facility] on 12

## 2018-12-13 ENCOUNTER — OFFICE VISIT (OUTPATIENT)
Dept: PULMONOLOGY | Facility: CLINIC | Age: 63
End: 2018-12-13
Payer: MEDICARE

## 2018-12-13 ENCOUNTER — OFFICE VISIT (OUTPATIENT)
Dept: NEPHROLOGY | Facility: CLINIC | Age: 63
End: 2018-12-13
Payer: MEDICARE

## 2018-12-13 VITALS
OXYGEN SATURATION: 92 % | SYSTOLIC BLOOD PRESSURE: 121 MMHG | HEIGHT: 64 IN | BODY MASS INDEX: 36.02 KG/M2 | WEIGHT: 211 LBS | HEART RATE: 67 BPM | DIASTOLIC BLOOD PRESSURE: 69 MMHG | RESPIRATION RATE: 16 BRPM

## 2018-12-13 VITALS
DIASTOLIC BLOOD PRESSURE: 68 MMHG | BODY MASS INDEX: 36.16 KG/M2 | WEIGHT: 211.81 LBS | HEIGHT: 64 IN | TEMPERATURE: 98 F | SYSTOLIC BLOOD PRESSURE: 108 MMHG | HEART RATE: 71 BPM

## 2018-12-13 DIAGNOSIS — N05.1 FSGS (FOCAL SEGMENTAL GLOMERULOSCLEROSIS): Primary | ICD-10-CM

## 2018-12-13 DIAGNOSIS — J44.9 STAGE 3 SEVERE COPD BY GOLD CLASSIFICATION (HCC): Primary | ICD-10-CM

## 2018-12-13 PROCEDURE — G0463 HOSPITAL OUTPT CLINIC VISIT: HCPCS | Performed by: INTERNAL MEDICINE

## 2018-12-13 PROCEDURE — 99213 OFFICE O/P EST LOW 20 MIN: CPT | Performed by: INTERNAL MEDICINE

## 2018-12-13 PROCEDURE — 99214 OFFICE O/P EST MOD 30 MIN: CPT | Performed by: INTERNAL MEDICINE

## 2018-12-13 NOTE — PATIENT INSTRUCTIONS
Take bumex 2 mg daily   Call next week with home weight and leg swelling status   Lab test as ordered   Follow up in 3 months

## 2018-12-13 NOTE — PROGRESS NOTES
Progress Note     Jr Bell is a 64 yrs old female with pmh of HL, multiple sclerosis (35 yrs ), restless leg syndrome, back pain, nephrolithiasis x 3, tobacco abuse who presented today for follow up    Initial lab work showed BUN/Cr 32/0.86 mg/dl w APPENDECTOMY     • APPENDECTOMY     •      • KNEE SURGERY             Medications (Active prior to today's visit):    Current Outpatient Medications:  Potassium Chloride ER 20 MEQ Oral Tab CR Take 1 tablet (20 mEq total) by mouth 3 (three) (VITAMIN D) 2000 UNITS Oral Cap Take 2,000 Units by mouth daily. Disp:  Rfl:    aspirin 81 MG Oral Tab Take 81 mg by mouth daily.  Disp:  Rfl:    Naltrexone-Bupropion HCl ER (CONTRAVE) 8-90 MG Oral Tablet 12 Hr Week 1: 1 tablet in AM      None in PMWeek 2 108/68   Pulse: 71   Temp: 98.4 °F (36.9 °C)       PHYSICAL EXAM:     Constitutional: appears well hydrated alert and responsive   Head/Face: normocephalic  Eyes/Vision: normal extraocular motion is intact  Nose/Mouth/Throat: mucous membranes are moist   BID   - monitor weights and leg swelling at home - bring home weights on next visit     Follow up in 3 months.  Urine test prior to next visit       Orders This Visit:  Orders Placed This Encounter      Basic Metabolic Panel (8) [E]      Protein,Total,Urine

## 2018-12-13 NOTE — PROGRESS NOTES
Referring Physician  Jose Antonio Gloria MD    History of Present Illness  Patient is seen today for follow-up appointment pulmonary clinic. He admits to ongoing dyspnea with minimal exertion. She continues to smoke 1 pack daily.   She states she is compliant w daily. Disp:  Rfl:    TraMADol HCl 50 MG Oral Tab TAKE 1 TABLET BY MOUTH EVERY 8 HOURS AS NEEDED Disp: 90 tablet Rfl: 0   TRAZODONE  MG Oral Tab TAKE 1 TABLET(100 MG) BY MOUTH EVERY NIGHT AT BEDTIME Disp: 90 tablet Rfl: 1   ClonazePAM 0.5 MG Oral Ta edema  Neurologic: no gross motor deficits  Skin: warm, dry  Lymphatic: no supraclavicular lymphadenopathy     Assessment  1. Severe COPD  2. Dyspnea with exertion  3. Nicotine dependence  4.   Chronic hypoxemic respiratory failure    Plan  -Patient with

## 2018-12-21 ENCOUNTER — LAB ENCOUNTER (OUTPATIENT)
Dept: LAB | Age: 63
End: 2018-12-21
Attending: INTERNAL MEDICINE
Payer: MEDICARE

## 2018-12-21 DIAGNOSIS — Z79.4 TYPE 2 DIABETES MELLITUS WITHOUT COMPLICATION, WITH LONG-TERM CURRENT USE OF INSULIN (HCC): ICD-10-CM

## 2018-12-21 DIAGNOSIS — E11.9 TYPE 2 DIABETES MELLITUS WITHOUT COMPLICATION, WITH LONG-TERM CURRENT USE OF INSULIN (HCC): ICD-10-CM

## 2018-12-21 PROCEDURE — 36415 COLL VENOUS BLD VENIPUNCTURE: CPT

## 2018-12-21 PROCEDURE — 82570 ASSAY OF URINE CREATININE: CPT

## 2018-12-21 PROCEDURE — 84443 ASSAY THYROID STIM HORMONE: CPT

## 2018-12-21 PROCEDURE — 81003 URINALYSIS AUTO W/O SCOPE: CPT

## 2018-12-21 PROCEDURE — 83036 HEMOGLOBIN GLYCOSYLATED A1C: CPT

## 2018-12-21 PROCEDURE — 85025 COMPLETE CBC W/AUTO DIFF WBC: CPT

## 2018-12-21 PROCEDURE — 80053 COMPREHEN METABOLIC PANEL: CPT

## 2018-12-21 PROCEDURE — 82043 UR ALBUMIN QUANTITATIVE: CPT

## 2018-12-21 PROCEDURE — 80061 LIPID PANEL: CPT

## 2018-12-24 ENCOUNTER — NURSE TRIAGE (OUTPATIENT)
Dept: OTHER | Age: 63
End: 2018-12-24

## 2018-12-24 RX ORDER — LEVOFLOXACIN 500 MG/1
500 TABLET, FILM COATED ORAL DAILY
Qty: 10 TABLET | Refills: 0 | Status: SHIPPED | OUTPATIENT
Start: 2018-12-24 | End: 2019-01-03

## 2018-12-24 NOTE — TELEPHONE ENCOUNTER
rx faxed,please adv to start on meds and adv to f/u if not better.   Take otc claritin or zyrtec 10 mgs a day x 4 weeks

## 2018-12-24 NOTE — TELEPHONE ENCOUNTER
Action Requested: Summary for Provider     []  Critical Lab, Recommendations Needed  [] Need Additional Advice  []   FYI    []   Need Orders  [x] Need Medications Sent to Pharmacy  []  Other     SUMMARY:Pt requesting Levaquin c/c stated cold s/s x 4 days -

## 2018-12-24 NOTE — TELEPHONE ENCOUNTER
Advised patient on Dr. Alise Landeros information and recommendations. Reviewed medications. Patient verbalized understanding, had no questions, agreed with plan of care.     Pharmacy     [address] AT [facility]

## 2018-12-27 ENCOUNTER — OFFICE VISIT (OUTPATIENT)
Dept: INTERNAL MEDICINE CLINIC | Facility: CLINIC | Age: 63
End: 2018-12-27
Payer: MEDICARE

## 2018-12-27 VITALS
HEART RATE: 66 BPM | HEIGHT: 64 IN | TEMPERATURE: 98 F | BODY MASS INDEX: 35.85 KG/M2 | DIASTOLIC BLOOD PRESSURE: 71 MMHG | WEIGHT: 210 LBS | RESPIRATION RATE: 20 BRPM | OXYGEN SATURATION: 95 % | SYSTOLIC BLOOD PRESSURE: 112 MMHG

## 2018-12-27 DIAGNOSIS — N05.1 FSGS (FOCAL SEGMENTAL GLOMERULOSCLEROSIS): ICD-10-CM

## 2018-12-27 DIAGNOSIS — I10 ESSENTIAL HYPERTENSION: ICD-10-CM

## 2018-12-27 DIAGNOSIS — E78.2 MIXED HYPERLIPIDEMIA: Primary | ICD-10-CM

## 2018-12-27 DIAGNOSIS — M25.551 PAIN OF RIGHT HIP JOINT: ICD-10-CM

## 2018-12-27 DIAGNOSIS — E11.9 TYPE 2 DIABETES MELLITUS WITHOUT COMPLICATION, WITH LONG-TERM CURRENT USE OF INSULIN (HCC): ICD-10-CM

## 2018-12-27 DIAGNOSIS — Z79.4 TYPE 2 DIABETES MELLITUS WITHOUT COMPLICATION, WITH LONG-TERM CURRENT USE OF INSULIN (HCC): ICD-10-CM

## 2018-12-27 DIAGNOSIS — J43.9 PULMONARY EMPHYSEMA, UNSPECIFIED EMPHYSEMA TYPE (HCC): ICD-10-CM

## 2018-12-27 DIAGNOSIS — E03.9 HYPOTHYROIDISM, UNSPECIFIED TYPE: ICD-10-CM

## 2018-12-27 PROCEDURE — 99214 OFFICE O/P EST MOD 30 MIN: CPT | Performed by: INTERNAL MEDICINE

## 2018-12-27 PROCEDURE — G0463 HOSPITAL OUTPT CLINIC VISIT: HCPCS | Performed by: INTERNAL MEDICINE

## 2018-12-27 RX ORDER — FLUCONAZOLE 150 MG/1
TABLET ORAL
Qty: 3 TABLET | Refills: 0 | Status: SHIPPED | OUTPATIENT
Start: 2018-12-27 | End: 2019-02-28 | Stop reason: ALTCHOICE

## 2018-12-28 NOTE — ASSESSMENT & PLAN NOTE
Patient has been on Crestor at 10 mg daily,The LDL cholesterol levels are higher–advised to re-start on the 20 mg daily. Advised to recheck labs in about 4 months .

## 2018-12-28 NOTE — ASSESSMENT & PLAN NOTE
Patient has been seen by Dr. Jeffery Rae. Her kidney functions have been stable–improved urine microalbumin. A dose of Bumex has been increased–but the patient has not yet started taking it at higher doses.   Advised to recheck labs within about 2-3 we

## 2018-12-28 NOTE — PROGRESS NOTES
HPI:    Patient ID: Mary Martell is a 61year old female. Assessment  1. Severe COPD  2. Dyspnea with exertion  3. Nicotine dependence  4.   Chronic hypoxemic respiratory failure     Plan  -Patient with evidence of progressive dyspnea with exertio glucose trend is decreasing steadily. An ACE inhibitor/angiotensin II receptor blocker is being taken. She does not see a podiatrist.Eye exam is not current. Hypertension   This is a chronic problem. The current episode started more than 1 year ago.  The right) and gait problem. Skin: Negative. Allergic/Immunologic: Negative. Hematological: Negative. Psychiatric/Behavioral: Negative. Current Outpatient Medications:  Melatonin 2.5 MG Oral Cap Take by mouth nightly.  Disp:  Rfl:    fl ROSUVASTATIN CALCIUM 20 MG Oral Tab TAKE 1 TABLET BY MOUTH EVERY NIGHT (Patient taking differently: TAKE 1/2 TABLET BY MOUTH EVERY NIGHT) Disp: 30 tablet Rfl: 5   SPIRIVA HANDIHALER 18 MCG Inhalation Cap INHALE THE CONTENTS OF 1 CAPSULE VIA HANDIHALER SA External ear normal.   Left Ear: External ear normal.   Nose: Nose normal.   Mouth/Throat: Oropharynx is clear and moist. No oropharyngeal exudate. Eyes: Conjunctivae and EOM are normal. Pupils are equal, round, and reactive to light.  Right eye exhibits monitor for any worsening symptoms         Hypothyroidism    Hyperlipidemia - Primary     Patient has been on Crestor at 10 mg daily,The LDL cholesterol levels are higher–advised to re-start on the 20 mg daily. Advised to recheck labs in about 4 months . stable–improved urine microalbumin. A dose of Bumex has been increased–but the patient has not yet started taking it at higher doses.   Advised to recheck labs within about 2-3 weeks of increasing the dose of medication to ensure that the renal functions r

## 2018-12-28 NOTE — ASSESSMENT & PLAN NOTE
Patient with severe COPD as per the recent pulmonary function test.  She does not use her oxygen on a regular basis as yet. She has been seen by Dr. Sherwin Peter.   She has been started on Trelegy-she does not able to afford it as yet and hence is on Brio and S

## 2018-12-28 NOTE — ASSESSMENT & PLAN NOTE
Persistent pain in the right hip joint. Hip x-rays done quite a while back. Repeat hip x-rays have been requested. Patient has been advised to follow-up with Dr. Elias Carson for an evaluation.   Rule out the possibility of avascular necrosis of

## 2018-12-28 NOTE — ASSESSMENT & PLAN NOTE
Blood pressure 112/71, pulse 66, temperature 98.1 °F (36.7 °C), temperature source Oral, resp. rate 20, height 5' 4\" (1.626 m), weight 210 lb (95.3 kg), SpO2 95 %, not currently breastfeeding.      Stable blood pressure, quite well controlled on Coreg 3.12

## 2018-12-28 NOTE — PATIENT INSTRUCTIONS
Problem List Items Addressed This Visit        Unprioritized    Emphysema lung ([facility]     Patient with severe COPD as per the recent pulmonary function test.  She does not use her oxygen on a regular basis as yet. She has been seen by Dr. Emeterio De La O.   She has requested. Patient has been advised to follow-up with Dr. Holly Villegas for an evaluation.   Rule out the possibility of avascular necrosis of the femoral head as recently has been on large doses of steroids for treatment of focal segmental glomeru

## 2018-12-28 NOTE — ASSESSMENT & PLAN NOTE
She is currently on Lantus at 10 units daily which he takes only occasionally as she does not wake up or eat on time. Hemoglobin A1c remains at 7.2.   She is advised to wake up and eat a slice of bread with peanut butter and take her insulin prior to watch

## 2018-12-31 RX ORDER — TRAZODONE HYDROCHLORIDE 100 MG/1
TABLET ORAL
Qty: 90 TABLET | Refills: 0 | Status: SHIPPED | OUTPATIENT
Start: 2018-12-31 | End: 2019-03-17

## 2018-12-31 RX ORDER — LEVOTHYROXINE SODIUM 0.03 MG/1
TABLET ORAL
Qty: 90 TABLET | Refills: 0 | Status: SHIPPED | OUTPATIENT
Start: 2018-12-31 | End: 2019-03-17

## 2018-12-31 RX ORDER — PRAMIPEXOLE DIHYDROCHLORIDE 1 MG/1
TABLET ORAL
Qty: 270 TABLET | Refills: 0 | Status: SHIPPED | OUTPATIENT
Start: 2018-12-31 | End: 2019-03-17

## 2018-12-31 NOTE — TELEPHONE ENCOUNTER
LOV 12/13/18. Refills pended and routed to Dr. Juventino Oliva. Medications are noted as taking.  RTC in 3 mos (3/2019)

## 2019-01-01 NOTE — LETTER
Myron Dub 37   Date:   3/11/2021     Name:   Verónica Flores    YOB: 1955   MRN:   [id number]       WHERE IS YOUR PAIN NOW? Jose Rafael the areas on your body where you feel the described sensations.   Use the appropriate sy
47

## 2019-01-03 RX ORDER — METOLAZONE 2.5 MG/1
TABLET ORAL
Qty: 36 TABLET | Refills: 0 | Status: SHIPPED | OUTPATIENT
Start: 2019-01-03 | End: 2019-04-02

## 2019-01-03 RX ORDER — BUMETANIDE 1 MG/1
TABLET ORAL
Qty: 180 TABLET | Refills: 0 | Status: SHIPPED | OUTPATIENT
Start: 2019-01-03 | End: 2019-03-17

## 2019-01-03 RX ORDER — LOSARTAN POTASSIUM 25 MG/1
TABLET ORAL
Qty: 90 TABLET | Refills: 0 | Status: SHIPPED | OUTPATIENT
Start: 2019-01-03 | End: 2019-03-17

## 2019-01-03 RX ORDER — CARVEDILOL 3.12 MG/1
TABLET ORAL
Qty: 180 TABLET | Refills: 0 | Status: SHIPPED | OUTPATIENT
Start: 2019-01-03 | End: 2019-04-12

## 2019-01-03 RX ORDER — LOSARTAN POTASSIUM 50 MG/1
TABLET ORAL
Qty: 90 TABLET | Refills: 0 | Status: SHIPPED | OUTPATIENT
Start: 2019-01-03 | End: 2019-03-17

## 2019-02-28 ENCOUNTER — OFFICE VISIT (OUTPATIENT)
Dept: NEUROLOGY | Facility: CLINIC | Age: 64
End: 2019-02-28
Payer: MEDICARE

## 2019-02-28 ENCOUNTER — TELEPHONE (OUTPATIENT)
Dept: NEUROLOGY | Facility: CLINIC | Age: 64
End: 2019-02-28

## 2019-02-28 VITALS
HEART RATE: 80 BPM | SYSTOLIC BLOOD PRESSURE: 96 MMHG | HEIGHT: 64 IN | WEIGHT: 200 LBS | BODY MASS INDEX: 34.15 KG/M2 | RESPIRATION RATE: 16 BRPM | DIASTOLIC BLOOD PRESSURE: 60 MMHG

## 2019-02-28 DIAGNOSIS — M16.11 PRIMARY OSTEOARTHRITIS OF RIGHT HIP: Primary | ICD-10-CM

## 2019-02-28 PROCEDURE — 99214 OFFICE O/P EST MOD 30 MIN: CPT | Performed by: PHYSICAL MEDICINE & REHABILITATION

## 2019-02-28 NOTE — TELEPHONE ENCOUNTER
Medicare Online for insurance coverage of Right intra-articular hip joint injection under fluoroscopy cpt codes 47920, 60782. Insurance was verified and procedure is a covered benefit and does not require authorization. Will inform Nursing.

## 2019-02-28 NOTE — PROGRESS NOTES
HPI:    Patient ID: Li Rutledge is a 59year old female. She has a history of MS, presents for follow-up of right groin pain along with associated back spasms.   States that when she stands up she begins to experience right anterior thigh and groin Inhalation Aerosol Powder, Breath Activated Inhale 1 puff into the lungs daily. Disp: 1 each Rfl: 5   Potassium Chloride ER 20 MEQ Oral Tab CR Take 20 mEq by mouth 2 (two) times daily.  TAKE 1 TABLET(20 MEQ) BY MOUTH TWICE DAILY  Disp: 270 tablet Rfl: 0   B Musculoskeletal:   Lumbar range of motion: deferred    Provocative tests: Negative supine SLR bilaterally. Negative seated slump test. Pain with scouring of the right hip.    Neurological:   Strength: 4/5 LE bilaterally    Sensation: intact to light touch

## 2019-02-28 NOTE — PROGRESS NOTES
Patient has been scheduled for a Right intra-articular hip joint injection under fluoroscopy, local on 3/13/19 at the [facility]. Medications and allergies reviewed.  Patient informed to hold aspirins, nsaids, blood thinners, multivitamins, vitamin E and fish oil

## 2019-03-12 ENCOUNTER — APPOINTMENT (OUTPATIENT)
Dept: LAB | Age: 64
End: 2019-03-12
Attending: INTERNAL MEDICINE
Payer: MEDICARE

## 2019-03-12 DIAGNOSIS — I10 ESSENTIAL HYPERTENSION: ICD-10-CM

## 2019-03-12 DIAGNOSIS — N05.1 FSGS (FOCAL SEGMENTAL GLOMERULOSCLEROSIS): ICD-10-CM

## 2019-03-12 LAB
ALBUMIN SERPL-MCNC: 3.4 G/DL (ref 3.4–5)
ALBUMIN/GLOB SERPL: 0.9 {RATIO} (ref 1–2)
ALP LIVER SERPL-CCNC: 127 U/L (ref 50–130)
ALT SERPL-CCNC: 37 U/L (ref 13–56)
ANION GAP SERPL CALC-SCNC: 5 MMOL/L (ref 0–18)
AST SERPL-CCNC: 16 U/L (ref 15–37)
BILIRUB SERPL-MCNC: 0.2 MG/DL (ref 0.1–2)
BUN BLD-MCNC: 39 MG/DL (ref 7–18)
BUN/CREAT SERPL: 35.8 (ref 10–20)
CALCIUM BLD-MCNC: 8.6 MG/DL (ref 8.5–10.1)
CHLORIDE SERPL-SCNC: 100 MMOL/L (ref 98–107)
CO2 SERPL-SCNC: 34 MMOL/L (ref 21–32)
CREAT BLD-MCNC: 1.09 MG/DL (ref 0.55–1.02)
CREAT UR-SCNC: 57 MG/DL
GLOBULIN PLAS-MCNC: 3.7 G/DL (ref 2.8–4.4)
GLUCOSE BLD-MCNC: 293 MG/DL (ref 70–99)
M PROTEIN MFR SERPL ELPH: 7.1 G/DL (ref 6.4–8.2)
OSMOLALITY SERPL CALC.SUM OF ELEC: 308 MOSM/KG (ref 275–295)
POTASSIUM SERPL-SCNC: 3.5 MMOL/L (ref 3.5–5.1)
PROT UR-MCNC: 12.3 MG/DL
SODIUM SERPL-SCNC: 139 MMOL/L (ref 136–145)

## 2019-03-12 PROCEDURE — 82570 ASSAY OF URINE CREATININE: CPT

## 2019-03-12 PROCEDURE — 36415 COLL VENOUS BLD VENIPUNCTURE: CPT

## 2019-03-12 PROCEDURE — 84156 ASSAY OF PROTEIN URINE: CPT

## 2019-03-12 PROCEDURE — 80053 COMPREHEN METABOLIC PANEL: CPT

## 2019-03-13 ENCOUNTER — NURSE TRIAGE (OUTPATIENT)
Dept: OTHER | Age: 64
End: 2019-03-13

## 2019-03-13 ENCOUNTER — OFFICE VISIT (OUTPATIENT)
Dept: SURGERY | Facility: CLINIC | Age: 64
End: 2019-03-13
Payer: MEDICARE

## 2019-03-13 DIAGNOSIS — M16.11 PRIMARY OSTEOARTHRITIS OF RIGHT HIP: Primary | ICD-10-CM

## 2019-03-13 PROCEDURE — 77002 NEEDLE LOCALIZATION BY XRAY: CPT | Performed by: PHYSICAL MEDICINE & REHABILITATION

## 2019-03-13 PROCEDURE — 20610 DRAIN/INJ JOINT/BURSA W/O US: CPT | Performed by: PHYSICAL MEDICINE & REHABILITATION

## 2019-03-13 RX ORDER — AMOXICILLIN AND CLAVULANATE POTASSIUM 875; 125 MG/1; MG/1
1 TABLET, FILM COATED ORAL 2 TIMES DAILY
Qty: 20 TABLET | Refills: 0 | Status: SHIPPED | OUTPATIENT
Start: 2019-03-13 | End: 2019-05-22

## 2019-03-13 NOTE — TELEPHONE ENCOUNTER
LOV 12/13/18. Potassium dose was increased to 20 meq 3 times every day. F/U in 3 mos (3/2019) Scheduled for tomorrow 3/14/19. Refill pended and routed to Dr. Rojas Goetz.

## 2019-03-13 NOTE — TELEPHONE ENCOUNTER
Action Requested: Summary for Provider     []  Critical Lab, Recommendations Needed  [] Need Additional Advice  []   FYI    []   Need Orders  [] Need Medications Sent to Pharmacy  []  Other     SUMMARY: pt calls with c/o \"starting sinus infection\".   +sor

## 2019-03-13 NOTE — TELEPHONE ENCOUNTER
No Known Allergies    Augmentin 875 mg p.o. twice daily for 10 days. Please advised to take an over-the-counter Claritin 10 mg daily and Flonase 1 puff each nostril 2 times daily for 4-6 weeks. See me if not better. Buddhism

## 2019-03-14 ENCOUNTER — OFFICE VISIT (OUTPATIENT)
Dept: NEPHROLOGY | Facility: CLINIC | Age: 64
End: 2019-03-14
Payer: MEDICARE

## 2019-03-14 VITALS
SYSTOLIC BLOOD PRESSURE: 96 MMHG | TEMPERATURE: 99 F | DIASTOLIC BLOOD PRESSURE: 56 MMHG | HEART RATE: 70 BPM | BODY MASS INDEX: 36.13 KG/M2 | WEIGHT: 211.63 LBS | HEIGHT: 64 IN

## 2019-03-14 DIAGNOSIS — N05.1 FSGS (FOCAL SEGMENTAL GLOMERULOSCLEROSIS): Primary | ICD-10-CM

## 2019-03-14 DIAGNOSIS — M79.89 LEG SWELLING: ICD-10-CM

## 2019-03-14 PROCEDURE — 99214 OFFICE O/P EST MOD 30 MIN: CPT | Performed by: INTERNAL MEDICINE

## 2019-03-14 PROCEDURE — G0463 HOSPITAL OUTPT CLINIC VISIT: HCPCS | Performed by: INTERNAL MEDICINE

## 2019-03-14 RX ORDER — POTASSIUM CHLORIDE 20 MEQ/1
TABLET, EXTENDED RELEASE ORAL
Qty: 270 TABLET | Refills: 0 | Status: SHIPPED | OUTPATIENT
Start: 2019-03-14 | End: 2019-12-12

## 2019-03-14 NOTE — PROGRESS NOTES
Progress Note     Jr Bell is a 64 yrs old female with pmh of HL, multiple sclerosis (35 yrs ), restless leg syndrome, back pain, nephrolithiasis x 3, tobacco abuse who presented today for follow up    Initial lab work showed BUN/Cr 32/0.86 mg/dl w KIDNEY STONE    • Multiple sclerosis ([facility] [address]    • Renal disorder       Past Surgical History:   Procedure Laterality Date   • APPENDECTOMY     • APPENDECTOMY     •      • KNEE SURGERY             Medications (Active prior to today's visit): Rfl: 5   OXcarbazepine 300 MG Oral Tab 2 tablets in the am and 2.5 tablets in the pm Disp: 405 tablet Rfl: 3   magnesium 250 MG Oral Tab Take 250 mg by mouth daily.    Disp:  Rfl:    Cholecalciferol (VITAMIN D) 2000 UNITS Oral Cap Take 2,000 Units by mouth for inappropriate interaction        03/14/19  1513   BP: 96/56   Pulse: 70   Temp: 98.5 °F (36.9 °C)       PHYSICAL EXAM:     Constitutional: appears well hydrated alert and responsive   Head/Face: normocephalic  Eyes/Vision: normal extraocular motion is HTN:    - lowish - asymptomatic. goal is <130/80 mmhg  - low salt diet instructed and smoking cessation counseled at every visit   - on losartan to 50 mg in am and 25 mg qpm, carvedilol to 3.125 mg BID   - monitor weights and leg swelling at home     [facility]

## 2019-03-18 ENCOUNTER — CARDPULM VISIT (OUTPATIENT)
Dept: CARDIAC REHAB | Facility: HOSPITAL | Age: 64
End: 2019-03-18
Attending: INTERNAL MEDICINE
Payer: MEDICARE

## 2019-03-18 DIAGNOSIS — J44.9 STAGE 3 SEVERE COPD BY GOLD CLASSIFICATION (HCC): ICD-10-CM

## 2019-03-18 RX ORDER — ROSUVASTATIN CALCIUM 20 MG/1
TABLET, COATED ORAL
Qty: 90 TABLET | Refills: 1 | Status: SHIPPED | OUTPATIENT
Start: 2019-03-18 | End: 2019-10-21

## 2019-03-18 RX ORDER — TRAZODONE HYDROCHLORIDE 100 MG/1
TABLET ORAL
Qty: 90 TABLET | Refills: 0 | Status: SHIPPED | OUTPATIENT
Start: 2019-03-18 | End: 2019-05-22

## 2019-03-18 RX ORDER — LEVOTHYROXINE SODIUM 0.03 MG/1
TABLET ORAL
Qty: 90 TABLET | Refills: 0 | Status: SHIPPED | OUTPATIENT
Start: 2019-03-18 | End: 2019-08-25

## 2019-03-18 RX ORDER — PRAMIPEXOLE DIHYDROCHLORIDE 1 MG/1
TABLET ORAL
Qty: 270 TABLET | Refills: 0 | Status: SHIPPED | OUTPATIENT
Start: 2019-03-18 | End: 2020-04-06

## 2019-03-19 RX ORDER — BUMETANIDE 1 MG/1
TABLET ORAL
Qty: 180 TABLET | Refills: 0 | Status: SHIPPED | OUTPATIENT
Start: 2019-03-19 | End: 2019-12-20

## 2019-03-19 RX ORDER — LOSARTAN POTASSIUM 25 MG/1
TABLET ORAL
Qty: 90 TABLET | Refills: 0 | Status: SHIPPED | OUTPATIENT
Start: 2019-03-19 | End: 2019-05-22

## 2019-03-19 RX ORDER — LOSARTAN POTASSIUM 50 MG/1
TABLET ORAL
Qty: 90 TABLET | Refills: 0 | Status: SHIPPED | OUTPATIENT
Start: 2019-03-19 | End: 2019-05-22

## 2019-03-21 ENCOUNTER — CARDPULM VISIT (OUTPATIENT)
Dept: CARDIAC REHAB | Facility: HOSPITAL | Age: 64
End: 2019-03-21
Attending: INTERNAL MEDICINE
Payer: MEDICARE

## 2019-03-21 LAB
GLUCOSE BLDC GLUCOMTR-MCNC: 186 MG/DL (ref 70–99)
GLUCOSE BLDC GLUCOMTR-MCNC: 223 MG/DL (ref 70–99)

## 2019-03-21 PROCEDURE — 82962 GLUCOSE BLOOD TEST: CPT

## 2019-03-25 ENCOUNTER — PATIENT MESSAGE (OUTPATIENT)
Dept: PULMONOLOGY | Facility: CLINIC | Age: 64
End: 2019-03-25

## 2019-03-25 NOTE — TELEPHONE ENCOUNTER
From: Nathan Morales  To:  Mila Alcantar DO  Sent: 3/25/2019 1:20 AM CDT  Subject: Non-Urgent Medical Question    Why does my pulmonary rehab visits do not show up on My Chart as scheduled visits, this seems odd since it is right at the hospital? If

## 2019-03-26 ENCOUNTER — CARDPULM VISIT (OUTPATIENT)
Dept: CARDIAC REHAB | Facility: HOSPITAL | Age: 64
End: 2019-03-26
Attending: INTERNAL MEDICINE
Payer: MEDICARE

## 2019-03-26 ENCOUNTER — OFFICE VISIT (OUTPATIENT)
Dept: PHYSICAL THERAPY | Age: 64
End: 2019-03-26
Attending: PHYSICAL MEDICINE & REHABILITATION
Payer: MEDICARE

## 2019-03-26 PROCEDURE — 97110 THERAPEUTIC EXERCISES: CPT

## 2019-03-26 PROCEDURE — 97163 PT EVAL HIGH COMPLEX 45 MIN: CPT

## 2019-03-26 PROCEDURE — 97530 THERAPEUTIC ACTIVITIES: CPT

## 2019-03-26 NOTE — PROGRESS NOTES
PHYSICAL THERAPY EVALUATION:   Referring Physician: Dr. Abiel Pena  Date of Onset: 2/28/2019 Date of Service: 3/26/2019   Diagnosis: Primary osteoarthritis of right hip (M16.11); lumbar stenosis  PATIENT SUMMARY:   Hoang Pardhan is a 59year old y/o fem with 2 canes or walker. 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 Able to clean her home without difficulty. Independent with ADLs and IADLs. Able to ambulate without AD.  Pt goals include: strengthening her leg and her complaints of lumbar and R hip pain. She rates that her symptoms can radiate down to her R thigh. She had an injection about 2 weeks ago which has helped.  Her movement dysfunction is consistent with impaired posture, muscle imbalance, and insufficient hip (S2) R: 5, L: 5    Ankle DF (L4) R: 4 -, L: 5    EHL (L5) R: 4, L: 4    Ankle PF (S1) R: 4, L: 5    Hip Abduction R: 3 -, L: 4 -    Hip Extension R: 3 -, L: 3 -      Palpation:TTP at R lumbar iliopsoas and quadriceps  Neuro Screen: B LEs intact to light to you have any questions, please contact me at Dept: [phone]    Sincerely,  Electronically signed by therapist: Zaheer Disla, PT, DPT, COMT, Cert.  MDT    Physician's certification required: Yes  I certify the need for these services furnished under

## 2019-03-28 ENCOUNTER — APPOINTMENT (OUTPATIENT)
Dept: CARDIAC REHAB | Facility: HOSPITAL | Age: 64
End: 2019-03-28
Attending: INTERNAL MEDICINE
Payer: MEDICARE

## 2019-03-28 ENCOUNTER — OFFICE VISIT (OUTPATIENT)
Dept: PHYSICAL THERAPY | Age: 64
End: 2019-03-28
Attending: PHYSICAL MEDICINE & REHABILITATION
Payer: MEDICARE

## 2019-03-28 PROCEDURE — 97112 NEUROMUSCULAR REEDUCATION: CPT

## 2019-03-28 PROCEDURE — 97110 THERAPEUTIC EXERCISES: CPT

## 2019-03-28 PROCEDURE — 97140 MANUAL THERAPY 1/> REGIONS: CPT

## 2019-03-28 NOTE — PROGRESS NOTES
Dx:     Primary osteoarthritis of right hip (M16.11); lumbar stenosis     Authorized # of Visits:  8 (Medicare PPO;  Cert ends - 0/40/8032)        Next MD visit: none scheduled  Fall Risk: elevated         Precautions: MS; fall risk           Medication Marielena mindful of energy conservation.      Charges:TEx1, MTx1, Neuro Re-ed x1       Total Timed Treatment: 50 min  Total Treatment Time: 50 min

## 2019-03-29 ENCOUNTER — HOSPITAL ENCOUNTER (OUTPATIENT)
Dept: ULTRASOUND IMAGING | Age: 64
Discharge: HOME | End: 2019-03-29
Attending: INTERNAL MEDICINE
Payer: MEDICARE

## 2019-03-29 DIAGNOSIS — R10.11 RIGHT UPPER QUADRANT ABDOMINAL PAIN: ICD-10-CM

## 2019-03-29 PROCEDURE — 76705 ECHO EXAM OF ABDOMEN: CPT | Performed by: INTERNAL MEDICINE

## 2019-04-02 ENCOUNTER — OFFICE VISIT (OUTPATIENT)
Dept: PHYSICAL THERAPY | Age: 64
End: 2019-04-02
Attending: PHYSICAL MEDICINE & REHABILITATION
Payer: MEDICARE

## 2019-04-02 ENCOUNTER — CARDPULM VISIT (OUTPATIENT)
Dept: CARDIAC REHAB | Facility: HOSPITAL | Age: 64
End: 2019-04-02
Attending: INTERNAL MEDICINE
Payer: MEDICARE

## 2019-04-02 DIAGNOSIS — M16.11 PRIMARY OSTEOARTHRITIS OF RIGHT HIP: ICD-10-CM

## 2019-04-02 PROCEDURE — 97110 THERAPEUTIC EXERCISES: CPT

## 2019-04-02 PROCEDURE — 97112 NEUROMUSCULAR REEDUCATION: CPT

## 2019-04-02 PROCEDURE — 97140 MANUAL THERAPY 1/> REGIONS: CPT

## 2019-04-02 NOTE — PROGRESS NOTES
Dx:     Primary osteoarthritis of right hip (M16.11); lumbar stenosis     Authorized # of Visits:  8 (Medicare PPO;  Cert ends - 8/65/3245)        Next MD visit: none scheduled  Fall Risk: elevated         Precautions: MS; fall risk           Medication Marielena continues to show insufficient hip extension for gait. Goals:   1. Patient will be independent with HEP to optimize gains made in PT to home and community settings and for self management of prophylactic care.   2. Patient will be able to tolerate full w

## 2019-04-03 RX ORDER — CARVEDILOL 3.12 MG/1
TABLET ORAL
Qty: 180 TABLET | Refills: 0 | OUTPATIENT
Start: 2019-04-03

## 2019-04-03 RX ORDER — METOLAZONE 2.5 MG/1
TABLET ORAL
Qty: 36 TABLET | Refills: 0 | Status: SHIPPED | OUTPATIENT
Start: 2019-04-03 | End: 2019-07-29

## 2019-04-04 ENCOUNTER — APPOINTMENT (OUTPATIENT)
Dept: CARDIAC REHAB | Facility: HOSPITAL | Age: 64
End: 2019-04-04
Attending: INTERNAL MEDICINE
Payer: MEDICARE

## 2019-04-04 ENCOUNTER — OFFICE VISIT (OUTPATIENT)
Dept: INTERNAL MEDICINE CLINIC | Facility: CLINIC | Age: 64
End: 2019-04-04
Payer: MEDICARE

## 2019-04-04 VITALS
HEIGHT: 64 IN | OXYGEN SATURATION: 92 % | SYSTOLIC BLOOD PRESSURE: 108 MMHG | WEIGHT: 208.81 LBS | BODY MASS INDEX: 35.65 KG/M2 | RESPIRATION RATE: 22 BRPM | DIASTOLIC BLOOD PRESSURE: 70 MMHG | TEMPERATURE: 98 F | HEART RATE: 69 BPM

## 2019-04-04 DIAGNOSIS — R10.11 RIGHT UPPER QUADRANT ABDOMINAL PAIN: ICD-10-CM

## 2019-04-04 DIAGNOSIS — N05.1 FSGS (FOCAL SEGMENTAL GLOMERULOSCLEROSIS): ICD-10-CM

## 2019-04-04 DIAGNOSIS — I10 ESSENTIAL HYPERTENSION: ICD-10-CM

## 2019-04-04 DIAGNOSIS — E03.9 HYPOTHYROIDISM, UNSPECIFIED TYPE: ICD-10-CM

## 2019-04-04 DIAGNOSIS — Z79.4 TYPE 2 DIABETES MELLITUS WITHOUT COMPLICATION, WITH LONG-TERM CURRENT USE OF INSULIN (HCC): ICD-10-CM

## 2019-04-04 DIAGNOSIS — E78.2 MIXED HYPERLIPIDEMIA: Primary | ICD-10-CM

## 2019-04-04 DIAGNOSIS — J43.9 PULMONARY EMPHYSEMA, UNSPECIFIED EMPHYSEMA TYPE (HCC): ICD-10-CM

## 2019-04-04 DIAGNOSIS — E11.9 TYPE 2 DIABETES MELLITUS WITHOUT COMPLICATION, WITH LONG-TERM CURRENT USE OF INSULIN (HCC): ICD-10-CM

## 2019-04-04 PROCEDURE — G0463 HOSPITAL OUTPT CLINIC VISIT: HCPCS | Performed by: INTERNAL MEDICINE

## 2019-04-04 PROCEDURE — 99214 OFFICE O/P EST MOD 30 MIN: CPT | Performed by: INTERNAL MEDICINE

## 2019-04-04 RX ORDER — MELATONIN
3 NIGHTLY
COMMUNITY

## 2019-04-04 NOTE — PROGRESS NOTES
HPI:    Patient ID: Peace Wing is a 59year old female. Labs overdue      Hyperlipidemia   This is a chronic problem. The current episode started more than 1 year ago. The problem is uncontrolled. Recent lipid tests were reviewed and are variable. diabetic treatment includes diet and insulin injections (lantus 10 units a day). She is compliant with treatment most of the time. Her weight is stable. She is following a diabetic, generally healthy, high fiber, low fat/cholesterol and low salt diet.  Meal [place] 2 tablets in AM     2 tablets in PM Disp: 70 tablet Rfl: 0   METOLAZONE 2.5 MG Oral Tab TAKE 1 TABLET BY MOUTH THREE TIMES A WEEK Disp: 36 tablet Rfl: 0   BUMETANIDE 1 MG Oral Tab TAKE 2 TABLETS(2 MG) BY MOUTH DAILY Disp: 180 tablet Rfl: 0   JOSELINEA 2 (two) times daily. Disp: 60 vial Rfl: 5   triamcinolone acetonide 0.1 % External Ointment BOLIVAR EXT AA BID Disp:  Rfl: 3   magnesium 250 MG Oral Tab Take 250 mg by mouth daily.    Disp:  Rfl:    Cholecalciferol (VITAMIN D) 2000 UNITS Oral Cap Take 2,000 Uni She has no cervical adenopathy. Neurological: She is alert and oriented to person, place, and time. She has normal reflexes. No cranial nerve deficit. She exhibits normal muscle tone. Coordination normal.   Skin: No rash noted. No erythema.    Psychiatric 50+25-75 mg daily, Bumex 1 mg daily, Coreg 3.125 mg twice daily. Potassium levels did look normal.  Recheck labs have been ordered, follow-up after completion         T2DM (type 2 diabetes mellitus) ([facility]     She is currently on Lantus at 10 units daily. gallbladder removal–cholecystectomy. Return in about 6 weeks (around 5/16/2019), or if symptoms worsen or fail to improve.     PT UNDERSTANDS AND AGREES TO FOLLOW DIRECTIONS AND ADVICE    Orders Placed This Encounter      CBC W Differential W

## 2019-04-04 NOTE — ASSESSMENT & PLAN NOTE
Vision has been on Crestor at 20 mg daily. Last liver function test look normal.  Recheck labs as directed with the next blood draw.

## 2019-04-04 NOTE — ASSESSMENT & PLAN NOTE
Blood pressure 108/70, pulse 69, temperature 98 °F (36.7 °C), temperature source Oral, resp. rate 22, height 5' 4\" (1.626 m), weight 208 lb 12.8 oz (94.7 kg), SpO2 92 %, not currently breastfeeding. Blood pressure looks great at this point.   Patient curr

## 2019-04-04 NOTE — ASSESSMENT & PLAN NOTE
Patient is monitored per nephrology. Recent labs showed stable creatinine, EGFR's. Urine protein levels are borderline elevated but remains stable. She does not have any significant lower extremity edema at this time.

## 2019-04-04 NOTE — ASSESSMENT & PLAN NOTE
Patient has had an episode of right upper quadrant pain around November. Her amylase, lipase and liver function test at that time looked stable. Patient did not complete her ultrasound of the liver and gallbladder at that time but has completed it today.

## 2019-04-04 NOTE — ASSESSMENT & PLAN NOTE
Patient with a history of severe COPD–was seen by pulmonology and after long period of contemplation has started on her pulmonary rehab which she has done well.   She is currently on trilogy, continues to smoke however and is willing to start on a trial of

## 2019-04-04 NOTE — ASSESSMENT & PLAN NOTE
She is currently on Lantus at 10 units daily. She does tend to forget the medication and sometimes skips it. Additionally she does not watch her diet closely and drinks a Pepsi almost daily as well as likes to eat or sweets.   Her hemoglobin A1c last chec

## 2019-04-05 ENCOUNTER — LAB ENCOUNTER (OUTPATIENT)
Dept: LAB | Age: 64
End: 2019-04-05
Attending: INTERNAL MEDICINE
Payer: MEDICARE

## 2019-04-05 ENCOUNTER — OFFICE VISIT (OUTPATIENT)
Dept: PHYSICAL THERAPY | Age: 64
End: 2019-04-05
Attending: PHYSICAL MEDICINE & REHABILITATION
Payer: MEDICARE

## 2019-04-05 DIAGNOSIS — M16.11 PRIMARY OSTEOARTHRITIS OF RIGHT HIP: ICD-10-CM

## 2019-04-05 DIAGNOSIS — Z79.4 TYPE 2 DIABETES MELLITUS WITHOUT COMPLICATION, WITH LONG-TERM CURRENT USE OF INSULIN (HCC): ICD-10-CM

## 2019-04-05 DIAGNOSIS — E11.9 TYPE 2 DIABETES MELLITUS WITHOUT COMPLICATION, WITH LONG-TERM CURRENT USE OF INSULIN (HCC): ICD-10-CM

## 2019-04-05 PROCEDURE — 82570 ASSAY OF URINE CREATININE: CPT

## 2019-04-05 PROCEDURE — 97140 MANUAL THERAPY 1/> REGIONS: CPT

## 2019-04-05 PROCEDURE — 80061 LIPID PANEL: CPT

## 2019-04-05 PROCEDURE — 36415 COLL VENOUS BLD VENIPUNCTURE: CPT

## 2019-04-05 PROCEDURE — 84443 ASSAY THYROID STIM HORMONE: CPT

## 2019-04-05 PROCEDURE — 81001 URINALYSIS AUTO W/SCOPE: CPT

## 2019-04-05 PROCEDURE — 85025 COMPLETE CBC W/AUTO DIFF WBC: CPT

## 2019-04-05 PROCEDURE — 97110 THERAPEUTIC EXERCISES: CPT

## 2019-04-05 PROCEDURE — 80053 COMPREHEN METABOLIC PANEL: CPT

## 2019-04-05 PROCEDURE — 83036 HEMOGLOBIN GLYCOSYLATED A1C: CPT

## 2019-04-05 PROCEDURE — 97112 NEUROMUSCULAR REEDUCATION: CPT

## 2019-04-05 PROCEDURE — 82043 UR ALBUMIN QUANTITATIVE: CPT

## 2019-04-05 NOTE — PROGRESS NOTES
Dx:     Primary osteoarthritis of right hip (M16.11); lumbar stenosis     Authorized # of Visits:  8 (Medicare PPO;  Cert ends - 2/72/6865)        Next MD visit: none scheduled  Fall Risk: elevated         Precautions: MS; fall risk           Medication Marielena pelvic transverse plane; 10x1  - Sitting on small ball; pelvic tilts; 10x1   Therapeutic Activity                  Assessment: Added hip flexor stretch to work towards improving hip extension to ease standing and gait.  Increased endurance for gait today wi

## 2019-04-05 NOTE — PATIENT INSTRUCTIONS
Problem List Items Addressed This Visit        Unprioritized    Emphysema lung ([address]     Patient with a history of severe COPD–was seen by pulmonology and after long period of contemplation has started on her pulmonary rehab which she has done well.   She i has completed it today. She does have small calculi and sludge in the gallbladder but no evidence of cholecystitis or obstruction noted at this time.   Would proceed with conservative management at this point–patient is advised to give me a call if she has

## 2019-04-06 NOTE — PROGRESS NOTES
Time based billing: total face-to-face time spent examining, counseling and treating this patient 15 minutes; more than 50% of time spent in counseling/coordination of care    Patient presents for essentially a second opinion on her left hip and thigh pain
none

## 2019-04-09 ENCOUNTER — CARDPULM VISIT (OUTPATIENT)
Dept: CARDIAC REHAB | Facility: HOSPITAL | Age: 64
End: 2019-04-09
Attending: INTERNAL MEDICINE
Payer: MEDICARE

## 2019-04-10 ENCOUNTER — OFFICE VISIT (OUTPATIENT)
Dept: PHYSICAL THERAPY | Age: 64
End: 2019-04-10
Attending: PHYSICAL MEDICINE & REHABILITATION
Payer: MEDICARE

## 2019-04-10 DIAGNOSIS — M16.11 PRIMARY OSTEOARTHRITIS OF RIGHT HIP: ICD-10-CM

## 2019-04-10 PROCEDURE — 97140 MANUAL THERAPY 1/> REGIONS: CPT

## 2019-04-10 PROCEDURE — 97110 THERAPEUTIC EXERCISES: CPT

## 2019-04-10 NOTE — PROGRESS NOTES
Dx:     Primary osteoarthritis of right hip (M16.11); lumbar stenosis     Authorized # of Visits:  8 (Medicare PPO;  Cert ends - 3/67/3037)        Next MD visit: none scheduled  Fall Risk: elevated         Precautions: MS; fall risk           Medication Marielena Sitting on ball: for balance and trunk control   - Sitting on small ball: pelvic hip hike; 10x2  - Sitting on small ball: pelvic transverse plane; 10x2  - Sitting on small ball; pelvic tilts; 10x2  - gait training with 2 canes; 50' x 2 laps - gait training

## 2019-04-12 ENCOUNTER — OFFICE VISIT (OUTPATIENT)
Dept: PHYSICAL THERAPY | Age: 64
End: 2019-04-12
Attending: PHYSICAL MEDICINE & REHABILITATION
Payer: MEDICARE

## 2019-04-12 DIAGNOSIS — M16.11 PRIMARY OSTEOARTHRITIS OF RIGHT HIP: ICD-10-CM

## 2019-04-12 PROCEDURE — 97110 THERAPEUTIC EXERCISES: CPT

## 2019-04-12 PROCEDURE — 97140 MANUAL THERAPY 1/> REGIONS: CPT

## 2019-04-12 RX ORDER — CARVEDILOL 3.12 MG/1
3.12 TABLET ORAL 2 TIMES DAILY
Qty: 180 TABLET | Refills: 0 | Status: SHIPPED | OUTPATIENT
Start: 2019-04-12 | End: 2019-08-25

## 2019-04-12 NOTE — PROGRESS NOTES
Dx:     Primary osteoarthritis of right hip (M16.11); lumbar stenosis     Authorized # of Visits:  8 (Medicare PPO;  Cert ends - 1/82/4847)        Next MD visit: none scheduled  Fall Risk: elevated         Precautions: MS; fall risk           Medication Marielena hip extension - Sidelying: MFR at R hip; quadriceps/ITB  - Supine: MFR at R quad/iliopsoas  - PROM: at R hip; progressing towards improving hip extension - Sidelying: MFR at R hip; quadriceps/ITB  - Supine: MFR at R quad/iliopsoas  - PROM: at R hip; progre Time: 45 min

## 2019-04-16 ENCOUNTER — CARDPULM VISIT (OUTPATIENT)
Dept: CARDIAC REHAB | Facility: HOSPITAL | Age: 64
End: 2019-04-16
Attending: INTERNAL MEDICINE
Payer: MEDICARE

## 2019-04-17 ENCOUNTER — OFFICE VISIT (OUTPATIENT)
Dept: PHYSICAL THERAPY | Age: 64
End: 2019-04-17
Attending: PHYSICAL MEDICINE & REHABILITATION
Payer: MEDICARE

## 2019-04-17 DIAGNOSIS — M16.11 PRIMARY OSTEOARTHRITIS OF RIGHT HIP: ICD-10-CM

## 2019-04-17 PROCEDURE — 97140 MANUAL THERAPY 1/> REGIONS: CPT

## 2019-04-17 PROCEDURE — 97110 THERAPEUTIC EXERCISES: CPT

## 2019-04-17 NOTE — PROGRESS NOTES
Dx:     Primary osteoarthritis of right hip (M16.11); lumbar stenosis     Authorized # of Visits:  8 (Medicare PPO;  Cert ends - 8/30/7660)        Next MD visit: none scheduled  Fall Risk: elevated         Precautions: MS; fall risk           Medication Marielena Sidelying: MFR at R hip; quadriceps/ITB  - Supine: MFR at R quad/iliopsoas  - PROM: at R hip; progressing towards improving hip extension - Sidelying: MFR at R hip; quadriceps/ITB  - Supine: MFR at R quad/iliopsoas  - PROM: at R hip; progressing towards im

## 2019-04-18 ENCOUNTER — CARDPULM VISIT (OUTPATIENT)
Dept: CARDIAC REHAB | Facility: HOSPITAL | Age: 64
End: 2019-04-18
Attending: INTERNAL MEDICINE
Payer: MEDICARE

## 2019-04-18 ENCOUNTER — OFFICE VISIT (OUTPATIENT)
Dept: PHYSICAL THERAPY | Age: 64
End: 2019-04-18
Attending: PHYSICAL MEDICINE & REHABILITATION
Payer: MEDICARE

## 2019-04-18 DIAGNOSIS — M16.11 PRIMARY OSTEOARTHRITIS OF RIGHT HIP: ICD-10-CM

## 2019-04-18 PROCEDURE — 97140 MANUAL THERAPY 1/> REGIONS: CPT

## 2019-04-18 PROCEDURE — 97110 THERAPEUTIC EXERCISES: CPT

## 2019-04-18 NOTE — PROGRESS NOTES
Patient Name: Vladimir Glasgow  YOB: 1955          MRN number:  [id number]  Date:  4/18/2019  Referring Physician:  Sofiya Wall   Dx:     Primary osteoarthritis of right hip (M16.11); lumbar stenosis     Authorized # of Visits:  8 (Medicare P 3/26/2019 4/18/2019   Flexion Mod loss Mod loss    Extension Mod loss  min/mod loss         LE STRENGTH:   -/5 MMT    3/26/2019 4/18/2019   Hip Flexion (L2) R:4 -, L: 5 R: 4+, L: 5    Knee Extension (L3) R: 5, L: 5 R: 5, L: 5    Knee Flexion (S2) R: 5, L: at least - 5 deg B hip extension to facilitate a more normalized gait pattern and ease strain at hips. 4/18/19: reviewed goals with pt.     Rehab Potential: good    Plan: Continue skilled Physical Therapy 2 x/week or a total of 8 visits over a 90 day perio TKE in standing (lunge position at wall); 10x5\" at R   - supine: BFKOs; 10x2  - Supine: pelvic tilt; 10x2  - TKE in standing (lunge position at wall); 10x5\" at R  - standing: hip/back ext with arms at barre for support; 10x3 - supine: pelvic tilts;10x2

## 2019-04-23 ENCOUNTER — CARDPULM VISIT (OUTPATIENT)
Dept: CARDIAC REHAB | Facility: HOSPITAL | Age: 64
End: 2019-04-23
Attending: INTERNAL MEDICINE
Payer: MEDICARE

## 2019-04-23 ENCOUNTER — OFFICE VISIT (OUTPATIENT)
Dept: PHYSICAL THERAPY | Age: 64
End: 2019-04-23
Attending: PHYSICAL MEDICINE & REHABILITATION
Payer: MEDICARE

## 2019-04-23 DIAGNOSIS — M16.11 PRIMARY OSTEOARTHRITIS OF RIGHT HIP: ICD-10-CM

## 2019-04-23 PROCEDURE — 97140 MANUAL THERAPY 1/> REGIONS: CPT

## 2019-04-23 PROCEDURE — 97110 THERAPEUTIC EXERCISES: CPT

## 2019-04-23 NOTE — PROGRESS NOTES
Patient Name: Alanna Becerra  YOB: 1955          MRN number:  [id number]  Date:  4/18/2019  Referring Physician:  Edmundo Chauhan   Dx:     Primary osteoarthritis of right hip (M16.11); lumbar stenosis     Authorized # of Visits:  16 (Medicare of emotions/stress in pain experience   Therapeutic Activity          Assessment: pt arrived reporting being stressed about her family and with her volunteer work. She states that she understands that the stress is not helping her with her pain.  Fatigue li

## 2019-04-25 ENCOUNTER — APPOINTMENT (OUTPATIENT)
Dept: PHYSICAL THERAPY | Age: 64
End: 2019-04-25
Attending: PHYSICAL MEDICINE & REHABILITATION
Payer: MEDICARE

## 2019-04-30 ENCOUNTER — OFFICE VISIT (OUTPATIENT)
Dept: PHYSICAL THERAPY | Age: 64
End: 2019-04-30
Attending: PHYSICAL MEDICINE & REHABILITATION
Payer: MEDICARE

## 2019-04-30 ENCOUNTER — CARDPULM VISIT (OUTPATIENT)
Dept: CARDIAC REHAB | Facility: HOSPITAL | Age: 64
End: 2019-04-30
Attending: INTERNAL MEDICINE
Payer: MEDICARE

## 2019-04-30 DIAGNOSIS — M16.11 PRIMARY OSTEOARTHRITIS OF RIGHT HIP: ICD-10-CM

## 2019-04-30 PROCEDURE — 97140 MANUAL THERAPY 1/> REGIONS: CPT

## 2019-04-30 PROCEDURE — 97110 THERAPEUTIC EXERCISES: CPT

## 2019-04-30 NOTE — PROGRESS NOTES
Patient Name: Vladimir Glasgow  YOB: 1955          MRN number:  [id number]  Referring Physician:  Sofiya Wall   Dx:     Primary osteoarthritis of right hip (M16.11); lumbar stenosis     Authorized # of Visits:  12 (Medicare PPO;  Cert ends - 7 discussed option to decreased PT freq to 1x/week due to stress, but she declined     Manual Therapy - Sidelying: MFR at R hip; quadriceps/ITB  - Supine: MFR at R quad/iliopsoas  - PROM: at R hip; progressing towards improving hip extension - Supine: MFR at hip extension to facilitate a more normalized gait pattern and ease strain at hips. 4/18/19: reviewed goals with pt. Plan: Progress standing and balance activities.        Charges: TEx2, MT x1  Total Timed Treatment: 45 min  Total Treatment Time: 45 min

## 2019-05-02 ENCOUNTER — APPOINTMENT (OUTPATIENT)
Dept: PHYSICAL THERAPY | Age: 64
End: 2019-05-02
Attending: PHYSICAL MEDICINE & REHABILITATION
Payer: MEDICARE

## 2019-05-02 ENCOUNTER — APPOINTMENT (OUTPATIENT)
Dept: CARDIAC REHAB | Facility: HOSPITAL | Age: 64
End: 2019-05-02
Attending: INTERNAL MEDICINE
Payer: MEDICARE

## 2019-05-08 ENCOUNTER — OFFICE VISIT (OUTPATIENT)
Dept: PHYSICAL THERAPY | Age: 64
End: 2019-05-08
Attending: INTERNAL MEDICINE
Payer: MEDICARE

## 2019-05-08 PROCEDURE — 97140 MANUAL THERAPY 1/> REGIONS: CPT

## 2019-05-08 PROCEDURE — 97110 THERAPEUTIC EXERCISES: CPT

## 2019-05-08 NOTE — PROGRESS NOTES
Patient Name: Maximino Santiago  YOB: 1955          MRN number:  [id number]  Referring Physician:  Dr. Victor M Landis DO  Dx:     Primary osteoarthritis of right hip (M16.11); lumbar stenosis     Authorized # of Visits:  12 (Medicare PPO;  Cert ends - 7 with arms at barre for support; 10x2  - standing: calf stretch; 20\"x5   Manual Therapy - Supine: MFR at R quad/iliopsoas  - PROM: at R hip; progressing towards improving hip extension - Supine: MFR at R quad/iliopsoas  - PROM: at R hip; progressing toward hips. (in progress)  5/8/19: reviewed goals with pt. Plan: Progress endurance for standing and balance exercises.       Charges: TEx2, MT x1  Total Timed Treatment: 45 min  Total Treatment Time: 45 min

## 2019-05-08 NOTE — TELEPHONE ENCOUNTER
Carvedilol 3.125 mg rx request. Please review and sign off if appropriate. Thank you.     LOV: 3/14/19  Last Refill: 1/3/19
Dr. Jaison Bernal, why was carvedilol denied? Looks like it was set up right for 3.125 mg BID, which is what LOV documents, & pt is up to date on appt. Please advise. Thank you.
Must be an error - pls send the prescription
Pt requesting to speak with RN re: Jackelyn Candelaria rx. Pls call. Thank you.
Rx sent. Pt notified.
DISPLAY PLAN FREE TEXT

## 2019-05-10 ENCOUNTER — OFFICE VISIT (OUTPATIENT)
Dept: PHYSICAL THERAPY | Age: 64
End: 2019-05-10
Attending: INTERNAL MEDICINE
Payer: MEDICARE

## 2019-05-10 PROCEDURE — 97140 MANUAL THERAPY 1/> REGIONS: CPT

## 2019-05-10 PROCEDURE — 97110 THERAPEUTIC EXERCISES: CPT

## 2019-05-10 NOTE — PROGRESS NOTES
Patient Name: Shyam Méndez  YOB: 1955          MRN number:  [id number]  Referring Physician:  Dr. Vladimir Roberto DO  Dx:     Primary osteoarthritis of right hip (M16.11); lumbar stenosis     Authorized # of Visits:  12 (Medicare PPO;  Cert ends - 7 - Supine: MFR at R quad/iliopsoas  - PROM: at R hip; progressing towards improving hip extension  - Supine: R hip flexion stretch faciliated with R leg over edge of mat table - Supine: MFR at R quad/iliopsoas  - PROM: at R hip; progressing towards improvin progress)  5/8/19: reviewed goals with pt. Plan: Progress endurance for standing and balance exercises.       Charges: TEx2, MT x1  Total Timed Treatment: 45 min  Total Treatment Time: 45 min

## 2019-05-11 NOTE — TELEPHONE ENCOUNTER
Review pended refill request as it does not fall under a protocol.     Last Rx: 4/4/19  LOV: 4/4/19    Requested Prescriptions   Pending Prescriptions Disp Refills   • Naltrexone-buPROPion HCl ER (CONTRAVE) 8-90 MG Oral Tablet 12 Hr 70 tablet 0     Sig: Carey

## 2019-05-13 ENCOUNTER — TELEPHONE (OUTPATIENT)
Dept: OTHER | Age: 64
End: 2019-05-13

## 2019-05-13 NOTE — TELEPHONE ENCOUNTER
Patient called stating [address] is trying to get a refill on patient's Naltrexone-buPROPion HCl ER (CONTRAVE) 8-90 MG Oral Tablet 12 Hr. She states the med is not covered by insurance and costs $300 / month.  She states with [facility]oon

## 2019-05-14 ENCOUNTER — APPOINTMENT (OUTPATIENT)
Dept: CARDIAC REHAB | Facility: HOSPITAL | Age: 64
End: 2019-05-14
Attending: INTERNAL MEDICINE
Payer: MEDICARE

## 2019-05-14 NOTE — TELEPHONE ENCOUNTER
Patient calling and states that she cannot pick  Up the prescription today  and she does not have any fax machine at home, requesting to fax the script to [facility] number confirmed. New script esent to [address] [facility] today. Roberta Swanson an

## 2019-05-15 ENCOUNTER — TELEPHONE (OUTPATIENT)
Dept: PHYSICAL THERAPY | Age: 64
End: 2019-05-15

## 2019-05-17 ENCOUNTER — TELEPHONE (OUTPATIENT)
Dept: OTHER | Age: 64
End: 2019-05-17

## 2019-05-17 NOTE — TELEPHONE ENCOUNTER
Received a call from the patient who reports she has an appointment pending with Dr. Arron Howell on 5/22/19 and wanted to know if she needs to have her labs drawn before the appointment. Message routed to Dr. Arron Howell for review.      Please respond to pool: LUDIN BEAVERS

## 2019-05-21 ENCOUNTER — LAB ENCOUNTER (OUTPATIENT)
Dept: LAB | Facility: HOSPITAL | Age: 64
End: 2019-05-21
Attending: INTERNAL MEDICINE
Payer: MEDICARE

## 2019-05-21 ENCOUNTER — CARDPULM VISIT (OUTPATIENT)
Dept: CARDIAC REHAB | Facility: HOSPITAL | Age: 64
End: 2019-05-21
Attending: INTERNAL MEDICINE
Payer: MEDICARE

## 2019-05-21 DIAGNOSIS — I10 ESSENTIAL HYPERTENSION: ICD-10-CM

## 2019-05-21 PROCEDURE — 80053 COMPREHEN METABOLIC PANEL: CPT

## 2019-05-21 PROCEDURE — 82043 UR ALBUMIN QUANTITATIVE: CPT

## 2019-05-21 PROCEDURE — 81001 URINALYSIS AUTO W/SCOPE: CPT

## 2019-05-21 PROCEDURE — 85025 COMPLETE CBC W/AUTO DIFF WBC: CPT

## 2019-05-21 PROCEDURE — 36415 COLL VENOUS BLD VENIPUNCTURE: CPT

## 2019-05-21 PROCEDURE — 82570 ASSAY OF URINE CREATININE: CPT

## 2019-05-22 ENCOUNTER — OFFICE VISIT (OUTPATIENT)
Dept: PHYSICAL THERAPY | Age: 64
End: 2019-05-22
Attending: INTERNAL MEDICINE
Payer: MEDICARE

## 2019-05-22 ENCOUNTER — OFFICE VISIT (OUTPATIENT)
Dept: INTERNAL MEDICINE CLINIC | Facility: CLINIC | Age: 64
End: 2019-05-22
Payer: MEDICARE

## 2019-05-22 VITALS
DIASTOLIC BLOOD PRESSURE: 73 MMHG | BODY MASS INDEX: 34.83 KG/M2 | SYSTOLIC BLOOD PRESSURE: 115 MMHG | HEART RATE: 76 BPM | WEIGHT: 204 LBS | HEIGHT: 64 IN | RESPIRATION RATE: 18 BRPM

## 2019-05-22 DIAGNOSIS — J43.9 PULMONARY EMPHYSEMA, UNSPECIFIED EMPHYSEMA TYPE (HCC): ICD-10-CM

## 2019-05-22 DIAGNOSIS — N05.1 FSGS (FOCAL SEGMENTAL GLOMERULOSCLEROSIS): ICD-10-CM

## 2019-05-22 DIAGNOSIS — G35 MULTIPLE SCLEROSIS (HCC): Primary | ICD-10-CM

## 2019-05-22 DIAGNOSIS — E78.2 MIXED HYPERLIPIDEMIA: ICD-10-CM

## 2019-05-22 DIAGNOSIS — E87.6 HYPOKALEMIA: ICD-10-CM

## 2019-05-22 DIAGNOSIS — Z79.4 TYPE 2 DIABETES MELLITUS WITHOUT COMPLICATION, WITH LONG-TERM CURRENT USE OF INSULIN (HCC): ICD-10-CM

## 2019-05-22 DIAGNOSIS — E11.9 TYPE 2 DIABETES MELLITUS WITHOUT COMPLICATION, WITH LONG-TERM CURRENT USE OF INSULIN (HCC): ICD-10-CM

## 2019-05-22 PROCEDURE — 97112 NEUROMUSCULAR REEDUCATION: CPT

## 2019-05-22 PROCEDURE — 97110 THERAPEUTIC EXERCISES: CPT

## 2019-05-22 PROCEDURE — G0463 HOSPITAL OUTPT CLINIC VISIT: HCPCS | Performed by: INTERNAL MEDICINE

## 2019-05-22 PROCEDURE — 97140 MANUAL THERAPY 1/> REGIONS: CPT

## 2019-05-22 PROCEDURE — 99214 OFFICE O/P EST MOD 30 MIN: CPT | Performed by: INTERNAL MEDICINE

## 2019-05-22 RX ORDER — LOSARTAN POTASSIUM 25 MG/1
TABLET ORAL
Qty: 90 TABLET | Refills: 1 | Status: SHIPPED | OUTPATIENT
Start: 2019-05-22 | End: 2019-12-17 | Stop reason: DRUGHIGH

## 2019-05-22 RX ORDER — TRAZODONE HYDROCHLORIDE 100 MG/1
TABLET ORAL
Qty: 90 TABLET | Refills: 2 | Status: SHIPPED | OUTPATIENT
Start: 2019-05-22 | End: 2020-08-03

## 2019-05-22 RX ORDER — LOSARTAN POTASSIUM 50 MG/1
TABLET ORAL
Qty: 90 TABLET | Refills: 1 | Status: SHIPPED | OUTPATIENT
Start: 2019-05-22 | End: 2019-12-17

## 2019-05-22 RX ORDER — INSULIN GLARGINE 100 [IU]/ML
INJECTION, SOLUTION SUBCUTANEOUS
Qty: 10 PEN | Refills: 3 | Status: SHIPPED | OUTPATIENT
Start: 2019-05-22 | End: 2020-03-23

## 2019-05-22 NOTE — PROGRESS NOTES
Patient Name: Emeterio Page  YOB: 1955          MRN number:  [id number]  Referring Physician:  Dr. Anne Sandhoff, DO  Dx:     Primary osteoarthritis of right hip (M16.11); lumbar stenosis     Authorized # of Visits:  12 (Medicare PPO;  Cert ends - 7 barre for support; 10x2  - standing: calf stretch; 20\"x5 - Supine: quad set; 10x10\" at R  - sitting: LAQs; 10x2  - standing: heel raises; 10x2, B  - standing: hip abd; 10x2, B  - standing; hip ext AROM; 10x2, B  - standing: hip/back ext with arms at [address] orders for medication and to call her physicians if needed to address her concerns. Performed exercises and balance activities today in standing to work towards improving her endurance.  She fatigued easily and required rest breaks after standing ~ 2-3 jose

## 2019-05-23 ENCOUNTER — CARDPULM VISIT (OUTPATIENT)
Dept: CARDIAC REHAB | Facility: HOSPITAL | Age: 64
End: 2019-05-23
Attending: INTERNAL MEDICINE
Payer: MEDICARE

## 2019-05-23 NOTE — ASSESSMENT & PLAN NOTE
Patient is usually monitored per nephrology. Urine protein levels, renal functions–EGFR and creatinine seem much improved at this time. She continues to have lower extremity swelling but blood pressures have improved significantly.   Lipid panel does not

## 2019-05-23 NOTE — PATIENT INSTRUCTIONS
Problem List Items Addressed This Visit        Unprioritized    Emphysema lung ([facility] [address]     History of severe COPD, continues to smoke, has been trying to quit on her own but has not been successful.   She was given a prescription for Contrave which was too ex 8.3.  Watch the diet carefully for starches, sugars, desserts. Lately patient had been a little bit more depressed and stopped watching her diet and has not been taking her medications regularly.   She is planning to restart strict control and be more comp

## 2019-05-23 NOTE — PROGRESS NOTES
HPI:    Patient ID: Sulma Robles is a 59year old female. Completed recently-urine microalbumin is normalized. The kidney functions, liver functions and electrolytes look stable. Blood counts look normal-no anemia.       Multiple Sclerosis   Associ diabetic complications include no autonomic neuropathy, CVA, nephropathy or PVD. Risk factors for coronary artery disease include diabetes mellitus, dyslipidemia, hypertension, obesity, post-menopausal, sedentary lifestyle, stress and tobacco exposure.  Cur MOUTH EVERY NIGHT AT BEDTIME Disp: 90 tablet Rfl: 2   Losartan Potassium 50 MG Oral Tab TAKE 1 TABLET BY MOUTH EVERY MORNING Disp: 90 tablet Rfl: 1   Losartan Potassium 25 MG Oral Tab TAKE 1 TABLET BY MOUTH EVERY EVENING Disp: 90 tablet Rfl: 1   carvedilol Solution Take 3 mL by nebulization 2 (two) times daily. Disp: 60 vial Rfl: 5   magnesium 250 MG Oral Tab Take 250 mg by mouth daily. Disp:  Rfl:    Cholecalciferol (VITAMIN D) 2000 UNITS Oral Cap Take 2,000 Units by mouth daily.    Disp:  Rfl:    aspirin atrophy. No cranial nerve deficit. She exhibits abnormal muscle tone. Coordination and gait abnormal. She displays no Babinski's sign on the right side. She displays no Babinski's sign on the left side.    Reflex Scores:       Tricep reflexes are 2+ on the Lately patient had been a little bit more depressed and stopped watching her diet and has not been taking her medications regularly. She is planning to restart strict control and be more compliant with her medications.   Recheck labs in about 8 weeks and w Urine      Urinalysis, Routine [E]      Meds This Visit:  Requested Prescriptions     Signed Prescriptions Disp Refills   • BASAGLAR KWIKPEN 100 UNIT/ML Subcutaneous Solution Pen-injector 10 pen 3     Si units s/c daily   • traZODone HCl 100 MG Oral T

## 2019-05-23 NOTE — ASSESSMENT & PLAN NOTE
Currently on Lantus 14 units daily, advised to increase to 20 units daily. Hemoglobin A1c at 8.3. Watch the diet carefully for starches, sugars, desserts.   Lately patient had been a little bit more depressed and stopped watching her diet and has not been

## 2019-05-23 NOTE — ASSESSMENT & PLAN NOTE
History of severe COPD, continues to smoke, has been trying to quit on her own but has not been successful.   She was given a prescription for Contrave which was too expensive–she has just managed to pick the medication up, advised to start on medication an

## 2019-05-23 NOTE — ASSESSMENT & PLAN NOTE
Lipid panel looked much improved on Crestor 20 mg daily. She has tolerated the medications well.   Recheck labs with a CPK level with the next blood draw

## 2019-05-24 ENCOUNTER — OFFICE VISIT (OUTPATIENT)
Dept: PHYSICAL THERAPY | Age: 64
End: 2019-05-24
Attending: PHYSICAL MEDICINE & REHABILITATION
Payer: MEDICARE

## 2019-05-24 PROCEDURE — 97112 NEUROMUSCULAR REEDUCATION: CPT

## 2019-05-24 PROCEDURE — 97110 THERAPEUTIC EXERCISES: CPT

## 2019-05-24 PROCEDURE — 97140 MANUAL THERAPY 1/> REGIONS: CPT

## 2019-05-24 NOTE — PROGRESS NOTES
Patient Name: Maria De Jesus Zimmerman  YOB: 1955          MRN number:  [id number]  Referring Physician:  Dr. Jamal Harrison DO  Dx:     Primary osteoarthritis of right hip (M16.11); lumbar stenosis     Authorized # of Visits:  12 (Medicare PPO;  Cert ends - 7 quad/iliopsoas  - PROM: at R hip; progressing towards improving hip extension  - Supine: R hip flexion stretch faciliated with R leg over edge of mat table - Supine: MFR at R quad/iliopsoas  - PROM: at R hip - Supine: MFR at R quad/iliopsoas  - PROM: at R 10 minutes without complaints of fatigue or increased pain. (In progress - limited to 1-2 minutes due to back pain)  3. Patient will be able to have equal step length and adequate foot clearance to ambulate 100 feet with AD to reduce risk for falls.  (in pr

## 2019-05-28 ENCOUNTER — APPOINTMENT (OUTPATIENT)
Dept: CARDIAC REHAB | Facility: HOSPITAL | Age: 64
End: 2019-05-28
Attending: INTERNAL MEDICINE
Payer: MEDICARE

## 2019-05-29 ENCOUNTER — APPOINTMENT (OUTPATIENT)
Dept: PHYSICAL THERAPY | Age: 64
End: 2019-05-29
Payer: MEDICARE

## 2019-05-29 NOTE — TELEPHONE ENCOUNTER
Pt states OV on 5/22 with Dr. Haider Friend where the patient states she informed BOLIVAR about her frequent thrush (from steroid use) and vaginal yeast infection symptoms she's currently experiencing.   Patient states she was told by BOLIVAR that she would send in a pres

## 2019-05-30 ENCOUNTER — APPOINTMENT (OUTPATIENT)
Dept: CARDIAC REHAB | Facility: HOSPITAL | Age: 64
End: 2019-05-30
Attending: INTERNAL MEDICINE
Payer: MEDICARE

## 2019-05-31 ENCOUNTER — APPOINTMENT (OUTPATIENT)
Dept: PHYSICAL THERAPY | Age: 64
End: 2019-05-31
Payer: MEDICARE

## 2019-05-31 RX ORDER — FLUCONAZOLE 150 MG/1
TABLET ORAL
Qty: 3 TABLET | Refills: 0 | Status: SHIPPED | OUTPATIENT
Start: 2019-05-31 | End: 2019-09-10

## 2019-06-04 ENCOUNTER — APPOINTMENT (OUTPATIENT)
Dept: CARDIAC REHAB | Facility: HOSPITAL | Age: 64
End: 2019-06-04
Attending: INTERNAL MEDICINE
Payer: MEDICARE

## 2019-06-05 ENCOUNTER — APPOINTMENT (OUTPATIENT)
Dept: PHYSICAL THERAPY | Age: 64
End: 2019-06-05
Attending: PHYSICAL MEDICINE & REHABILITATION
Payer: MEDICARE

## 2019-06-06 ENCOUNTER — APPOINTMENT (OUTPATIENT)
Dept: CARDIAC REHAB | Facility: HOSPITAL | Age: 64
End: 2019-06-06
Attending: INTERNAL MEDICINE
Payer: MEDICARE

## 2019-06-12 ENCOUNTER — APPOINTMENT (OUTPATIENT)
Dept: LAB | Age: 64
End: 2019-06-12
Attending: INTERNAL MEDICINE
Payer: MEDICARE

## 2019-06-12 DIAGNOSIS — N05.1 FSGS (FOCAL SEGMENTAL GLOMERULOSCLEROSIS): ICD-10-CM

## 2019-06-12 PROCEDURE — 36415 COLL VENOUS BLD VENIPUNCTURE: CPT

## 2019-06-12 PROCEDURE — 80069 RENAL FUNCTION PANEL: CPT

## 2019-06-13 ENCOUNTER — OFFICE VISIT (OUTPATIENT)
Dept: PHYSICAL THERAPY | Age: 64
End: 2019-06-13
Attending: PHYSICAL MEDICINE & REHABILITATION
Payer: MEDICARE

## 2019-06-13 ENCOUNTER — OFFICE VISIT (OUTPATIENT)
Dept: NEPHROLOGY | Facility: CLINIC | Age: 64
End: 2019-06-13
Payer: MEDICARE

## 2019-06-13 VITALS
HEART RATE: 76 BPM | BODY MASS INDEX: 35.28 KG/M2 | DIASTOLIC BLOOD PRESSURE: 74 MMHG | WEIGHT: 206.63 LBS | SYSTOLIC BLOOD PRESSURE: 118 MMHG | HEIGHT: 64 IN

## 2019-06-13 DIAGNOSIS — N18.30 CKD (CHRONIC KIDNEY DISEASE), STAGE III (HCC): ICD-10-CM

## 2019-06-13 DIAGNOSIS — N05.1 FSGS (FOCAL SEGMENTAL GLOMERULOSCLEROSIS): Primary | ICD-10-CM

## 2019-06-13 DIAGNOSIS — M79.89 LEG SWELLING: ICD-10-CM

## 2019-06-13 PROCEDURE — 99214 OFFICE O/P EST MOD 30 MIN: CPT | Performed by: INTERNAL MEDICINE

## 2019-06-13 PROCEDURE — G0463 HOSPITAL OUTPT CLINIC VISIT: HCPCS | Performed by: INTERNAL MEDICINE

## 2019-06-13 PROCEDURE — 97112 NEUROMUSCULAR REEDUCATION: CPT

## 2019-06-13 PROCEDURE — 97140 MANUAL THERAPY 1/> REGIONS: CPT

## 2019-06-13 PROCEDURE — 97110 THERAPEUTIC EXERCISES: CPT

## 2019-06-13 NOTE — PROGRESS NOTES
Progress Note     Sarah Villalta is a 64 yrs old female with pmh of HL, multiple sclerosis   (35 yrs), restless leg syndrome, back pain, nephrolithiasis x 3, tobacco abuse who presented today for follow up    Initial lab work showed BUN/Cr 32/0.86 mg/dl • Multiple sclerosis ([facility] [place] [address]    • Renal disorder       Past Surgical History:   Procedure Laterality Date   • APPENDECTOMY     • APPENDECTOMY     •      • KNEE SURGERY             Medications (Active prior to today's visit):    Current Ou Albuterol Sulfate (PROAIR RESPICLICK) 857 (90 Base) MCG/ACT Inhalation Aerosol Powder, Breath Activated Inhale 2 puffs into the lungs 3 (three) times daily as needed. Disp: 1 each Rfl: 3   Vitamin B-12 1000 MCG Oral Tab Take 1,000 mcg by mouth daily.  Dis noted  Respiratory:  lungs are clear to auscultation bilaterally  Cardiovascular: regular rate and rhythm no rubs  Abdomen: soft, non-tender, non-distended  Skin/Hair: no abnormal bruising noted  Back/Spine: no abnormalities noted  Musculoskeletal: joint a

## 2019-06-13 NOTE — PROGRESS NOTES
Patient Name: Mary Martell  YOB: 1955          MRN number:  [id number]  Referring Physician:  Dr. Harriet Duvall DO  Dx:     Primary osteoarthritis of right hip (M16.11); lumbar stenosis     Authorized # of Visits:  12 (Medicare PPO;  Cert ends - 7    LE STRENGTH:   -/5 MMT    3/26/2019 4/18/2019 6/13/2019   Hip Flexion (L2) R:4 -, L: 5 R: 4+, L: 5  R: 5, L: 5   Knee Extension (L3) R: 5, L: 5 R: 5, L: 5  R: 5, L: 5   Knee Flexion (S2) R: 5, L: 5 R: 5, L: 5  R: 5, L: 5   Ankle DF (L4) R: 4 -, L: 5 R pain  - Sitting: BKFO 10x2  - Sitting:  Ankle pumps; 10x2  - sitting: Heel slides; 10x2  - standing: Weight shift M/L; 20x  - standing: Hip ext/abd; 10x2 each dir   Manual Therapy - Supine: MFR at R quad/iliopsoas  - PROM: at R hip; progressing towards impr pain/stiffness and difficulty with gait. She has made gains in LE strength; however, she demonstrates decreased trunk ROM and B hip/knee ROM contributing to difficulty with gait and standing.  She participates in cardiac rehab 2x/week and states that she fe second opinion from another psychologist.    Patient was advised of these findings, precautions, and treatment options and has agreed to actively participate in planning and for this course of care.     Thank you for your referral. If you have any questions

## 2019-06-18 ENCOUNTER — HOSPITAL ENCOUNTER (OUTPATIENT)
Dept: CT IMAGING | Facility: HOSPITAL | Age: 64
Discharge: HOME | End: 2019-06-18
Attending: INTERNAL MEDICINE
Payer: MEDICARE

## 2019-06-18 DIAGNOSIS — J43.9 PULMONARY EMPHYSEMA, UNSPECIFIED EMPHYSEMA TYPE (HCC): ICD-10-CM

## 2019-06-18 PROCEDURE — 71250 CT THORAX DX C-: CPT | Performed by: INTERNAL MEDICINE

## 2019-06-19 ENCOUNTER — APPOINTMENT (OUTPATIENT)
Dept: PHYSICAL THERAPY | Age: 64
End: 2019-06-19
Attending: INTERNAL MEDICINE
Payer: MEDICARE

## 2019-06-20 ENCOUNTER — APPOINTMENT (OUTPATIENT)
Dept: CARDIAC REHAB | Facility: HOSPITAL | Age: 64
End: 2019-06-20
Attending: INTERNAL MEDICINE
Payer: MEDICARE

## 2019-06-25 ENCOUNTER — APPOINTMENT (OUTPATIENT)
Dept: CARDIAC REHAB | Facility: HOSPITAL | Age: 64
End: 2019-06-25
Attending: INTERNAL MEDICINE
Payer: MEDICARE

## 2019-06-27 ENCOUNTER — APPOINTMENT (OUTPATIENT)
Dept: CARDIAC REHAB | Facility: HOSPITAL | Age: 64
End: 2019-06-27
Attending: INTERNAL MEDICINE
Payer: MEDICARE

## 2019-07-02 ENCOUNTER — CARDPULM VISIT (OUTPATIENT)
Dept: CARDIAC REHAB | Facility: HOSPITAL | Age: 64
End: 2019-07-02
Attending: INTERNAL MEDICINE
Payer: MEDICARE

## 2019-07-11 ENCOUNTER — NURSE TRIAGE (OUTPATIENT)
Dept: OTHER | Age: 64
End: 2019-07-11

## 2019-07-11 NOTE — TELEPHONE ENCOUNTER
Advised patient of Dr. Shandra Araujo note. Patient verbalized understanding. Appointment made for 7/15/19 at 4:30pm with Dr Zenaida Ross at [place]. Will get fasting labs done prior to visit- lab hours given.

## 2019-07-11 NOTE — TELEPHONE ENCOUNTER
Patient has had chronic edema bilateral lower extremities. She has exacerbations off and on. She is a chronic smoker with severe COPD and she has a history of nephrotic syndrome. She has multiple sclerosis and stay seated in a wheelchair almost all day.

## 2019-07-11 NOTE — TELEPHONE ENCOUNTER
Action Requested: Summary for Provider     []  Critical Lab, Recommendations Needed  [x] Need Additional Advice  [x]   FYI    []   Need Orders  [] Need Medications Sent to Pharmacy  []  Other     SUMMARY: patient calling in with increasing edema (she see's

## 2019-07-19 ENCOUNTER — APPOINTMENT (OUTPATIENT)
Dept: LAB | Age: 64
End: 2019-07-19
Attending: INTERNAL MEDICINE
Payer: MEDICARE

## 2019-07-19 DIAGNOSIS — E11.9 TYPE 2 DIABETES MELLITUS WITHOUT COMPLICATION, WITH LONG-TERM CURRENT USE OF INSULIN (HCC): ICD-10-CM

## 2019-07-19 DIAGNOSIS — Z79.4 TYPE 2 DIABETES MELLITUS WITHOUT COMPLICATION, WITH LONG-TERM CURRENT USE OF INSULIN (HCC): ICD-10-CM

## 2019-07-19 DIAGNOSIS — E87.6 HYPOKALEMIA: ICD-10-CM

## 2019-07-19 LAB
ALBUMIN SERPL-MCNC: 3.2 G/DL (ref 3.4–5)
ALBUMIN/GLOB SERPL: 0.8 {RATIO} (ref 1–2)
ALP LIVER SERPL-CCNC: 131 U/L (ref 50–130)
ALT SERPL-CCNC: 38 U/L (ref 13–56)
ANION GAP SERPL CALC-SCNC: 9 MMOL/L (ref 0–18)
AST SERPL-CCNC: 19 U/L (ref 15–37)
BILIRUB SERPL-MCNC: 0.5 MG/DL (ref 0.1–2)
BILIRUB UR QL: NEGATIVE
BUN BLD-MCNC: 25 MG/DL (ref 7–18)
BUN/CREAT SERPL: 27.2 (ref 10–20)
CALCIUM BLD-MCNC: 9.1 MG/DL (ref 8.5–10.1)
CHLORIDE SERPL-SCNC: 102 MMOL/L (ref 98–112)
CHOLEST SMN-MCNC: 181 MG/DL (ref ?–200)
CO2 SERPL-SCNC: 28 MMOL/L (ref 21–32)
COLOR UR: YELLOW
CREAT BLD-MCNC: 0.92 MG/DL (ref 0.55–1.02)
CREAT UR-SCNC: 221 MG/DL
EST. AVERAGE GLUCOSE BLD GHB EST-MCNC: 212 MG/DL (ref 68–126)
GLOBULIN PLAS-MCNC: 4.1 G/DL (ref 2.8–4.4)
GLUCOSE BLD-MCNC: 230 MG/DL (ref 70–99)
GLUCOSE UR-MCNC: NEGATIVE MG/DL
HAV IGM SER QL: 2.1 MG/DL (ref 1.6–2.6)
HBA1C MFR BLD HPLC: 9 % (ref ?–5.7)
HDLC SERPL-MCNC: 39 MG/DL (ref 40–59)
KETONES UR-MCNC: NEGATIVE MG/DL
LDLC SERPL CALC-MCNC: 111 MG/DL (ref ?–100)
M PROTEIN MFR SERPL ELPH: 7.3 G/DL (ref 6.4–8.2)
MICROALBUMIN UR-MCNC: 218 MG/DL
MICROALBUMIN/CREAT 24H UR-RTO: 986.4 UG/MG (ref ?–30)
NITRITE UR QL STRIP.AUTO: NEGATIVE
NONHDLC SERPL-MCNC: 142 MG/DL (ref ?–130)
OSMOLALITY SERPL CALC.SUM OF ELEC: 300 MOSM/KG (ref 275–295)
PATIENT FASTING: YES
PATIENT FASTING: YES
PH UR: 5 [PH] (ref 5–8)
POTASSIUM SERPL-SCNC: 3.3 MMOL/L (ref 3.5–5.1)
PROT UR-MCNC: 100 MG/DL
RBC #/AREA URNS AUTO: 21 /HPF
SODIUM SERPL-SCNC: 139 MMOL/L (ref 136–145)
SP GR UR STRIP: 1.02 (ref 1–1.03)
TRIGL SERPL-MCNC: 154 MG/DL (ref 30–149)
UROBILINOGEN UR STRIP-ACNC: <2
VIT C UR-MCNC: NEGATIVE MG/DL
VLDLC SERPL CALC-MCNC: 31 MG/DL (ref 0–30)
WBC #/AREA URNS AUTO: 759 /HPF

## 2019-07-19 PROCEDURE — 83036 HEMOGLOBIN GLYCOSYLATED A1C: CPT

## 2019-07-19 PROCEDURE — 82570 ASSAY OF URINE CREATININE: CPT

## 2019-07-19 PROCEDURE — 83735 ASSAY OF MAGNESIUM: CPT

## 2019-07-19 PROCEDURE — 81001 URINALYSIS AUTO W/SCOPE: CPT

## 2019-07-19 PROCEDURE — 36415 COLL VENOUS BLD VENIPUNCTURE: CPT

## 2019-07-19 PROCEDURE — 82043 UR ALBUMIN QUANTITATIVE: CPT

## 2019-07-19 PROCEDURE — 80053 COMPREHEN METABOLIC PANEL: CPT

## 2019-07-19 PROCEDURE — 80061 LIPID PANEL: CPT

## 2019-07-22 ENCOUNTER — OFFICE VISIT (OUTPATIENT)
Dept: INTERNAL MEDICINE CLINIC | Facility: CLINIC | Age: 64
End: 2019-07-22
Payer: MEDICARE

## 2019-07-22 ENCOUNTER — APPOINTMENT (OUTPATIENT)
Dept: LAB | Age: 64
End: 2019-07-22
Attending: INTERNAL MEDICINE
Payer: MEDICARE

## 2019-07-22 VITALS
HEIGHT: 64 IN | BODY MASS INDEX: 34.83 KG/M2 | WEIGHT: 204 LBS | RESPIRATION RATE: 18 BRPM | HEART RATE: 76 BPM | DIASTOLIC BLOOD PRESSURE: 70 MMHG | SYSTOLIC BLOOD PRESSURE: 135 MMHG

## 2019-07-22 DIAGNOSIS — R82.81 PYURIA: ICD-10-CM

## 2019-07-22 DIAGNOSIS — J43.9 PULMONARY EMPHYSEMA, UNSPECIFIED EMPHYSEMA TYPE (HCC): ICD-10-CM

## 2019-07-22 DIAGNOSIS — E78.2 MIXED HYPERLIPIDEMIA: Primary | ICD-10-CM

## 2019-07-22 DIAGNOSIS — E03.9 HYPOTHYROIDISM, UNSPECIFIED TYPE: ICD-10-CM

## 2019-07-22 DIAGNOSIS — N04.9 NEPHROTIC SYNDROME: ICD-10-CM

## 2019-07-22 DIAGNOSIS — Z72.0 TOBACCO ABUSE DISORDER: ICD-10-CM

## 2019-07-22 DIAGNOSIS — R60.9 DEPENDENT EDEMA: ICD-10-CM

## 2019-07-22 DIAGNOSIS — E11.9 TYPE 2 DIABETES MELLITUS WITHOUT COMPLICATION, WITH LONG-TERM CURRENT USE OF INSULIN (HCC): ICD-10-CM

## 2019-07-22 DIAGNOSIS — Z79.4 TYPE 2 DIABETES MELLITUS WITHOUT COMPLICATION, WITH LONG-TERM CURRENT USE OF INSULIN (HCC): ICD-10-CM

## 2019-07-22 DIAGNOSIS — I10 ESSENTIAL HYPERTENSION: ICD-10-CM

## 2019-07-22 DIAGNOSIS — J30.1 ALLERGIC RHINITIS DUE TO POLLEN, UNSPECIFIED SEASONALITY: ICD-10-CM

## 2019-07-22 LAB
BILIRUB UR QL: NEGATIVE
CLARITY UR: CLEAR
COLOR UR: YELLOW
CREAT UR-SCNC: 76.2 MG/DL
GLUCOSE UR-MCNC: >=500 MG/DL
HYALINE CASTS #/AREA URNS AUTO: 3 /LPF
KETONES UR-MCNC: NEGATIVE MG/DL
MICROALBUMIN UR-MCNC: 31.1 MG/DL
MICROALBUMIN/CREAT 24H UR-RTO: 408.1 UG/MG (ref ?–30)
NITRITE UR QL STRIP.AUTO: NEGATIVE
PH UR: 6 [PH] (ref 5–8)
PROT UR-MCNC: 30 MG/DL
RBC #/AREA URNS AUTO: 2 /HPF
SP GR UR STRIP: 1.01 (ref 1–1.03)
UROBILINOGEN UR STRIP-ACNC: <2
VIT C UR-MCNC: NEGATIVE MG/DL
WBC #/AREA URNS AUTO: 78 /HPF

## 2019-07-22 PROCEDURE — 87077 CULTURE AEROBIC IDENTIFY: CPT

## 2019-07-22 PROCEDURE — 87186 SC STD MICRODIL/AGAR DIL: CPT

## 2019-07-22 PROCEDURE — 87086 URINE CULTURE/COLONY COUNT: CPT

## 2019-07-22 PROCEDURE — 99214 OFFICE O/P EST MOD 30 MIN: CPT | Performed by: INTERNAL MEDICINE

## 2019-07-22 PROCEDURE — 82570 ASSAY OF URINE CREATININE: CPT

## 2019-07-22 PROCEDURE — 81001 URINALYSIS AUTO W/SCOPE: CPT

## 2019-07-22 PROCEDURE — G0463 HOSPITAL OUTPT CLINIC VISIT: HCPCS | Performed by: INTERNAL MEDICINE

## 2019-07-22 PROCEDURE — 82043 UR ALBUMIN QUANTITATIVE: CPT

## 2019-07-22 RX ORDER — FLUTICASONE PROPIONATE 50 MCG
SPRAY, SUSPENSION (ML) NASAL
Qty: 1 BOTTLE | Refills: 3 | Status: SHIPPED | OUTPATIENT
Start: 2019-07-22 | End: 2021-04-20

## 2019-07-22 RX ORDER — LEVOFLOXACIN 500 MG/1
500 TABLET, FILM COATED ORAL DAILY
Qty: 10 TABLET | Refills: 0 | Status: SHIPPED | OUTPATIENT
Start: 2019-07-22 | End: 2019-08-01

## 2019-07-22 RX ORDER — MONTELUKAST SODIUM 10 MG/1
10 TABLET ORAL NIGHTLY
Qty: 30 TABLET | Refills: 5 | Status: SHIPPED | OUTPATIENT
Start: 2019-07-22 | End: 2020-02-28

## 2019-07-22 RX ORDER — FLUCONAZOLE 150 MG/1
TABLET ORAL
Qty: 3 TABLET | Refills: 0 | Status: SHIPPED | OUTPATIENT
Start: 2019-07-22 | End: 2019-09-10

## 2019-07-22 RX ORDER — POTASSIUM CHLORIDE 750 MG/1
10 TABLET, FILM COATED, EXTENDED RELEASE ORAL DAILY
Qty: 10 TABLET | Refills: 0 | Status: SHIPPED | OUTPATIENT
Start: 2019-07-22 | End: 2019-09-10

## 2019-07-22 NOTE — ASSESSMENT & PLAN NOTE
Blood pressure 135/70, pulse 76, resp. rate 18, height 5' 4\" (1.626 m), weight 204 lb (92.5 kg), not currently breastfeeding. Low blood pressures on losartan 50+ 25–75 mg daily, Bumex 1 mg daily, Coreg 3.125 mg twice daily.   Slightly low potassium at [facility]

## 2019-07-22 NOTE — ASSESSMENT & PLAN NOTE
Patient has been under a lot of stress and decided not to take medications. She skips her insulin almost on a regular basis. Hemoglobin A1c is up to 9.0.   Rest of the labs show worsening kidney functions are increasing proteinuria–patient was made aware

## 2019-07-22 NOTE — PROGRESS NOTES
HPI:    Patient ID: Nena Hernandez is a 59year old female. Has not been taking meds and vhecking sugars  Has been skipping insulin. Has been noncompliant with diet.   Multiple stressors-family related  Has been having flash backs of bad times in the hyperlipidemia. Current antihyperlipidemic treatment includes diet change, exercise and statins (crestor). The current treatment provides moderate improvement of lipids. Compliance problems include adherence to diet and adherence to exercise.   Risk factors planning includes carbohydrate counting, avoidance of concentrated sweets and calorie counting. She has not had a previous visit with a dietitian. She never participates in exercise. Her home blood glucose trend is decreasing steadily.  (Does not check suga fluconazole (DIFLUCAN) 150 MG Oral Tab 1 tablet by mouth every weekly Disp: 3 tablet Rfl: 0   Montelukast Sodium 10 MG Oral Tab Take 1 tablet (10 mg total) by mouth nightly.  Disp: 30 tablet Rfl: 5   Fluticasone Propionate 50 MCG/ACT Nasal Suspension 1 PU Activated Inhale 1 puff into the lungs daily. Disp: 1 each Rfl: 5   Albuterol Sulfate (PROAIR RESPICLICK) 566 (90 Base) MCG/ACT Inhalation Aerosol Powder, Breath Activated Inhale 2 puffs into the lungs 3 (three) times daily as needed.  Disp: 1 each Rfl: 3 distension and no mass. There is no tenderness. There is no rebound. Musculoskeletal: Normal range of motion. She exhibits edema (2+ pitting edema). She exhibits no tenderness. Lymphadenopathy:     She has no cervical adenopathy.    Neurological: She is advised to cut back and quit smoking and has been given a trial of Wellbutrin and Chantix. She is unwilling to try the Chantix at this time. So she has started on the Wellbutrin with Contrave. Will follow-up at her next visit.   Repeat PFT and echocardio taken on a regular basis. Watch the diet carefully to restrict the starches and sugars in the diet. Add Humalog–5 units with every meal  Maintain blood sugar records in writing before every meal.  See me in about 4 weeks.   Referral for counselor–behavior

## 2019-07-22 NOTE — ASSESSMENT & PLAN NOTE
Worsening almost nephrotic range proteinuria. 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 Her protein in the urine about 2 months back had dropped to 0.87.   But since then she has not been watching her diet and his sugars have be

## 2019-07-22 NOTE — ASSESSMENT & PLAN NOTE
Continues to smoke quite a bit about a pack per day. She does have severe COPD with a DLCO of about 41%. She does have evidence of severe emphysema on the CT scan as well as on the PFTs.   She has been advised to cut back and quit smoking and has been giv

## 2019-07-22 NOTE — ASSESSMENT & PLAN NOTE
Thyroid function test seems stable on levothyroxine at 25 mcg daily. Continue on the same dose of medication.

## 2019-07-22 NOTE — ASSESSMENT & PLAN NOTE
Lipid panel has improved on Crestor 20 mg daily which she has tolerated well. Continue same dose of medication.

## 2019-07-22 NOTE — PATIENT INSTRUCTIONS
Problem List Items Addressed This Visit        Unprioritized    Dependent edema    Emphysema lung (HCC)     Moderate obstructive defect with a significant postbronchodilator response as well as significant reduction in the diffusion capacity noted on her l respond to Levaquin. Urine culture and sensitivity has been requested. Strict diet control of blood sugars to help with control of the UTI has been discussed. Drink plenty of fluids to keep hydrated.          Relevant Orders    BASIC METABOLIC PANEL (8) Oral Tab    Fluticasone Propionate 50 MCG/ACT Nasal Suspension

## 2019-07-22 NOTE — ASSESSMENT & PLAN NOTE
Moderate obstructive defect with a significant postbronchodilator response as well as significant reduction in the diffusion capacity noted on her last PFT. Repeat PFT has been ordered. Advised to restart on all her inhalers.   She does have some nasal co

## 2019-07-23 ENCOUNTER — CARDPULM VISIT (OUTPATIENT)
Dept: CARDIAC REHAB | Facility: HOSPITAL | Age: 64
End: 2019-07-23
Attending: INTERNAL MEDICINE
Payer: MEDICARE

## 2019-07-23 RX ORDER — POTASSIUM CHLORIDE 750 MG/1
TABLET, FILM COATED, EXTENDED RELEASE ORAL
Qty: 90 TABLET | Refills: 0 | OUTPATIENT
Start: 2019-07-23

## 2019-07-30 ENCOUNTER — CARDPULM VISIT (OUTPATIENT)
Dept: CARDIAC REHAB | Facility: HOSPITAL | Age: 64
End: 2019-07-30
Attending: INTERNAL MEDICINE
Payer: MEDICARE

## 2019-07-30 ENCOUNTER — PATIENT MESSAGE (OUTPATIENT)
Dept: NEUROLOGY | Facility: CLINIC | Age: 64
End: 2019-07-30

## 2019-07-30 ENCOUNTER — TELEPHONE (OUTPATIENT)
Dept: NEUROLOGY | Facility: CLINIC | Age: 64
End: 2019-07-30

## 2019-07-30 DIAGNOSIS — M16.11 OSTEOARTHRITIS OF RIGHT HIP, UNSPECIFIED OSTEOARTHRITIS TYPE: Primary | ICD-10-CM

## 2019-07-30 RX ORDER — METOLAZONE 2.5 MG/1
TABLET ORAL
Qty: 36 TABLET | Refills: 0 | Status: SHIPPED | OUTPATIENT
Start: 2019-07-30 | End: 2020-02-20

## 2019-07-30 NOTE — LETTER
[facility] SURGERY SCHEDULING FORM   [address] [facility]   [phone] (scheduling phone) [phone] (scheduling fax)     PATIENT INFORMATION   Last Name:      Severiano Mention      First Name:    Marce Weems [x]  Yes  []  No or using our own   Allergies: Patient has no known allergies.          Completed by:    Paulina Ruiz      Date:    6/19/2018 Implemented All Universal Safety Interventions:  [place] to call system. Call bell, personal items and telephone within reach. Instruct patient to call for assistance. Room bathroom lighting operational. Non-slip footwear when patient is off stretcher. Physically safe environment: no spills, clutter or unnecessary equipment. Stretcher in lowest position, wheels locked, appropriate side rails in place.

## 2019-07-30 NOTE — TELEPHONE ENCOUNTER
Refill passed per [facility], [facility] protocol.   Refill Protocol Appointment Criteria  · Appointment scheduled in the past 12 months or in the next 3 months  Recent Outpatient Visits            1 week ago Mixed hyperlipidemia    Yaima Sharma

## 2019-07-31 ENCOUNTER — TELEPHONE (OUTPATIENT)
Dept: NEUROLOGY | Facility: CLINIC | Age: 64
End: 2019-07-31

## 2019-07-31 DIAGNOSIS — M16.11 OSTEOARTHRITIS OF RIGHT HIP, UNSPECIFIED OSTEOARTHRITIS TYPE: Primary | ICD-10-CM

## 2019-07-31 NOTE — TELEPHONE ENCOUNTER
As we verbally discussed ok to set patient up for right intra-articular hip joint injection under fluoroscopy, local. Please make sure she does not have any infectious symptoms or has been started on ABX before scheduling.

## 2019-07-31 NOTE — TELEPHONE ENCOUNTER
Regarding: injection  Contact: [phone]  ----- Message from Adelaida Hernandez sent at 7/31/2019 10:19 AM CDT -----       ----- Message sent from Ana Muhammad LPN to Ciara Gerarder at 7/31/2019 10:01 AM -----   Hi Heaven London would like

## 2019-07-31 NOTE — PROGRESS NOTES
Verbally discussed with . Confirmed with patient that it is the right hip and not the left. Order placed to schedule Right intra-articular hip joint injection under fluoroscopy, local at [facility]. Patient called.     Patient has been scheduled

## 2019-07-31 NOTE — PROGRESS NOTES
Patient requesting repeat hip injection before 8/10/2019?     Previously done- Right intra-articular hip joint injection under fluoroscopy, local on 3/13/19 @ [address] [place] [address]-2/28/2019  NOV- none       Please advise

## 2019-07-31 NOTE — PROGRESS NOTES
Called patient to confirm that she had a UTI and is asymptomatic. She has today and tomorrow and will be completed with ABT.

## 2019-07-31 NOTE — TELEPHONE ENCOUNTER
Patient states she was doing a  this past weekend and has been more active. Has been walking more c/o pain in left buttox, does not radiate down her leg. Rated pain a 4/10, requesting an injection, please advise.

## 2019-07-31 NOTE — PROGRESS NOTES
Called and left message for patient to call. Amee Enrique would like to know patient current symptoms and if they were similar symptoms prior to last injection. Also, to confirm the hip to be injected.

## 2019-07-31 NOTE — TELEPHONE ENCOUNTER
From: Harry Rowland  To: Stalin Mcdermott DO  Sent: 7/30/2019 10:32 PM CDT  Subject: Non-Urgent Medical Question    Is it possible to get a hip injection prior to August 10th?

## 2019-07-31 NOTE — TELEPHONE ENCOUNTER
Medicare online for insurance coverage of Right intra-articular hip joint injection under fluoroscopy cpt code [id number], local Insurance was verified and procedure is a cover benefit and does not require authorization. Will inform Nursing.

## 2019-07-31 NOTE — TELEPHONE ENCOUNTER
Please ask her what symptoms she is experiencing and confirm which leg is bothering her.  If the symptoms are similar to what she has had before that responded to hip joint injection then we can get her set up for right hip joint injection under fluoroscopy

## 2019-08-02 ENCOUNTER — HOSPITAL ENCOUNTER (OUTPATIENT)
Dept: RESPIRATORY THERAPY | Facility: HOSPITAL | Age: 64
Discharge: HOME | End: 2019-08-02
Attending: INTERNAL MEDICINE
Payer: MEDICARE

## 2019-08-02 PROCEDURE — 94060 EVALUATION OF WHEEZING: CPT

## 2019-08-02 PROCEDURE — 94729 DIFFUSING CAPACITY: CPT

## 2019-08-02 PROCEDURE — 94726 PLETHYSMOGRAPHY LUNG VOLUMES: CPT

## 2019-08-05 ENCOUNTER — OFFICE VISIT (OUTPATIENT)
Dept: SURGERY | Facility: CLINIC | Age: 64
End: 2019-08-05

## 2019-08-05 DIAGNOSIS — M16.11 PRIMARY OSTEOARTHRITIS OF RIGHT HIP: ICD-10-CM

## 2019-08-05 PROCEDURE — 77002 NEEDLE LOCALIZATION BY XRAY: CPT | Performed by: PHYSICAL MEDICINE & REHABILITATION

## 2019-08-05 PROCEDURE — 20610 DRAIN/INJ JOINT/BURSA W/O US: CPT | Performed by: PHYSICAL MEDICINE & REHABILITATION

## 2019-08-06 ENCOUNTER — CARDPULM VISIT (OUTPATIENT)
Dept: CARDIAC REHAB | Facility: HOSPITAL | Age: 64
End: 2019-08-06
Attending: INTERNAL MEDICINE
Payer: MEDICARE

## 2019-08-07 ENCOUNTER — HOSPITAL ENCOUNTER (OUTPATIENT)
Dept: CV DIAGNOSTICS | Facility: HOSPITAL | Age: 64
Discharge: HOME | End: 2019-08-07
Attending: INTERNAL MEDICINE
Payer: MEDICARE

## 2019-08-07 DIAGNOSIS — I10 ESSENTIAL HYPERTENSION: ICD-10-CM

## 2019-08-07 DIAGNOSIS — J43.9 PULMONARY EMPHYSEMA, UNSPECIFIED EMPHYSEMA TYPE (HCC): ICD-10-CM

## 2019-08-07 PROCEDURE — 93306 TTE W/DOPPLER COMPLETE: CPT | Performed by: INTERNAL MEDICINE

## 2019-08-08 ENCOUNTER — APPOINTMENT (OUTPATIENT)
Dept: CARDIAC REHAB | Facility: HOSPITAL | Age: 64
End: 2019-08-08
Attending: INTERNAL MEDICINE
Payer: MEDICARE

## 2019-08-22 ENCOUNTER — APPOINTMENT (OUTPATIENT)
Dept: CARDIAC REHAB | Facility: HOSPITAL | Age: 64
End: 2019-08-22
Attending: INTERNAL MEDICINE
Payer: MEDICARE

## 2019-08-26 NOTE — TELEPHONE ENCOUNTER
LOV 6/13/19. Medication is noted as taking. F/U in 6 mos (12/2019) Refill pended and routed to Dr. Tonja Flanagan to approve.

## 2019-08-27 ENCOUNTER — CARDPULM VISIT (OUTPATIENT)
Dept: CARDIAC REHAB | Facility: HOSPITAL | Age: 64
End: 2019-08-27
Attending: INTERNAL MEDICINE
Payer: MEDICARE

## 2019-08-27 RX ORDER — CARVEDILOL 3.12 MG/1
TABLET ORAL
Qty: 180 TABLET | Refills: 0 | Status: SHIPPED | OUTPATIENT
Start: 2019-08-27 | End: 2020-01-06

## 2019-08-27 RX ORDER — LEVOTHYROXINE SODIUM 0.03 MG/1
TABLET ORAL
Qty: 90 TABLET | Refills: 1 | Status: SHIPPED | OUTPATIENT
Start: 2019-08-27 | End: 2020-03-21

## 2019-08-27 NOTE — TELEPHONE ENCOUNTER
Refill passed per [facility], [facility] protocol.   Hypothyroid Medications  Protocol Criteria:  Appointment scheduled in the past 12 months or the next 3 months  TSH resulted in the past 12 months that is normal  Recent Outpatient Visits            3 weeks ago P PHASE 2 [facility] 14 Cardiopulmonary Rehab COPD, BELA    In 1 month [facility] PUL PHASE [id number] Kemar Freedman Cardiopulmonary Rehab COPD, BELA    In 1 month [facility] PUL PHASE [id number] Kemar Freedman Cardiopulmonary Rehab COPD, HUSSA

## 2019-08-29 ENCOUNTER — CARDPULM VISIT (OUTPATIENT)
Dept: CARDIAC REHAB | Facility: HOSPITAL | Age: 64
End: 2019-08-29
Attending: INTERNAL MEDICINE
Payer: MEDICARE

## 2019-09-05 ENCOUNTER — CARDPULM VISIT (OUTPATIENT)
Dept: CARDIAC REHAB | Facility: HOSPITAL | Age: 64
End: 2019-09-05
Attending: INTERNAL MEDICINE
Payer: MEDICARE

## 2019-09-07 ENCOUNTER — TELEPHONE (OUTPATIENT)
Dept: OTHER | Age: 64
End: 2019-09-07

## 2019-09-07 NOTE — TELEPHONE ENCOUNTER
Pt state has upcoming OV with Dr Ira Sutton and is asking if she needs to complete labs and if so does she need to fast.    Pt is requesting a potassium level.

## 2019-09-10 ENCOUNTER — OFFICE VISIT (OUTPATIENT)
Dept: INTERNAL MEDICINE CLINIC | Facility: CLINIC | Age: 64
End: 2019-09-10
Payer: MEDICARE

## 2019-09-10 VITALS
BODY MASS INDEX: 34.49 KG/M2 | WEIGHT: 202 LBS | SYSTOLIC BLOOD PRESSURE: 137 MMHG | DIASTOLIC BLOOD PRESSURE: 75 MMHG | HEART RATE: 78 BPM | RESPIRATION RATE: 20 BRPM | HEIGHT: 64 IN

## 2019-09-10 DIAGNOSIS — IMO0001 DIABETES MELLITUS, INSULIN DEPENDENT (IDDM), UNCONTROLLED: Primary | ICD-10-CM

## 2019-09-10 DIAGNOSIS — N39.46 MIXED STRESS AND URGE URINARY INCONTINENCE: ICD-10-CM

## 2019-09-10 DIAGNOSIS — G35 MULTIPLE SCLEROSIS (HCC): ICD-10-CM

## 2019-09-10 DIAGNOSIS — I10 ESSENTIAL HYPERTENSION: ICD-10-CM

## 2019-09-10 DIAGNOSIS — E78.2 MIXED HYPERLIPIDEMIA: ICD-10-CM

## 2019-09-10 DIAGNOSIS — J43.9 PULMONARY EMPHYSEMA, UNSPECIFIED EMPHYSEMA TYPE (HCC): ICD-10-CM

## 2019-09-10 DIAGNOSIS — N04.9 NEPHROTIC SYNDROME: ICD-10-CM

## 2019-09-10 DIAGNOSIS — R53.82 CHRONIC FATIGUE: ICD-10-CM

## 2019-09-10 PROCEDURE — G0463 HOSPITAL OUTPT CLINIC VISIT: HCPCS | Performed by: INTERNAL MEDICINE

## 2019-09-10 PROCEDURE — 99214 OFFICE O/P EST MOD 30 MIN: CPT | Performed by: INTERNAL MEDICINE

## 2019-09-10 RX ORDER — DEXTROAMPHETAMINE SACCHARATE, AMPHETAMINE ASPARTATE MONOHYDRATE, DEXTROAMPHETAMINE SULFATE AND AMPHETAMINE SULFATE 2.5; 2.5; 2.5; 2.5 MG/1; MG/1; MG/1; MG/1
10 CAPSULE, EXTENDED RELEASE ORAL DAILY
Qty: 30 CAPSULE | Refills: 0 | Status: SHIPPED | OUTPATIENT
Start: 2019-09-10 | End: 2019-10-10

## 2019-09-10 RX ORDER — SOLIFENACIN SUCCINATE 10 MG/1
10 TABLET, FILM COATED ORAL DAILY
Qty: 30 TABLET | Refills: 2 | Status: SHIPPED | OUTPATIENT
Start: 2019-09-10 | End: 2019-10-16

## 2019-09-10 RX ORDER — BLOOD-GLUCOSE TRANSMITTER
EACH MISCELLANEOUS
Qty: 1 EACH | Refills: 3 | Status: SHIPPED | OUTPATIENT
Start: 2019-09-10 | End: 2020-01-24

## 2019-09-10 RX ORDER — DEXTROAMPHETAMINE SACCHARATE, AMPHETAMINE ASPARTATE MONOHYDRATE, DEXTROAMPHETAMINE SULFATE AND AMPHETAMINE SULFATE 2.5; 2.5; 2.5; 2.5 MG/1; MG/1; MG/1; MG/1
10 CAPSULE, EXTENDED RELEASE ORAL DAILY
Qty: 30 CAPSULE | Refills: 0 | Status: SHIPPED | OUTPATIENT
Start: 2019-10-11 | End: 2019-10-16

## 2019-09-10 RX ORDER — DEXTROAMPHETAMINE SACCHARATE, AMPHETAMINE ASPARTATE MONOHYDRATE, DEXTROAMPHETAMINE SULFATE AND AMPHETAMINE SULFATE 2.5; 2.5; 2.5; 2.5 MG/1; MG/1; MG/1; MG/1
10 CAPSULE, EXTENDED RELEASE ORAL DAILY
Qty: 30 CAPSULE | Refills: 0 | Status: SHIPPED | OUTPATIENT
Start: 2019-11-11 | End: 2019-10-16

## 2019-09-10 RX ORDER — BLOOD-GLUCOSE SENSOR
EACH MISCELLANEOUS
Qty: 3 EACH | Refills: 3 | Status: SHIPPED | OUTPATIENT
Start: 2019-09-10 | End: 2020-01-24

## 2019-09-10 RX ORDER — OXCARBAZEPINE 300 MG/1
TABLET, FILM COATED ORAL
Qty: 180 TABLET | Refills: 3 | Status: SHIPPED | OUTPATIENT
Start: 2019-09-10 | End: 2021-01-24

## 2019-09-11 NOTE — ASSESSMENT & PLAN NOTE
History of nephrotic syndrome with fluctuating proteinuria. Had gradually improved but then worsened. This most likely is due to noncompliance with regular use of losartan.   She has been advised to take the losartan on a regular basis and will reassess a

## 2019-09-11 NOTE — PROGRESS NOTES
HPI:    Patient ID: Aimee Roberts is a 59year old female. Labs are pending    Diabetes   She presents for her follow-up diabetic visit. She has type 2 diabetes mellitus. Her disease course has been improving.  Hypoglycemia symptoms include confusion (crestor). The current treatment provides moderate improvement of lipids. Compliance problems include adherence to diet and adherence to exercise.   Risk factors for coronary artery disease include obesity, post-menopausal, dyslipidemia and diabetes mellitu confusion, dysphoric mood and sleep disturbance. Negative for suicidal ideas. The patient is nervous/anxious.                Current Outpatient Medications:  Continuous Blood Gluc Transmit (DEXCOM G6 TRANSMITTER) Does not apply Misc As directed, change ever AT BEDTIME Disp: 90 tablet Rfl: 2   Losartan Potassium 50 MG Oral Tab TAKE 1 TABLET BY MOUTH EVERY MORNING Disp: 90 tablet Rfl: 1   Losartan Potassium 25 MG Oral Tab TAKE 1 TABLET BY MOUTH EVERY EVENING Disp: 90 tablet Rfl: 1   melatonin 3 MG Oral Tab Take Allergies:No Known Allergies      09/10/19  1844   BP: 137/75   Pulse: 78   Resp: 20     Body mass index is 34.67 kg/m². PHYSICAL EXAM:   Physical Exam   Constitutional: She is oriented to person, place, and time.  She appears well-developed and well extremity. 3. 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 4. There is no electrodiagnostic evidence of a lumbosacral radiculopathy or myopathy of the right lower extremity.     Skin: No mainor will need to drink fluids. Nephrotic syndrome     History of nephrotic syndrome with fluctuating proteinuria. Had gradually improved but then worsened. This most likely is due to noncompliance with regular use of losartan.   She has been advised t SENSOR) Does not apply Misc    Other Relevant Orders    OP REFERRAL TO UROGYNECOLOGY CLINIC    COMP METABOLIC PANEL (14)    CBC WITH DIFFERENTIAL WITH PLATELET    HEMOGLOBIN A1C    LIPID PANEL    MICROALB/CREAT RATIO, RANDOM URINE (Completed)    URINALYSIS

## 2019-09-11 NOTE — ASSESSMENT & PLAN NOTE
Moderate obstructive defect with significant postbronchodilator response. CT chest and did show moderate to severe emphysema. She is currently on Singulair, Flonase, Trelegy. She has tolerated the combination well.   Continues to cough and she continues

## 2019-09-11 NOTE — ASSESSMENT & PLAN NOTE
Ongoing issues with MS and worsening weakness and fatigue. Unable to complete her daytime activities as easily as she did in the past.  Has been cutting back on the oxcarbazepine due to the fatigue. She continues to have the muscle spasms.   Trial of Adde

## 2019-09-11 NOTE — ASSESSMENT & PLAN NOTE
Blood sugars continue to be elevated. Last A1c was at 9.0. Home sugars are anywhere between 140–180. She does not check her sugars on a regular basis.   She is currently on Lantus 20 units daily with Humalog 5 units with every meal.  In order to manage t

## 2019-09-11 NOTE — ASSESSMENT & PLAN NOTE
Patient has been struggling with urinary incontinence and is increasingly frustrated from her inability to manage her weakness, fatigue and urinary problems.   She has been given medications for management–none of which helped  Restart on Vesicare 10 mg 1 t

## 2019-09-11 NOTE — ASSESSMENT & PLAN NOTE
Blood pressure 137/75, pulse 78, resp. rate 20, height 5' 4\" (1.626 m), weight 202 lb (91.6 kg), not currently breastfeeding. Blood pressures seem stable at this time–patient does not take medications on a regular basis.   Explained need to take the lo

## 2019-09-11 NOTE — ASSESSMENT & PLAN NOTE
Lipid panel has been stable on Crestor 20 mg daily. She is due for recheck labs in the next 6 weeks ordered today.

## 2019-09-12 ENCOUNTER — CARDPULM VISIT (OUTPATIENT)
Dept: CARDIAC REHAB | Facility: HOSPITAL | Age: 64
End: 2019-09-12
Attending: INTERNAL MEDICINE
Payer: MEDICARE

## 2019-09-12 ENCOUNTER — APPOINTMENT (OUTPATIENT)
Dept: LAB | Age: 64
End: 2019-09-12
Attending: INTERNAL MEDICINE
Payer: MEDICARE

## 2019-09-12 ENCOUNTER — TELEPHONE (OUTPATIENT)
Dept: INTERNAL MEDICINE CLINIC | Facility: CLINIC | Age: 64
End: 2019-09-12

## 2019-09-12 DIAGNOSIS — N04.9 NEPHROTIC SYNDROME: ICD-10-CM

## 2019-09-12 DIAGNOSIS — IMO0001 DIABETES MELLITUS, INSULIN DEPENDENT (IDDM), UNCONTROLLED: ICD-10-CM

## 2019-09-12 LAB
ANION GAP SERPL CALC-SCNC: 6 MMOL/L (ref 0–18)
BILIRUB UR QL: NEGATIVE
BUN BLD-MCNC: 23 MG/DL (ref 7–18)
BUN/CREAT SERPL: 27.4 (ref 10–20)
CALCIUM BLD-MCNC: 8.7 MG/DL (ref 8.5–10.1)
CHLORIDE SERPL-SCNC: 108 MMOL/L (ref 98–112)
CO2 SERPL-SCNC: 29 MMOL/L (ref 21–32)
COLOR UR: YELLOW
CREAT BLD-MCNC: 0.84 MG/DL (ref 0.55–1.02)
CREAT UR-SCNC: 136 MG/DL
GLUCOSE BLD-MCNC: 265 MG/DL (ref 70–99)
GLUCOSE UR-MCNC: 50 MG/DL
KETONES UR-MCNC: NEGATIVE MG/DL
MICROALBUMIN UR-MCNC: 163 MG/DL
MICROALBUMIN/CREAT 24H UR-RTO: 1198.5 UG/MG (ref ?–30)
NITRITE UR QL STRIP.AUTO: NEGATIVE
OSMOLALITY SERPL CALC.SUM OF ELEC: 309 MOSM/KG (ref 275–295)
PATIENT FASTING: NO
PH UR: 6 [PH] (ref 5–8)
POTASSIUM SERPL-SCNC: 4.4 MMOL/L (ref 3.5–5.1)
PROT UR-MCNC: >=500 MG/DL
RBC #/AREA URNS AUTO: 9 /HPF
SODIUM SERPL-SCNC: 143 MMOL/L (ref 136–145)
SP GR UR STRIP: 1.02 (ref 1–1.03)
UROBILINOGEN UR STRIP-ACNC: <2
VIT C UR-MCNC: NEGATIVE MG/DL
WBC #/AREA URNS AUTO: 393 /HPF

## 2019-09-12 PROCEDURE — 82043 UR ALBUMIN QUANTITATIVE: CPT

## 2019-09-12 PROCEDURE — 82570 ASSAY OF URINE CREATININE: CPT

## 2019-09-12 PROCEDURE — 81001 URINALYSIS AUTO W/SCOPE: CPT

## 2019-09-12 PROCEDURE — 36415 COLL VENOUS BLD VENIPUNCTURE: CPT

## 2019-09-12 PROCEDURE — 80048 BASIC METABOLIC PNL TOTAL CA: CPT

## 2019-09-13 NOTE — TELEPHONE ENCOUNTER
PA for VESIcare 10 mg tab completed with [facility] via CMM response time 3-5 business days KEY [id number].

## 2019-09-19 ENCOUNTER — CARDPULM VISIT (OUTPATIENT)
Dept: CARDIAC REHAB | Facility: HOSPITAL | Age: 64
End: 2019-09-19
Attending: INTERNAL MEDICINE
Payer: MEDICARE

## 2019-09-20 ENCOUNTER — TELEPHONE (OUTPATIENT)
Dept: INTERNAL MEDICINE CLINIC | Facility: CLINIC | Age: 64
End: 2019-09-20

## 2019-09-20 DIAGNOSIS — Z09 FOLLOW-UP EXAM AFTER TREATMENT: Primary | ICD-10-CM

## 2019-09-20 DIAGNOSIS — N39.0 URINARY TRACT INFECTION WITHOUT HEMATURIA, SITE UNSPECIFIED: ICD-10-CM

## 2019-09-20 NOTE — TELEPHONE ENCOUNTER
Pt calling to respond to lab result note from Dr Sumeet Montero on [address] (copied below). Pt states has been having urinary frequency and incontinence which is why Dr Sumeet Montero referred her to a urogynecologist (has not scheduled appt yet).  States urinary frequency

## 2019-09-21 NOTE — TELEPHONE ENCOUNTER
Advised patient of Dr Gavin Farmer  note. Patient verbalized understanding and had no further questions.  Order generated for future urine culture

## 2019-09-26 ENCOUNTER — APPOINTMENT (OUTPATIENT)
Dept: CARDIAC REHAB | Facility: HOSPITAL | Age: 64
End: 2019-09-26
Attending: INTERNAL MEDICINE
Payer: MEDICARE

## 2019-10-03 ENCOUNTER — CARDPULM VISIT (OUTPATIENT)
Dept: CARDIAC REHAB | Facility: HOSPITAL | Age: 64
End: 2019-10-03
Attending: INTERNAL MEDICINE
Payer: MEDICARE

## 2019-10-03 ENCOUNTER — APPOINTMENT (OUTPATIENT)
Dept: LAB | Age: 64
End: 2019-10-03
Attending: INTERNAL MEDICINE
Payer: MEDICARE

## 2019-10-03 DIAGNOSIS — N39.0 URINARY TRACT INFECTION WITHOUT HEMATURIA, SITE UNSPECIFIED: ICD-10-CM

## 2019-10-03 DIAGNOSIS — Z09 FOLLOW-UP EXAM AFTER TREATMENT: ICD-10-CM

## 2019-10-03 PROCEDURE — 87186 SC STD MICRODIL/AGAR DIL: CPT

## 2019-10-03 PROCEDURE — 87086 URINE CULTURE/COLONY COUNT: CPT

## 2019-10-03 PROCEDURE — 87088 URINE BACTERIA CULTURE: CPT

## 2019-10-10 ENCOUNTER — APPOINTMENT (OUTPATIENT)
Dept: CARDIAC REHAB | Facility: HOSPITAL | Age: 64
End: 2019-10-10
Attending: INTERNAL MEDICINE
Payer: MEDICARE

## 2019-10-14 ENCOUNTER — LAB ENCOUNTER (OUTPATIENT)
Dept: LAB | Age: 64
End: 2019-10-14
Attending: INTERNAL MEDICINE
Payer: MEDICARE

## 2019-10-14 DIAGNOSIS — IMO0001 DIABETES MELLITUS, INSULIN DEPENDENT (IDDM), UNCONTROLLED: ICD-10-CM

## 2019-10-14 PROCEDURE — 83036 HEMOGLOBIN GLYCOSYLATED A1C: CPT

## 2019-10-14 PROCEDURE — 36415 COLL VENOUS BLD VENIPUNCTURE: CPT

## 2019-10-14 PROCEDURE — 85025 COMPLETE CBC W/AUTO DIFF WBC: CPT

## 2019-10-14 PROCEDURE — 80053 COMPREHEN METABOLIC PANEL: CPT

## 2019-10-14 PROCEDURE — 80061 LIPID PANEL: CPT

## 2019-10-16 ENCOUNTER — OFFICE VISIT (OUTPATIENT)
Dept: INTERNAL MEDICINE CLINIC | Facility: CLINIC | Age: 64
End: 2019-10-16
Payer: MEDICARE

## 2019-10-16 VITALS
OXYGEN SATURATION: 90 % | HEIGHT: 64 IN | BODY MASS INDEX: 35.45 KG/M2 | DIASTOLIC BLOOD PRESSURE: 79 MMHG | RESPIRATION RATE: 26 BRPM | SYSTOLIC BLOOD PRESSURE: 132 MMHG | HEART RATE: 65 BPM | TEMPERATURE: 98 F | WEIGHT: 207.63 LBS

## 2019-10-16 DIAGNOSIS — I10 ESSENTIAL HYPERTENSION: ICD-10-CM

## 2019-10-16 DIAGNOSIS — IMO0001 DIABETES MELLITUS, INSULIN DEPENDENT (IDDM), UNCONTROLLED: ICD-10-CM

## 2019-10-16 DIAGNOSIS — N04.9 NEPHROTIC SYNDROME: ICD-10-CM

## 2019-10-16 DIAGNOSIS — Z72.0 TOBACCO ABUSE DISORDER: ICD-10-CM

## 2019-10-16 DIAGNOSIS — E78.2 MIXED HYPERLIPIDEMIA: Primary | ICD-10-CM

## 2019-10-16 DIAGNOSIS — J43.9 PULMONARY EMPHYSEMA, UNSPECIFIED EMPHYSEMA TYPE (HCC): ICD-10-CM

## 2019-10-16 DIAGNOSIS — N30.00 ACUTE CYSTITIS WITHOUT HEMATURIA: ICD-10-CM

## 2019-10-16 PROCEDURE — 99214 OFFICE O/P EST MOD 30 MIN: CPT | Performed by: INTERNAL MEDICINE

## 2019-10-16 PROCEDURE — G0463 HOSPITAL OUTPT CLINIC VISIT: HCPCS | Performed by: INTERNAL MEDICINE

## 2019-10-16 RX ORDER — POLYETHYLENE GLYCOL 3350 17 G
4 POWDER IN PACKET (EA) ORAL AS NEEDED
COMMUNITY

## 2019-10-16 RX ORDER — CIPROFLOXACIN 500 MG/1
TABLET, FILM COATED ORAL
Qty: 10 TABLET | Refills: 0 | Status: SHIPPED | OUTPATIENT
Start: 2019-10-16 | End: 2019-12-17

## 2019-10-16 RX ORDER — CIPROFLOXACIN 500 MG/1
TABLET, FILM COATED ORAL
Qty: 14 TABLET | Refills: 0 | Status: SHIPPED | OUTPATIENT
Start: 2019-10-16 | End: 2019-10-16

## 2019-10-17 ENCOUNTER — CARDPULM VISIT (OUTPATIENT)
Dept: CARDIAC REHAB | Facility: HOSPITAL | Age: 64
End: 2019-10-17
Attending: INTERNAL MEDICINE
Payer: MEDICARE

## 2019-10-17 PROCEDURE — 82962 GLUCOSE BLOOD TEST: CPT

## 2019-10-17 NOTE — ASSESSMENT & PLAN NOTE
Sugars have improved but not normalized. Hemoglobin A1c is at 8.1 at this time. Her last numbers were 9.0 and hence this is a significant improvement. She is advised to continue her good work about change in diet and compliance with medications.   Advise

## 2019-10-17 NOTE — ASSESSMENT & PLAN NOTE
Moderate obstructive defect with significant postbronchodilator response. CT scan moderate to severe emphysema. She is on trilogy, Singulair and Flonase. She has tolerated the combinations well and hence will continue at the same doses.       Encouraged

## 2019-10-17 NOTE — ASSESSMENT & PLAN NOTE
Intermittent episodes of dysuria.   Restarted on Cipro and will advised to start on Myrbetriq for management of urinary incontinence after treatment with antibiotic is completed

## 2019-10-17 NOTE — ASSESSMENT & PLAN NOTE
Lipid panel and liver function tests have been stable on Crestor 20 mg daily. She has tolerated the medications well, continue on the same at this point and will follow-up on labs.

## 2019-10-17 NOTE — ASSESSMENT & PLAN NOTE
History of nephrotic syndrome with persistent proteinuria. Elevated protein in the urine per microalbumin levels. Patient is advised to drink plenty of fluids, continue to monitor the labs carefully. Recheck labs in about 8 to 10 weeks.

## 2019-10-17 NOTE — ASSESSMENT & PLAN NOTE
Smokes about 3 to 4 cigarettes/day. Has started using Nicorette lozenges which she uses about 2 to 3/day. Quit date set for tomorrow.   Patient is being advised to discontinue her Adderall and start on Contrave instead–the bupropion in the Contrave should

## 2019-10-17 NOTE — PATIENT INSTRUCTIONS
Problem List Items Addressed This Visit        Unprioritized    Acute cystitis without hematuria     Intermittent episodes of dysuria.   Restarted on Cipro and will advised to start on Myrbetriq for management of urinary incontinence after treatment with an patient has made a significant positive step by enrolling in quit smoking class. She has set a date to quit for tomorrow, will monitor this carefully. Tobacco abuse disorder     Smokes about 3 to 4 cigarettes/day.   Has started using Nicorette lo

## 2019-10-17 NOTE — PROGRESS NOTES
HPI:    Patient ID: Verónica Flores is a 59year old female. Labs discussed–hemoglobin A1c improved at 8.1. Urine protein levels remain elevated. Kidney functions remained stable otherwise.     Patient has enrolled in a quit smoking program and has cu type 2 diabetes mellitus. Her disease course has been improving. Hypoglycemia symptoms include confusion and nervousness/anxiousness. Associated symptoms include fatigue, foot paresthesias and weakness.  (Coating on the tongue improved,urinary frequency imp weakness. Pertinent negatives include no abdominal pain, anorexia or urinary symptoms. Nothing aggravates the symptoms. She has tried nothing for the symptoms. The treatment provided no relief.        Review of Systems   Constitutional: Positive for fatigue Disp: 5 pen, Rfl: 3  Montelukast Sodium 10 MG Oral Tab, Take 1 tablet (10 mg total) by mouth nightly., Disp: 30 tablet, Rfl: 5  Fluticasone Propionate 50 MCG/ACT Nasal Suspension, 1 PUFF EACH NOSTRIL 2 TIMES A  DAY, Disp: 1 Bottle, Rfl: [facility] D) 2000 UNITS Oral Cap, Take 2,000 Units by mouth daily. , Disp: , Rfl:   aspirin 81 MG Oral Tab, Take 81 mg by mouth daily. , Disp: , Rfl:   Rosuvastatin Calcium 20 MG Oral Tab, TAKE 1 TABLET BY MOUTH EVERY NIGHT, Disp: 90 tablet, Rfl: 1  Continuous Blood cranial nerve deficit. She exhibits normal muscle tone. Coordination normal.   Skin: No rash noted. No erythema. Psychiatric: She has a normal mood and affect. Her behavior is normal. Thought content normal.   Nursing note and vitals reviewed. compliance with medications. Advised to increase the Lantus to 24 units daily. Continue on Humalog with every meal.  Drink plenty of fluids to keep hydrated. Recheck labs in about 3 months.   Overdue for the diabetic eye exam, advised to call ophthalmolo

## 2019-10-21 RX ORDER — ROSUVASTATIN CALCIUM 20 MG/1
TABLET, COATED ORAL
Qty: 90 TABLET | Refills: 1 | Status: SHIPPED | OUTPATIENT
Start: 2019-10-21 | End: 2020-06-18

## 2019-10-22 NOTE — TELEPHONE ENCOUNTER
Refill passed per [facility], [facility] protocol.   Cholesterol Medications  Protocol Criteria:  · Appointment scheduled in the past 12 months or in the next 3 months  · ALT & LDL on file in the past 12 months  · ALT result < 80  · LDL result <130   Recent Outpat

## 2019-11-07 ENCOUNTER — CARDPULM VISIT (OUTPATIENT)
Dept: CARDIAC REHAB | Facility: HOSPITAL | Age: 64
End: 2019-11-07
Attending: INTERNAL MEDICINE
Payer: MEDICARE

## 2019-11-14 ENCOUNTER — CARDPULM VISIT (OUTPATIENT)
Dept: CARDIAC REHAB | Facility: HOSPITAL | Age: 64
End: 2019-11-14
Attending: INTERNAL MEDICINE
Payer: MEDICARE

## 2019-11-26 ENCOUNTER — APPOINTMENT (OUTPATIENT)
Dept: CARDIAC REHAB | Facility: HOSPITAL | Age: 64
End: 2019-11-26
Attending: INTERNAL MEDICINE
Payer: MEDICARE

## 2019-12-05 ENCOUNTER — CARDPULM VISIT (OUTPATIENT)
Dept: CARDIAC REHAB | Facility: HOSPITAL | Age: 64
End: 2019-12-05
Attending: INTERNAL MEDICINE
Payer: MEDICARE

## 2019-12-07 ENCOUNTER — APPOINTMENT (OUTPATIENT)
Dept: CT IMAGING | Facility: HOSPITAL | Age: 64
End: 2019-12-07
Attending: EMERGENCY MEDICINE
Payer: MEDICARE

## 2019-12-07 ENCOUNTER — HOSPITAL ENCOUNTER (OUTPATIENT)
Age: 64
Discharge: ANOTHER HOSPITAL | End: 2019-12-07
Attending: EMERGENCY MEDICINE
Payer: MEDICARE

## 2019-12-07 ENCOUNTER — HOSPITAL ENCOUNTER (EMERGENCY)
Facility: HOSPITAL | Age: 64
Discharge: HOME | End: 2019-12-07
Attending: EMERGENCY MEDICINE
Payer: MEDICARE

## 2019-12-07 VITALS
DIASTOLIC BLOOD PRESSURE: 80 MMHG | HEART RATE: 86 BPM | WEIGHT: 215 LBS | RESPIRATION RATE: 14 BRPM | OXYGEN SATURATION: 92 % | SYSTOLIC BLOOD PRESSURE: 146 MMHG | BODY MASS INDEX: 36.7 KG/M2 | TEMPERATURE: 98 F | HEIGHT: 64 IN

## 2019-12-07 VITALS
OXYGEN SATURATION: 94 % | HEART RATE: 77 BPM | BODY MASS INDEX: 37 KG/M2 | RESPIRATION RATE: 20 BRPM | WEIGHT: 215 LBS | TEMPERATURE: 98 F | DIASTOLIC BLOOD PRESSURE: 68 MMHG | SYSTOLIC BLOOD PRESSURE: 113 MMHG

## 2019-12-07 DIAGNOSIS — R10.32 ABDOMINAL PAIN, LEFT LOWER QUADRANT: Primary | ICD-10-CM

## 2019-12-07 DIAGNOSIS — K57.92 ACUTE DIVERTICULITIS: Primary | ICD-10-CM

## 2019-12-07 PROCEDURE — 81001 URINALYSIS AUTO W/SCOPE: CPT | Performed by: EMERGENCY MEDICINE

## 2019-12-07 PROCEDURE — 36415 COLL VENOUS BLD VENIPUNCTURE: CPT

## 2019-12-07 PROCEDURE — 80048 BASIC METABOLIC PNL TOTAL CA: CPT | Performed by: EMERGENCY MEDICINE

## 2019-12-07 PROCEDURE — 99284 EMERGENCY DEPT VISIT MOD MDM: CPT

## 2019-12-07 PROCEDURE — 81001 URINALYSIS AUTO W/SCOPE: CPT

## 2019-12-07 PROCEDURE — 99213 OFFICE O/P EST LOW 20 MIN: CPT

## 2019-12-07 PROCEDURE — 87086 URINE CULTURE/COLONY COUNT: CPT | Performed by: EMERGENCY MEDICINE

## 2019-12-07 PROCEDURE — 87077 CULTURE AEROBIC IDENTIFY: CPT | Performed by: EMERGENCY MEDICINE

## 2019-12-07 PROCEDURE — 99212 OFFICE O/P EST SF 10 MIN: CPT

## 2019-12-07 PROCEDURE — 87086 URINE CULTURE/COLONY COUNT: CPT

## 2019-12-07 PROCEDURE — 74177 CT ABD & PELVIS W/CONTRAST: CPT | Performed by: EMERGENCY MEDICINE

## 2019-12-07 PROCEDURE — 85025 COMPLETE CBC W/AUTO DIFF WBC: CPT | Performed by: EMERGENCY MEDICINE

## 2019-12-07 RX ORDER — METRONIDAZOLE 500 MG/1
500 TABLET ORAL 3 TIMES DAILY
Qty: 30 TABLET | Refills: 0 | Status: SHIPPED | OUTPATIENT
Start: 2019-12-07 | End: 2019-12-17

## 2019-12-07 RX ORDER — CIPROFLOXACIN 500 MG/1
500 TABLET, FILM COATED ORAL 2 TIMES DAILY
Qty: 20 TABLET | Refills: 0 | Status: SHIPPED | OUTPATIENT
Start: 2019-12-07 | End: 2019-12-17

## 2019-12-07 NOTE — ED INITIAL ASSESSMENT (HPI)
Pt complaining of diarrhea for a week. +left lower quadrant abd pain. No blood in stool. No ua symptoms.

## 2019-12-07 NOTE — ED PROVIDER NOTES
Patient Seen in: [facility] Emergency Department      History   Patient presents with:  Abdomen/Flank Pain    Stated Complaint: LLQ pain    HPI    Is a 70-year-old female who was sent from immediate care for left lower quadrant pain since last nig Device None (Room air)       Current:/68   Pulse 77   Temp 97.6 °F (36.4 °C) (Oral)   Resp 20   Wt 97.5 kg   LMP  (LMP Unknown)   SpO2 94%   BMI 36.90 kg/m²         Physical Exam    GENERAL: No acute distress, awake and alert  HEENT: MMM, EOMI, PERRL GREEN   RAINBOW DRAW GOLD   URINE CULTURE, ROUTINE     MDM     DDX: gastritis, diverticulitis, UTI, diarrhea    Ct Abdomen+pelvis(contrast Only)(cpt=74177)    Result Date: 12/7/2019  CONCLUSION:  1. Acute sigmoid diverticulitis. 2. Minimal cholelithiasis.

## 2019-12-07 NOTE — ED PROVIDER NOTES
Patient Seen in: [facility] Immediate Care In [address] [place] [address]    History   Patient presents with:  Abdominal Pain    Stated Complaint: Abd Pain    HPI    Is here with complaint of abdominal pain left lower quadrant since yesterday.   She has had some diar Oral Tab,  TAKE 1 TABLET(3.125 MG) BY MOUTH TWICE DAILY   LEVOTHYROXINE SODIUM 25 MCG Oral Tab,  TAKE 1 TABLET(25 MCG) BY MOUTH EVERY DAY   METOLAZONE 2.5 MG Oral Tab,  TAKE 1 TABLET BY MOUTH THREE TIMES A WEEK  Patient taking differently: twice a week. Breath Activated,  Inhale 2 puffs into the lungs 3 (three) times daily as needed. Vitamin B-12 1000 MCG Oral Tab,  Take 1,000 mcg by mouth daily.    ipratropium-albuterol 0.5-2.5 (3) MG/3ML Inhalation Solution,  Take 3 mL by nebulization 2 (two) times romeo asymmetry noted. Psychiatric:  Normal affect. Oriented. No unusual behavior. Interacting well.     Recommended ER evaluation for possible diverticulitis she will go there now    ED Course   Labs Reviewed - No data to display     MDM     92% Normal  Puls

## 2019-12-07 NOTE — ED INITIAL ASSESSMENT (HPI)
Pt is c/o LLQ abdominal pain since last night, has had diarrhea x1 week, denies N/V. She states she is able to tolerate PO intake.

## 2019-12-09 ENCOUNTER — TELEPHONE (OUTPATIENT)
Dept: OTHER | Age: 64
End: 2019-12-09

## 2019-12-09 NOTE — TELEPHONE ENCOUNTER
Patient called to inform provider of her recent ER visits. States she was in [place] on 11/23/19, had right lower leg pain and went to ER to rule out blood clot.  Per patient, ultrasound negative for blood clot and patient was informed to wear ace wrap

## 2019-12-10 NOTE — TELEPHONE ENCOUNTER
Would recommend to complete medications as directed per the emergency room. Low residue diet, plenty of fluids, multiple small meals. Call if any problems. Will need to see me in about 5 to 6 days. May add to the end of the day any day.

## 2019-12-10 NOTE — TELEPHONE ENCOUNTER
Spoke with patient (verified name and ), advised Dr Verta Bumpers note and verbalized understanding. Cancelled 19 OV and made an appointment on 19. Insurance information not listed, soft transferred to [facility] to update it.   Will close the encounte

## 2019-12-12 ENCOUNTER — OFFICE VISIT (OUTPATIENT)
Dept: NEPHROLOGY | Facility: CLINIC | Age: 64
End: 2019-12-12
Payer: MEDICARE

## 2019-12-12 ENCOUNTER — APPOINTMENT (OUTPATIENT)
Dept: LAB | Facility: HOSPITAL | Age: 64
End: 2019-12-12
Attending: INTERNAL MEDICINE
Payer: MEDICARE

## 2019-12-12 ENCOUNTER — HOSPITAL ENCOUNTER (OUTPATIENT)
Dept: GENERAL RADIOLOGY | Facility: HOSPITAL | Age: 64
Discharge: HOME | End: 2019-12-12
Attending: INTERNAL MEDICINE
Payer: MEDICARE

## 2019-12-12 ENCOUNTER — TELEPHONE (OUTPATIENT)
Dept: NEPHROLOGY | Facility: CLINIC | Age: 64
End: 2019-12-12

## 2019-12-12 VITALS
WEIGHT: 217.63 LBS | DIASTOLIC BLOOD PRESSURE: 81 MMHG | HEART RATE: 70 BPM | SYSTOLIC BLOOD PRESSURE: 114 MMHG | BODY MASS INDEX: 37.16 KG/M2 | HEIGHT: 64 IN | TEMPERATURE: 98 F

## 2019-12-12 DIAGNOSIS — N18.30 CKD (CHRONIC KIDNEY DISEASE), STAGE III (HCC): ICD-10-CM

## 2019-12-12 DIAGNOSIS — M25.551 PAIN OF RIGHT HIP JOINT: ICD-10-CM

## 2019-12-12 DIAGNOSIS — N18.30 CKD (CHRONIC KIDNEY DISEASE), STAGE III (HCC): Primary | ICD-10-CM

## 2019-12-12 DIAGNOSIS — M79.89 LEG SWELLING: ICD-10-CM

## 2019-12-12 DIAGNOSIS — N05.1 FSGS (FOCAL SEGMENTAL GLOMERULOSCLEROSIS): ICD-10-CM

## 2019-12-12 PROCEDURE — 99214 OFFICE O/P EST MOD 30 MIN: CPT | Performed by: INTERNAL MEDICINE

## 2019-12-12 PROCEDURE — 82043 UR ALBUMIN QUANTITATIVE: CPT

## 2019-12-12 PROCEDURE — G0463 HOSPITAL OUTPT CLINIC VISIT: HCPCS | Performed by: INTERNAL MEDICINE

## 2019-12-12 PROCEDURE — 73502 X-RAY EXAM HIP UNI 2-3 VIEWS: CPT | Performed by: INTERNAL MEDICINE

## 2019-12-12 PROCEDURE — 82570 ASSAY OF URINE CREATININE: CPT

## 2019-12-12 PROCEDURE — 84156 ASSAY OF PROTEIN URINE: CPT

## 2019-12-12 RX ORDER — POTASSIUM CHLORIDE 20 MEQ/1
20 TABLET, EXTENDED RELEASE ORAL 3 TIMES DAILY
Qty: 270 TABLET | Refills: 0 | COMMUNITY
Start: 2019-12-12 | End: 2020-01-04

## 2019-12-12 NOTE — PROGRESS NOTES
Progress Note     Israel Espinoza is a 59 yrs old female with pmh of HL, multiple sclerosis   (35 yrs), restless leg syndrome, back pain, nephrolithiasis x 3, tobacco abuse who presented today for follow up    Initial lab work showed BUN/Cr 32/0.86 mg/dl Essential hypertension    • Hyperlipidemia    • Hypothyroidism    • KIDNEY STONE    • Multiple sclerosis ([facility] [address]    • Renal disorder       Past Surgical History:   Procedure Laterality Date   • APPENDECTOMY     • APPENDECTOMY     •      • KNEE S 1 TABLET BY MOUTH EVERY MORNING 90 tablet 1   • Losartan Potassium 25 MG Oral Tab TAKE 1 TABLET BY MOUTH EVERY EVENING 90 tablet 1   • melatonin 3 MG Oral Tab Take 3 mg by mouth nightly.      • BUMETANIDE 1 MG Oral Tab TAKE 2 TABLETS(2 MG) BY MOUTH DAILY (P 1 PUFF EACH NOSTRIL 2 TIMES A  DAY (Patient not taking: Reported on 12/12/2019 ) 1 Bottle 3   • ipratropium-albuterol 0.5-2.5 (3) MG/3ML Inhalation Solution Take 3 mL by nebulization 2 (two) times daily.  (Patient not taking: Reported on 12/12/2019 ) 60 via UACR 150 mg - albumin lower at 3.0 - repeat urine and serum albumin   - cont. vitamin D 2000 units daily - history of OA and DJD  - negative VANESSA, ANCA and Hepatitis B and C and HIV non reactive. - C3 and C4 within range.  CBC normal H/H and coags wnl  - RF

## 2019-12-12 NOTE — PATIENT INSTRUCTIONS
Increase bumex to 2 mg twice a day   Increase metolazone 2.5 mg daily   Follow up on Tuesday 12/17 at 4:30 pm   Urine test as ordered in next 1-2 days   Daily weights   Increase potassium to TID dose

## 2019-12-13 NOTE — TELEPHONE ENCOUNTER
Patient returned nurse call, indicates mychart message was read, but asking to speak with nurse, please return call, thanks.

## 2019-12-13 NOTE — TELEPHONE ENCOUNTER
Contacted pt. She received message to increase potassium to TID. She had questions about lab values, which I answered. Pt also wondering about urine tests.  Notified her that albumin level has decreased but will send to [facility] to better advise when she RTC on

## 2019-12-13 NOTE — TELEPHONE ENCOUNTER
Mychart msg unread. Left pt message per consent on file to increase potassium to TID until she sees [facility] next on 12/17/19. Advised to call office back to confirm she received message.

## 2019-12-15 NOTE — TELEPHONE ENCOUNTER
Worsening of proteinuria since early this year     Will discuss more at your next office visit on Tuesday

## 2019-12-17 ENCOUNTER — OFFICE VISIT (OUTPATIENT)
Dept: NEPHROLOGY | Facility: CLINIC | Age: 64
End: 2019-12-17
Payer: MEDICARE

## 2019-12-17 ENCOUNTER — OFFICE VISIT (OUTPATIENT)
Dept: INTERNAL MEDICINE CLINIC | Facility: CLINIC | Age: 64
End: 2019-12-17
Payer: MEDICARE

## 2019-12-17 ENCOUNTER — APPOINTMENT (OUTPATIENT)
Dept: CARDIAC REHAB | Facility: HOSPITAL | Age: 64
End: 2019-12-17
Attending: INTERNAL MEDICINE
Payer: MEDICARE

## 2019-12-17 VITALS
BODY MASS INDEX: 35.62 KG/M2 | WEIGHT: 208.63 LBS | SYSTOLIC BLOOD PRESSURE: 121 MMHG | DIASTOLIC BLOOD PRESSURE: 78 MMHG | HEART RATE: 79 BPM | HEIGHT: 64 IN

## 2019-12-17 VITALS
WEIGHT: 207 LBS | DIASTOLIC BLOOD PRESSURE: 78 MMHG | RESPIRATION RATE: 18 BRPM | HEIGHT: 64 IN | BODY MASS INDEX: 35.34 KG/M2 | SYSTOLIC BLOOD PRESSURE: 121 MMHG

## 2019-12-17 DIAGNOSIS — I10 ESSENTIAL HYPERTENSION: ICD-10-CM

## 2019-12-17 DIAGNOSIS — N18.30 CKD (CHRONIC KIDNEY DISEASE), STAGE III (HCC): ICD-10-CM

## 2019-12-17 DIAGNOSIS — I27.20 PULMONARY HTN (HCC): ICD-10-CM

## 2019-12-17 DIAGNOSIS — I89.0 LYMPHEDEMA: ICD-10-CM

## 2019-12-17 DIAGNOSIS — N04.9 NEPHROTIC SYNDROME: ICD-10-CM

## 2019-12-17 DIAGNOSIS — N05.1 FSGS (FOCAL SEGMENTAL GLOMERULOSCLEROSIS): Primary | ICD-10-CM

## 2019-12-17 DIAGNOSIS — K57.92 ACUTE DIVERTICULITIS: Primary | ICD-10-CM

## 2019-12-17 PROBLEM — I73.9 PERIPHERAL VASCULAR DISEASE (HCC): Status: ACTIVE | Noted: 2019-12-17

## 2019-12-17 PROCEDURE — G0463 HOSPITAL OUTPT CLINIC VISIT: HCPCS | Performed by: INTERNAL MEDICINE

## 2019-12-17 PROCEDURE — 1111F DSCHRG MED/CURRENT MED MERGE: CPT | Performed by: INTERNAL MEDICINE

## 2019-12-17 PROCEDURE — 99214 OFFICE O/P EST MOD 30 MIN: CPT | Performed by: INTERNAL MEDICINE

## 2019-12-17 RX ORDER — LOSARTAN POTASSIUM 50 MG/1
50 TABLET ORAL 2 TIMES DAILY
Qty: 180 TABLET | Refills: 1 | Status: SHIPPED | OUTPATIENT
Start: 2019-12-17 | End: 2020-09-08

## 2019-12-17 RX ORDER — CIPROFLOXACIN 500 MG/1
TABLET, FILM COATED ORAL
Qty: 10 TABLET | Refills: 0 | Status: SHIPPED | OUTPATIENT
Start: 2019-12-17 | End: 2020-01-14 | Stop reason: ALTCHOICE

## 2019-12-17 RX ORDER — METRONIDAZOLE 500 MG/1
TABLET ORAL
Qty: 10 TABLET | Refills: 0 | Status: SHIPPED | OUTPATIENT
Start: 2019-12-17 | End: 2020-01-14 | Stop reason: ALTCHOICE

## 2019-12-17 NOTE — PROGRESS NOTES
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 (Patient not taking: Reported on 12/12/2019 ) 60 vial 5   • Cholecalciferol (VITAMIN D) 2000 UNITS Oral Cap Take 2,000 Units by mouth daily. - serum Albumin 3.9 mg - UACR 150 mg - albumin lower at 3.0 - repeat urine and serum albumin   - cont. vitamin D 2000 units daily - history of OA and DJD  - negative VANESSA, ANCA and Hepatitis B and C and HIV non reactive. - C3 and C4 within range.  CBC nor

## 2019-12-17 NOTE — PATIENT INSTRUCTIONS
Continue bumex at current dose and take metolazone 2.5 mg every other day for one more week - call with weights and blood pressure readings at home   Daily weights and blood pressure readings   Increase losartan 50 mg twice a day   Follow up in 4 weeks

## 2019-12-18 NOTE — ASSESSMENT & PLAN NOTE
2D echocardiogram of the heart just completed shows pulmonary pressures rising to 41 at this time. Patient is being referred to Dr. Alma Ocasio for an evaluation. Will follow-up after evaluation is completed.   She has had history of emphysema and contin

## 2019-12-18 NOTE — PROGRESS NOTES
HPI:    Patient ID: Emeterio Page is a 59year old female. Per ER 12/7     Abdomen/Flank Pain     Stated Complaint: LLQ pain     HPI     Is a 14-year-old female who was sent from immediate care for left lower quadrant pain since last night.   She desc include no peripheral edema. Risk factors for coronary artery disease include sedentary lifestyle, dyslipidemia and diabetes mellitus. Past treatments include lifestyle changes, ACE inhibitors, beta blockers and diuretics (coreg,bumex,losartan).  Compliance fat/cholesterol and low salt diet. Meal planning includes carbohydrate counting, avoidance of concentrated sweets and calorie counting. She has not had a previous visit with a dietitian. She never participates in exercise.  Her home blood glucose trend is d nervous/anxious. Current Outpatient Medications   Medication Sig Dispense Refill   • losartan Potassium 50 MG Oral Tab Take 1 tablet (50 mg total) by mouth 2 (two) times daily.  TAKE 1 TABLET BY MOUTH EVERY MORNING 180 tablet 1   • Ciprofloxacin TABLET(100 MG) BY MOUTH EVERY NIGHT AT BEDTIME 90 tablet 2   • melatonin 3 MG Oral Tab Take 3 mg by mouth nightly.      • PRAMIPEXOLE DIHYDROCHLORIDE 1 MG Oral Tab TAKE 1 TABLET BY MOUTH THREE TIMES DAILY (Patient taking differently: Take 2 mg by mouth nigh External ear normal.   Left Ear: External ear normal.   Nose: Nose normal.   Mouth/Throat: Oropharynx is clear and moist. No oropharyngeal exudate. Eyes: Pupils are equal, round, and reactive to light.  Conjunctivae and EOM are normal. Right eye exhibits 121/78, resp. rate 18, height 5' 4\" (1.626 m), weight 207 lb (93.9 kg), not currently breastfeeding. Heart failure blood pressures look stable at this time.   Continue on losartan 75 mg daily with Bumex 1 mg 2 times daily and Coreg 3.125 mg twice daily this closely to reduce risks for progression to right heart failure. Return in about 4 weeks (around 1/14/2020). PT UNDERSTANDS AND AGREES TO FOLLOW DIRECTIONS AND ADVICE    No orders of the defined types were placed in this encounter.

## 2019-12-18 NOTE — ASSESSMENT & PLAN NOTE
Blood pressure 121/78, resp. rate 18, height 5' 4\" (1.626 m), weight 207 lb (93.9 kg), not currently breastfeeding. Heart failure blood pressures look stable at this time.   Continue on losartan 75 mg daily with Bumex 1 mg 2 times daily and Coreg 3.125

## 2019-12-18 NOTE — ASSESSMENT & PLAN NOTE
Hx of hronic bilateral lower extremity edema. Stasis dermatitis as well as chronic lymphedema noted. Patient does have a history of MS and is wheelchair bound for most time. She has her feet hanging down for quite a bit.   She has been on diuretics for m

## 2019-12-18 NOTE — PATIENT INSTRUCTIONS
Problem List Items Addressed This Visit        Unprioritized    Acute diverticulitis - Primary    HTN (hypertension)     Blood pressure 121/78, resp. rate 18, height 5' 4\" (1.626 m), weight 207 lb (93.9 kg), not currently breastfeeding.      Heart failure

## 2019-12-19 ENCOUNTER — TELEPHONE (OUTPATIENT)
Dept: NEPHROLOGY | Facility: CLINIC | Age: 64
End: 2019-12-19

## 2019-12-19 ENCOUNTER — APPOINTMENT (OUTPATIENT)
Dept: CARDIAC REHAB | Facility: HOSPITAL | Age: 64
End: 2019-12-19
Attending: INTERNAL MEDICINE
Payer: MEDICARE

## 2019-12-19 DIAGNOSIS — N05.1 FSGS (FOCAL SEGMENTAL GLOMERULOSCLEROSIS): ICD-10-CM

## 2019-12-19 DIAGNOSIS — N18.30 CKD (CHRONIC KIDNEY DISEASE), STAGE III (HCC): Primary | ICD-10-CM

## 2019-12-19 NOTE — TELEPHONE ENCOUNTER
Patient to cut down bumex to 2 mg daily dose starting tomm     Also find out when is she getting the lab work done ?

## 2019-12-20 ENCOUNTER — LAB ENCOUNTER (OUTPATIENT)
Dept: LAB | Age: 64
End: 2019-12-20
Attending: INTERNAL MEDICINE
Payer: MEDICARE

## 2019-12-20 ENCOUNTER — TELEPHONE (OUTPATIENT)
Dept: INTERNAL MEDICINE CLINIC | Facility: CLINIC | Age: 64
End: 2019-12-20

## 2019-12-20 DIAGNOSIS — IMO0001 DIABETES MELLITUS, INSULIN DEPENDENT (IDDM), UNCONTROLLED: ICD-10-CM

## 2019-12-20 LAB
ABSOLUTE IMMATURE GRANULOCYTES (OFFPRE24): NORMAL
ALBUMIN SERPL-MCNC: 3.4 G/DL
ALBUMIN/GLOB SERPL: NORMAL {RATIO}
ALP SERPL-CCNC: 122 U/L
ALT SERPL-CCNC: 36 U/L
ANION GAP SERPL CALC-SCNC: NORMAL MMOL/L
AST SERPL-CCNC: 30 U/L
BASO+EOS+MONOS # BLD: NORMAL 10*3/UL
BASO+EOS+MONOS NFR BLD: NORMAL %
BASOPHILS # BLD: NORMAL 10*3/UL
BASOPHILS NFR BLD: NORMAL %
BILIRUB SERPL-MCNC: 0.2 MG/DL
BUN SERPL-MCNC: 37 MG/DL
BUN/CREAT SERPL: NORMAL
CALCIUM SERPL-MCNC: 9.1 MG/DL
CHLORIDE SERPL-SCNC: 89 MMOL/L
CO2 SERPL-SCNC: NORMAL MMOL/L
CREAT SERPL-MCNC: 1.27 MG/DL
DIFFERENTIAL METHOD BLD: NORMAL
EOSINOPHIL # BLD: NORMAL 10*3/UL
EOSINOPHIL NFR BLD: NORMAL %
ERYTHROCYTE [DISTWIDTH] IN BLOOD: NORMAL %
GLOBULIN SER-MCNC: 4.8 G/DL
GLUCOSE SERPL-MCNC: 351 MG/DL
HCT VFR BLD CALC: 50.5 %
HEMOGLOBIN A1C: 9.7
HGB BLD-MCNC: 17.8 G/DL
IMMATURE GRANULOCYTES (OFFPRE25): NORMAL
LENGTH OF FAST TIME PATIENT: NORMAL H
LYMPHOCYTES # BLD: NORMAL 10*3/UL
LYMPHOCYTES NFR BLD: NORMAL %
MCH RBC QN AUTO: NORMAL PG
MCHC RBC AUTO-ENTMCNC: NORMAL G/DL
MCV RBC AUTO: NORMAL FL
MONOCYTES # BLD: NORMAL 10*3/UL
MONOCYTES NFR BLD: NORMAL %
MPV (OFFPRE2): NORMAL
NEUTROPHILS # BLD: NORMAL 10*3/UL
NEUTROPHILS NFR BLD: NORMAL %
NRBC BLD MANUAL-RTO: NORMAL %
PLAT MORPH BLD: NORMAL
PLATELET # BLD: 397 10*3/UL
POTASSIUM SERPL-SCNC: 3.3 MMOL/L
PROT SERPL-MCNC: 8.2 G/DL
RBC # BLD: 5.74 10*6/UL
RBC MORPH BLD: NORMAL
SODIUM SERPL-SCNC: 132 MMOL/L
TSH SERPL-ACNC: 6.36 M[IU]/L
WBC # BLD: 8.6 10*3/UL
WBC MORPH BLD: NORMAL

## 2019-12-20 PROCEDURE — 85060 BLOOD SMEAR INTERPRETATION: CPT

## 2019-12-20 PROCEDURE — 85025 COMPLETE CBC W/AUTO DIFF WBC: CPT

## 2019-12-20 PROCEDURE — 80053 COMPREHEN METABOLIC PANEL: CPT

## 2019-12-20 PROCEDURE — 83036 HEMOGLOBIN GLYCOSYLATED A1C: CPT

## 2019-12-20 PROCEDURE — 36415 COLL VENOUS BLD VENIPUNCTURE: CPT

## 2019-12-20 PROCEDURE — 84439 ASSAY OF FREE THYROXINE: CPT

## 2019-12-20 PROCEDURE — 82570 ASSAY OF URINE CREATININE: CPT

## 2019-12-20 PROCEDURE — 81001 URINALYSIS AUTO W/SCOPE: CPT

## 2019-12-20 PROCEDURE — 82043 UR ALBUMIN QUANTITATIVE: CPT

## 2019-12-20 PROCEDURE — 84443 ASSAY THYROID STIM HORMONE: CPT

## 2019-12-20 RX ORDER — BUMETANIDE 1 MG/1
TABLET ORAL
Qty: 180 TABLET | Refills: 0 | Status: ON HOLD | COMMUNITY
Start: 2019-12-20 | End: 2020-01-30

## 2019-12-20 NOTE — TELEPHONE ENCOUNTER
Contacted pt and advised her to cut down Bumex to 2 mg daily. She states she is planning on doing labs today.

## 2019-12-20 NOTE — TELEPHONE ENCOUNTER
Patient seen Dr Delaney for her appointment.  Patient stt that Dr requested fro her to come back in 4 weeks for a follow up and but be added to the end of the day of Dr schedule       Dr would you like patient to be double booked, res 25     Please advise

## 2019-12-24 ENCOUNTER — APPOINTMENT (OUTPATIENT)
Dept: CARDIAC REHAB | Facility: HOSPITAL | Age: 64
End: 2019-12-24
Attending: INTERNAL MEDICINE
Payer: MEDICARE

## 2019-12-26 ENCOUNTER — APPOINTMENT (OUTPATIENT)
Dept: CARDIAC REHAB | Facility: HOSPITAL | Age: 64
End: 2019-12-26
Attending: INTERNAL MEDICINE
Payer: MEDICARE

## 2019-12-26 NOTE — TELEPHONE ENCOUNTER
Patient to reduce metolazone to twice a week dose.  Hold bumex for 2 days     Repeat BMP in 7-10 days - ordered

## 2019-12-30 ENCOUNTER — TELEPHONE (OUTPATIENT)
Dept: INTERNAL MEDICINE CLINIC | Facility: CLINIC | Age: 64
End: 2019-12-30

## 2019-12-30 ENCOUNTER — NURSE TRIAGE (OUTPATIENT)
Dept: INTERNAL MEDICINE CLINIC | Facility: CLINIC | Age: 64
End: 2019-12-30

## 2019-12-30 RX ORDER — FLUCONAZOLE 150 MG/1
TABLET ORAL
Qty: 3 TABLET | Refills: 0 | Status: SHIPPED | OUTPATIENT
Start: 2019-12-30 | End: 2020-02-17

## 2019-12-30 NOTE — TELEPHONE ENCOUNTER
Action Requested: Summary for Provider     []  Critical Lab, Recommendations Needed  [] Need Additional Advice  []   FYI    []   Need Orders  [] Need Medications Sent to Pharmacy  []  Other     SUMMARY: Per protocol advised office visit.   Patient declining

## 2019-12-30 NOTE — TELEPHONE ENCOUNTER
Left message on pt identified VM informing her prescription has been sent to pharmacy as requested and to call office if any questions.

## 2019-12-30 NOTE — TELEPHONE ENCOUNTER
Patient states she needs all cardiology related tests performed in the last year sent to cardiologist - Dr. Mirza Wayne (?) she will be seeing in January.     Patient states she's unsure of the correct spelling doctor's name and \"too tired\" to find the informa

## 2019-12-31 ENCOUNTER — APPOINTMENT (OUTPATIENT)
Dept: CARDIAC REHAB | Facility: HOSPITAL | Age: 64
End: 2019-12-31
Attending: INTERNAL MEDICINE
Payer: MEDICARE

## 2019-12-31 NOTE — ASSESSMENT & PLAN NOTE
Patient diagnosed with tips variant of FSGS as per kidney biopsy. She was treated with steroids and the proteinuria had resolved completely. Lately been gradually worsening proteinuria, hypertension, lower extremity edema.   Diabetes has been poorly cont

## 2019-12-31 NOTE — ASSESSMENT & PLAN NOTE
Acute sigmoid diverticulitis as per the CT scan on December 7, 2019. Has been doing much better with antibiotics. Has been watching her diet carefully at this time. Gastroenterology follow-up as directed.

## 2020-01-02 ENCOUNTER — APPOINTMENT (OUTPATIENT)
Dept: CARDIAC REHAB | Facility: HOSPITAL | Age: 65
End: 2020-01-02
Attending: INTERNAL MEDICINE
Payer: MEDICARE

## 2020-01-03 NOTE — TELEPHONE ENCOUNTER
LOV 12/17/19. RTC in 4 weeks. F/U scheduled for 1/14/2020. Medication is noted as taking.  Refill pended and routed to Dr. Varinder Lopez for approval.

## 2020-01-04 RX ORDER — POTASSIUM CHLORIDE 20 MEQ/1
TABLET, EXTENDED RELEASE ORAL
Qty: 270 TABLET | Refills: 0 | Status: ON HOLD | OUTPATIENT
Start: 2020-01-04 | End: 2020-01-30

## 2020-01-06 RX ORDER — CARVEDILOL 3.12 MG/1
TABLET ORAL
Qty: 180 TABLET | Refills: 0 | Status: SHIPPED | OUTPATIENT
Start: 2020-01-06 | End: 2020-04-07

## 2020-01-06 NOTE — TELEPHONE ENCOUNTER
Current Outpatient Medications   Medication Sig Dispense Refill   • Insulin Pen Needle (BD PEN NEEDLE SHORT U/F) 31G X 8 MM Does not apply Misc USE TWICE DAILY AS DIRECTED 200 each 3

## 2020-01-06 NOTE — TELEPHONE ENCOUNTER
LOV 12/17/19. RTC 4 weeks. No changes to carvedilol at LOV or in [place]. Upcoming appt scheduled 1/14/20.

## 2020-01-07 RX ORDER — FLUTICASONE FUROATE, UMECLIDINIUM BROMIDE AND VILANTEROL TRIFENATATE 100; 62.5; 25 UG/1; UG/1; UG/1
POWDER RESPIRATORY (INHALATION)
Qty: 60 EACH | Refills: 0 | Status: SHIPPED | OUTPATIENT
Start: 2020-01-07 | End: 2020-02-20

## 2020-01-08 NOTE — TELEPHONE ENCOUNTER
Refill passed per [facility] protocol.   Refill Protocol Appointment Criteria  · Appointment scheduled in the past 12 months or in the next 3 months  Recent Outpatient Visits            3 weeks ago FSGS (focal segmental glomerulosclerosis)    Beau

## 2020-01-10 PROBLEM — I73.9 PVD (PERIPHERAL VASCULAR DISEASE) (CMD): Status: ACTIVE | Noted: 2020-01-10

## 2020-01-10 PROBLEM — I27.20 PULMONARY HYPERTENSION (CMD): Status: ACTIVE | Noted: 2020-01-10

## 2020-01-13 ENCOUNTER — APPOINTMENT (OUTPATIENT)
Dept: LAB | Facility: HOSPITAL | Age: 65
End: 2020-01-13
Attending: INTERNAL MEDICINE
Payer: MEDICARE

## 2020-01-13 ENCOUNTER — OFFICE VISIT (OUTPATIENT)
Dept: CARDIOLOGY | Age: 65
End: 2020-01-13

## 2020-01-13 VITALS
BODY MASS INDEX: 35.34 KG/M2 | HEIGHT: 64 IN | WEIGHT: 207 LBS | SYSTOLIC BLOOD PRESSURE: 128 MMHG | DIASTOLIC BLOOD PRESSURE: 82 MMHG | HEART RATE: 88 BPM

## 2020-01-13 DIAGNOSIS — N05.1 FSGS (FOCAL SEGMENTAL GLOMERULOSCLEROSIS): ICD-10-CM

## 2020-01-13 DIAGNOSIS — I27.20 PULMONARY HYPERTENSION (CMD): Primary | ICD-10-CM

## 2020-01-13 DIAGNOSIS — N18.30 CKD (CHRONIC KIDNEY DISEASE), STAGE III (HCC): ICD-10-CM

## 2020-01-13 PROBLEM — E78.00 HYPERCHOLESTEREMIA: Status: ACTIVE | Noted: 2020-01-13

## 2020-01-13 PROBLEM — E11.9 TYPE 2 DIABETES MELLITUS (CMD): Status: ACTIVE | Noted: 2020-01-13

## 2020-01-13 PROBLEM — I10 ESSENTIAL HYPERTENSION: Status: ACTIVE | Noted: 2020-01-13

## 2020-01-13 PROBLEM — K76.0 HEPATIC STEATOSIS: Status: ACTIVE | Noted: 2020-01-13

## 2020-01-13 PROBLEM — K57.92 DIVERTICULITIS: Status: ACTIVE | Noted: 2020-01-13

## 2020-01-13 LAB
ANION GAP SERPL CALC-SCNC: 7 MMOL/L (ref 0–18)
BUN BLD-MCNC: 24 MG/DL (ref 7–18)
BUN/CREAT SERPL: 26.4 (ref 10–20)
CALCIUM BLD-MCNC: 8.9 MG/DL (ref 8.5–10.1)
CHLORIDE SERPL-SCNC: 102 MMOL/L (ref 98–112)
CO2 SERPL-SCNC: 28 MMOL/L (ref 21–32)
CREAT BLD-MCNC: 0.91 MG/DL (ref 0.55–1.02)
GLUCOSE BLD-MCNC: 296 MG/DL (ref 70–99)
OSMOLALITY SERPL CALC.SUM OF ELEC: 299 MOSM/KG (ref 275–295)
PATIENT FASTING Y/N/NP: NO
POTASSIUM SERPL-SCNC: 3.8 MMOL/L (ref 3.5–5.1)
SODIUM SERPL-SCNC: 137 MMOL/L (ref 136–145)

## 2020-01-13 PROCEDURE — 80048 BASIC METABOLIC PNL TOTAL CA: CPT

## 2020-01-13 PROCEDURE — 36415 COLL VENOUS BLD VENIPUNCTURE: CPT

## 2020-01-13 PROCEDURE — 99215 OFFICE O/P EST HI 40 MIN: CPT | Performed by: INTERNAL MEDICINE

## 2020-01-13 RX ORDER — BUMETANIDE 1 MG/1
2 TABLET ORAL DAILY
COMMUNITY
Start: 2019-12-20 | End: 2020-01-13 | Stop reason: SDUPTHER

## 2020-01-13 RX ORDER — MONTELUKAST SODIUM 10 MG/1
10 TABLET ORAL NIGHTLY
COMMUNITY
Start: 2019-07-22

## 2020-01-13 RX ORDER — LEVOTHYROXINE SODIUM 0.03 MG/1
25 TABLET ORAL DAILY
COMMUNITY
Start: 2015-03-05

## 2020-01-13 RX ORDER — CHOLECALCIFEROL (VITAMIN D3) 50 MCG
50 TABLET ORAL AT BEDTIME
COMMUNITY

## 2020-01-13 RX ORDER — METRONIDAZOLE 500 MG/1
500 TABLET ORAL 2 TIMES DAILY
COMMUNITY
Start: 2019-12-17 | End: 2020-02-17 | Stop reason: SDUPTHER

## 2020-01-13 RX ORDER — OMEPRAZOLE 40 MG/1
40 CAPSULE, DELAYED RELEASE ORAL DAILY
COMMUNITY
Start: 2015-03-05 | End: 2020-06-15 | Stop reason: CLARIF

## 2020-01-13 RX ORDER — LANOLIN ALCOHOL/MO/W.PET/CERES
3 CREAM (GRAM) TOPICAL NIGHTLY
COMMUNITY
End: 2021-11-01

## 2020-01-13 RX ORDER — LANOLIN ALCOHOL/MO/W.PET/CERES
1000 CREAM (GRAM) TOPICAL DAILY
COMMUNITY

## 2020-01-13 RX ORDER — POTASSIUM CHLORIDE 20 MEQ/1
20 TABLET, EXTENDED RELEASE ORAL DAILY
COMMUNITY
Start: 2020-01-04 | End: 2021-03-19 | Stop reason: SDUPTHER

## 2020-01-13 RX ORDER — OXCARBAZEPINE 300 MG/1
300 TABLET, FILM COATED ORAL 2 TIMES DAILY
COMMUNITY
Start: 2015-03-05 | End: 2022-01-01 | Stop reason: DRUGHIGH

## 2020-01-13 RX ORDER — BUMETANIDE 1 MG/1
TABLET ORAL
Qty: 270 TABLET | Refills: 3 | Status: SHIPPED | OUTPATIENT
Start: 2020-01-13 | End: 2020-09-21 | Stop reason: CLARIF

## 2020-01-13 RX ORDER — TRAZODONE HYDROCHLORIDE 100 MG/1
100 TABLET ORAL NIGHTLY
COMMUNITY
Start: 2019-05-22

## 2020-01-13 RX ORDER — FLUCONAZOLE 150 MG/1
150 TABLET ORAL
COMMUNITY
Start: 2019-12-30 | End: 2020-06-15 | Stop reason: CLARIF

## 2020-01-13 RX ORDER — ROSUVASTATIN CALCIUM 20 MG/1
20 TABLET, COATED ORAL NIGHTLY
COMMUNITY
Start: 2019-10-21 | End: 2022-01-26 | Stop reason: SDUPTHER

## 2020-01-13 RX ORDER — LOSARTAN POTASSIUM 50 MG/1
25 TABLET ORAL 2 TIMES DAILY
COMMUNITY
Start: 2019-09-10 | End: 2021-10-19 | Stop reason: DRUGHIGH

## 2020-01-13 RX ORDER — CARVEDILOL 3.12 MG/1
3.25 TABLET ORAL 2 TIMES DAILY
COMMUNITY
Start: 2019-09-10 | End: 2021-07-26 | Stop reason: DRUGHIGH

## 2020-01-13 RX ORDER — IPRATROPIUM BROMIDE AND ALBUTEROL SULFATE 2.5; .5 MG/3ML; MG/3ML
3 SOLUTION RESPIRATORY (INHALATION) 2 TIMES DAILY
COMMUNITY
Start: 2017-12-05 | End: 2020-01-13 | Stop reason: CLARIF

## 2020-01-13 RX ORDER — SENNOSIDES 8.8 MG/5ML
5 LIQUID ORAL DAILY
COMMUNITY
End: 2020-06-15 | Stop reason: CLARIF

## 2020-01-13 RX ORDER — PRAMIPEXOLE DIHYDROCHLORIDE 1 MG/1
1 TABLET ORAL NIGHTLY
COMMUNITY
Start: 2019-03-18

## 2020-01-13 RX ORDER — POLYETHYLENE GLYCOL 3350 17 G
2 POWDER IN PACKET (EA) ORAL PRN
COMMUNITY
End: 2021-11-01

## 2020-01-13 RX ORDER — METOLAZONE 2.5 MG/1
2.5 TABLET ORAL
COMMUNITY
End: 2021-06-01 | Stop reason: SDUPTHER

## 2020-01-13 RX ORDER — FLUTICASONE PROPIONATE 50 MCG
1 SPRAY, SUSPENSION (ML) NASAL PRN
COMMUNITY
Start: 2019-07-22 | End: 2021-11-01

## 2020-01-13 RX ORDER — MULTIVITAMIN WITH IRON
250 TABLET ORAL AT BEDTIME
Status: ON HOLD | COMMUNITY
End: 2023-01-01 | Stop reason: HOSPADM

## 2020-01-13 ASSESSMENT — ENCOUNTER SYMPTOMS
HEMATOCHEZIA: 0
BRUISES/BLEEDS EASILY: 0
FEVER: 0
WEIGHT LOSS: 1
COUGH: 1
SPUTUM PRODUCTION: 1
CHILLS: 0
SUSPICIOUS LESIONS: 0
ALLERGIC/IMMUNOLOGIC COMMENTS: NO NEW FOOD ALLERGIES
HEMOPTYSIS: 0
WEIGHT GAIN: 0

## 2020-01-13 ASSESSMENT — PATIENT HEALTH QUESTIONNAIRE - PHQ9
SUM OF ALL RESPONSES TO PHQ9 QUESTIONS 1 AND 2: 0
SUM OF ALL RESPONSES TO PHQ9 QUESTIONS 1 AND 2: 0
1. LITTLE INTEREST OR PLEASURE IN DOING THINGS: NOT AT ALL
2. FEELING DOWN, DEPRESSED OR HOPELESS: NOT AT ALL

## 2020-01-13 ASSESSMENT — NEW YORK HEART ASSOCIATION (NYHA) CLASSIFICATION: NYHA FUNCTIONAL CLASS: III

## 2020-01-14 ENCOUNTER — OFFICE VISIT (OUTPATIENT)
Dept: NEPHROLOGY | Facility: CLINIC | Age: 65
End: 2020-01-14
Payer: MEDICARE

## 2020-01-14 ENCOUNTER — TELEPHONE (OUTPATIENT)
Dept: CARDIOLOGY | Age: 65
End: 2020-01-14

## 2020-01-14 ENCOUNTER — CLINICAL ABSTRACT (OUTPATIENT)
Dept: CARDIOLOGY | Age: 65
End: 2020-01-14

## 2020-01-14 VITALS
TEMPERATURE: 98 F | BODY MASS INDEX: 35.85 KG/M2 | SYSTOLIC BLOOD PRESSURE: 121 MMHG | WEIGHT: 210 LBS | HEART RATE: 74 BPM | HEIGHT: 64 IN | DIASTOLIC BLOOD PRESSURE: 73 MMHG

## 2020-01-14 DIAGNOSIS — N05.1 FSGS (FOCAL SEGMENTAL GLOMERULOSCLEROSIS): ICD-10-CM

## 2020-01-14 DIAGNOSIS — R80.9 NON-NEPHROTIC RANGE PROTEINURIA: ICD-10-CM

## 2020-01-14 DIAGNOSIS — N18.30 CKD (CHRONIC KIDNEY DISEASE), STAGE III (HCC): Primary | ICD-10-CM

## 2020-01-14 PROCEDURE — 99214 OFFICE O/P EST MOD 30 MIN: CPT | Performed by: INTERNAL MEDICINE

## 2020-01-14 PROCEDURE — G0463 HOSPITAL OUTPT CLINIC VISIT: HCPCS | Performed by: INTERNAL MEDICINE

## 2020-01-14 NOTE — PROGRESS NOTES
Progress Note     Steffen Sanchez is a 59 yrs old female with pmh of HL, multiple sclerosis (35 yrs), restless leg syndrome, back pain, nephrolithiasis x 3, tobacco abuse who presented today for follow up    Initial lab work showed BUN/Cr 32/0.86 mg/dl wi hypertension    • Hyperlipidemia    • Hypothyroidism    • KIDNEY STONE    • Multiple sclerosis ([facility] [address]    • Renal disorder       Past Surgical History:   Procedure Laterality Date   • APPENDECTOMY     • APPENDECTOMY     •      • KNEE SURGERY  19 NOSTRIL 2 TIMES A  DAY 1 Bottle 3   • BASAGLAR KWIKPEN 100 UNIT/ML Subcutaneous Solution Pen-injector 20 units s/c daily (Patient taking differently: 24 units s/c daily ) 10 pen 3   • traZODone HCl 100 MG Oral Tab TAKE 1 TABLET(100 MG) BY MOUTH EVERY NIGHT lethargy  Cardiovascular:  Negative for chest pain, sob  Respiratory:  + exertional dyspnea - on oxygen  Gastrointestinal:  Negative for abdominal pain, constipation  Genitourinary:  Negative for dysuria and hematuria  Hema/Lymph:  Negative for easy bleedi renal US showed right kidney 11.9 cm or left kidney 10.7 cm with good cortical medullary differentation    2.  Bilateral Leg swelling: improved   - repeat urine studies   - weight 206 lbs - was 211 lbs -> 217 -> 207 lbs  - bumex 2 mg in am and 1 mg q pm  -

## 2020-01-15 NOTE — H&P
Patient seen and examined. Agree with Above  Neck veins flat, no chest pain     Plan:   1.  RHC today to assess filling pressures      Giselle Daniels MD, [facility] [place]  Heart Failure Transplant / Pulmonary HTN

## 2020-01-21 ENCOUNTER — TELEPHONE (OUTPATIENT)
Dept: OTHER | Age: 65
End: 2020-01-21

## 2020-01-21 NOTE — TELEPHONE ENCOUNTER
Patient states has appointment with you for Friday 1/24/20. Asking if she needs labs taken before appointment on Friday. ?   Cholesterol etc?

## 2020-01-23 ENCOUNTER — LAB ENCOUNTER (OUTPATIENT)
Dept: LAB | Age: 65
End: 2020-01-23
Attending: INTERNAL MEDICINE
Payer: MEDICARE

## 2020-01-23 DIAGNOSIS — D75.1 POLYCYTHEMIA: ICD-10-CM

## 2020-01-23 DIAGNOSIS — N18.30 CKD STAGE 3 DUE TO TYPE 2 DIABETES MELLITUS (HCC): ICD-10-CM

## 2020-01-23 DIAGNOSIS — E11.22 CKD STAGE 3 DUE TO TYPE 2 DIABETES MELLITUS (HCC): ICD-10-CM

## 2020-01-23 LAB
ALBUMIN SERPL-MCNC: 3.1 G/DL (ref 3.4–5)
ALBUMIN/GLOB SERPL: 0.8 {RATIO} (ref 1–2)
ALP LIVER SERPL-CCNC: 117 U/L (ref 50–130)
ALT SERPL-CCNC: 31 U/L (ref 13–56)
ANION GAP SERPL CALC-SCNC: 4 MMOL/L (ref 0–18)
AST SERPL-CCNC: 19 U/L (ref 15–37)
BASOPHILS # BLD AUTO: 0.02 X10(3) UL (ref 0–0.2)
BASOPHILS NFR BLD AUTO: 0.3 %
BILIRUB SERPL-MCNC: 0.4 MG/DL (ref 0.1–2)
BUN BLD-MCNC: 21 MG/DL (ref 7–18)
BUN/CREAT SERPL: 25.9 (ref 10–20)
CALCIUM BLD-MCNC: 8.9 MG/DL (ref 8.5–10.1)
CHLORIDE SERPL-SCNC: 105 MMOL/L (ref 98–112)
CO2 SERPL-SCNC: 30 MMOL/L (ref 21–32)
CREAT BLD-MCNC: 0.81 MG/DL (ref 0.55–1.02)
DEPRECATED RDW RBC AUTO: 39.4 FL (ref 35.1–46.3)
EOSINOPHIL # BLD AUTO: 0.08 X10(3) UL (ref 0–0.7)
EOSINOPHIL NFR BLD AUTO: 1.4 %
ERYTHROCYTE [DISTWIDTH] IN BLOOD BY AUTOMATED COUNT: 12 % (ref 11–15)
GLOBULIN PLAS-MCNC: 3.9 G/DL (ref 2.8–4.4)
GLUCOSE BLD-MCNC: 166 MG/DL (ref 70–99)
HCT VFR BLD AUTO: 46.4 % (ref 35–48)
HGB BLD-MCNC: 15.5 G/DL (ref 12–16)
IMM GRANULOCYTES # BLD AUTO: 0.02 X10(3) UL (ref 0–1)
IMM GRANULOCYTES NFR BLD: 0.3 %
LYMPHOCYTES # BLD AUTO: 1.86 X10(3) UL (ref 1–4)
LYMPHOCYTES NFR BLD AUTO: 32.1 %
M PROTEIN MFR SERPL ELPH: 7 G/DL (ref 6.4–8.2)
MCH RBC QN AUTO: 29.9 PG (ref 26–34)
MCHC RBC AUTO-ENTMCNC: 33.4 G/DL (ref 31–37)
MCV RBC AUTO: 89.6 FL (ref 80–100)
MONOCYTES # BLD AUTO: 0.39 X10(3) UL (ref 0.1–1)
MONOCYTES NFR BLD AUTO: 6.7 %
NEUTROPHILS # BLD AUTO: 3.42 X10 (3) UL (ref 1.5–7.7)
NEUTROPHILS # BLD AUTO: 3.42 X10(3) UL (ref 1.5–7.7)
NEUTROPHILS NFR BLD AUTO: 59.2 %
OSMOLALITY SERPL CALC.SUM OF ELEC: 295 MOSM/KG (ref 275–295)
PATIENT FASTING Y/N/NP: YES
PLATELET # BLD AUTO: 233 10(3)UL (ref 150–450)
POTASSIUM SERPL-SCNC: 4 MMOL/L (ref 3.5–5.1)
RBC # BLD AUTO: 5.18 X10(6)UL (ref 3.8–5.3)
SODIUM SERPL-SCNC: 139 MMOL/L (ref 136–145)
WBC # BLD AUTO: 5.8 X10(3) UL (ref 4–11)

## 2020-01-23 PROCEDURE — 85025 COMPLETE CBC W/AUTO DIFF WBC: CPT

## 2020-01-23 PROCEDURE — 80053 COMPREHEN METABOLIC PANEL: CPT

## 2020-01-23 PROCEDURE — 36415 COLL VENOUS BLD VENIPUNCTURE: CPT

## 2020-01-24 ENCOUNTER — OFFICE VISIT (OUTPATIENT)
Dept: INTERNAL MEDICINE CLINIC | Facility: CLINIC | Age: 65
End: 2020-01-24
Payer: MEDICARE

## 2020-01-24 VITALS
RESPIRATION RATE: 18 BRPM | DIASTOLIC BLOOD PRESSURE: 82 MMHG | HEART RATE: 73 BPM | SYSTOLIC BLOOD PRESSURE: 124 MMHG | HEIGHT: 64 IN | WEIGHT: 207 LBS | BODY MASS INDEX: 35.34 KG/M2

## 2020-01-24 DIAGNOSIS — N04.9 NEPHROTIC SYNDROME: ICD-10-CM

## 2020-01-24 DIAGNOSIS — I27.20 PULMONARY HTN (HCC): ICD-10-CM

## 2020-01-24 DIAGNOSIS — N05.1 FSGS (FOCAL SEGMENTAL GLOMERULOSCLEROSIS): ICD-10-CM

## 2020-01-24 DIAGNOSIS — I10 ESSENTIAL HYPERTENSION: ICD-10-CM

## 2020-01-24 DIAGNOSIS — IMO0001 DIABETES MELLITUS, INSULIN DEPENDENT (IDDM), UNCONTROLLED: Primary | ICD-10-CM

## 2020-01-24 DIAGNOSIS — Z72.0 TOBACCO ABUSE DISORDER: ICD-10-CM

## 2020-01-24 DIAGNOSIS — M54.16 LUMBAR RADICULOPATHY: ICD-10-CM

## 2020-01-24 PROCEDURE — G0463 HOSPITAL OUTPT CLINIC VISIT: HCPCS | Performed by: INTERNAL MEDICINE

## 2020-01-24 PROCEDURE — 99214 OFFICE O/P EST MOD 30 MIN: CPT | Performed by: INTERNAL MEDICINE

## 2020-01-25 NOTE — ASSESSMENT & PLAN NOTE
Persistent low back pain sometimes radiating into the right or the left lower extremities. Last MRI in May 2017 did show mild to moderate lumbar spinal stenosis as well as foraminal narrowing.   She is advised to follow-up with Dr. Lela Brooke for up evaluatio

## 2020-01-25 NOTE — ASSESSMENT & PLAN NOTE
Patient has been monitored per nephrology. Renal functions, EGFR and creatinine have improved and almost normalized. Lipid panel looks stable.   Blood sugars remain extremely elevated at this time and hence will need to monitor the kidney functions closel

## 2020-01-25 NOTE — ASSESSMENT & PLAN NOTE
Patient has been followed up per cardiology. Await right heart cath at this time. Bumex doses have been increased. Lower extremity edema has improved.   Renal functions look normal.

## 2020-01-25 NOTE — ASSESSMENT & PLAN NOTE
Blood sugars are quite elevated. Recheck labs in about 2 months. Orders provided. Strict diet control and continue on the same dose of medication. Call with blood sugar records before breakfast and before dinner.   Recheck labs as directed per

## 2020-01-25 NOTE — PATIENT INSTRUCTIONS
Problem List Items Addressed This Visit        Unprioritized    Diabetes mellitus, insulin dependent (IDDM), uncontrolled ([facility] [address] - Primary     Blood sugars are quite elevated. Recheck labs in about 2 months. Orders provided.   Strict diet control and obey Nicorette lozenges at this time. Has not started the Contrave either.

## 2020-01-25 NOTE — PROGRESS NOTES
HPI:    Patient ID: Li Rutledge is a 72year old female. Notes recorded by Alyson Man MD on 1/21/2020 at 8:39 PM CST  Protein in the urine remains elevated.   There is however has now gone up as high as it has been in the past.  The blood counts in filling pressures and worsening diastolic dysfunction. She is predominantly confined to a wheelchair due to her MS, and does not have significant physical activity. Cardiac Diagnostics:    Echocardiogram 2017: Normal LV and RV function.  EF 50 to 5 PM CST        Hypertension   This is a chronic problem. The current episode started more than 1 year ago. The problem has been gradually improving since onset. The problem is controlled. Associated symptoms include anxiety and headaches.  Pertinent negative stress and tobacco exposure. Current diabetic treatment includes diet and insulin injections (lantus 10 units a day). She is compliant with treatment most of the time. Her weight is stable.  She is following a diabetic, generally healthy, high fiber, low fa Negative. Musculoskeletal: Positive for arthralgias, back pain and gait problem. Last mri ls spine 2017    =====  CONCLUSION:      L2-L3: Mild central stenosis. L3-L4: Mild to moderate central stenosis.  Mild to moderate left greater than right f KWIKPEN 100 UNIT/ML Subcutaneous Solution Pen-injector 20 units s/c daily (Patient taking differently: 24 units s/c daily ) 10 pen 3   • traZODone HCl 100 MG Oral Tab TAKE 1 TABLET(100 MG) BY MOUTH EVERY NIGHT AT BEDTIME 90 tablet 2   • melatonin 3 MG Oral normal.   Left Ear: External ear normal.   Nose: Nose normal.   Mouth/Throat: Oropharynx is clear and moist. No oropharyngeal exudate. Eyes: Pupils are equal, round, and reactive to light. Conjunctivae and EOM are normal. Right eye exhibits no discharge. quite elevated. Recheck labs in about 2 months. Orders provided. Strict diet control and continue on the same dose of medication. Call with blood sugar records before breakfast and before dinner.   Recheck labs as directed per         Relevant Orders

## 2020-01-25 NOTE — ASSESSMENT & PLAN NOTE
Patient continues to smoke about 6 cigarettes a day.,  Has not started on the Nicorette lozenges at this time. Has not started the Contrave either.

## 2020-01-25 NOTE — ASSESSMENT & PLAN NOTE
Blood pressure 124/82, pulse 73, resp. rate 18, height 5' 4\" (1.626 m), weight 207 lb (93.9 kg), not currently breastfeeding. Will blood pressure, well controlled at this time. Continue on losartan 50 mg twice daily, Bumex 2 mg in a.m. and 1 mg in p.m.

## 2020-01-30 ENCOUNTER — HOSPITAL ENCOUNTER (OUTPATIENT)
Dept: INTERVENTIONAL RADIOLOGY/VASCULAR | Facility: HOSPITAL | Age: 65
Discharge: HOME | End: 2020-01-30
Attending: INTERNAL MEDICINE | Admitting: INTERNAL MEDICINE
Payer: MEDICARE

## 2020-01-30 VITALS
BODY MASS INDEX: 35.34 KG/M2 | DIASTOLIC BLOOD PRESSURE: 63 MMHG | SYSTOLIC BLOOD PRESSURE: 124 MMHG | OXYGEN SATURATION: 95 % | WEIGHT: 207 LBS | HEART RATE: 77 BPM | RESPIRATION RATE: 24 BRPM | HEIGHT: 64 IN

## 2020-01-30 DIAGNOSIS — I27.20 PULMONARY HYPERTENSION (HCC): ICD-10-CM

## 2020-01-30 DIAGNOSIS — I27.20 PULMONARY HYPERTENSION (CMD): ICD-10-CM

## 2020-01-30 LAB — GLUCOSE BLDC GLUCOMTR-MCNC: 173 MG/DL (ref 70–99)

## 2020-01-30 PROCEDURE — 4A023N6 MEASUREMENT OF CARDIAC SAMPLING AND PRESSURE, RIGHT HEART, PERCUTANEOUS APPROACH: ICD-10-PCS | Performed by: INTERNAL MEDICINE

## 2020-01-30 PROCEDURE — 82962 GLUCOSE BLOOD TEST: CPT

## 2020-01-30 PROCEDURE — 93451 RIGHT HEART CATH: CPT | Performed by: INTERNAL MEDICINE

## 2020-01-30 PROCEDURE — 36415 COLL VENOUS BLD VENIPUNCTURE: CPT

## 2020-01-30 PROCEDURE — 93451 RIGHT HEART CATH: CPT

## 2020-01-30 RX ORDER — SPIRONOLACTONE 25 MG/1
12.5 TABLET ORAL DAILY
Qty: 30 TABLET | Refills: 3 | Status: SHIPPED | OUTPATIENT
Start: 2020-01-30 | End: 2021-12-28

## 2020-01-30 RX ORDER — BUMETANIDE 1 MG/1
TABLET ORAL
Qty: 180 TABLET | Refills: 0 | Status: SHIPPED | OUTPATIENT
Start: 2020-01-30 | End: 2021-02-11 | Stop reason: ALTCHOICE

## 2020-01-30 RX ORDER — MIDAZOLAM HYDROCHLORIDE 1 MG/ML
INJECTION INTRAMUSCULAR; INTRAVENOUS
Status: DISCONTINUED
Start: 2020-01-30 | End: 2020-01-30 | Stop reason: WASHOUT

## 2020-01-30 RX ORDER — SODIUM CHLORIDE 9 MG/ML
INJECTION, SOLUTION INTRAVENOUS
Status: COMPLETED | OUTPATIENT
Start: 2020-01-30 | End: 2020-01-30

## 2020-01-30 RX ORDER — LIDOCAINE HYDROCHLORIDE 20 MG/ML
INJECTION, SOLUTION EPIDURAL; INFILTRATION; INTRACAUDAL; PERINEURAL
Status: COMPLETED
Start: 2020-01-30 | End: 2020-01-30

## 2020-01-30 RX ORDER — CHLORHEXIDINE GLUCONATE 4 G/100ML
30 SOLUTION TOPICAL
Status: DISCONTINUED | OUTPATIENT
Start: 2020-01-30 | End: 2020-01-30

## 2020-01-30 RX ORDER — POTASSIUM CHLORIDE 20 MEQ/1
20 TABLET, EXTENDED RELEASE ORAL DAILY
Qty: 270 TABLET | Refills: 0 | Status: SHIPPED | OUTPATIENT
Start: 2020-01-30

## 2020-01-30 RX ORDER — BUMETANIDE 0.25 MG/ML
1 INJECTION, SOLUTION INTRAMUSCULAR; INTRAVENOUS ONCE
Status: COMPLETED | OUTPATIENT
Start: 2020-01-30 | End: 2020-01-30

## 2020-01-30 RX ADMIN — BUMETANIDE 1 MG: 0.25 INJECTION, SOLUTION INTRAMUSCULAR; INTRAVENOUS at 10:19:00

## 2020-01-30 RX ADMIN — SODIUM CHLORIDE: 9 INJECTION, SOLUTION INTRAVENOUS at 07:45:00

## 2020-01-30 NOTE — PROCEDURES
Verónica Ill  2/1/1955      Procedure:  1) Right Heart Catheterization    Reason for Procedure: dyspnea     Procedure Summary:  1. Risks and Benefits were explained to patient. Informed Consent was obtained  2.  Patient prepped and draped in usual steri

## 2020-02-16 ENCOUNTER — APPOINTMENT (OUTPATIENT)
Dept: LAB | Facility: HOSPITAL | Age: 65
End: 2020-02-16
Attending: INTERNAL MEDICINE
Payer: MEDICARE

## 2020-02-16 DIAGNOSIS — I27.20 PULMONARY HYPERTENSION (HCC): ICD-10-CM

## 2020-02-16 LAB
ANION GAP SERPL CALC-SCNC: 3 MMOL/L (ref 0–18)
BUN BLD-MCNC: 31 MG/DL (ref 7–18)
BUN SERPL-MCNC: 31 MG/DL
BUN/CREAT SERPL: 30.1 (ref 10–20)
CALCIUM BLD-MCNC: 8.6 MG/DL (ref 8.5–10.1)
CALCIUM SERPL-MCNC: 8.6 MG/DL
CHLORIDE SERPL-SCNC: 102 MMOL/L
CHLORIDE SERPL-SCNC: 102 MMOL/L (ref 98–112)
CO2 SERPL-SCNC: 32 MMOL/L (ref 21–32)
CREAT BLD-MCNC: 1.03 MG/DL (ref 0.55–1.02)
CREAT SERPL-MCNC: 1.03 MG/DL
GLUCOSE BLD-MCNC: 300 MG/DL (ref 70–99)
GLUCOSE SERPL-MCNC: 300 MG/DL
OSMOLALITY SERPL CALC.SUM OF ELEC: 302 MOSM/KG (ref 275–295)
PATIENT FASTING Y/N/NP: NO
POTASSIUM SERPL-SCNC: 3.8 MMOL/L
POTASSIUM SERPL-SCNC: 3.8 MMOL/L (ref 3.5–5.1)
SODIUM SERPL-SCNC: 137 MMOL/L
SODIUM SERPL-SCNC: 137 MMOL/L (ref 136–145)

## 2020-02-16 PROCEDURE — 36415 COLL VENOUS BLD VENIPUNCTURE: CPT

## 2020-02-16 PROCEDURE — 80048 BASIC METABOLIC PNL TOTAL CA: CPT

## 2020-02-17 ENCOUNTER — OFFICE VISIT (OUTPATIENT)
Dept: CARDIOLOGY | Age: 65
End: 2020-02-17

## 2020-02-17 VITALS
HEART RATE: 72 BPM | OXYGEN SATURATION: 95 % | RESPIRATION RATE: 18 BRPM | DIASTOLIC BLOOD PRESSURE: 64 MMHG | HEIGHT: 64 IN | BODY MASS INDEX: 35 KG/M2 | SYSTOLIC BLOOD PRESSURE: 108 MMHG | WEIGHT: 205 LBS

## 2020-02-17 DIAGNOSIS — I50.9 CONGESTIVE HEART FAILURE, UNSPECIFIED HF CHRONICITY, UNSPECIFIED HEART FAILURE TYPE (CMD): ICD-10-CM

## 2020-02-17 DIAGNOSIS — I27.20 PULMONARY HYPERTENSION (CMD): Primary | ICD-10-CM

## 2020-02-17 PROCEDURE — 99215 OFFICE O/P EST HI 40 MIN: CPT | Performed by: INTERNAL MEDICINE

## 2020-02-17 RX ORDER — SPIRONOLACTONE 25 MG/1
25 TABLET ORAL DAILY
Qty: 90 TABLET | Refills: 3 | Status: SHIPPED | OUTPATIENT
Start: 2020-02-17 | End: 2021-05-19

## 2020-02-17 RX ORDER — SPIRONOLACTONE 25 MG/1
12.5 TABLET ORAL DAILY
COMMUNITY
Start: 2020-01-30 | End: 2020-02-17 | Stop reason: SDUPTHER

## 2020-02-17 RX ORDER — FLUCONAZOLE 150 MG/1
TABLET ORAL
Qty: 3 TABLET | Refills: 0 | Status: SHIPPED | OUTPATIENT
Start: 2020-02-17 | End: 2021-02-11 | Stop reason: ALTCHOICE

## 2020-02-17 ASSESSMENT — PATIENT HEALTH QUESTIONNAIRE - PHQ9
SUM OF ALL RESPONSES TO PHQ9 QUESTIONS 1 AND 2: 0
2. FEELING DOWN, DEPRESSED OR HOPELESS: NOT AT ALL
1. LITTLE INTEREST OR PLEASURE IN DOING THINGS: NOT AT ALL
SUM OF ALL RESPONSES TO PHQ9 QUESTIONS 1 AND 2: 0

## 2020-02-17 ASSESSMENT — ENCOUNTER SYMPTOMS
HEMATOCHEZIA: 0
HEMOPTYSIS: 0
WEIGHT LOSS: 1
SUSPICIOUS LESIONS: 0
BRUISES/BLEEDS EASILY: 0
FEVER: 0
ALLERGIC/IMMUNOLOGIC COMMENTS: NO NEW FOOD ALLERGIES
CHILLS: 0
COUGH: 1
WEIGHT GAIN: 0
SPUTUM PRODUCTION: 1

## 2020-02-17 NOTE — TELEPHONE ENCOUNTER
Current Outpatient Medications   • fluconazole (DIFLUCAN) 150 MG Oral Tab 1 tablet by mouth every weekly 3 tablet 0

## 2020-02-18 ENCOUNTER — TELEPHONE (OUTPATIENT)
Dept: CARDIOLOGY | Age: 65
End: 2020-02-18

## 2020-02-18 NOTE — TELEPHONE ENCOUNTER
Review pended refill request as it does not fall under a protocol.   Requested Prescriptions     Pending Prescriptions Disp Refills   • fluconazole (DIFLUCAN) 150 MG Oral Tab 3 tablet 0     Si tablet by mouth every weekly         Recent Visits  Date Typ

## 2020-02-20 RX ORDER — FLUTICASONE FUROATE, UMECLIDINIUM BROMIDE AND VILANTEROL TRIFENATATE 100; 62.5; 25 UG/1; UG/1; UG/1
POWDER RESPIRATORY (INHALATION)
Qty: 60 EACH | Refills: 0 | Status: SHIPPED | OUTPATIENT
Start: 2020-02-20 | End: 2020-03-23

## 2020-02-20 RX ORDER — METOLAZONE 2.5 MG/1
2.5 TABLET ORAL
Qty: 8 TABLET | Refills: 3 | Status: SHIPPED | OUTPATIENT
Start: 2020-02-20 | End: 2020-07-20

## 2020-02-20 NOTE — TELEPHONE ENCOUNTER
LOV 1/14/20. Metolazone decreased to twice weekly in telephone encounter 12/19/19. No further changes made at LOV according to note. RTC 3-4 months.

## 2020-02-27 DIAGNOSIS — J30.1 ALLERGIC RHINITIS DUE TO POLLEN, UNSPECIFIED SEASONALITY: ICD-10-CM

## 2020-02-28 RX ORDER — MONTELUKAST SODIUM 10 MG/1
10 TABLET ORAL NIGHTLY
Qty: 90 TABLET | Refills: 1 | Status: SHIPPED | OUTPATIENT
Start: 2020-02-28 | End: 2020-10-10

## 2020-02-28 NOTE — TELEPHONE ENCOUNTER
Refill passed per [facility] protocol.   Refill Protocol Appointment Criteria  · Appointment scheduled in the past 6 months or in the next 3 months  Recent Outpatient Visits            1 month ago Diabetes mellitus, insulin dependent (IDDM), uncontroll

## 2020-03-21 RX ORDER — LEVOTHYROXINE SODIUM 0.03 MG/1
TABLET ORAL
Qty: 90 TABLET | Refills: 1 | Status: SHIPPED | OUTPATIENT
Start: 2020-03-21 | End: 2021-02-05

## 2020-03-23 ENCOUNTER — TELEPHONE (OUTPATIENT)
Dept: INTERNAL MEDICINE CLINIC | Facility: CLINIC | Age: 65
End: 2020-03-23

## 2020-03-23 RX ORDER — FLUTICASONE FUROATE, UMECLIDINIUM BROMIDE AND VILANTEROL TRIFENATATE 100; 62.5; 25 UG/1; UG/1; UG/1
POWDER RESPIRATORY (INHALATION)
Qty: 60 EACH | Refills: 0 | Status: SHIPPED | OUTPATIENT
Start: 2020-03-23 | End: 2020-06-17

## 2020-03-23 NOTE — TELEPHONE ENCOUNTER
Patient requesting refill.  Per patient she is taking 24 units of the Basaglar       Albuterol Sulfate (PROAIR RESPICLICK) 678 (90 Base) MCG/ACT Inhalation Aerosol Powder, Breath Activated        BASAGLAR KWIKPEN 100 UNIT/ML Subcutaneous Solution Pen-injector

## 2020-03-24 RX ORDER — ALBUTEROL SULFATE 90 UG/1
2 AEROSOL, METERED RESPIRATORY (INHALATION) EVERY 4 HOURS PRN
Qty: 1 INHALER | Refills: 6 | Status: SHIPPED | OUTPATIENT
Start: 2020-03-24 | End: 2021-10-04

## 2020-03-24 NOTE — TELEPHONE ENCOUNTER
Sky sent a message to prescriber:  Can we change to regular PROAIR?  Walker Laughter is on back order

## 2020-03-30 ENCOUNTER — APPOINTMENT (OUTPATIENT)
Dept: CARDIOLOGY | Age: 65
End: 2020-03-30

## 2020-04-06 RX ORDER — PRAMIPEXOLE DIHYDROCHLORIDE 1 MG/1
TABLET ORAL
Qty: 270 TABLET | Refills: 0 | Status: SHIPPED | OUTPATIENT
Start: 2020-04-06 | End: 2021-03-05

## 2020-04-06 NOTE — TELEPHONE ENCOUNTER
LOV 1/14/20. RTC 3-4 months (April-May 2020). No changes to carvedilol at LOV. Upcoming appt scheduled 4/14/20.

## 2020-04-07 RX ORDER — CARVEDILOL 3.12 MG/1
TABLET ORAL
Qty: 180 TABLET | Refills: 0 | Status: SHIPPED | OUTPATIENT
Start: 2020-04-07 | End: 2020-08-27

## 2020-04-13 ENCOUNTER — TELEPHONE (OUTPATIENT)
Dept: NEPHROLOGY | Facility: CLINIC | Age: 65
End: 2020-04-13

## 2020-04-14 ENCOUNTER — TELEMEDICINE (OUTPATIENT)
Dept: NEPHROLOGY | Facility: CLINIC | Age: 65
End: 2020-04-14

## 2020-04-14 DIAGNOSIS — N05.1 FSGS (FOCAL SEGMENTAL GLOMERULOSCLEROSIS): ICD-10-CM

## 2020-04-14 DIAGNOSIS — N18.30 CKD (CHRONIC KIDNEY DISEASE), STAGE III (HCC): Primary | ICD-10-CM

## 2020-04-14 DIAGNOSIS — R80.9 NON-NEPHROTIC RANGE PROTEINURIA: ICD-10-CM

## 2020-04-14 PROCEDURE — 99213 OFFICE O/P EST LOW 20 MIN: CPT | Performed by: INTERNAL MEDICINE

## 2020-04-14 NOTE — PROGRESS NOTES
Progress Note     Samson Díaz is a 59 yrs old female with pmh of HL, multiple sclerosis (35 yrs), mostly on wheel chair  restless leg syndrome, back pain, nephrolithiasis x 3, tobacco abuse who presented today for follow up    Initial lab work showed Hypothyroidism    • KIDNEY STONE    • Multiple sclerosis ([facility] [address]    • Renal disorder       Past Surgical History:   Procedure Laterality Date   • APPENDECTOMY     • APPENDECTOMY     •      • KNEE SURGERY             Medications (Active prior differently: Take 50 mg by mouth 2 (two) times daily.   ) 180 tablet 1   • Rosuvastatin Calcium 20 MG Oral Tab TAKE 1 TABLET BY MOUTH EVERY NIGHT 90 tablet 1   • nicotine polacrilex (NICORETTE) 2 MG Mouth/Throat Lozenge Place 2 mg inside cheek as needed for for inappropriate interaction       No fever, BP in 130/70s range    PHYSICAL EXAM:     Constitutional: appears well hydrated alert and responsive   Extremities: 3+ve edema  Neurological:  Grossly normal    ASSESSMENT/PLAN:     1. CKD stage II: FSGS:   - F Amanda Bach MD

## 2020-04-16 NOTE — PATIENT INSTRUCTIONS
Follow up in June   Lab test prior to next visit as ordered by Dr. Ananda Kilpatrick  Daily weights and take diuretics regularly

## 2020-05-01 ENCOUNTER — TELEPHONE (OUTPATIENT)
Dept: INTERNAL MEDICINE CLINIC | Facility: CLINIC | Age: 65
End: 2020-05-01

## 2020-05-01 NOTE — TELEPHONE ENCOUNTER
Patient calling has appt.  for next Friday ; wants to change to virtual visit   Appt changed to virtual visit , link sent to her MyChart       Unsure if she would have her blood work done before her visit due to \" fear of COVID \" as she is high risk     A

## 2020-05-02 NOTE — TELEPHONE ENCOUNTER
Okay to do the blood test–it is safe to go to [facility] with a mask and have the labs done before the system opens up and there are many more people in the waiting area.

## 2020-05-04 ENCOUNTER — TELEPHONE (OUTPATIENT)
Dept: INTERNAL MEDICINE CLINIC | Facility: CLINIC | Age: 65
End: 2020-05-04

## 2020-05-04 ENCOUNTER — LAB ENCOUNTER (OUTPATIENT)
Dept: LAB | Facility: HOSPITAL | Age: 65
End: 2020-05-04
Attending: INTERNAL MEDICINE
Payer: MEDICARE

## 2020-05-04 DIAGNOSIS — E11.9 TYPE 2 DIABETES MELLITUS WITHOUT COMPLICATION, WITH LONG-TERM CURRENT USE OF INSULIN (HCC): ICD-10-CM

## 2020-05-04 DIAGNOSIS — IMO0001 DIABETES MELLITUS, INSULIN DEPENDENT (IDDM), UNCONTROLLED: ICD-10-CM

## 2020-05-04 DIAGNOSIS — Z79.4 TYPE 2 DIABETES MELLITUS WITHOUT COMPLICATION, WITH LONG-TERM CURRENT USE OF INSULIN (HCC): Primary | ICD-10-CM

## 2020-05-04 DIAGNOSIS — N18.30 CKD (CHRONIC KIDNEY DISEASE), STAGE III (HCC): ICD-10-CM

## 2020-05-04 DIAGNOSIS — E11.9 TYPE 2 DIABETES MELLITUS WITHOUT COMPLICATION, WITH LONG-TERM CURRENT USE OF INSULIN (HCC): Primary | ICD-10-CM

## 2020-05-04 DIAGNOSIS — Z79.4 TYPE 2 DIABETES MELLITUS WITHOUT COMPLICATION, WITH LONG-TERM CURRENT USE OF INSULIN (HCC): ICD-10-CM

## 2020-05-04 DIAGNOSIS — R80.9 NON-NEPHROTIC RANGE PROTEINURIA: ICD-10-CM

## 2020-05-04 DIAGNOSIS — I27.20 PULMONARY HYPERTENSION (HCC): ICD-10-CM

## 2020-05-04 LAB
ALBUMIN SERPL-MCNC: 2.7 G/DL
ALP SERPL-CCNC: 108 U/L
ALT SERPL-CCNC: 23 U/L
ALT SERPL-CCNC: 23 UNITS/L
AST SERPL-CCNC: 13 U/L
AST SERPL-CCNC: 13 UNITS/L
BILIRUB SERPL-MCNC: 0.5 MG/DL
BILIRUB SERPL-MCNC: 0.5 MG/DL
BUN SERPL-MCNC: 24 MG/DL
BUN SERPL-MCNC: 24 MG/DL
CALCIUM SERPL-MCNC: 8.8 MG/DL
CALCIUM SERPL-MCNC: 8.8 MG/DL
CHLORIDE SERPL-SCNC: 106 MMOL/L
CHLORIDE SERPL-SCNC: 106 MMOL/L
CHOLEST SERPL-MCNC: 205 MG/DL
CREAT SERPL-MCNC: 0.88 MG/DL
CREAT SERPL-MCNC: 0.88 MG/DL
GLOBULIN SER-MCNC: 4 G/DL
GLOBULIN SER-MCNC: 4 G/DL
GLUCOSE SERPL-MCNC: 156 MG/DL
GLUCOSE SERPL-MCNC: 156 MG/DL
HCT VFR BLD CALC: 44.5 %
HDLC SERPL-MCNC: 35 MG/DL
HGB BLD-MCNC: 15.4 G/DL
LDLC SERPL CALC-MCNC: 126 MG/DL
NONHDLC SERPL-MCNC: 170 MG/DL
PLATELET # BLD: 260 K/MCL
POTASSIUM SERPL-SCNC: 3.9 MMOL/L
POTASSIUM SERPL-SCNC: 3.9 MMOL/L
PROT SERPL-MCNC: 6.7 G/DL
RBC # BLD: 4.91 10*6/UL
SODIUM SERPL-SCNC: 138 MMOL/L
SODIUM SERPL-SCNC: 138 MMOL/L
TRIGL SERPL-MCNC: 219 MG/DL
WBC # BLD: 6.9 K/MCL

## 2020-05-04 PROCEDURE — 80061 LIPID PANEL: CPT

## 2020-05-04 PROCEDURE — 36415 COLL VENOUS BLD VENIPUNCTURE: CPT

## 2020-05-04 PROCEDURE — 82570 ASSAY OF URINE CREATININE: CPT

## 2020-05-04 PROCEDURE — 85025 COMPLETE CBC W/AUTO DIFF WBC: CPT

## 2020-05-04 PROCEDURE — 83036 HEMOGLOBIN GLYCOSYLATED A1C: CPT

## 2020-05-04 PROCEDURE — 82043 UR ALBUMIN QUANTITATIVE: CPT

## 2020-05-04 PROCEDURE — 84100 ASSAY OF PHOSPHORUS: CPT

## 2020-05-04 PROCEDURE — 81001 URINALYSIS AUTO W/SCOPE: CPT

## 2020-05-04 PROCEDURE — 80053 COMPREHEN METABOLIC PANEL: CPT

## 2020-05-04 NOTE — TELEPHONE ENCOUNTER
Jailene in reference lab (ext [id number]) reporting dx code diabetes insulin dependent uncontrolled not passing Medicare. Keeps getting pop up ABN's for lipid, HBA1C and CBC. Needs recoding. Pt in lab now.      Tasked to Dr Gurjit Villagomez    Please reply to pool: EM

## 2020-05-05 ENCOUNTER — CLINICAL ABSTRACT (OUTPATIENT)
Dept: CARDIOLOGY | Age: 65
End: 2020-05-05

## 2020-05-07 ENCOUNTER — TELEMEDICINE (OUTPATIENT)
Dept: INTERNAL MEDICINE CLINIC | Facility: CLINIC | Age: 65
End: 2020-05-07
Payer: MEDICARE

## 2020-05-07 VITALS — TEMPERATURE: 99 F | BODY MASS INDEX: 36 KG/M2 | WEIGHT: 211 LBS

## 2020-05-07 DIAGNOSIS — N05.1 FSGS (FOCAL SEGMENTAL GLOMERULOSCLEROSIS): ICD-10-CM

## 2020-05-07 DIAGNOSIS — E78.2 MIXED HYPERLIPIDEMIA: ICD-10-CM

## 2020-05-07 DIAGNOSIS — E11.21 TYPE 2 DIABETES MELLITUS WITH DIABETIC NEPHROPATHY, WITH LONG-TERM CURRENT USE OF INSULIN (HCC): ICD-10-CM

## 2020-05-07 DIAGNOSIS — N39.46 MIXED STRESS AND URGE URINARY INCONTINENCE: ICD-10-CM

## 2020-05-07 DIAGNOSIS — I27.20 PULMONARY HTN (HCC): ICD-10-CM

## 2020-05-07 DIAGNOSIS — J43.9 PULMONARY EMPHYSEMA, UNSPECIFIED EMPHYSEMA TYPE (HCC): ICD-10-CM

## 2020-05-07 DIAGNOSIS — Z12.31 VISIT FOR SCREENING MAMMOGRAM: ICD-10-CM

## 2020-05-07 DIAGNOSIS — G35 MULTIPLE SCLEROSIS (HCC): ICD-10-CM

## 2020-05-07 DIAGNOSIS — I10 ESSENTIAL HYPERTENSION: Primary | ICD-10-CM

## 2020-05-07 DIAGNOSIS — E03.9 HYPOTHYROIDISM, UNSPECIFIED TYPE: ICD-10-CM

## 2020-05-07 DIAGNOSIS — Z79.4 TYPE 2 DIABETES MELLITUS WITH DIABETIC NEPHROPATHY, WITH LONG-TERM CURRENT USE OF INSULIN (HCC): ICD-10-CM

## 2020-05-07 PROBLEM — E11.29 TYPE 2 DIABETES MELLITUS WITH KIDNEY COMPLICATION, WITH LONG-TERM CURRENT USE OF INSULIN (HCC): Status: ACTIVE | Noted: 2017-01-31

## 2020-05-07 PROCEDURE — 99214 OFFICE O/P EST MOD 30 MIN: CPT | Performed by: INTERNAL MEDICINE

## 2020-05-07 NOTE — PROGRESS NOTES
HPI:    Patient ID: Chidi Carrillo is a 72year old female.     Telehealth outside of [facility] [address] Verbal Consent   I conducted a telehealth visit with Chidi Carrillo today, 05/07/20, which was completed using two-way, real-time interactive audio and calcium levels look normal.   The blood counts look normal-no anemia.    The urine test look normal.   Urine microalbumin levels remain slightly elevated at 60 but has improved from 91 in the past.     Per Dr Amie Bush:  72year old female wi chronic problem. The current episode started more than 1 year ago. The problem is uncontrolled. Recent lipid tests were reviewed and are variable. Exacerbating diseases include obesity. There are no known factors aggravating her hyperlipidemia.  Current ant being taken. She does not see a podiatrist.Eye exam is not current. Kidney Problem   This is a chronic problem. The current episode started more than 1 year ago. The problem occurs constantly.  The problem has been gradually improving (urine microalbumin TABLET BY MOUTH TWICE DAILY 180 tablet 0   • PRAMIPEXOLE DIHYDROCHLORIDE 1 MG Oral Tab TAKE 1 TABLET BY MOUTH THREE TIMES DAILY 270 tablet 0   • Albuterol Sulfate HFA (PROAIR HFA) 108 (90 Base) MCG/ACT Inhalation Aero Soln Inhale 2 puffs into the lungs brittney (HUMALOG KWIKPEN) 200 UNIT/ML Subcutaneous Solution Pen-injector 5 units subcutaneously with every meal (Patient taking differently: 5 units subcutaneously with breakfast only ) 5 pen 3   • Fluticasone Propionate 50 MCG/ACT Nasal Suspension 1 PUFF EACH NOS Items Addressed This Visit        Unprioritized    Multiple sclerosis ([address]     History of MS extremities chair dependent. Muscle spasm continues to be an issue especially lower extremity.            Pulmonary emphysema ([address]     Patient continues to smoke creatinine have remained stable. Triglycerides remain slightly elevated. Pulmonary HTN ([address]     Seen by emilee Pierson/[facility] heart cath    Impression:   1. Upper normal RH filling pressure  2. Mildly elevated LH filling pressure  3.  Mild seconda

## 2020-05-07 NOTE — PATIENT INSTRUCTIONS
Problem List Items Addressed This Visit        Unprioritized    FSGS (focal segmental glomerulosclerosis)    HTN (hypertension) - Primary    Hyperlipidemia    Hypothyroidism    Multiple sclerosis ([address]    Pulmonary emphysema ([address]    Pulmonary HTN ([address]

## 2020-06-01 NOTE — ASSESSMENT & PLAN NOTE
Ongoing problems with urinary incontinence in spite of being on Vesicare. Referral to urogynecology provided.

## 2020-06-01 NOTE — ASSESSMENT & PLAN NOTE
[facility]    Impression:   1. Upper normal RH filling pressure  2. Mildly elevated LH filling pressure  3. Mild secondary Pulm HTN, with mildly elevated PVR , though low   PVR/SVR ratio  4. Stable CI   5.  Elevated SVR       Allyson

## 2020-06-01 NOTE — ASSESSMENT & PLAN NOTE
History of MS extremities chair dependent. Muscle spasm continues to be an issue especially lower extremity.

## 2020-06-01 NOTE — ASSESSMENT & PLAN NOTE
Blood pressures have been stable. Continue on losartan 50 mg twice daily, Bumex 2 mg in the a.m. and 1 mg in p.m., Coreg 3.125 mg daily. She has been started on spironolactone after evaluation per cardiology. Follow-up on labs as directed.

## 2020-06-01 NOTE — ASSESSMENT & PLAN NOTE
Thyroid function tests have been stable on levothyroxine at 25 mcg daily. Continue the same dose of medication and follow-up with labs.

## 2020-06-01 NOTE — ASSESSMENT & PLAN NOTE
Diabetes improved,better compliance with meds but diet needs to improve.   Pt has just started a diet control regimen  Adv to recheck labs and f/u in 3 months

## 2020-06-01 NOTE — ASSESSMENT & PLAN NOTE
Lipid panel shows persistent elevation in triglycerides. LDL is borderline elevated. Patient would like to continue with diet control at this time. Follow-up labs ordered. Continue on Crestor 20 mg daily.

## 2020-06-01 NOTE — ASSESSMENT & PLAN NOTE
Patient has been closely monitored per nephrology. Renal functions and EGFR as well as creatinine have remained stable. Triglycerides remain slightly elevated.

## 2020-06-01 NOTE — ASSESSMENT & PLAN NOTE
Patient continues to smoke. Last PFT moderate obstructive defect and significant postbronchodilator response. Trelegy has been tested before occasionally. Has not needed to use the nebulizer. Smoking cessation counseling given. Nguyne Krishna Patient: Mary Jane Song    

## 2020-06-15 ENCOUNTER — E-ADVICE (OUTPATIENT)
Dept: CARDIOLOGY | Age: 65
End: 2020-06-15

## 2020-06-15 ENCOUNTER — TELEPHONE (OUTPATIENT)
Dept: CARDIOLOGY | Age: 65
End: 2020-06-15

## 2020-06-15 ENCOUNTER — V-VISIT (OUTPATIENT)
Dept: CARDIOLOGY | Age: 65
End: 2020-06-15

## 2020-06-15 DIAGNOSIS — I27.20 PULMONARY HYPERTENSION (CMD): Primary | ICD-10-CM

## 2020-06-15 DIAGNOSIS — I50.812 CHRONIC RIGHT-SIDED HEART FAILURE (CMD): ICD-10-CM

## 2020-06-15 PROCEDURE — 99358 PROLONG SERVICE W/O CONTACT: CPT | Performed by: INTERNAL MEDICINE

## 2020-06-15 PROCEDURE — 99443 TELEPHONE E&M BY PHYSICIAN EST PT NOT ORIG PREV 7 DAYS 21-30 MIN: CPT | Performed by: INTERNAL MEDICINE

## 2020-06-15 ASSESSMENT — ENCOUNTER SYMPTOMS
COUGH: 1
HEMATOCHEZIA: 0
BRUISES/BLEEDS EASILY: 0
SUSPICIOUS LESIONS: 0
FEVER: 0
CHILLS: 1
HEMOPTYSIS: 0
WEIGHT GAIN: 0
ALLERGIC/IMMUNOLOGIC COMMENTS: NO NEW FOOD ALLERGIES
SPUTUM PRODUCTION: 1

## 2020-06-17 ENCOUNTER — TELEPHONE (OUTPATIENT)
Dept: INTERNAL MEDICINE CLINIC | Facility: CLINIC | Age: 65
End: 2020-06-17

## 2020-06-17 RX ORDER — FLUTICASONE FUROATE, UMECLIDINIUM BROMIDE AND VILANTEROL TRIFENATATE 100; 62.5; 25 UG/1; UG/1; UG/1
POWDER RESPIRATORY (INHALATION)
Qty: 60 EACH | Refills: 0 | Status: SHIPPED | OUTPATIENT
Start: 2020-06-17 | End: 2020-08-03

## 2020-06-18 RX ORDER — ROSUVASTATIN CALCIUM 20 MG/1
TABLET, COATED ORAL
Qty: 90 TABLET | Refills: 1 | Status: SHIPPED | OUTPATIENT
Start: 2020-06-18 | End: 2021-01-24

## 2020-06-22 ENCOUNTER — CLINICAL ABSTRACT (OUTPATIENT)
Dept: CARDIOLOGY | Age: 65
End: 2020-06-22

## 2020-06-26 ENCOUNTER — E-ADVICE (OUTPATIENT)
Dept: CARDIOLOGY | Age: 65
End: 2020-06-26

## 2020-06-27 ENCOUNTER — LAB ENCOUNTER (OUTPATIENT)
Dept: LAB | Facility: HOSPITAL | Age: 65
End: 2020-06-27
Attending: INTERNAL MEDICINE
Payer: MEDICARE

## 2020-06-27 DIAGNOSIS — I50.812 CHRONIC RIGHT-SIDED HEART FAILURE (HCC): Primary | ICD-10-CM

## 2020-06-27 DIAGNOSIS — R80.9 NON-NEPHROTIC RANGE PROTEINURIA: ICD-10-CM

## 2020-06-27 DIAGNOSIS — N18.30 CKD (CHRONIC KIDNEY DISEASE), STAGE III (HCC): ICD-10-CM

## 2020-06-27 LAB
ALBUMIN SERPL-MCNC: 2.9 G/DL
ANION GAP SERPL CALC-SCNC: 5 MMOL/L
BUN SERPL-MCNC: 53 MG/DL
BUN SERPL-MCNC: 53 MG/DL
BUN/CREAT SERPL: 47.7
CALCIUM SERPL-MCNC: 8.7 MG/DL
CALCIUM SERPL-MCNC: 8.7 MG/DL
CHLORIDE SERPL-SCNC: 97 MMOL/L
CHLORIDE SERPL-SCNC: 97 MMOL/L
CO2 SERPL-SCNC: 35 MMOL/L
CREAT SERPL-MCNC: 1.11 MG/DL
CREAT SERPL-MCNC: 1.11 MG/DL
GLUCOSE SERPL-MCNC: 205 MG/DL
GLUCOSE SERPL-MCNC: 205 MG/DL
HCT VFR BLD CALC: 47.8 %
HGB BLD-MCNC: 16.4 G/DL
NT-PROBNP SERPL-MCNC: 85 PG/ML
NT-PROBNP SERPL-MCNC: 85 PG/ML
PHOSPHATE SERPL-MCNC: 3.6 MG/DL
PLATELET # BLD: 239 K/MCL
POTASSIUM SERPL-SCNC: 3.3 MMOL/L
POTASSIUM SERPL-SCNC: 3.3 MMOL/L
RBC # BLD: 5.17 10*6/UL
SODIUM SERPL-SCNC: 137 MMOL/L
SODIUM SERPL-SCNC: 137 MMOL/L
WBC # BLD: 7.4 K/MCL

## 2020-06-27 PROCEDURE — 83880 ASSAY OF NATRIURETIC PEPTIDE: CPT

## 2020-06-27 PROCEDURE — 36415 COLL VENOUS BLD VENIPUNCTURE: CPT

## 2020-06-27 PROCEDURE — 85025 COMPLETE CBC W/AUTO DIFF WBC: CPT

## 2020-06-27 PROCEDURE — 80069 RENAL FUNCTION PANEL: CPT

## 2020-06-29 ENCOUNTER — V-VISIT (OUTPATIENT)
Dept: CARDIOLOGY | Age: 65
End: 2020-06-29

## 2020-06-29 ENCOUNTER — CLINICAL ABSTRACT (OUTPATIENT)
Dept: CARDIOLOGY | Age: 65
End: 2020-06-29

## 2020-06-29 DIAGNOSIS — I10 ESSENTIAL HYPERTENSION: ICD-10-CM

## 2020-06-29 DIAGNOSIS — I89.0 LYMPHEDEMA: ICD-10-CM

## 2020-06-29 DIAGNOSIS — I27.20 PULMONARY HYPERTENSION (CMD): Primary | ICD-10-CM

## 2020-06-29 PROCEDURE — 99214 OFFICE O/P EST MOD 30 MIN: CPT | Performed by: NURSE PRACTITIONER

## 2020-06-30 ENCOUNTER — MED REC SCAN ONLY (OUTPATIENT)
Dept: INTERNAL MEDICINE CLINIC | Facility: CLINIC | Age: 65
End: 2020-06-30

## 2020-06-30 ENCOUNTER — OFFICE VISIT (OUTPATIENT)
Dept: NEPHROLOGY | Facility: CLINIC | Age: 65
End: 2020-06-30
Payer: MEDICARE

## 2020-06-30 VITALS
WEIGHT: 203.81 LBS | HEIGHT: 64 IN | SYSTOLIC BLOOD PRESSURE: 146 MMHG | BODY MASS INDEX: 34.8 KG/M2 | HEART RATE: 86 BPM | DIASTOLIC BLOOD PRESSURE: 72 MMHG

## 2020-06-30 DIAGNOSIS — N05.1 FSGS (FOCAL SEGMENTAL GLOMERULOSCLEROSIS): ICD-10-CM

## 2020-06-30 DIAGNOSIS — N17.9 AKI (ACUTE KIDNEY INJURY) (HCC): ICD-10-CM

## 2020-06-30 DIAGNOSIS — N18.30 CKD (CHRONIC KIDNEY DISEASE), STAGE III (HCC): Primary | ICD-10-CM

## 2020-06-30 DIAGNOSIS — R80.9 NON-NEPHROTIC RANGE PROTEINURIA: ICD-10-CM

## 2020-06-30 PROCEDURE — G0463 HOSPITAL OUTPT CLINIC VISIT: HCPCS | Performed by: INTERNAL MEDICINE

## 2020-06-30 PROCEDURE — 99215 OFFICE O/P EST HI 40 MIN: CPT | Performed by: INTERNAL MEDICINE

## 2020-06-30 NOTE — PATIENT INSTRUCTIONS
24 hrs urine test for protein   Follow up in 2 weeks   Lab test as ordered by cardiology   Whey protein - one scoop a day

## 2020-06-30 NOTE — PROGRESS NOTES
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   • Essential hypertension    • Hyperlipidemia    • Hypothyroidism    • KIDNEY STONE    • Multiple sclerosis ([facility] [address]    • Renal disorder       Past Surgical History:   Procedure Laterality Date   • APPENDECTOMY  1985 Lispro (HUMALOG KWIKPEN) 200 UNIT/ML Subcutaneous Solution Pen-injector 5 units subcutaneously with every meal (Patient taking differently: 5 units subcutaneously with breakfast only ) 5 pen 3   • traZODone HCl 100 MG Oral Tab TAKE 1 TABLET(100 MG) BY MOUT sob  Respiratory:  + exertional dyspnea - on oxygen  Gastrointestinal:  Negative for abdominal pain, constipation  Genitourinary:  Negative for dysuria and hematuria  Hema/Lymph:  Negative for easy bleeding and easy bruising  Integumentary:  Negative for p good cortical medullary differentation     2. HFpEF/ Bilateral Leg swelling: component of lymphedema   - repeat urine studies worsening proteinuria at 2 gm of protein - 24 hrs pending  - weight 217 -> 207 -> 197 lbs  - bumex reduced to 1 mg a day and metol

## 2020-07-04 ENCOUNTER — APPOINTMENT (OUTPATIENT)
Dept: LAB | Facility: HOSPITAL | Age: 65
End: 2020-07-04
Attending: INTERNAL MEDICINE
Payer: MEDICARE

## 2020-07-04 DIAGNOSIS — R60.0 BILATERAL LEG EDEMA: ICD-10-CM

## 2020-07-04 DIAGNOSIS — N18.30 CKD (CHRONIC KIDNEY DISEASE), STAGE III (HCC): ICD-10-CM

## 2020-07-04 DIAGNOSIS — N05.1 FSGS (FOCAL SEGMENTAL GLOMERULOSCLEROSIS): ICD-10-CM

## 2020-07-04 DIAGNOSIS — R80.9 NON-NEPHROTIC RANGE PROTEINURIA: ICD-10-CM

## 2020-07-04 DIAGNOSIS — N17.9 AKI (ACUTE KIDNEY INJURY) (HCC): ICD-10-CM

## 2020-07-04 LAB
ANION GAP SERPL CALC-SCNC: 3 MMOL/L (ref 0–18)
BUN BLD-MCNC: 26 MG/DL (ref 7–18)
BUN SERPL-MCNC: 26 MG/DL
BUN/CREAT SERPL: 28.3 (ref 10–20)
CALCIUM BLD-MCNC: 8.6 MG/DL (ref 8.5–10.1)
CALCIUM SERPL-MCNC: 8.6 MG/DL
CHLORIDE SERPL-SCNC: 106 MMOL/L
CHLORIDE SERPL-SCNC: 106 MMOL/L (ref 98–112)
CO2 SERPL-SCNC: 30 MMOL/L (ref 21–32)
CREAT BLD-MCNC: 0.92 MG/DL (ref 0.55–1.02)
CREAT SERPL-MCNC: 0.92 MG/DL
CREAT UR-SCNC: 0.88 G/24 HR (ref 0.6–1.8)
GLUCOSE BLD-MCNC: 201 MG/DL (ref 70–99)
GLUCOSE SERPL-MCNC: 201 MG/DL
M PROTEIN 24H UR ELPH-MRATE: 2263.7 MG/24 HR (ref ?–149.1)
OSMOLALITY SERPL CALC.SUM OF ELEC: 298 MOSM/KG (ref 275–295)
PATIENT FASTING Y/N/NP: NO
POTASSIUM SERPL-SCNC: 4.2 MMOL/L
POTASSIUM SERPL-SCNC: 4.2 MMOL/L (ref 3.5–5.1)
SODIUM SERPL-SCNC: 139 MMOL/L
SODIUM SERPL-SCNC: 139 MMOL/L (ref 136–145)
SPECIMEN VOL UR: 1440 ML
SPECIMEN VOL UR: 1440 ML

## 2020-07-04 PROCEDURE — 80048 BASIC METABOLIC PNL TOTAL CA: CPT

## 2020-07-04 PROCEDURE — 84156 ASSAY OF PROTEIN URINE: CPT

## 2020-07-04 PROCEDURE — 82570 ASSAY OF URINE CREATININE: CPT

## 2020-07-04 PROCEDURE — 36415 COLL VENOUS BLD VENIPUNCTURE: CPT

## 2020-07-05 ENCOUNTER — E-ADVICE (OUTPATIENT)
Dept: CARDIOLOGY | Age: 65
End: 2020-07-05

## 2020-07-06 ENCOUNTER — CLINICAL ABSTRACT (OUTPATIENT)
Dept: CARDIOLOGY | Age: 65
End: 2020-07-06

## 2020-07-10 ENCOUNTER — TELEPHONE (OUTPATIENT)
Dept: NEPHROLOGY | Facility: CLINIC | Age: 65
End: 2020-07-10

## 2020-07-14 ENCOUNTER — OFFICE VISIT (OUTPATIENT)
Dept: NEPHROLOGY | Facility: CLINIC | Age: 65
End: 2020-07-14
Payer: MEDICARE

## 2020-07-14 VITALS
SYSTOLIC BLOOD PRESSURE: 103 MMHG | DIASTOLIC BLOOD PRESSURE: 56 MMHG | BODY MASS INDEX: 34.83 KG/M2 | WEIGHT: 204 LBS | HEART RATE: 76 BPM | HEIGHT: 64 IN

## 2020-07-14 DIAGNOSIS — N18.30 CKD (CHRONIC KIDNEY DISEASE), STAGE III (HCC): Primary | ICD-10-CM

## 2020-07-14 DIAGNOSIS — N05.1 FSGS (FOCAL SEGMENTAL GLOMERULOSCLEROSIS): ICD-10-CM

## 2020-07-14 DIAGNOSIS — R80.9 NON-NEPHROTIC RANGE PROTEINURIA: ICD-10-CM

## 2020-07-14 PROCEDURE — G0463 HOSPITAL OUTPT CLINIC VISIT: HCPCS | Performed by: INTERNAL MEDICINE

## 2020-07-14 PROCEDURE — 99214 OFFICE O/P EST MOD 30 MIN: CPT | Performed by: INTERNAL MEDICINE

## 2020-07-14 NOTE — PROGRESS NOTES
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 (Patient taking differently: Take 25 mg by mouth daily.  ) 30 tablet 3   • Potassium Chloride ER 20 MEQ Oral Tab CR Take 1 tablet (20 mEq total) by mouth daily.  270 tablet 0   • losartan Potassium 50 MG Oral Tab Take 1 table oxygen  Gastrointestinal:  Negative for abdominal pain, constipation  Genitourinary:  Negative for dysuria and hematuria  Hema/Lymph:  Negative for easy bleeding and easy bruising  Integumentary:  Negative for pruritus and rash  Musculoskeletal: back and j monoclonal gammopathy  - renal US showed right kidney 11.9 cm or left kidney 10.7 cm with good cortical medullary differentation  - goal Aic <7% and Aic has been poorly controlled with value between 8-10 in last one year     2. HFpEF/ Bilateral Leg swellin

## 2020-07-14 NOTE — PATIENT INSTRUCTIONS
Take bumex 1 mg twice a day today and day after tomorrow   Continue metolazone at 2.5 mg every other day   Lab test per Dr. Sumeet Montero   Follow up in 8-10 days   Daily weights

## 2020-07-15 ENCOUNTER — TELEPHONE (OUTPATIENT)
Dept: NEPHROLOGY | Facility: CLINIC | Age: 65
End: 2020-07-15

## 2020-07-15 ENCOUNTER — APPOINTMENT (OUTPATIENT)
Dept: LAB | Facility: HOSPITAL | Age: 65
End: 2020-07-15
Attending: INTERNAL MEDICINE
Payer: MEDICARE

## 2020-07-15 DIAGNOSIS — R10.9 FLANK PAIN: Primary | ICD-10-CM

## 2020-07-15 DIAGNOSIS — R10.9 FLANK PAIN: ICD-10-CM

## 2020-07-15 LAB
BILIRUB UR QL: NEGATIVE
CLARITY UR: CLEAR
COLOR UR: YELLOW
GLUCOSE UR-MCNC: NEGATIVE MG/DL
HYALINE CASTS #/AREA URNS AUTO: 15 /LPF
KETONES UR-MCNC: NEGATIVE MG/DL
NITRITE UR QL STRIP.AUTO: NEGATIVE
PH UR: 6 [PH] (ref 5–8)
PROT UR-MCNC: 100 MG/DL
RBC #/AREA URNS AUTO: 6 /HPF
SP GR UR STRIP: 1.01 (ref 1–1.03)
UROBILINOGEN UR STRIP-ACNC: <2
WBC #/AREA URNS AUTO: 8 /HPF

## 2020-07-15 PROCEDURE — 81001 URINALYSIS AUTO W/SCOPE: CPT

## 2020-07-15 PROCEDURE — 87086 URINE CULTURE/COLONY COUNT: CPT

## 2020-07-15 PROCEDURE — 87077 CULTURE AEROBIC IDENTIFY: CPT

## 2020-07-15 PROCEDURE — 87186 SC STD MICRODIL/AGAR DIL: CPT

## 2020-07-15 NOTE — TELEPHONE ENCOUNTER
Patient is having pain in right kidney started last night. She also has the shivers. Patient would like to speak directly to a RN about this as soon as possible.  Please advise

## 2020-07-15 NOTE — TELEPHONE ENCOUNTER
Spoke to patient. She has had a dull pain in her lower right side of her back since last night. Pain is constant. No blood in urine. Had chills but temp is 98.1. Took some Tylenol which helped a little. Saw Dr. Kin Mcgregor in the office yesterday 7/14/2020.

## 2020-07-15 NOTE — TELEPHONE ENCOUNTER
SUBJECTIVE:  Arley Pisano is a 52 year old female who is here in follow up of her left shoulder.  I saw her 9 months ago and she received a cortisone injection.  At that point we had a report of an MRI that showed partial cuff tearing.  Read by physical therapy as well which she did not end up doing.  She says the cortisone shot lasted for 8 months and the pain has returned significantly all over the shoulder with any sort of motion.  No other complaints    REVIEW OF SYSTEMS:    All other systems are reviewed and are negative except as documented in the History of Present Illness.    PROBLEM LIST:  There is no problem list on file for this patient.       HISTORIES:  Current Outpatient Medications   Medication   • pramipexole (MIRAPEX) 0.125 MG tablet   • furosemide (LASIX) 40 MG tablet   • lidocaine (XYLOCAINE) 5 % ointment   • Metoprolol Succinate 50 MG Capsule ER 24 Hour Sprinkle   • predniSONE (DELTASONE) 10 MG tablet   • cyclobenzaprine (FLEXERIL) 10 MG tablet   • tiotropium (SPIRIVA HANDIHALER) 18 MCG capsule for inhaler   • fluticasone-salmeterol (ADVAIR DISKUS) 250-50 MCG/DOSE inhaler   • albuterol (PROAIR HFA) 108 (90 Base) MCG/ACT inhaler   • terbinafine (LAMISIL) 250 MG tablet   • tizanidine (ZANAFLEX) 4 MG capsule   • nortriptyline (PAMELOR) 50 MG capsule   • promethazine (PHENERGAN) 6.25 MG/5ML syrup   • oxyCODONE SR (OXYCONTIN) 15 MG 12 hr tablet   • atorvastatin (LIPITOR) 10 MG tablet   • metoPROLOL (LOPRESSOR) 25 MG tablet   • ergocalciferol (DRISDOL) 64089 UNITS capsule   • oxyCODONE, IMM REL, (ROXICODONE) 15 MG immediate release tablet   • topiramate (TOPAMAX) 50 MG tablet     No current facility-administered medications for this visit.      ALLERGIES:   Allergen Reactions   • Flagyl [Metronidazole] HIVES   • Ibuprofen Other (See Comments)     Swollen tongue.   • Losartan Angioedema     Tongue swelling  March 2020   • Nsaids ANAPHYLAXIS   • Penicillins HIVES     Hives.   • Vicodin  If pain is not severe okay to wait until tomorrow for Dr. Eh Briggs. Continue Tylenol. If she has any urinary tract symptoms such as dysuria, frequency or hematuria do urinalysis and urine culture. [Hydrocodone-Acetaminophen] HIVES   • Lyrica SWELLING and Other (See Comments)     She also reports memory loss       OBJECTIVE:    PHYSICAL EXAMINATION:  Vitals:  There were no vitals taken for this visit.   Constitutional:  Well-developed and well-nourished female in no acute distress.  Skin:  Warm, dry, intact without rash or lesion.  Psychiatric:  Alert and oriented x3.   Respiratory:  Non labored breathing.  Musculoskeletal:  Examination shoulder shows pain with motion overhead.  There is some weakness secondary to to pain head.  No significant tenderness.  Neurovascular intact distally        ASSESSMENT / PLAN:  She is inquiring about another injection which given how much relief she had I think is not unreasonable though I did explain repeat injections could weaken the rotator cuff further.  I would still think physical therapy is recommended to the prevented from returning in addition to the injection.  If the pain comes back arthroscopy could potentially be an option but she would need to get the CD of her MRI and bring it back with her so I can review the images myself    PROCEDURE NOTE:  The risks and benefits of the injection were discussed.  Risks include but are not limited to fever, bleeding, pain, failure to resolve pain or worsening pain, and blood sugar elevation.  Patient understands these risks and wishes to proceed with the treatment.  There were no assurances or guarantees given as to the results of the injection.    Description of Procedure:  The injection site was sterilely prepped.  The left subacromial space was injected with 40 mg Kenalog and 5 mL of lidocaine.  Hemostasis obtained and dressing applied.  The patient tolerated the procedure well without complications.        Instructions provided as documented in the after visit summary.    The patient indicated understanding of the diagnosis and agreed with the plan of care.  All questions were answered.

## 2020-07-15 NOTE — TELEPHONE ENCOUNTER
Patient contacted. Relayed advice. Patient thinks she should get urine testing done just in case. Orders entered in system. Encounter routed to Dr. Rojas Goetz to advise on Thursday.

## 2020-07-17 RX ORDER — CEFPODOXIME PROXETIL 100 MG/1
100 TABLET, FILM COATED ORAL 2 TIMES DAILY
Qty: 14 TABLET | Refills: 0 | Status: SHIPPED | OUTPATIENT
Start: 2020-07-17 | End: 2020-08-04

## 2020-07-20 ENCOUNTER — E-ADVICE (OUTPATIENT)
Dept: CARDIOLOGY | Age: 65
End: 2020-07-20

## 2020-07-20 RX ORDER — METOLAZONE 2.5 MG/1
2.5 TABLET ORAL
Qty: 45 TABLET | Refills: 1 | Status: SHIPPED | OUTPATIENT
Start: 2020-07-20 | End: 2022-02-03

## 2020-07-21 ENCOUNTER — HOSPITAL ENCOUNTER (OUTPATIENT)
Dept: MAMMOGRAPHY | Age: 65
Discharge: HOME | End: 2020-07-21
Attending: INTERNAL MEDICINE
Payer: MEDICARE

## 2020-07-21 DIAGNOSIS — Z12.31 VISIT FOR SCREENING MAMMOGRAM: ICD-10-CM

## 2020-07-21 PROCEDURE — 77063 BREAST TOMOSYNTHESIS BI: CPT | Performed by: INTERNAL MEDICINE

## 2020-07-21 PROCEDURE — 77067 SCR MAMMO BI INCL CAD: CPT | Performed by: INTERNAL MEDICINE

## 2020-08-01 ENCOUNTER — LAB ENCOUNTER (OUTPATIENT)
Dept: LAB | Facility: HOSPITAL | Age: 65
End: 2020-08-01
Attending: INTERNAL MEDICINE
Payer: MEDICARE

## 2020-08-01 DIAGNOSIS — E11.21 TYPE 2 DIABETES MELLITUS WITH DIABETIC NEPHROPATHY, WITH LONG-TERM CURRENT USE OF INSULIN (HCC): ICD-10-CM

## 2020-08-01 DIAGNOSIS — Z79.4 TYPE 2 DIABETES MELLITUS WITH DIABETIC NEPHROPATHY, WITH LONG-TERM CURRENT USE OF INSULIN (HCC): ICD-10-CM

## 2020-08-01 LAB
ALBUMIN SERPL-MCNC: 2.8 G/DL
ALBUMIN SERPL-MCNC: 2.8 G/DL (ref 3.4–5)
ALBUMIN/GLOB SERPL: 0.6 {RATIO} (ref 1–2)
ALP LIVER SERPL-CCNC: 111 U/L (ref 50–130)
ALP SERPL-CCNC: 111 U/L
ALT SERPL-CCNC: 32 U/L (ref 13–56)
ALT SERPL-CCNC: 32 UNITS/L
ANION GAP SERPL CALC-SCNC: 2 MMOL/L (ref 0–18)
AST SERPL-CCNC: 15 U/L (ref 15–37)
AST SERPL-CCNC: 15 UNITS/L
BASOPHILS # BLD AUTO: 0.03 X10(3) UL (ref 0–0.2)
BASOPHILS NFR BLD AUTO: 0.4 %
BILIRUB SERPL-MCNC: 0.5 MG/DL
BILIRUB SERPL-MCNC: 0.5 MG/DL (ref 0.1–2)
BILIRUB UR QL: NEGATIVE
BUN BLD-MCNC: 26 MG/DL (ref 7–18)
BUN SERPL-MCNC: 26 MG/DL
BUN/CREAT SERPL: 26.8 (ref 10–20)
CALCIUM BLD-MCNC: 8.5 MG/DL (ref 8.5–10.1)
CALCIUM SERPL-MCNC: 8.5 MG/DL
CHLORIDE SERPL-SCNC: 104 MMOL/L
CHLORIDE SERPL-SCNC: 104 MMOL/L (ref 98–112)
CHOLEST SERPL-MCNC: 220 MG/DL
CHOLEST SMN-MCNC: 220 MG/DL (ref ?–200)
CLARITY UR: CLEAR
CO2 SERPL-SCNC: 31 MMOL/L (ref 21–32)
COLOR UR: YELLOW
CREAT BLD-MCNC: 0.97 MG/DL (ref 0.55–1.02)
CREAT SERPL-MCNC: 0.97 MG/DL
CREAT UR-SCNC: 25.3 MG/DL
DEPRECATED RDW RBC AUTO: 41.1 FL (ref 35.1–46.3)
EOSINOPHIL # BLD AUTO: 0.14 X10(3) UL (ref 0–0.7)
EOSINOPHIL NFR BLD AUTO: 1.8 %
ERYTHROCYTE [DISTWIDTH] IN BLOOD BY AUTOMATED COUNT: 12.4 % (ref 11–15)
EST. AVERAGE GLUCOSE BLD GHB EST-MCNC: 177 MG/DL (ref 68–126)
GLOBULIN PLAS-MCNC: 4.4 G/DL (ref 2.8–4.4)
GLOBULIN SER-MCNC: 4.4 G/DL
GLUCOSE BLD-MCNC: 190 MG/DL (ref 70–99)
GLUCOSE SERPL-MCNC: 190 MG/DL
GLUCOSE UR-MCNC: NEGATIVE MG/DL
HBA1C MFR BLD HPLC: 7.8 % (ref ?–5.7)
HCT VFR BLD AUTO: 46.1 % (ref 35–48)
HCT VFR BLD CALC: 46.1 %
HDLC SERPL-MCNC: 34 MG/DL
HDLC SERPL-MCNC: 34 MG/DL (ref 40–59)
HEMOGLOBIN A1C: 7.8 %
HGB BLD-MCNC: 16 G/DL
HGB BLD-MCNC: 16 G/DL (ref 12–16)
HYALINE CASTS #/AREA URNS AUTO: 1 /LPF
IMM GRANULOCYTES # BLD AUTO: 0.03 X10(3) UL (ref 0–1)
IMM GRANULOCYTES NFR BLD: 0.4 %
KETONES UR-MCNC: NEGATIVE MG/DL
LDLC SERPL CALC-MCNC: 131 MG/DL
LDLC SERPL CALC-MCNC: 131 MG/DL (ref ?–100)
LYMPHOCYTES # BLD AUTO: 1.58 X10(3) UL (ref 1–4)
LYMPHOCYTES NFR BLD AUTO: 20.1 %
M PROTEIN MFR SERPL ELPH: 7.2 G/DL (ref 6.4–8.2)
MCH RBC QN AUTO: 31.8 PG (ref 26–34)
MCHC RBC AUTO-ENTMCNC: 34.7 G/DL (ref 31–37)
MCV RBC AUTO: 91.7 FL (ref 80–100)
MICROALBUMIN UR-MCNC: 50.7 MG/DL
MICROALBUMIN/CREAT 24H UR-RTO: 2004 UG/MG (ref ?–30)
MONOCYTES # BLD AUTO: 0.55 X10(3) UL (ref 0.1–1)
MONOCYTES NFR BLD AUTO: 7 %
NEUTROPHILS # BLD AUTO: 5.52 X10 (3) UL (ref 1.5–7.7)
NEUTROPHILS # BLD AUTO: 5.52 X10(3) UL (ref 1.5–7.7)
NEUTROPHILS NFR BLD AUTO: 70.3 %
NITRITE UR QL STRIP.AUTO: NEGATIVE
NONHDLC SERPL-MCNC: 186 MG/DL
NONHDLC SERPL-MCNC: 186 MG/DL (ref ?–130)
OSMOLALITY SERPL CALC.SUM OF ELEC: 294 MOSM/KG (ref 275–295)
PATIENT FASTING Y/N/NP: YES
PATIENT FASTING Y/N/NP: YES
PH UR: 7 [PH] (ref 5–8)
PLATELET # BLD AUTO: 314 10(3)UL (ref 150–450)
PLATELET # BLD: 314 K/MCL
POTASSIUM SERPL-SCNC: 4.1 MMOL/L
POTASSIUM SERPL-SCNC: 4.1 MMOL/L (ref 3.5–5.1)
PROT SERPL-MCNC: 72 G/DL
PROT UR-MCNC: 100 MG/DL
RBC # BLD AUTO: 5.03 X10(6)UL (ref 3.8–5.3)
RBC # BLD: 5.03 10*6/UL
RBC #/AREA URNS AUTO: 3 /HPF
SODIUM SERPL-SCNC: 137 MMOL/L
SODIUM SERPL-SCNC: 137 MMOL/L (ref 136–145)
SP GR UR STRIP: 1.01 (ref 1–1.03)
TRIGL SERPL-MCNC: 275 MG/DL
TRIGL SERPL-MCNC: 275 MG/DL (ref 30–149)
UROBILINOGEN UR STRIP-ACNC: <2
VLDLC SERPL CALC-MCNC: 55 MG/DL (ref 0–30)
WBC # BLD AUTO: 7.9 X10(3) UL (ref 4–11)
WBC # BLD: 7.9 K/MCL
WBC #/AREA URNS AUTO: 34 /HPF

## 2020-08-01 PROCEDURE — 83036 HEMOGLOBIN GLYCOSYLATED A1C: CPT

## 2020-08-01 PROCEDURE — 82043 UR ALBUMIN QUANTITATIVE: CPT

## 2020-08-01 PROCEDURE — 85025 COMPLETE CBC W/AUTO DIFF WBC: CPT

## 2020-08-01 PROCEDURE — 80061 LIPID PANEL: CPT

## 2020-08-01 PROCEDURE — 81001 URINALYSIS AUTO W/SCOPE: CPT

## 2020-08-01 PROCEDURE — 80053 COMPREHEN METABOLIC PANEL: CPT

## 2020-08-01 PROCEDURE — 82570 ASSAY OF URINE CREATININE: CPT

## 2020-08-01 PROCEDURE — 36415 COLL VENOUS BLD VENIPUNCTURE: CPT

## 2020-08-03 ENCOUNTER — TELEPHONE (OUTPATIENT)
Dept: NEPHROLOGY | Facility: CLINIC | Age: 65
End: 2020-08-03

## 2020-08-03 ENCOUNTER — CLINICAL ABSTRACT (OUTPATIENT)
Dept: CARDIOLOGY | Age: 65
End: 2020-08-03

## 2020-08-03 ENCOUNTER — TELEPHONE (OUTPATIENT)
Dept: INTERNAL MEDICINE CLINIC | Facility: CLINIC | Age: 65
End: 2020-08-03

## 2020-08-03 DIAGNOSIS — R31.29 MICROSCOPIC HEMATURIA: Primary | ICD-10-CM

## 2020-08-03 RX ORDER — TRAZODONE HYDROCHLORIDE 100 MG/1
TABLET ORAL
Qty: 90 TABLET | Refills: 2 | Status: SHIPPED | OUTPATIENT
Start: 2020-08-03 | End: 2021-06-17

## 2020-08-03 NOTE — TELEPHONE ENCOUNTER
Stable kidney function and proteinuria     On and off RBC in urine - CTAP in 2019 with no mass or lesions. Will refer to urologist for cystoscopy in light of chronic smoking .      Referral provided and discussed with patient

## 2020-08-04 ENCOUNTER — OFFICE VISIT (OUTPATIENT)
Dept: INTERNAL MEDICINE CLINIC | Facility: CLINIC | Age: 65
End: 2020-08-04
Payer: MEDICARE

## 2020-08-04 VITALS
SYSTOLIC BLOOD PRESSURE: 126 MMHG | HEART RATE: 80 BPM | HEIGHT: 64 IN | DIASTOLIC BLOOD PRESSURE: 76 MMHG | WEIGHT: 205.81 LBS | RESPIRATION RATE: 16 BRPM | BODY MASS INDEX: 35.13 KG/M2

## 2020-08-04 DIAGNOSIS — Z79.4 TYPE 2 DIABETES MELLITUS WITH DIABETIC NEPHROPATHY, WITH LONG-TERM CURRENT USE OF INSULIN (HCC): Primary | ICD-10-CM

## 2020-08-04 DIAGNOSIS — Z78.0 MENOPAUSE: ICD-10-CM

## 2020-08-04 DIAGNOSIS — I27.20 PULMONARY HTN (HCC): ICD-10-CM

## 2020-08-04 DIAGNOSIS — I10 ESSENTIAL HYPERTENSION: ICD-10-CM

## 2020-08-04 DIAGNOSIS — N05.1 FSGS (FOCAL SEGMENTAL GLOMERULOSCLEROSIS): ICD-10-CM

## 2020-08-04 DIAGNOSIS — E03.9 HYPOTHYROIDISM, UNSPECIFIED TYPE: ICD-10-CM

## 2020-08-04 DIAGNOSIS — E78.2 MIXED HYPERLIPIDEMIA: ICD-10-CM

## 2020-08-04 DIAGNOSIS — Z72.0 TOBACCO ABUSE DISORDER: ICD-10-CM

## 2020-08-04 DIAGNOSIS — I73.9 PERIPHERAL VASCULAR DISEASE (HCC): ICD-10-CM

## 2020-08-04 DIAGNOSIS — E11.21 TYPE 2 DIABETES MELLITUS WITH DIABETIC NEPHROPATHY, WITH LONG-TERM CURRENT USE OF INSULIN (HCC): Primary | ICD-10-CM

## 2020-08-04 PROCEDURE — G0463 HOSPITAL OUTPT CLINIC VISIT: HCPCS | Performed by: INTERNAL MEDICINE

## 2020-08-04 PROCEDURE — 99214 OFFICE O/P EST MOD 30 MIN: CPT | Performed by: INTERNAL MEDICINE

## 2020-08-04 RX ORDER — FLUTICASONE FUROATE, UMECLIDINIUM BROMIDE AND VILANTEROL TRIFENATATE 100; 62.5; 25 UG/1; UG/1; UG/1
POWDER RESPIRATORY (INHALATION)
Qty: 60 EACH | Refills: 5 | Status: SHIPPED | OUTPATIENT
Start: 2020-08-04 | End: 2021-04-15

## 2020-08-05 NOTE — ASSESSMENT & PLAN NOTE
Smoking 1-2.5 ppd -seems to be chain smoking with stress. Refuses chantix. as has some depressive symptoms and worried about suicidal thoughts. Has been seeing a therapist-in [place]-phone consult.

## 2020-08-05 NOTE — ASSESSMENT & PLAN NOTE
History of nephrotic syndrome secondary to FSGS. Completed her treatment and currently doing very well. Mild proteinuria persists. No lower extremity edema. Blood pressures are well controlled.   She is monitored per nephrology–Dr. Celestino Bocanegra

## 2020-08-05 NOTE — ASSESSMENT & PLAN NOTE
Blood pressure 126/76, pulse 80, resp. rate 16, height 5' 4\" (1.626 m), weight 205 lb 12.8 oz (93.4 kg), not currently breastfeeding. Blood pressures are well controlled at this time on losartan, carvedilol, spironolactone, metolazone, Bumex.   Renal func

## 2020-08-05 NOTE — ASSESSMENT & PLAN NOTE
Patient has been seen by Dr. Halley Stafford. She is on diuretics. No worsening shortness of breath. Continues to smoke. She is due for an echocardiogram.  Orders provided last time have not yet been completed.   Advised to call [phone] to set up an appointme

## 2020-08-05 NOTE — ASSESSMENT & PLAN NOTE
Patient has been on Lantus and sliding scale of Humalog. Blood sugars seem well controlled, hemoglobin A1c is at 7.8. Urine protein levels have improved. Renal functions look stable. She has been followed up per nephrology.   Slight hematuria noted on t

## 2020-08-05 NOTE — PATIENT INSTRUCTIONS
Problem List Items Addressed This Visit        Unprioritized    FSGS (focal segmental glomerulosclerosis)     History of nephrotic syndrome secondary to FSGS. Completed her treatment and currently doing very well. Mild proteinuria persists.   No lower ext Raghu, [phone] for an appointment.          Relevant Orders    COMP METABOLIC PANEL (14)    CBC WITH DIFFERENTIAL WITH PLATELET    HEMOGLOBIN A1C    LIPID PANEL    MICROALB/CREAT RATIO, RANDOM URINE    URINALYSIS, ROUTINE    TSH W REFLEX TO FREE T4

## 2020-08-05 NOTE — ASSESSMENT & PLAN NOTE
Lipid panel shows persistent elevation in the triglycerides–unable to start on medications at this time in addition to the rosuvastatin due to risks for worsening muscle pain and patient has baseline myalgia.   Strict diet control for starches, sugars, aaliyah

## 2020-08-05 NOTE — PROGRESS NOTES
HPI:    Patient ID: Li Rutledge is a 72year old female. Labs discussed    Hypertension   This is a chronic problem. The current episode started more than 1 year ago. The problem has been gradually improving since onset. The problem is controlled. includes diet and insulin injections (lantus 10 units a day). She is compliant with treatment most of the time. Her weight is stable. She is following a diabetic, generally healthy, high fiber, low fat/cholesterol and low salt diet.  Meal planning includes Inhaler 6   • Albuterol Sulfate (PROAIR RESPICLICK) 158 (90 Base) MCG/ACT Inhalation Aerosol Powder, Breath Activated Inhale 2 puffs into the lungs 3 (three) times daily as needed.  1 each 3   • insulin glargine (BASAGLAR KWIKPEN) 100 UNIT/ML Subcutaneous S daily. 60 vial 5   • magnesium 250 MG Oral Tab Take 250 mg by mouth daily. • Cholecalciferol (VITAMIN D) 2000 UNITS Oral Cap Take 2,000 Units by mouth daily. • aspirin 81 MG Oral Tab Take 81 mg by mouth daily.        Allergies:  Cefpodoxime daily. Continue the same dose of medication and follow-up with labs.          Hyperlipidemia     Lipid panel shows persistent elevation in the triglycerides–unable to start on medications at this time in addition to the rosuvastatin due to risks for worsen Mild proteinuria persists. No lower extremity edema. Blood pressures are well controlled. She is monitored per nephrology–Dr. Jose Alfredo Bolton         Peripheral vascular disease ([facility] [address]    Pulmonary HTN ([facility] [address]     Patient has been seen by Dr. Krystyna Madden.   She is on diuret

## 2020-08-27 RX ORDER — CARVEDILOL 3.12 MG/1
TABLET ORAL
Qty: 180 TABLET | Refills: 0 | Status: SHIPPED | OUTPATIENT
Start: 2020-08-27 | End: 2021-01-05

## 2020-08-31 ENCOUNTER — TELEPHONE (OUTPATIENT)
Dept: INTERNAL MEDICINE CLINIC | Facility: CLINIC | Age: 65
End: 2020-08-31

## 2020-08-31 NOTE — TELEPHONE ENCOUNTER
Received a call from the patient's  who is on HIPAA.  reports he was exposed to COVID-19 on 8/25/20.  wanted to know if the patient should be tested for COVID-19.  reports the patient is asymptomatic now.   was informed du

## 2020-09-08 RX ORDER — LOSARTAN POTASSIUM 50 MG/1
TABLET ORAL
Qty: 180 TABLET | Refills: 0 | Status: SHIPPED | OUTPATIENT
Start: 2020-09-08 | End: 2021-02-02

## 2020-09-09 ENCOUNTER — HOSPITAL ENCOUNTER (OUTPATIENT)
Dept: BONE DENSITY | Facility: HOSPITAL | Age: 65
Discharge: HOME | End: 2020-09-09
Attending: INTERNAL MEDICINE
Payer: MEDICARE

## 2020-09-09 ENCOUNTER — HOSPITAL ENCOUNTER (OUTPATIENT)
Dept: CV DIAGNOSTICS | Facility: HOSPITAL | Age: 65
Discharge: HOME | End: 2020-09-09
Attending: INTERNAL MEDICINE
Payer: MEDICARE

## 2020-09-09 DIAGNOSIS — Z78.0 MENOPAUSE: ICD-10-CM

## 2020-09-09 DIAGNOSIS — I27.20 PULMONARY HTN (HCC): ICD-10-CM

## 2020-09-09 PROCEDURE — 93306 TTE W/DOPPLER COMPLETE: CPT | Performed by: INTERNAL MEDICINE

## 2020-09-09 PROCEDURE — 77080 DXA BONE DENSITY AXIAL: CPT | Performed by: INTERNAL MEDICINE

## 2020-09-15 ENCOUNTER — OFFICE VISIT (OUTPATIENT)
Dept: NEPHROLOGY | Facility: CLINIC | Age: 65
End: 2020-09-15
Payer: MEDICARE

## 2020-09-15 VITALS
DIASTOLIC BLOOD PRESSURE: 75 MMHG | SYSTOLIC BLOOD PRESSURE: 126 MMHG | HEART RATE: 93 BPM | BODY MASS INDEX: 35 KG/M2 | HEIGHT: 64 IN | TEMPERATURE: 97 F

## 2020-09-15 DIAGNOSIS — N18.30 CKD (CHRONIC KIDNEY DISEASE), STAGE III (HCC): ICD-10-CM

## 2020-09-15 DIAGNOSIS — N05.1 FSGS (FOCAL SEGMENTAL GLOMERULOSCLEROSIS): ICD-10-CM

## 2020-09-15 DIAGNOSIS — R80.9 NON-NEPHROTIC RANGE PROTEINURIA: ICD-10-CM

## 2020-09-15 DIAGNOSIS — R31.29 MICROSCOPIC HEMATURIA: Primary | ICD-10-CM

## 2020-09-15 PROCEDURE — 99214 OFFICE O/P EST MOD 30 MIN: CPT | Performed by: INTERNAL MEDICINE

## 2020-09-15 PROCEDURE — G0463 HOSPITAL OUTPT CLINIC VISIT: HCPCS | Performed by: INTERNAL MEDICINE

## 2020-09-15 NOTE — PROGRESS NOTES
Progress Note     Juanito Ferguson is a 72 yrs old female with pmh of HL, multiple sclerosis (35 yrs), restless leg syndrome, back pain, nephrolithiasis x 3, tobacco abuse, diverticulitis who presented today for follow up    Initial lab work showed BUN/Cr Inge jones for microscopic hematuria. On metolazone 2.5 mg every other day and bumetanide 1 mg BID . Weight was 207 lbs on few days back. Hasn't checked since. BP in 120s range.      HISTORY:  Past Medical History:   Diagnosis Date   • Anxiety state, un BY MOUTH EVERY DAY 90 tablet 1   • Montelukast Sodium 10 MG Oral Tab Take 1 tablet (10 mg total) by mouth nightly.  90 tablet 1   • bumetanide 1 MG Oral Tab TAKE 2 TABLETS(2 MG) BY MOUTH am, 1mg in afternoon 180 tablet 0   • spironolactone 25 MG Oral Tab Ta constipation  Genitourinary:  Negative for dysuria and hematuria  Hema/Lymph:  Negative for easy bleeding and easy bruising  Integumentary:  Negative for pruritus and rash  Musculoskeletal: back and joint aches.  Bilateral thigh pain -  Neurological:  diffi right kidney 11.9 cm or left kidney 10.7 cm with good cortical medullary differentation  - goal Aic <7% and Aic has been poorly controlled with value between 8-10 in last one year -.>7.8%     2. HFpEF/ Bilateral Leg swelling: component of lymphedema   - re

## 2020-09-15 NOTE — PATIENT INSTRUCTIONS
Follow up in 3 months  Resume your diuretics at prescribed dose   Lab test as ordered by Dr. Yaneth Raphael

## 2020-09-16 ENCOUNTER — OFFICE VISIT (OUTPATIENT)
Dept: SURGERY | Facility: CLINIC | Age: 65
End: 2020-09-16
Payer: MEDICARE

## 2020-09-16 VITALS — DIASTOLIC BLOOD PRESSURE: 72 MMHG | SYSTOLIC BLOOD PRESSURE: 124 MMHG | HEART RATE: 80 BPM

## 2020-09-16 DIAGNOSIS — F17.200 TOBACCO USE DISORDER: ICD-10-CM

## 2020-09-16 DIAGNOSIS — R82.71 ASYMPTOMATIC BACTERIURIA: ICD-10-CM

## 2020-09-16 DIAGNOSIS — R31.21 ASYMPTOMATIC MICROSCOPIC HEMATURIA: Primary | ICD-10-CM

## 2020-09-16 PROCEDURE — 99204 OFFICE O/P NEW MOD 45 MIN: CPT | Performed by: UROLOGY

## 2020-09-16 PROCEDURE — G0463 HOSPITAL OUTPT CLINIC VISIT: HCPCS | Performed by: UROLOGY

## 2020-09-16 NOTE — PROGRESS NOTES
[facility], [facility] Urology  Initial Office Consultation    HPI:   Sulma Robles is a 72year old female here today for consultation at the request of, and a copy of this note will be sent to, Jose Antonio Gloria MD.    1. Asymptomatic Microscopic Hematuria  2.  R ROS was performed and is otherwise negative. EXAM:  /72 (BP Location: Left arm, Patient Position: Sitting, Cuff Size: adult)   Pulse 80   LMP  (LMP Unknown)     Physical Exam   Vitals reviewed.    Constitutional: She is oriented to person, place the proposed workup for microscopic hematuria. This includes a voided urine sample for cytology, a CT-Urogram to evaluate the upper urinary tracts, as well as a cystoscopy to evaluate the bladder and urethra.     Smoking cessation counseling was provided to

## 2020-09-21 ENCOUNTER — OFFICE VISIT (OUTPATIENT)
Dept: CARDIOLOGY | Age: 65
End: 2020-09-21

## 2020-09-21 VITALS
HEART RATE: 88 BPM | SYSTOLIC BLOOD PRESSURE: 102 MMHG | BODY MASS INDEX: 34.66 KG/M2 | OXYGEN SATURATION: 86 % | WEIGHT: 203 LBS | RESPIRATION RATE: 18 BRPM | DIASTOLIC BLOOD PRESSURE: 60 MMHG | HEIGHT: 64 IN

## 2020-09-21 DIAGNOSIS — I50.22 CHRONIC SYSTOLIC CONGESTIVE HEART FAILURE (CMD): Primary | ICD-10-CM

## 2020-09-21 PROCEDURE — 99214 OFFICE O/P EST MOD 30 MIN: CPT | Performed by: INTERNAL MEDICINE

## 2020-09-21 RX ORDER — BUMETANIDE 1 MG/1
1 TABLET ORAL DAILY
COMMUNITY
End: 2021-04-05 | Stop reason: SDUPTHER

## 2020-09-21 ASSESSMENT — PATIENT HEALTH QUESTIONNAIRE - PHQ9
SUM OF ALL RESPONSES TO PHQ9 QUESTIONS 1 AND 2: 0
1. LITTLE INTEREST OR PLEASURE IN DOING THINGS: NOT AT ALL
CLINICAL INTERPRETATION OF PHQ2 SCORE: NO FURTHER SCREENING NEEDED
SUM OF ALL RESPONSES TO PHQ9 QUESTIONS 1 AND 2: 0
CLINICAL INTERPRETATION OF PHQ9 SCORE: NO FURTHER SCREENING NEEDED
2. FEELING DOWN, DEPRESSED OR HOPELESS: NOT AT ALL

## 2020-09-21 ASSESSMENT — ENCOUNTER SYMPTOMS
DIZZINESS: 1
WEIGHT GAIN: 1
BRUISES/BLEEDS EASILY: 0
SPUTUM PRODUCTION: 1
FEVER: 0
ALLERGIC/IMMUNOLOGIC COMMENTS: NO NEW FOOD ALLERGIES
COUGH: 1
CHILLS: 1
HEMOPTYSIS: 0
HEMATOCHEZIA: 0
SUSPICIOUS LESIONS: 0

## 2020-10-05 ENCOUNTER — LAB ENCOUNTER (OUTPATIENT)
Dept: LAB | Age: 65
End: 2020-10-05
Attending: UROLOGY
Payer: MEDICARE

## 2020-10-05 DIAGNOSIS — R82.71 ASYMPTOMATIC BACTERIURIA: ICD-10-CM

## 2020-10-05 PROCEDURE — 81001 URINALYSIS AUTO W/SCOPE: CPT

## 2020-10-05 PROCEDURE — 87088 URINE BACTERIA CULTURE: CPT

## 2020-10-05 PROCEDURE — 87186 SC STD MICRODIL/AGAR DIL: CPT

## 2020-10-05 PROCEDURE — 87086 URINE CULTURE/COLONY COUNT: CPT

## 2020-10-07 ENCOUNTER — HOSPITAL ENCOUNTER (OUTPATIENT)
Dept: CT IMAGING | Facility: HOSPITAL | Age: 65
Discharge: HOME | End: 2020-10-07
Attending: UROLOGY
Payer: MEDICARE

## 2020-10-07 DIAGNOSIS — R31.21 ASYMPTOMATIC MICROSCOPIC HEMATURIA: ICD-10-CM

## 2020-10-07 PROCEDURE — 74178 CT ABD&PLV WO CNTR FLWD CNTR: CPT | Performed by: UROLOGY

## 2020-10-07 PROCEDURE — 76376 3D RENDER W/INTRP POSTPROCES: CPT | Performed by: UROLOGY

## 2020-10-07 PROCEDURE — 82565 ASSAY OF CREATININE: CPT

## 2020-10-08 DIAGNOSIS — R82.71 ASYMPTOMATIC BACTERIURIA: Primary | ICD-10-CM

## 2020-10-08 DIAGNOSIS — J30.1 ALLERGIC RHINITIS DUE TO POLLEN, UNSPECIFIED SEASONALITY: ICD-10-CM

## 2020-10-08 RX ORDER — SULFAMETHOXAZOLE AND TRIMETHOPRIM 800; 160 MG/1; MG/1
1 TABLET ORAL 2 TIMES DAILY
Qty: 14 TABLET | Refills: 0 | Status: SHIPPED | OUTPATIENT
Start: 2020-10-08 | End: 2020-10-15

## 2020-10-08 NOTE — TELEPHONE ENCOUNTER
•  Montelukast Sodium 10 MG Oral Tab, Take 1 tablet (10 mg total) by mouth nightly., Disp: 90 tablet, Rfl: 1

## 2020-10-10 RX ORDER — MONTELUKAST SODIUM 10 MG/1
10 TABLET ORAL NIGHTLY
Qty: 90 TABLET | Refills: 1 | Status: SHIPPED | OUTPATIENT
Start: 2020-10-10 | End: 2021-05-02

## 2020-10-14 ENCOUNTER — PROCEDURE (OUTPATIENT)
Dept: SURGERY | Facility: CLINIC | Age: 65
End: 2020-10-14
Payer: MEDICARE

## 2020-10-14 VITALS — HEART RATE: 79 BPM | DIASTOLIC BLOOD PRESSURE: 74 MMHG | SYSTOLIC BLOOD PRESSURE: 114 MMHG

## 2020-10-14 DIAGNOSIS — N39.46 MIXED STRESS AND URGE URINARY INCONTINENCE: ICD-10-CM

## 2020-10-14 DIAGNOSIS — R31.21 ASYMPTOMATIC MICROSCOPIC HEMATURIA: Primary | ICD-10-CM

## 2020-10-14 PROCEDURE — 52000 CYSTOURETHROSCOPY: CPT | Performed by: UROLOGY

## 2020-10-14 NOTE — PROGRESS NOTES
[facility], [facility] Urology  Follow-Up Visit    HPI: Hoang Pradhan is a 72year old female presents for a follow up visit. Patient was last seen on 9/16/20. INTERVAL HISTORY: Here for office cystoscopy to complete evaluation of microscopic hematuria.   Vo lesions.       PAST MEDICAL HISTORY: Multiple sclerosis, DM, HTN, HLD, PVD, COPD, nephrotic syndrome, hypothyroidism, pulmonary hypertension, history of nephrolithiasis.     PAST SURGICAL HISTORY: Appendectomy, knee surgery, .     SOCIAL HISTORY: possibility of prolapse. 5.  Coronary atherosclerosis. UROLOGY PROCEDURE:    CYSTOURETHROSCOPY    Anesthesia:  2% lidocaine gel    Urethra: Normal    Bladder: Normal.  No tumor, stone, diverticulum, or glomerulation.   There is evidence of chronic cy symptoms. 3. May consider referral to urogynecology if patient is very bothered by her stress incontinence.     Apart from performing office cystoscopy, 10 mintues were spent with more than half the time in face-to-face discussion involving counseling, f

## 2020-10-14 NOTE — TELEPHONE ENCOUNTER
LOV 10/26/17. Office note indicates dose of Carvedilol is 3.125mg 2 times every day. Cheek Interpolation Flap Text: A decision was made to reconstruct the defect utilizing an interpolation axial flap and a staged reconstruction.  A telfa template was made of the defect.  This telfa template was then used to outline the Cheek Interpolation flap.  The donor area for the pedicle flap was then injected with anesthesia.  The flap was excised through the skin and subcutaneous tissue down to the layer of the underlying musculature.  The interpolation flap was carefully excised within this deep plane to maintain its blood supply.  The edges of the donor site were undermined.   The donor site was closed in a primary fashion.  The pedicle was then rotated into position and sutured.  Once the tube was sutured into place, adequate blood supply was confirmed with blanching and refill.  The pedicle was then wrapped with xeroform gauze and dressed appropriately with a telfa and gauze bandage to ensure continued blood supply and protect the attached pedicle.

## 2020-10-15 ENCOUNTER — TELEPHONE (OUTPATIENT)
Dept: CARDIOLOGY | Age: 65
End: 2020-10-15

## 2020-10-15 DIAGNOSIS — I50.32 CHRONIC HEART FAILURE WITH PRESERVED EJECTION FRACTION (CMD): ICD-10-CM

## 2020-10-15 DIAGNOSIS — E11.69 TYPE 2 DIABETES MELLITUS WITH OTHER SPECIFIED COMPLICATION, WITHOUT LONG-TERM CURRENT USE OF INSULIN (CMD): Primary | ICD-10-CM

## 2020-10-15 RX ORDER — FLUCONAZOLE 150 MG/1
150 TABLET ORAL ONCE
Qty: 1 TABLET | Refills: 0 | Status: SHIPPED | OUTPATIENT
Start: 2020-10-15 | End: 2020-10-15

## 2020-11-02 ENCOUNTER — MED REC SCAN ONLY (OUTPATIENT)
Dept: INTERNAL MEDICINE CLINIC | Facility: CLINIC | Age: 65
End: 2020-11-02

## 2020-11-03 ENCOUNTER — LAB ENCOUNTER (OUTPATIENT)
Dept: LAB | Age: 65
End: 2020-11-03
Attending: INTERNAL MEDICINE
Payer: MEDICARE

## 2020-11-03 DIAGNOSIS — Z79.4 TYPE 2 DIABETES MELLITUS WITH CHRONIC KIDNEY DISEASE, WITH LONG-TERM CURRENT USE OF INSULIN, UNSPECIFIED CKD STAGE (HCC): ICD-10-CM

## 2020-11-03 DIAGNOSIS — Z79.4 TYPE 2 DIABETES MELLITUS WITH DIABETIC NEPHROPATHY, WITH LONG-TERM CURRENT USE OF INSULIN (HCC): ICD-10-CM

## 2020-11-03 DIAGNOSIS — I50.31: ICD-10-CM

## 2020-11-03 DIAGNOSIS — E11.22 TYPE 2 DIABETES MELLITUS WITH CHRONIC KIDNEY DISEASE, WITH LONG-TERM CURRENT USE OF INSULIN, UNSPECIFIED CKD STAGE (HCC): ICD-10-CM

## 2020-11-03 DIAGNOSIS — E11.21 TYPE 2 DIABETES MELLITUS WITH DIABETIC NEPHROPATHY, WITH LONG-TERM CURRENT USE OF INSULIN (HCC): ICD-10-CM

## 2020-11-03 LAB
ANION GAP SERPL CALC-SCNC: NORMAL MMOL/L
BUN SERPL-MCNC: 22 MG/DL
BUN/CREAT SERPL: NORMAL
CALCIUM SERPL-MCNC: 9.4 MG/DL
CALCIUM, CORRECTED: NORMAL
CHLORIDE SERPL-SCNC: 106 MMOL/L
CO2 SERPL-SCNC: NORMAL MMOL/L
CREAT SERPL-MCNC: 0.91 MG/DL
GLUCOSE SERPL-MCNC: 162 MG/DL
LENGTH OF FAST TIME PATIENT: NORMAL H
POTASSIUM SERPL-SCNC: 4 MMOL/L
SODIUM SERPL-SCNC: 140 MMOL/L

## 2020-11-03 PROCEDURE — 82043 UR ALBUMIN QUANTITATIVE: CPT

## 2020-11-03 PROCEDURE — 83036 HEMOGLOBIN GLYCOSYLATED A1C: CPT

## 2020-11-03 PROCEDURE — 84443 ASSAY THYROID STIM HORMONE: CPT

## 2020-11-03 PROCEDURE — 80061 LIPID PANEL: CPT

## 2020-11-03 PROCEDURE — 81001 URINALYSIS AUTO W/SCOPE: CPT

## 2020-11-03 PROCEDURE — 85025 COMPLETE CBC W/AUTO DIFF WBC: CPT

## 2020-11-03 PROCEDURE — 36415 COLL VENOUS BLD VENIPUNCTURE: CPT

## 2020-11-03 PROCEDURE — 82570 ASSAY OF URINE CREATININE: CPT

## 2020-11-03 PROCEDURE — 80053 COMPREHEN METABOLIC PANEL: CPT

## 2020-11-04 ENCOUNTER — CLINICAL ABSTRACT (OUTPATIENT)
Dept: CARDIOLOGY | Age: 65
End: 2020-11-04

## 2020-11-04 ENCOUNTER — OFFICE VISIT (OUTPATIENT)
Dept: INTERNAL MEDICINE CLINIC | Facility: CLINIC | Age: 65
End: 2020-11-04
Payer: MEDICARE

## 2020-11-04 VITALS
BODY MASS INDEX: 34.91 KG/M2 | HEIGHT: 64 IN | OXYGEN SATURATION: 93 % | TEMPERATURE: 98 F | SYSTOLIC BLOOD PRESSURE: 132 MMHG | WEIGHT: 204.5 LBS | RESPIRATION RATE: 22 BRPM | HEART RATE: 77 BPM | DIASTOLIC BLOOD PRESSURE: 85 MMHG

## 2020-11-04 DIAGNOSIS — I10 ESSENTIAL HYPERTENSION: ICD-10-CM

## 2020-11-04 DIAGNOSIS — E78.2 MIXED HYPERLIPIDEMIA: ICD-10-CM

## 2020-11-04 DIAGNOSIS — E03.9 HYPOTHYROIDISM, UNSPECIFIED TYPE: ICD-10-CM

## 2020-11-04 DIAGNOSIS — E11.21 TYPE 2 DIABETES MELLITUS WITH DIABETIC NEPHROPATHY, WITH LONG-TERM CURRENT USE OF INSULIN (HCC): Primary | ICD-10-CM

## 2020-11-04 DIAGNOSIS — D58.2 ELEVATED HEMOGLOBIN (HCC): ICD-10-CM

## 2020-11-04 DIAGNOSIS — N05.1 FSGS (FOCAL SEGMENTAL GLOMERULOSCLEROSIS): ICD-10-CM

## 2020-11-04 DIAGNOSIS — Z72.0 TOBACCO ABUSE DISORDER: ICD-10-CM

## 2020-11-04 DIAGNOSIS — Z79.4 TYPE 2 DIABETES MELLITUS WITH DIABETIC NEPHROPATHY, WITH LONG-TERM CURRENT USE OF INSULIN (HCC): Primary | ICD-10-CM

## 2020-11-04 PROCEDURE — G0463 HOSPITAL OUTPT CLINIC VISIT: HCPCS | Performed by: INTERNAL MEDICINE

## 2020-11-04 PROCEDURE — 99214 OFFICE O/P EST MOD 30 MIN: CPT | Performed by: INTERNAL MEDICINE

## 2020-11-04 RX ORDER — VARENICLINE TARTRATE 0.5 MG/1
0.5 TABLET, FILM COATED ORAL 2 TIMES DAILY
Qty: 30 TABLET | Refills: 2 | Status: SHIPPED | OUTPATIENT
Start: 2020-11-04 | End: 2021-03-04

## 2020-11-05 NOTE — ASSESSMENT & PLAN NOTE
History of nephrotic syndrome secondary to biopsy-proven FSGS. Mild proteinuria persists and seems to be slightly worse than the last time. Blood pressures are stable. Blood sugars look stable though remains slightly elevated.   Better blood sugar contro

## 2020-11-05 NOTE — ASSESSMENT & PLAN NOTE
Smoking about 2 packs/day. She has been smoking continuously due to stress. She feels that since the election is done with she may not be doing this anymore. She is willing to try Chantix and willing to consider intervention.   She has been through [employer]

## 2020-11-05 NOTE — ASSESSMENT & PLAN NOTE
Elevated hemoglobin at 16.7. 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   Patient is encouraged to consider quitting smoking as t

## 2020-11-05 NOTE — ASSESSMENT & PLAN NOTE
Lipid panel shows persistent elevation in the triglycerides, she has been on rosuvastatin and has had some muscle aches and pains and hence will hold off starting her on fenofibrate. Advised strict diet control of starches, sugars, desserts in the diet.

## 2020-11-05 NOTE — PATIENT INSTRUCTIONS
Problem List Items Addressed This Visit        Unprioritized    Elevated hemoglobin (HCC)     Elevated hemoglobin at 16.7.   Explained to the patient reasons for polycythemia or elevation in the hemoglobin secondary to smoking and most likely methemoglobine Hypothyroidism     Thyroid function tests look stable on levothyroxine 25 mcg daily. Continue on the same dose of medication. Tobacco abuse disorder     Smoking about 2 packs/day. She has been smoking continuously due to stress.   She feels

## 2020-11-05 NOTE — ASSESSMENT & PLAN NOTE
Blood pressure 132/85, pulse 77, temperature 98.2 °F (36.8 °C), temperature source Oral, resp. rate 22, height 5' 4\" (1.626 m), weight 204 lb 8 oz (92.8 kg), SpO2 93 %, not currently breastfeeding. Blood pressures look stable at this time.   Continue on l

## 2020-11-05 NOTE — ASSESSMENT & PLAN NOTE
Hemoglobin A1c remains slightly elevated at 7.8. This was exactly the same as it was at her last visit.   Upon review of medications that she is on at this time–he is on Lantus at 24 units which he takes on a regular basis but she takes a Humalog 5 units o

## 2020-11-05 NOTE — ASSESSMENT & PLAN NOTE
Thyroid function tests look stable on levothyroxine 25 mcg daily. Continue on the same dose of medication.

## 2020-12-08 NOTE — TELEPHONE ENCOUNTER
[facility]  Per the patient to call back only if needed. The patient stated that she continue to have deep sore throat even while being on Levaquin. She now feels a \"tight muscle\" on her right side. Has a slight cough.   Denies a fever or sinus congestion none

## 2020-12-15 ENCOUNTER — LAB ENCOUNTER (OUTPATIENT)
Dept: LAB | Facility: HOSPITAL | Age: 65
End: 2020-12-15
Attending: INTERNAL MEDICINE
Payer: MEDICARE

## 2020-12-15 DIAGNOSIS — E11.21 TYPE 2 DIABETES MELLITUS WITH DIABETIC NEPHROPATHY, WITH LONG-TERM CURRENT USE OF INSULIN (HCC): ICD-10-CM

## 2020-12-15 DIAGNOSIS — Z79.4 TYPE 2 DIABETES MELLITUS WITH DIABETIC NEPHROPATHY, WITH LONG-TERM CURRENT USE OF INSULIN (HCC): ICD-10-CM

## 2020-12-15 LAB
ALBUMIN SERPL-MCNC: 2.8 G/DL
ALBUMIN/CREAT UR: 1663.2
ALBUMIN/GLOB SERPL: 0.7 {RATIO}
ALP SERPL-CCNC: 111 U/L
ALT SERPL-CCNC: 27 UNITS/L
ANION GAP SERPL CALC-SCNC: 4 MMOL/L
AST SERPL-CCNC: 14 UNITS/L
BILIRUB SERPL-MCNC: 0.3 MG/DL
BUN SERPL-MCNC: 27 MG/DL
BUN/CREAT SERPL: 30.7
CALCIUM SERPL-MCNC: 9.1 MG/DL
CHLORIDE SERPL-SCNC: 107 MMOL/L
CO2 SERPL-SCNC: 27 MMOL/L
CREAT SERPL-MCNC: 0.88 MG/DL
CREATININE UR: 96.8
ERYTHROCYTE [DISTWIDTH] IN BLOOD: 34.5 %
GLOBULIN SER-MCNC: 4.1 G/DL
GLUCOSE SERPL-MCNC: 160 MG/DL
HCT VFR BLD CALC: 50.5 %
HGB BLD-MCNC: 17.4 G/DL
MCH RBC QN AUTO: 31.8 PG
MCV RBC AUTO: 92.3 FL
MICROALBUMIN: 161
PLATELET # BLD: 230 K/MCL
POTASSIUM SERPL-SCNC: 4.2 MMOL/L
PROT SERPL-MCNC: 6.9 G/DL
RBC # BLD: 5.47 10*6/UL
SODIUM SERPL-SCNC: 138 MMOL/L
WBC # BLD: 7 K/MCL

## 2020-12-15 PROCEDURE — 82043 UR ALBUMIN QUANTITATIVE: CPT

## 2020-12-15 PROCEDURE — 85025 COMPLETE CBC W/AUTO DIFF WBC: CPT

## 2020-12-15 PROCEDURE — 82570 ASSAY OF URINE CREATININE: CPT

## 2020-12-15 PROCEDURE — 80053 COMPREHEN METABOLIC PANEL: CPT

## 2020-12-15 PROCEDURE — 36415 COLL VENOUS BLD VENIPUNCTURE: CPT

## 2020-12-15 PROCEDURE — 81001 URINALYSIS AUTO W/SCOPE: CPT

## 2020-12-16 ENCOUNTER — TELEMEDICINE (OUTPATIENT)
Dept: INTERNAL MEDICINE CLINIC | Facility: CLINIC | Age: 65
End: 2020-12-16

## 2020-12-16 ENCOUNTER — CLINICAL ABSTRACT (OUTPATIENT)
Dept: CARDIOLOGY | Age: 65
End: 2020-12-16

## 2020-12-16 VITALS — SYSTOLIC BLOOD PRESSURE: 140 MMHG | DIASTOLIC BLOOD PRESSURE: 70 MMHG

## 2020-12-16 DIAGNOSIS — Z72.0 TOBACCO ABUSE DISORDER: ICD-10-CM

## 2020-12-16 DIAGNOSIS — D75.1 POLYCYTHEMIA: Primary | ICD-10-CM

## 2020-12-16 DIAGNOSIS — G47.30 SLEEP-DISORDERED BREATHING: ICD-10-CM

## 2020-12-16 PROCEDURE — 99214 OFFICE O/P EST MOD 30 MIN: CPT | Performed by: INTERNAL MEDICINE

## 2020-12-17 NOTE — ASSESSMENT & PLAN NOTE
Continues to smoke about 2 packs/day. She understands the risks and dangers associated with heavy smoking. She initially wanted to start on quitting trial after election but now she plans to do this at the start of [address] [place] [address].   She has been given prescri

## 2020-12-17 NOTE — ASSESSMENT & PLAN NOTE
Persistent rapid elevation in the hemoglobin discussed. Referral to hematology provided. Sleep study has been ordered. Advised to quit smoking ASAP. She does have Chantix with her. She does not plan to start on the medications as yet.

## 2020-12-17 NOTE — PATIENT INSTRUCTIONS
Problem List Items Addressed This Visit        Unprioritized    Polycythemia - Primary     Persistent rapid elevation in the hemoglobin discussed. Referral to hematology provided. Sleep study has been ordered. Advised to quit smoking ASAP.   She does hav

## 2020-12-17 NOTE — PROGRESS NOTES
HPI:    Patient ID: Nathan Morales is a 72year old female.   Telehealth outside of [facility] [address] Verbal Consent   I conducted a telehealth visit with Nathan Morales today, 12/16/20, which was completed using two-way, real-time interactive audio a anxiety. Pertinent negatives include no peripheral edema. Risk factors for coronary artery disease include sedentary lifestyle, dyslipidemia and diabetes mellitus.  Past treatments include lifestyle changes, ACE inhibitors, beta blockers and diuretics (core Oral Tab TAKE 1 TABLET(100 MG) BY MOUTH EVERY NIGHT AT BEDTIME 90 tablet 2   • metolazone 2.5 MG Oral Tab Take 1 tablet (2.5 mg total) by mouth 3 (three) times a week.  45 tablet 1   • Rosuvastatin Calcium 20 MG Oral Tab TAKE 1 TABLET BY MOUTH EVERY NIGHT 9 Pen Needle (BD PEN NEEDLE SHORT U/F) 31G X 8 MM Does not apply Misc USE TWICE DAILY AS DIRECTED 200 each 2   • Vitamin B-12 1000 MCG Oral Tab Take 1,000 mcg by mouth daily.      • ipratropium-albuterol 0.5-2.5 (3) MG/3ML Inhalation Solution Take 3 mL by neb SLEEP STUDY TRANSCRIPTION    Tobacco abuse disorder     Continues to smoke about 2 packs/day. She understands the risks and dangers associated with heavy smoking.   She initially wanted to start on quitting trial after election but now she plans to do this

## 2020-12-17 NOTE — ASSESSMENT & PLAN NOTE
History of sleep disordered breathing. Has had sleep studies in the past but has been reluctant to follow through with a repeat evaluation.   Given persistent elevation in the red cell count as well as pulmonary hypertension, obesity, difficult to control

## 2020-12-23 ENCOUNTER — OFFICE VISIT (OUTPATIENT)
Dept: HEMATOLOGY/ONCOLOGY | Facility: HOSPITAL | Age: 65
End: 2020-12-23
Attending: INTERNAL MEDICINE
Payer: MEDICARE

## 2020-12-23 VITALS
HEART RATE: 80 BPM | BODY MASS INDEX: 35.17 KG/M2 | DIASTOLIC BLOOD PRESSURE: 52 MMHG | OXYGEN SATURATION: 90 % | SYSTOLIC BLOOD PRESSURE: 109 MMHG | TEMPERATURE: 98 F | HEIGHT: 64 IN | RESPIRATION RATE: 18 BRPM | WEIGHT: 206 LBS

## 2020-12-23 DIAGNOSIS — D75.1 POLYCYTHEMIA: Primary | ICD-10-CM

## 2020-12-23 DIAGNOSIS — J43.1 PANLOBULAR EMPHYSEMA (HCC): ICD-10-CM

## 2020-12-23 DIAGNOSIS — R09.02 HYPOXEMIA: ICD-10-CM

## 2020-12-23 PROCEDURE — 99204 OFFICE O/P NEW MOD 45 MIN: CPT | Performed by: INTERNAL MEDICINE

## 2020-12-23 NOTE — CONSULTS
[facility] History and Physical    Patient Name: Alanna Becerra   YOB: 1955   Medical Record Number: [id number]   CSN: [id number]   Attending Physician:  Maria G Sparks MD       Date of Visit: 12/23/2020     Chief Complaint:  Polycythemia. Oral Tab, TAKE 1 TABLET BY MOUTH TWICE DAILY. , Disp: 180 tablet, Rfl: 0  •  CARVEDILOL 3.125 MG Oral Tab, TAKE 1 TABLET BY MOUTH TWICE DAILY, Disp: 180 tablet, Rfl: 0  •  TRELEGY ELLIPTA 100-62.5-25 MCG/INH Inhalation Aerosol Powder, Breath Activated, INHA mg inside cheek as needed for Smoking cessation. , Disp: , Rfl:   •  OXcarbazepine 300 MG Oral Tab, 1 tab in the morning and 1 tab in the evening, Disp: 180 tablet, Rfl: 3  •  Insulin Lispro (HUMALOG KWIKPEN) 200 UNIT/ML Subcutaneous Solution Pen-injector, 2+ ankle edema bilaterally. Neurological: moving all 4 extremities but in a wheelchair.    Psych/Depression: Appropriate mood and affect    Laboratory reviewed:    Lab Results   Component Value Date    WBC 7.0 12/15/2020    RBC 5.47 (H) 12/15/2020    HGB

## 2021-01-04 ENCOUNTER — TELEPHONE (OUTPATIENT)
Dept: NEPHROLOGY | Facility: CLINIC | Age: 66
End: 2021-01-04

## 2021-01-04 ENCOUNTER — OFFICE VISIT (OUTPATIENT)
Dept: CARDIOLOGY | Age: 66
End: 2021-01-04

## 2021-01-04 VITALS
HEART RATE: 76 BPM | HEIGHT: 64 IN | RESPIRATION RATE: 18 BRPM | BODY MASS INDEX: 34.15 KG/M2 | SYSTOLIC BLOOD PRESSURE: 112 MMHG | WEIGHT: 200 LBS | DIASTOLIC BLOOD PRESSURE: 64 MMHG

## 2021-01-04 DIAGNOSIS — I50.32 CHRONIC HEART FAILURE WITH PRESERVED EJECTION FRACTION (CMD): ICD-10-CM

## 2021-01-04 DIAGNOSIS — E11.69 TYPE 2 DIABETES MELLITUS WITH OTHER SPECIFIED COMPLICATION, WITHOUT LONG-TERM CURRENT USE OF INSULIN (CMD): ICD-10-CM

## 2021-01-04 DIAGNOSIS — I50.30 DIASTOLIC CONGESTIVE HEART FAILURE, UNSPECIFIED HF CHRONICITY (CMD): Primary | ICD-10-CM

## 2021-01-04 PROCEDURE — 99214 OFFICE O/P EST MOD 30 MIN: CPT | Performed by: INTERNAL MEDICINE

## 2021-01-04 RX ORDER — TORSEMIDE 20 MG/1
20 TABLET ORAL DAILY
Qty: 30 TABLET | Refills: 11 | Status: SHIPPED | OUTPATIENT
Start: 2021-01-04 | End: 2021-10-19 | Stop reason: DRUGHIGH

## 2021-01-04 RX ORDER — VARENICLINE TARTRATE 0.5 MG/1
0.5 TABLET, FILM COATED ORAL 2 TIMES DAILY
COMMUNITY
Start: 2020-11-04 | End: 2021-08-23

## 2021-01-04 RX ORDER — FLUCONAZOLE 150 MG/1
150 TABLET ORAL ONCE
Qty: 1 TABLET | Refills: 0 | Status: SHIPPED | OUTPATIENT
Start: 2021-01-04 | End: 2021-01-04

## 2021-01-04 SDOH — HEALTH STABILITY: PHYSICAL HEALTH: ON AVERAGE, HOW MANY DAYS PER WEEK DO YOU ENGAGE IN MODERATE TO STRENUOUS EXERCISE (LIKE A BRISK WALK)?: 0 DAYS

## 2021-01-04 SDOH — HEALTH STABILITY: PHYSICAL HEALTH: ON AVERAGE, HOW MANY MINUTES DO YOU ENGAGE IN EXERCISE AT THIS LEVEL?: 0 MIN

## 2021-01-04 ASSESSMENT — ENCOUNTER SYMPTOMS
COUGH: 1
HEMATOCHEZIA: 0
WEIGHT LOSS: 1
FEVER: 0
SPUTUM PRODUCTION: 1
BRUISES/BLEEDS EASILY: 0
HEMOPTYSIS: 0
SUSPICIOUS LESIONS: 0
LOSS OF BALANCE: 1
ALLERGIC/IMMUNOLOGIC COMMENTS: NO NEW FOOD ALLERGIES

## 2021-01-04 ASSESSMENT — PATIENT HEALTH QUESTIONNAIRE - PHQ9
2. FEELING DOWN, DEPRESSED OR HOPELESS: NOT AT ALL
SUM OF ALL RESPONSES TO PHQ9 QUESTIONS 1 AND 2: 0
CLINICAL INTERPRETATION OF PHQ2 SCORE: NO FURTHER SCREENING NEEDED
1. LITTLE INTEREST OR PLEASURE IN DOING THINGS: NOT AT ALL
SUM OF ALL RESPONSES TO PHQ9 QUESTIONS 1 AND 2: 0
CLINICAL INTERPRETATION OF PHQ9 SCORE: NO FURTHER SCREENING NEEDED

## 2021-01-05 RX ORDER — IPRATROPIUM BROMIDE AND ALBUTEROL SULFATE 2.5; .5 MG/3ML; MG/3ML
3 SOLUTION RESPIRATORY (INHALATION) 2 TIMES DAILY
Qty: 60 VIAL | Refills: 5 | Status: SHIPPED | OUTPATIENT
Start: 2021-01-05

## 2021-01-05 RX ORDER — CARVEDILOL 3.12 MG/1
3.12 TABLET ORAL 2 TIMES DAILY
Qty: 180 TABLET | Refills: 0 | Status: SHIPPED | OUTPATIENT
Start: 2021-01-05 | End: 2021-04-21

## 2021-01-05 NOTE — TELEPHONE ENCOUNTER
Patient requesting refills for ipratropium neb solution. Please call. Thank you.     Current Outpatient Medications   Medication Sig Dispense Refill   • ipratropium-albuterol 0.5-2.5 (3) MG/3ML Inhalation Solution Take 3 mL by nebulization 2 (two) times d

## 2021-01-11 NOTE — PROGRESS NOTES
HPI:    Patient ID: Hawa Andrea is a 72year old female. Labs discussed    Diabetes  She presents for her follow-up diabetic visit. She has type 2 diabetes mellitus. Her disease course has been improving.  Hypoglycemia symptoms include confusion and and adherence to exercise. Risk factors for coronary artery disease include obesity, post-menopausal, dyslipidemia and diabetes mellitus. Hypertension  This is a chronic problem. The current episode started more than 1 year ago.  The problem has been Bethany mouth 3 (three) times a week.  45 tablet 1   • Rosuvastatin Calcium 20 MG Oral Tab TAKE 1 TABLET BY MOUTH EVERY NIGHT 90 tablet 1   • PRAMIPEXOLE DIHYDROCHLORIDE 1 MG Oral Tab TAKE 1 TABLET BY MOUTH THREE TIMES DAILY 270 tablet 0   • Albuterol Sulfate HFA ( Tab Take 1,000 mcg by mouth daily. • magnesium 250 MG Oral Tab Take 250 mg by mouth daily. • Cholecalciferol (VITAMIN D) 2000 UNITS Oral Cap Take 2,000 Units by mouth daily. • aspirin 81 MG Oral Tab Take 81 mg by mouth daily.      • carvedil normal.   Nursing note and vitals reviewed. ASSESSMENT/PLAN:     Problem List Items Addressed This Visit        Unprioritized    Hypothyroidism     Thyroid function tests look stable on levothyroxine 25 mcg daily.   Continue on the same dose of remains slightly elevated at 7.8. This was exactly the same as it was at her last visit.   Upon review of medications that she is on at this time–he is on Lantus at 24 units which he takes on a regular basis but she takes a Humalog 5 units only with about AND AGREES TO FOLLOW DIRECTIONS AND ADVICE    Orders Placed This Encounter      CBC W Differential W Platelet [E]      Comp Metabolic Panel (14) [E]      Microalb/Creat Ratio, Random Urine      Urinalysis, Routine [E]      Meds This Visit:  Requested Presc

## 2021-01-19 ENCOUNTER — MED REC SCAN ONLY (OUTPATIENT)
Dept: INTERNAL MEDICINE CLINIC | Facility: CLINIC | Age: 66
End: 2021-01-19

## 2021-01-22 ENCOUNTER — TELEMEDICINE (OUTPATIENT)
Dept: INTERNAL MEDICINE CLINIC | Facility: CLINIC | Age: 66
End: 2021-01-22
Payer: MEDICARE

## 2021-01-22 ENCOUNTER — NURSE TRIAGE (OUTPATIENT)
Dept: INTERNAL MEDICINE CLINIC | Facility: CLINIC | Age: 66
End: 2021-01-22

## 2021-01-22 ENCOUNTER — TELEPHONE (OUTPATIENT)
Dept: CARDIOLOGY | Age: 66
End: 2021-01-22

## 2021-01-22 DIAGNOSIS — B00.1 COLD SORE: Primary | ICD-10-CM

## 2021-01-22 PROCEDURE — 99213 OFFICE O/P EST LOW 20 MIN: CPT | Performed by: INTERNAL MEDICINE

## 2021-01-22 RX ORDER — VALACYCLOVIR HYDROCHLORIDE 1 G/1
2 TABLET, FILM COATED ORAL EVERY 12 HOURS SCHEDULED
Qty: 4 TABLET | Refills: 0 | Status: SHIPPED | OUTPATIENT
Start: 2021-01-22 | End: 2021-01-23

## 2021-01-22 NOTE — PROGRESS NOTES
Patient ID: Li Rutledge is a 72year old female. Patient presents with:  Mouth Cold Sores         HISTORY OF PRESENT ILLNESS:   Patient presents for above. This visit is conducted using Telemedicine with live, interactive video and audio (Text[employer]). nightly., Disp: 90 tablet, Rfl: 1  •  LOSARTAN POTASSIUM 50 MG Oral Tab, TAKE 1 TABLET BY MOUTH TWICE DAILY. , Disp: 180 tablet, Rfl: 0  •  TRELEGY ELLIPTA 100-62.5-25 MCG/INH Inhalation Aerosol Powder, Breath Activated, INHALE 1 PUFF BY MOUTH INTO THE LUNG Smoking cessation. , Disp: , Rfl:   •  OXcarbazepine 300 MG Oral Tab, 1 tab in the morning and 1 tab in the evening, Disp: 180 tablet, Rfl: 3  •  Insulin Lispro (HUMALOG KWIKPEN) 200 UNIT/ML Subcutaneous Solution Pen-injector, 5 units subcutaneously with ev standard drinks      Drug use: No      Sexual activity: Not on file    Lifestyle      Physical activity        Days per week: Not on file        Minutes per session: Not on file      Stress: Not on file    Relationships      Social connections        Talks for 1 day. Dispense: 4 tablet; Refill: 0  · Symptomatic treatment. Return if symptoms worsen or fail to improve.     Time spent on encounter  11 minutes             Ru Painter understands video evaluation is not a substitute for face-to-face exami visit.     Aris Smith MD  1/22/2021

## 2021-01-22 NOTE — TELEPHONE ENCOUNTER
Action Requested: Summary for Provider     []  Critical Lab, Recommendations Needed  [] Need Additional Advice  [x]   FYI    []   Need Orders  [] Need Medications Sent to Pharmacy  []  Other     SUMMARY:   Spoke with pt,  verified, pt c/o cold sore on h

## 2021-01-22 NOTE — TELEPHONE ENCOUNTER
Current Outpatient Medications:   •  Rosuvastatin Calcium 20 MG Oral Tab, TAKE 1 TABLET BY MOUTH EVERY NIGHT, Disp: 90 tablet, Rfl: 1

## 2021-01-22 NOTE — TELEPHONE ENCOUNTER
Current Outpatient Medications   Medication Sig Dispense Refill   • OXcarbazepine 300 MG Oral Tab 1 tab in the morning and 1 tab in the evening 180 tablet 3

## 2021-01-24 RX ORDER — ROSUVASTATIN CALCIUM 20 MG/1
TABLET, COATED ORAL
Qty: 90 TABLET | Refills: 1 | Status: SHIPPED | OUTPATIENT
Start: 2021-01-24 | End: 2021-08-12

## 2021-01-24 RX ORDER — OXCARBAZEPINE 300 MG/1
TABLET, FILM COATED ORAL
Qty: 180 TABLET | Refills: 3 | Status: SHIPPED | OUTPATIENT
Start: 2021-01-24 | End: 2022-02-03

## 2021-01-27 DIAGNOSIS — Z23 NEED FOR VACCINATION: ICD-10-CM

## 2021-02-01 ENCOUNTER — LAB ENCOUNTER (OUTPATIENT)
Dept: LAB | Age: 66
End: 2021-02-01
Attending: INTERNAL MEDICINE
Payer: MEDICARE

## 2021-02-01 DIAGNOSIS — D75.1 POLYCYTHEMIA: ICD-10-CM

## 2021-02-01 LAB
BASOPHILS # BLD AUTO: 0.06 X10(3) UL (ref 0–0.2)
BASOPHILS NFR BLD AUTO: 0.8 %
DEPRECATED RDW RBC AUTO: 41.2 FL (ref 35.1–46.3)
EOSINOPHIL # BLD AUTO: 0.13 X10(3) UL (ref 0–0.7)
EOSINOPHIL NFR BLD AUTO: 1.7 %
ERYTHROCYTE [DISTWIDTH] IN BLOOD BY AUTOMATED COUNT: 12.4 % (ref 11–15)
HCT VFR BLD AUTO: 54.3 %
HCT VFR BLD CALC: 54.3 %
HGB BLD-MCNC: 18.6 G/DL
HGB BLD-MCNC: 18.6 G/DL
IMM GRANULOCYTES # BLD AUTO: 0.02 X10(3) UL (ref 0–1)
IMM GRANULOCYTES NFR BLD: 0.3 %
LYMPHOCYTES # BLD AUTO: 1.76 X10(3) UL (ref 1–4)
LYMPHOCYTES NFR BLD AUTO: 22.6 %
MCH RBC QN AUTO: 31.2 PG (ref 26–34)
MCHC RBC AUTO-ENTMCNC: 34.3 G/DL (ref 31–37)
MCV RBC AUTO: 91 FL
MONOCYTES # BLD AUTO: 0.62 X10(3) UL (ref 0.1–1)
MONOCYTES NFR BLD AUTO: 8 %
NEUTROPHILS # BLD AUTO: 5.2 X10 (3) UL (ref 1.5–7.7)
NEUTROPHILS # BLD AUTO: 5.2 X10(3) UL (ref 1.5–7.7)
NEUTROPHILS NFR BLD AUTO: 66.6 %
PLATELET # BLD AUTO: 225 10(3)UL (ref 150–450)
PLATELET # BLD: 225 K/MCL
RBC # BLD AUTO: 5.97 X10(6)UL
RBC # BLD: 5.97 10*6/UL
WBC # BLD AUTO: 7.8 X10(3) UL (ref 4–11)
WBC # BLD: 7.8 K/MCL

## 2021-02-01 PROCEDURE — 85060 BLOOD SMEAR INTERPRETATION: CPT

## 2021-02-01 PROCEDURE — 36415 COLL VENOUS BLD VENIPUNCTURE: CPT

## 2021-02-01 PROCEDURE — 85025 COMPLETE CBC W/AUTO DIFF WBC: CPT

## 2021-02-02 ENCOUNTER — CLINICAL ABSTRACT (OUTPATIENT)
Dept: CARDIOLOGY | Age: 66
End: 2021-02-02

## 2021-02-02 RX ORDER — LOSARTAN POTASSIUM 50 MG/1
50 TABLET ORAL 2 TIMES DAILY
Qty: 180 TABLET | Refills: 0 | Status: SHIPPED | OUTPATIENT
Start: 2021-02-02 | End: 2021-05-06

## 2021-02-02 NOTE — TELEPHONE ENCOUNTER
Last seen 9/15/2020. Medication is noted as taking. Return to clinic in 3 months (12/2020) No follow up scheduled. This is the first request. Nothing from the pharmacy. Refill pended and routed to Dr. Rojas Goetz.

## 2021-02-02 NOTE — TELEPHONE ENCOUNTER
Pt is calling to get a refill on   LOSARTAN POTASSIUM 50 MG Oral Tab 180 tablet 0 9/8/2020    Sig:   TAKE 1 TABLET BY MOUTH TWICE DAILY. Route:   (none)     Order        Pt states afua contacted  several times.  Please advise pt only has a couple

## 2021-02-03 NOTE — TELEPHONE ENCOUNTER
Last seen 9/15/2020. Return to clinic in 3 months (12/2020) No follow up scheduled. Refill pended and routed to Dr. Alhaji Childs. Medication is noted as taking.

## 2021-02-04 RX ORDER — LOSARTAN POTASSIUM 50 MG/1
50 TABLET ORAL 2 TIMES DAILY
Qty: 180 TABLET | Refills: 0 | Status: CANCELLED | OUTPATIENT
Start: 2021-02-04

## 2021-02-05 NOTE — TELEPHONE ENCOUNTER
Current Outpatient Medications   Medication Sig Dispense Refill   • LEVOTHYROXINE SODIUM 25 MCG Oral Tab TAKE 1 TABLET(25 MCG) BY MOUTH EVERY DAY 90 tablet 1

## 2021-02-06 RX ORDER — LEVOTHYROXINE SODIUM 0.03 MG/1
25 TABLET ORAL DAILY
Qty: 90 TABLET | Refills: 1 | Status: SHIPPED | OUTPATIENT
Start: 2021-02-06 | End: 2021-09-09

## 2021-02-07 ENCOUNTER — IMMUNIZATION (OUTPATIENT)
Dept: LAB | Age: 66
End: 2021-02-07
Attending: HOSPITALIST
Payer: MEDICARE

## 2021-02-07 DIAGNOSIS — Z23 NEED FOR VACCINATION: Primary | ICD-10-CM

## 2021-02-07 PROCEDURE — 0001A SARSCOV2 VAC 30MCG/0.3ML IM: CPT

## 2021-02-11 ENCOUNTER — OFFICE VISIT (OUTPATIENT)
Dept: PULMONOLOGY | Facility: CLINIC | Age: 66
End: 2021-02-11
Payer: MEDICARE

## 2021-02-11 VITALS
WEIGHT: 200 LBS | DIASTOLIC BLOOD PRESSURE: 74 MMHG | BODY MASS INDEX: 34.15 KG/M2 | SYSTOLIC BLOOD PRESSURE: 141 MMHG | HEIGHT: 64 IN | OXYGEN SATURATION: 92 %

## 2021-02-11 DIAGNOSIS — G47.10 HYPERSOMNIA: ICD-10-CM

## 2021-02-11 DIAGNOSIS — Z87.891 PERSONAL HISTORY OF TOBACCO USE, PRESENTING HAZARDS TO HEALTH: Primary | ICD-10-CM

## 2021-02-11 PROCEDURE — 99213 OFFICE O/P EST LOW 20 MIN: CPT | Performed by: INTERNAL MEDICINE

## 2021-02-11 RX ORDER — CLONAZEPAM 0.5 MG/1
0.5 TABLET ORAL 2 TIMES DAILY
COMMUNITY
End: 2021-04-15

## 2021-02-11 RX ORDER — TORSEMIDE 20 MG/1
40 TABLET ORAL 2 TIMES DAILY
COMMUNITY
Start: 2021-01-04

## 2021-02-11 NOTE — PROGRESS NOTES
Referring Physician  Ivanna Nina MD    History of Present Illness  Patient is seen today for follow-up appointment pulmonary clinic. Admits to ongoing chronic dyspnea with exertion with some gradual worsening. Using Trelegy on daily basis.   Mateus Tabor mouth 3 (three) times a week., Disp: 45 tablet, Rfl: 1    •  PRAMIPEXOLE DIHYDROCHLORIDE 1 MG Oral Tab, TAKE 1 TABLET BY MOUTH THREE TIMES DAILY, Disp: 270 tablet, Rfl: 0    •  Albuterol Sulfate HFA (PROAIR HFA) 108 (90 Base) MCG/ACT Inhalation Aero Soln, on file prior to visit.        Allergies    Cefpodoxime             FEVER    Physical Exam  Constitutional: no acute distress  HEENT: PERRL  Cardio: RRR, S1 S2  Respiratory: Diminished breath sounds bilaterally  GI: abdomen soft, non tender  Extremities: no

## 2021-02-18 ENCOUNTER — OFFICE VISIT (OUTPATIENT)
Dept: INTERNAL MEDICINE CLINIC | Facility: CLINIC | Age: 66
End: 2021-02-18
Payer: MEDICARE

## 2021-02-18 VITALS
WEIGHT: 195 LBS | HEIGHT: 64 IN | SYSTOLIC BLOOD PRESSURE: 99 MMHG | DIASTOLIC BLOOD PRESSURE: 67 MMHG | HEART RATE: 76 BPM | TEMPERATURE: 98 F | RESPIRATION RATE: 18 BRPM | BODY MASS INDEX: 33.29 KG/M2

## 2021-02-18 DIAGNOSIS — E11.21 TYPE 2 DIABETES MELLITUS WITH DIABETIC NEPHROPATHY, WITH LONG-TERM CURRENT USE OF INSULIN (HCC): Primary | ICD-10-CM

## 2021-02-18 DIAGNOSIS — I10 ESSENTIAL HYPERTENSION: ICD-10-CM

## 2021-02-18 DIAGNOSIS — G47.30 SLEEP-DISORDERED BREATHING: ICD-10-CM

## 2021-02-18 DIAGNOSIS — G35 MULTIPLE SCLEROSIS (HCC): ICD-10-CM

## 2021-02-18 DIAGNOSIS — Z79.4 TYPE 2 DIABETES MELLITUS WITH DIABETIC NEPHROPATHY, WITH LONG-TERM CURRENT USE OF INSULIN (HCC): Primary | ICD-10-CM

## 2021-02-18 DIAGNOSIS — D75.1 POLYCYTHEMIA: ICD-10-CM

## 2021-02-18 DIAGNOSIS — E03.9 HYPOTHYROIDISM, UNSPECIFIED TYPE: ICD-10-CM

## 2021-02-18 DIAGNOSIS — E78.2 MIXED HYPERLIPIDEMIA: ICD-10-CM

## 2021-02-18 PROCEDURE — 99214 OFFICE O/P EST MOD 30 MIN: CPT | Performed by: INTERNAL MEDICINE

## 2021-02-18 RX ORDER — FLASH GLUCOSE SENSOR
KIT MISCELLANEOUS
Qty: 6 EACH | Refills: 3 | Status: SHIPPED | OUTPATIENT
Start: 2021-02-18 | End: 2021-04-20

## 2021-02-18 NOTE — PROGRESS NOTES
HPI:    Patient ID: Maximino Santiago is a 77year old female. Per Dr Yoel Dos Santos  1. Severe COPD  2. Dyspnea with exertion  3. Nicotine dependence  4.   Chronic hypoxemic respiratory failure        Plan  -Patient with evidence of progressive was provided extensively today     In regards to her polycythemia, I would NOT recommend any phlebotomy as her polycythemia is a compensatory for the underlying hypoxemia, which will get worse if her hemoglobin drops.  Treatment at this point is better oxyg (FREESTYLE VALERIA 14 DAY SENSOR) Does not apply Misc Use as directed every 2 weeks. 6 each 3   • Continuous Blood Gluc  (FREESTYLE VALERIA 2 READER SYSTM) Does not apply Device 1 Device by Does not apply route continuous.  1 each 0   • clonazePAM 0.5 M Solution Pen-injector 24 units s/c daily 10 pen 3   • spironolactone 25 MG Oral Tab Take 0.5 tablets (12.5 mg total) by mouth daily.  (Patient taking differently: Take 25 mg by mouth daily.  ) 30 tablet 3   • Potassium Chloride ER 20 MEQ Oral Tab CR Take 1 rhythm. Heart sounds: Normal heart sounds. No murmur heard. Pulmonary:      Effort: Pulmonary effort is normal. No respiratory distress. Breath sounds: Normal breath sounds. No wheezing or rales. Chest:      Chest wall: No tenderness.    Abd 6 Encounters:  02/18/21 : 195 lb (88.5 kg)  02/11/21 : 200 lb (90.7 kg)  12/23/20 : 206 lb (93.4 kg)  11/04/20 : 204 lb 8 oz (92.8 kg)  08/04/20 : 205 lb 12.8 oz (93.4 kg)  07/14/20 : 204 lb (92.5 kg)            Sleep-disordered breathing     History of sl SENSOR) Does not apply Misc    Continuous Blood Gluc  (FREESTYLE VALERIA 2 READER SYSTM) Does not apply Device    Other Relevant Orders    CBC WITH DIFFERENTIAL WITH PLATELET    COMP METABOLIC PANEL (14)    LIPID PANEL    HEMOGLOBIN A1C    TSH W REFL

## 2021-02-19 ENCOUNTER — TELEPHONE (OUTPATIENT)
Dept: INTERNAL MEDICINE CLINIC | Facility: CLINIC | Age: 66
End: 2021-02-19

## 2021-02-19 RX ORDER — FLASH GLUCOSE SCANNING READER
1 EACH MISCELLANEOUS
Qty: 6 DEVICE | Refills: 1 | Status: SHIPPED | OUTPATIENT
Start: 2021-02-19 | End: 2021-04-20

## 2021-02-19 NOTE — ASSESSMENT & PLAN NOTE
History of sleep disordered breathing. Home sleep study has been requested due to worsening polycythemia–secondary. She has been advised to use oxygen ASAP 24 hours to reduce further increase in the hemoglobin levels.   Advised to complete the sleep study

## 2021-02-19 NOTE — PATIENT INSTRUCTIONS
Problem List Items Addressed This Visit        Unprioritized    HTN (hypertension)     Blood pressure 99/67, pulse 76, temperature 97.9 °F (36.6 °C), temperature source Tympanic, resp.  rate 18, height 5' 4\" (1.626 m), weight 195 lb (88.5 kg), not currentl to worsening polycythemia–secondary. She has been advised to use oxygen ASAP 24 hours to reduce further increase in the hemoglobin levels. Advised to complete the sleep study ASAP.          Relevant Orders    [facility] ALT REFERRAL HOME SLEEP APNEA TEST    G

## 2021-02-19 NOTE — ASSESSMENT & PLAN NOTE
Persistent secondary polycythemia with significant elevation in the hemoglobin to 18. 6. This is dangerous at this time for risks of strokes, heart attacks and blood clots. She is on an aspirin daily.   She is advised to start using oxygen on a daily basis

## 2021-02-19 NOTE — ASSESSMENT & PLAN NOTE
Patient has been on Lantus 24 units daily and then she takes 5 units of Humalog with every meal.  Patient has not been checking her blood sugars before she doses herself. She does not like to poke her cell.   Consider a continuous glucose monitor if covere

## 2021-02-19 NOTE — ASSESSMENT & PLAN NOTE
Patient has been on diet control, has lost a significant amount of weight at this time. Will need recheck labs to reassess. She could not tolerate fenofibrate–developed muscle aches, pains and discontinued.     Wt Readings from Last 6 Encounters:  02/18/2

## 2021-02-19 NOTE — ASSESSMENT & PLAN NOTE
Blood pressure 99/67, pulse 76, temperature 97.9 °F (36.6 °C), temperature source Tympanic, resp. rate 18, height 5' 4\" (1.626 m), weight 195 lb (88.5 kg), not currently breastfeeding. Blood pressures look stable at this time.   Continue on losartan, C

## 2021-02-19 NOTE — ASSESSMENT & PLAN NOTE
History of MS, wheelchair dependent. Muscle spasm has been an issue. She is not on any specific treatments at this time and has remained stable.

## 2021-02-19 NOTE — ASSESSMENT & PLAN NOTE
Thyroid function test look stable on levothyroxine at 25 mcg daily. Continue on the same dose of medication.

## 2021-02-19 NOTE — TELEPHONE ENCOUNTER
Current Outpatient Medications   Medication Sig Dispense Refill   • Continuous Blood Gluc  (FREESTYLE VALERIA 2 READER SYSTM) Does not apply Device 1 Device by Does not apply route continuous.  1 each 0       [facility]

## 2021-02-28 ENCOUNTER — IMMUNIZATION (OUTPATIENT)
Dept: LAB | Age: 66
End: 2021-02-28
Attending: HOSPITALIST
Payer: MEDICARE

## 2021-02-28 DIAGNOSIS — Z23 NEED FOR VACCINATION: Primary | ICD-10-CM

## 2021-02-28 PROCEDURE — 0002A SARSCOV2 VAC 30MCG/0.3ML IM: CPT

## 2021-03-04 ENCOUNTER — TELEPHONE (OUTPATIENT)
Dept: INTERNAL MEDICINE CLINIC | Facility: CLINIC | Age: 66
End: 2021-03-04

## 2021-03-04 RX ORDER — VALACYCLOVIR HYDROCHLORIDE 1 G/1
TABLET, FILM COATED ORAL
Qty: 20 TABLET | Refills: 3 | Status: SHIPPED | OUTPATIENT
Start: 2021-03-04 | End: 2021-04-20 | Stop reason: ALTCHOICE

## 2021-03-04 NOTE — TELEPHONE ENCOUNTER
It may be related to the vaccine. Please advised to use saline nasal spray every 2 hours. Refills on Valtrex has been sent in.

## 2021-03-04 NOTE — TELEPHONE ENCOUNTER
Patient states on 1/22 she had a virtual appointment with Dr. Violeta Sapp for mouth cold sores. States since Sunday, 2/28 she developed another cold sore on her mouth and in her nose. States her nose is very dry and painful due to these sores.   Patient had

## 2021-03-05 NOTE — TELEPHONE ENCOUNTER
•  PRAMIPEXOLE DIHYDROCHLORIDE 1 MG Oral Tab, TAKE 1 TABLET BY MOUTH THREE TIMES DAILY, Disp: 270 tablet, Rfl: 0

## 2021-03-05 NOTE — TELEPHONE ENCOUNTER
Pt is calling because per pt pharmacy did not receive her Freestyle supplies. I called [facility] in  [place] and was informed that they did receive the script but it is not covered. I called pt back and she was informed of this information.  Pt will ca

## 2021-03-06 RX ORDER — PRAMIPEXOLE DIHYDROCHLORIDE 1 MG/1
1 TABLET ORAL 3 TIMES DAILY
Qty: 270 TABLET | Refills: 1 | Status: SHIPPED | OUTPATIENT
Start: 2021-03-06 | End: 2021-12-15

## 2021-03-11 ENCOUNTER — TELEPHONE (OUTPATIENT)
Dept: NEUROLOGY | Facility: CLINIC | Age: 66
End: 2021-03-11

## 2021-03-11 ENCOUNTER — OFFICE VISIT (OUTPATIENT)
Dept: NEUROLOGY | Facility: CLINIC | Age: 66
End: 2021-03-11
Payer: MEDICARE

## 2021-03-11 VITALS
HEIGHT: 64 IN | DIASTOLIC BLOOD PRESSURE: 70 MMHG | BODY MASS INDEX: 33.29 KG/M2 | HEART RATE: 68 BPM | OXYGEN SATURATION: 92 % | RESPIRATION RATE: 18 BRPM | WEIGHT: 195 LBS | SYSTOLIC BLOOD PRESSURE: 104 MMHG

## 2021-03-11 DIAGNOSIS — M51.16 LUMBAR DISC HERNIATION WITH RADICULOPATHY: Primary | ICD-10-CM

## 2021-03-11 PROCEDURE — 99214 OFFICE O/P EST MOD 30 MIN: CPT | Performed by: PHYSICAL MEDICINE & REHABILITATION

## 2021-03-11 RX ORDER — HYDROCODONE BITARTRATE AND ACETAMINOPHEN 5; 325 MG/1; MG/1
1 TABLET ORAL 2 TIMES DAILY PRN
Qty: 14 TABLET | Refills: 0 | Status: SHIPPED | OUTPATIENT
Start: 2021-03-11 | End: 2021-04-20

## 2021-03-11 NOTE — PROGRESS NOTES
Progress note    C/C: right low back and leg pain    HPI: 78 yo female with history of MS presents with right-sided lower back pain rating down the right leg ultimately to the dorsum of the foot and great toe.   Symptoms have been worsening over the past se

## 2021-03-11 NOTE — TELEPHONE ENCOUNTER
Per Medicare Guidelines -no authorization is required for radiology    Status: Approved-no authorization required per health plan    Patient notified she can proceed with scheduling L-Spine MRI CPT 33615.  Patient will call tomorrow to schedule

## 2021-03-12 NOTE — TELEPHONE ENCOUNTER
Current Outpatient Medications   Medication Sig Dispense Refill   • Insulin Lispro (HUMALOG KWIKPEN) 200 UNIT/ML Subcutaneous Solution Pen-injector 5 units subcutaneously with every meal 5 pen 3

## 2021-03-14 RX ORDER — INSULIN LISPRO 200 [IU]/ML
INJECTION, SOLUTION SUBCUTANEOUS
Qty: 5 PEN | Refills: 3 | Status: SHIPPED | OUTPATIENT
Start: 2021-03-14

## 2021-03-15 ENCOUNTER — HOSPITAL ENCOUNTER (OUTPATIENT)
Dept: MRI IMAGING | Facility: HOSPITAL | Age: 66
Discharge: HOME | End: 2021-03-15
Attending: PHYSICAL MEDICINE & REHABILITATION
Payer: MEDICARE

## 2021-03-15 DIAGNOSIS — M51.16 LUMBAR DISC HERNIATION WITH RADICULOPATHY: ICD-10-CM

## 2021-03-15 PROCEDURE — 72148 MRI LUMBAR SPINE W/O DYE: CPT | Performed by: PHYSICAL MEDICINE & REHABILITATION

## 2021-03-16 ENCOUNTER — TELEPHONE (OUTPATIENT)
Dept: NEUROLOGY | Facility: CLINIC | Age: 66
End: 2021-03-16

## 2021-03-16 DIAGNOSIS — M54.16 RIGHT LUMBAR RADICULOPATHY: Primary | ICD-10-CM

## 2021-03-16 NOTE — TELEPHONE ENCOUNTER
S/w patient in regards of results. Patient verbalized understanding with no questions at this time. Patient would like Dr. Monalisa Peguero to proceed with TFESI. Please drop order.

## 2021-03-17 ENCOUNTER — TELEPHONE (OUTPATIENT)
Dept: NEUROLOGY | Facility: CLINIC | Age: 66
End: 2021-03-17

## 2021-03-17 NOTE — TELEPHONE ENCOUNTER
Per Medicare Guidelines -no authorization is required for duglas    Status: Approved-no authorization required per health plan     Clinical staff can proceed with scheduling right L5 transforaminal epidural steroid injection CPT 12656 dx:M54.16 at [facility]

## 2021-03-17 NOTE — TELEPHONE ENCOUNTER
Patient has been scheduled for a right L5 transforaminal epidural steroid injection under fluoroscopy, local on 04/14/21 at the [address] [place], [place] [address]Th . Medications and allergies reviewed.  Patient informed we will need verbal or written authorization from patients Primary Ca

## 2021-03-19 ENCOUNTER — TELEPHONE (OUTPATIENT)
Dept: CARDIOLOGY | Age: 66
End: 2021-03-19

## 2021-03-19 ENCOUNTER — HOSPITAL ENCOUNTER (OUTPATIENT)
Dept: CT IMAGING | Facility: HOSPITAL | Age: 66
Discharge: HOME | End: 2021-03-19
Attending: INTERNAL MEDICINE
Payer: MEDICARE

## 2021-03-19 DIAGNOSIS — Z87.891 PERSONAL HISTORY OF TOBACCO USE, PRESENTING HAZARDS TO HEALTH: ICD-10-CM

## 2021-03-19 DIAGNOSIS — E87.6 HYPOKALEMIA: Primary | ICD-10-CM

## 2021-03-19 PROCEDURE — 71271 CT THORAX LUNG CANCER SCR C-: CPT | Performed by: INTERNAL MEDICINE

## 2021-03-19 RX ORDER — POTASSIUM CHLORIDE 20 MEQ/1
20 TABLET, EXTENDED RELEASE ORAL DAILY
Qty: 90 TABLET | Refills: 3 | Status: SHIPPED | OUTPATIENT
Start: 2021-03-19 | End: 2021-12-07 | Stop reason: SDUPTHER

## 2021-04-05 ENCOUNTER — OFFICE VISIT (OUTPATIENT)
Dept: CARDIOLOGY | Age: 66
End: 2021-04-05

## 2021-04-05 ENCOUNTER — LAB SERVICES (OUTPATIENT)
Dept: LAB | Age: 66
End: 2021-04-05

## 2021-04-05 ENCOUNTER — TELEPHONE (OUTPATIENT)
Dept: CARDIOLOGY | Age: 66
End: 2021-04-05

## 2021-04-05 VITALS
WEIGHT: 198 LBS | HEART RATE: 72 BPM | BODY MASS INDEX: 33.8 KG/M2 | SYSTOLIC BLOOD PRESSURE: 110 MMHG | RESPIRATION RATE: 18 BRPM | HEIGHT: 64 IN | DIASTOLIC BLOOD PRESSURE: 60 MMHG

## 2021-04-05 DIAGNOSIS — I50.32 CHRONIC HEART FAILURE WITH PRESERVED EJECTION FRACTION (CMD): ICD-10-CM

## 2021-04-05 DIAGNOSIS — I50.32 CHRONIC HEART FAILURE WITH PRESERVED EJECTION FRACTION (CMD): Primary | ICD-10-CM

## 2021-04-05 LAB
ANION GAP SERPL CALC-SCNC: 6 MMOL/L (ref 10–20)
BUN SERPL-MCNC: 49 MG/DL (ref 6–20)
BUN/CREAT SERPL: 50 (ref 7–25)
CALCIUM SERPL-MCNC: 9.6 MG/DL (ref 8.4–10.2)
CHLORIDE SERPL-SCNC: 98 MMOL/L (ref 98–107)
CO2 SERPL-SCNC: 36 MMOL/L (ref 21–32)
CREAT SERPL-MCNC: 0.98 MG/DL (ref 0.51–0.95)
FASTING DURATION TIME PATIENT: 0 HOURS
GFR SERPLBLD BASED ON 1.73 SQ M-ARVRAT: 60 ML/MIN/1.73M2
GLUCOSE SERPL-MCNC: 135 MG/DL (ref 65–99)
MAGNESIUM SERPL-MCNC: 2.7 MG/DL (ref 1.7–2.4)
POTASSIUM SERPL-SCNC: 3.2 MMOL/L (ref 3.4–5.1)
SODIUM SERPL-SCNC: 137 MMOL/L (ref 135–145)

## 2021-04-05 PROCEDURE — 83735 ASSAY OF MAGNESIUM: CPT | Performed by: CLINICAL MEDICAL LABORATORY

## 2021-04-05 PROCEDURE — 99215 OFFICE O/P EST HI 40 MIN: CPT | Performed by: INTERNAL MEDICINE

## 2021-04-05 PROCEDURE — 80048 BASIC METABOLIC PNL TOTAL CA: CPT | Performed by: CLINICAL MEDICAL LABORATORY

## 2021-04-05 PROCEDURE — 36415 COLL VENOUS BLD VENIPUNCTURE: CPT | Performed by: CLINICAL MEDICAL LABORATORY

## 2021-04-05 RX ORDER — IPRATROPIUM BROMIDE AND ALBUTEROL SULFATE 2.5; .5 MG/3ML; MG/3ML
3 SOLUTION RESPIRATORY (INHALATION) 2 TIMES DAILY PRN
COMMUNITY
Start: 2021-01-05 | End: 2022-01-01

## 2021-04-05 RX ORDER — HYDROCODONE BITARTRATE AND ACETAMINOPHEN 5; 325 MG/1; MG/1
1 TABLET ORAL EVERY 6 HOURS PRN
COMMUNITY
Start: 2021-03-11 | End: 2021-11-01

## 2021-04-05 RX ORDER — VALACYCLOVIR HYDROCHLORIDE 1 G/1
1000 TABLET, FILM COATED ORAL 2 TIMES DAILY PRN
COMMUNITY
Start: 2021-03-04 | End: 2021-10-19 | Stop reason: ALTCHOICE

## 2021-04-05 ASSESSMENT — ENCOUNTER SYMPTOMS
NUMBNESS: 1
SUSPICIOUS LESIONS: 0
HEMATOCHEZIA: 0
ALLERGIC/IMMUNOLOGIC COMMENTS: NO NEW FOOD ALLERGIES
BRUISES/BLEEDS EASILY: 0
DIZZINESS: 1
LOSS OF BALANCE: 1
BLOATING: 1
HEMOPTYSIS: 0
FEVER: 0
SPUTUM PRODUCTION: 1
COUGH: 1

## 2021-04-05 ASSESSMENT — PATIENT HEALTH QUESTIONNAIRE - PHQ9
SUM OF ALL RESPONSES TO PHQ9 QUESTIONS 1 AND 2: 0
CLINICAL INTERPRETATION OF PHQ2 SCORE: NO FURTHER SCREENING NEEDED
CLINICAL INTERPRETATION OF PHQ9 SCORE: NO FURTHER SCREENING NEEDED
1. LITTLE INTEREST OR PLEASURE IN DOING THINGS: NOT AT ALL
2. FEELING DOWN, DEPRESSED OR HOPELESS: NOT AT ALL
SUM OF ALL RESPONSES TO PHQ9 QUESTIONS 1 AND 2: 0

## 2021-04-13 ENCOUNTER — DOCUMENTATION (OUTPATIENT)
Dept: CARDIOLOGY | Age: 66
End: 2021-04-13

## 2021-04-13 ENCOUNTER — LAB ENCOUNTER (OUTPATIENT)
Dept: LAB | Age: 66
End: 2021-04-13
Attending: INTERNAL MEDICINE
Payer: MEDICARE

## 2021-04-13 DIAGNOSIS — E11.21 TYPE 2 DIABETES MELLITUS WITH DIABETIC NEPHROPATHY, WITH LONG-TERM CURRENT USE OF INSULIN (HCC): ICD-10-CM

## 2021-04-13 DIAGNOSIS — Z79.4 TYPE 2 DIABETES MELLITUS WITH DIABETIC NEPHROPATHY, WITH LONG-TERM CURRENT USE OF INSULIN (HCC): ICD-10-CM

## 2021-04-13 PROCEDURE — 80053 COMPREHEN METABOLIC PANEL: CPT

## 2021-04-13 PROCEDURE — 83036 HEMOGLOBIN GLYCOSYLATED A1C: CPT

## 2021-04-13 PROCEDURE — 36415 COLL VENOUS BLD VENIPUNCTURE: CPT

## 2021-04-13 PROCEDURE — 80061 LIPID PANEL: CPT

## 2021-04-13 PROCEDURE — 85025 COMPLETE CBC W/AUTO DIFF WBC: CPT

## 2021-04-13 PROCEDURE — 84443 ASSAY THYROID STIM HORMONE: CPT

## 2021-04-14 ENCOUNTER — OFFICE VISIT (OUTPATIENT)
Dept: SURGERY | Facility: CLINIC | Age: 66
End: 2021-04-14

## 2021-04-14 DIAGNOSIS — M54.16 RIGHT LUMBAR RADICULOPATHY: Primary | ICD-10-CM

## 2021-04-14 PROCEDURE — 64483 NJX AA&/STRD TFRM EPI L/S 1: CPT | Performed by: PHYSICAL MEDICINE & REHABILITATION

## 2021-04-14 NOTE — PROCEDURES
[facility].    LUMBAR TRANSFORAMINAL   NAME:  Hrary Rowland    MR #:    [id number] :  1955     PHYSICIAN:  Marcel Castillo        Operative Report    DATE OF PROCEDURE: 2021   PREOPERATIVE DIAGNOSES: 1.  Right lumbar radi in the clinic as scheduled. Throughout the whole procedure, the patient's pulse oximetry and vital signs were monitored and they remained completely stable.   Also, throughout the whole procedure, prior to injection of any medication, aspiration was perfor

## 2021-04-15 ENCOUNTER — OFFICE VISIT (OUTPATIENT)
Dept: INTERNAL MEDICINE CLINIC | Facility: CLINIC | Age: 66
End: 2021-04-15
Payer: MEDICARE

## 2021-04-15 VITALS
SYSTOLIC BLOOD PRESSURE: 114 MMHG | HEIGHT: 64 IN | DIASTOLIC BLOOD PRESSURE: 74 MMHG | TEMPERATURE: 98 F | BODY MASS INDEX: 32.78 KG/M2 | RESPIRATION RATE: 18 BRPM | WEIGHT: 192 LBS | HEART RATE: 67 BPM

## 2021-04-15 DIAGNOSIS — G35 MULTIPLE SCLEROSIS (HCC): ICD-10-CM

## 2021-04-15 DIAGNOSIS — E78.2 MIXED HYPERLIPIDEMIA: ICD-10-CM

## 2021-04-15 DIAGNOSIS — I27.20 PULMONARY HTN (HCC): ICD-10-CM

## 2021-04-15 DIAGNOSIS — Z72.0 TOBACCO ABUSE DISORDER: ICD-10-CM

## 2021-04-15 DIAGNOSIS — D75.1 POLYCYTHEMIA: ICD-10-CM

## 2021-04-15 DIAGNOSIS — E11.21 TYPE 2 DIABETES MELLITUS WITH DIABETIC NEPHROPATHY, WITH LONG-TERM CURRENT USE OF INSULIN (HCC): Primary | ICD-10-CM

## 2021-04-15 DIAGNOSIS — K13.0 LIP FISSURE: ICD-10-CM

## 2021-04-15 DIAGNOSIS — J43.1 PANLOBULAR EMPHYSEMA (HCC): ICD-10-CM

## 2021-04-15 DIAGNOSIS — E03.9 HYPOTHYROIDISM, UNSPECIFIED TYPE: ICD-10-CM

## 2021-04-15 DIAGNOSIS — Z79.4 TYPE 2 DIABETES MELLITUS WITH DIABETIC NEPHROPATHY, WITH LONG-TERM CURRENT USE OF INSULIN (HCC): Primary | ICD-10-CM

## 2021-04-15 DIAGNOSIS — I10 ESSENTIAL HYPERTENSION: ICD-10-CM

## 2021-04-15 PROCEDURE — 99214 OFFICE O/P EST MOD 30 MIN: CPT | Performed by: INTERNAL MEDICINE

## 2021-04-15 RX ORDER — VALACYCLOVIR HYDROCHLORIDE 1 G/1
TABLET, FILM COATED ORAL
Qty: 20 TABLET | Refills: 0 | Status: SHIPPED | OUTPATIENT
Start: 2021-04-15 | End: 2021-04-19

## 2021-04-15 NOTE — TELEPHONE ENCOUNTER
• TRELEGY ELLIPTA 100-62.5-25 MCG/INH Inhalation Aerosol Powder, Breath Activated INHALE 1 PUFF BY MOUTH INTO THE LUNGS AS DIRECTED 60 each 5

## 2021-04-16 RX ORDER — FLUTICASONE FUROATE, UMECLIDINIUM BROMIDE AND VILANTEROL TRIFENATATE 100; 62.5; 25 UG/1; UG/1; UG/1
1 POWDER RESPIRATORY (INHALATION) AS DIRECTED
Qty: 60 EACH | Refills: 5 | Status: SHIPPED | OUTPATIENT
Start: 2021-04-16 | End: 2021-10-08

## 2021-04-16 NOTE — ASSESSMENT & PLAN NOTE
Blood sugars are elevated at hemoglobin A1c of 8.3. Patient refuses to check blood sugars on a regular basis. Request that she checks her blood sugars at least for a short while until we can get the blood sugars under control.   She agrees to check before

## 2021-04-16 NOTE — ASSESSMENT & PLAN NOTE
Lip fissure that is deep, nonhealing upper lip. Patient is a smoker and hence guarded about the prognosis. Patient is advised to follow-up with Dr. Arsen White for an 28-15-10-60 for an appointment. Use Neosporin at night.   Restart on [place]

## 2021-04-16 NOTE — ASSESSMENT & PLAN NOTE
History of MS–wheelchair dependent but doing quite well at this time. Does not need any specific treatments. Continue to monitor.

## 2021-04-16 NOTE — PROGRESS NOTES
HPI:    Patient ID: Bernice Garcia is a 77year old female. Labs   Elevated hemoglobin A1c at 8.3. Lipid panel with elevated LDL levels. Rest of the labs look stable.     Wt Readings from Last 6 Encounters:  04/15/21 : 192 lb (87.1 kg)  03/11/21 : 19 MD, [facility]      Per Dr Leona Naylor  -Patient with evidence of progressive dyspnea with exertion likely secondary to underlying COPD. Prior pulmonary function testing with evidence of severe COPD.   Advised the patient to continue Trelegy and nebulizer tr units with every meal). She is compliant with treatment most of the time. Her weight is stable. She is following a diabetic, generally healthy, high fiber, low fat/cholesterol and low salt diet.  Meal planning includes carbohydrate counting, avoidance of co Negative for chills and fatigue. HENT: Negative. Negative for ear discharge. Upper lip with a deep fissure-x 2 months,associated with pain   Eyes: Negative. Respiratory: Negative. Negative for chest tightness and shortness of breath. disturbance and suicidal ideas. The patient is nervous/anxious.              Current Outpatient Medications   Medication Sig Dispense Refill   • Insulin Lispro (HUMALOG KWIKPEN) 200 UNIT/ML Subcutaneous Solution Pen-injector 5 units subcutaneously with ever (VITAMIN D) 2000 UNITS Oral Cap Take 2,000 Units by mouth daily. • aspirin 81 MG Oral Tab Take 81 mg by mouth daily. • losartan Potassium 50 MG Oral Tab Take 1 tablet (50 mg total) by mouth 2 (two) times daily.  180 tablet 2   • carvedilol 3.125 M Chest wall: No tenderness. Abdominal:      General: Bowel sounds are normal. There is no distension. Palpations: There is no mass. Tenderness: There is no abdominal tenderness. There is no rebound.    Musculoskeletal:         General: No tender Continue on the same dose of medication. Hyperlipidemia     Patient has been on diet control alone for management of cholesterol. She could not tolerate fenofibrate. Continue to monitor at this time.   These numbers should improve with improvement WITH DIFFERENTIAL WITH PLATELET    HEMOGLOBIN A1C    LIPID PANEL    MICROALB/CREAT RATIO, RANDOM URINE    URINALYSIS, ROUTINE    Pulmonary HTN ([address]     Has been seen by cardiology. She is currently on metolazone–doses are being gradually taken down.   Her

## 2021-04-16 NOTE — ASSESSMENT & PLAN NOTE
Patient has been on diet control alone for management of cholesterol. She could not tolerate fenofibrate. Continue to monitor at this time. These numbers should improve with improvement in diabetes.

## 2021-04-16 NOTE — ASSESSMENT & PLAN NOTE
Has been seen by cardiology. She is currently on metolazone–doses are being gradually taken down. Her lower extremity edema has improved. She is on Jardiance. Her weight has come down at this time. Her blood sugars continue to be elevated.   She will f

## 2021-04-16 NOTE — PATIENT INSTRUCTIONS
Problem List Items Addressed This Visit        Unprioritized    HTN (hypertension)     Blood pressure 114/74, pulse 67, temperature 98.4 °F (36.9 °C), temperature source Tympanic, resp.  rate 18, height 5' 4\" (1.626 m), weight 192 lb (87.1 kg), not current increase it with exertion to help accommodate needs. Need to quit smoking is imperative. Follow-up sleep study has been ordered but not completed. We will follow-up once done.            Pulmonary emphysema ([address]     Patient has been seen by pulmonolog pressure. Follow-up evaluation in about 3 months.   Recheck labs have been ordered         Relevant Orders    COMP METABOLIC PANEL (14)    CBC WITH DIFFERENTIAL WITH PLATELET    HEMOGLOBIN A1C    LIPID PANEL    MICROALB/CREAT RATIO, RANDOM URINE    URINALY

## 2021-04-16 NOTE — ASSESSMENT & PLAN NOTE
Persistent secondary polycythemia with elevation in the hemoglobin to 16.5. She is on an aspirin daily. She has been on oxygen but she does not use it on a regular basis. She has had marked hypoxia. Reviewed this with Dr. Scott Woods.   She is advised to in

## 2021-04-16 NOTE — ASSESSMENT & PLAN NOTE
Thyroid function test have been stable on levothyroxine at 25 mcg daily. Continue on the same dose of medication.

## 2021-04-16 NOTE — ASSESSMENT & PLAN NOTE
Blood pressure 114/74, pulse 67, temperature 98.4 °F (36.9 °C), temperature source Tympanic, resp. rate 18, height 5' 4\" (1.626 m), weight 192 lb (87.1 kg), not currently breastfeeding.   Blood pressures look stable, well controlled at this time on [facility]

## 2021-04-16 NOTE — ASSESSMENT & PLAN NOTE
Patient has been seen by pulmonology. Follow-up CT scan discussed. She has severe centrilobular emphysema with scattered pleural parenchymal scaring diffuse bronchial wall thickening and mucous plugging from chronic bronchitis.   There is marked coronary

## 2021-04-19 ENCOUNTER — OFFICE VISIT (OUTPATIENT)
Dept: OTOLARYNGOLOGY | Facility: CLINIC | Age: 66
End: 2021-04-19
Payer: MEDICARE

## 2021-04-19 VITALS
WEIGHT: 192 LBS | SYSTOLIC BLOOD PRESSURE: 135 MMHG | HEART RATE: 85 BPM | BODY MASS INDEX: 32.78 KG/M2 | DIASTOLIC BLOOD PRESSURE: 75 MMHG | HEIGHT: 64 IN | RESPIRATION RATE: 18 BRPM | TEMPERATURE: 98 F

## 2021-04-19 DIAGNOSIS — K13.0 LIP LESION: Primary | ICD-10-CM

## 2021-04-19 PROCEDURE — 99203 OFFICE O/P NEW LOW 30 MIN: CPT | Performed by: OTOLARYNGOLOGY

## 2021-04-19 RX ORDER — VARENICLINE TARTRATE 0.5 MG/1
0.5 TABLET, FILM COATED ORAL 2 TIMES DAILY
COMMUNITY
Start: 2020-11-04 | End: 2021-04-20 | Stop reason: ALTCHOICE

## 2021-04-19 NOTE — PROGRESS NOTES
Li Nash is a 77year old female. Patient presents with:  Cut: Open cut on left upper lip. Ongoing for 2 months, has not gotten better. Denies any pain.        HISTORY OF PRESENT ILLNESS  4/19/2021  Patient presents with a lesion located on her upp Past Medical History:   Diagnosis Date   • Anxiety state, unspecified    • Depression    • Diabetes ([facility] [address]     induced by steroids   • Essential hypertension    • Hyperlipidemia    • Hypothyroidism    • KIDNEY STONE    • Multiple sclerosis ([facility]    • Inspection - Normal.         Lymph Detail Normal Submental. Submandibular. Anterior cervical. Posterior cervical. Supraclavicular.          Nose/Mouth/Throat abNormal External nose - Normal. Lips/teeth/gums -she has a cracked upper lip with some erythema ar MG Oral Tab, TAKE 1 TABLET(100 MG) BY MOUTH EVERY NIGHT AT BEDTIME, Disp: 90 tablet, Rfl: 2  •  metolazone 2.5 MG Oral Tab, Take 1 tablet (2.5 mg total) by mouth 3 (three) times a week., Disp: 45 tablet, Rfl: 1  •  Albuterol Sulfate HFA (PROAIR HFA) 108 (9 (fourteen) days. (Patient not taking: Reported on 4/15/2021 ), Disp: 6 Device, Rfl: 1  •  Continuous Blood Gluc Sensor ([employer] VALERIA 14 DAY SENSOR) Does not apply Misc, Use as directed every 2 weeks.  (Patient not taking: Reported on 4/15/2021 ), Disp: 6

## 2021-04-20 ENCOUNTER — TELEPHONE (OUTPATIENT)
Dept: CARDIOLOGY | Age: 66
End: 2021-04-20

## 2021-04-20 RX ORDER — CARVEDILOL 3.12 MG/1
3.12 TABLET ORAL 2 TIMES DAILY
Qty: 180 TABLET | Refills: 0 | OUTPATIENT
Start: 2021-04-20

## 2021-04-20 NOTE — TELEPHONE ENCOUNTER
Last seen 9/15/2020. Return to clinic in 3 months (12/2020) No follow up scheduled. Medication is noted as taking. Refill pended and routed to Dr. Israel Holman.

## 2021-04-20 NOTE — TELEPHONE ENCOUNTER
Refill request    Current Outpatient Medications   Medication Sig Dispense Refill   • carvedilol 3.125 MG Oral Tab Take 1 tablet (3.125 mg total) by mouth 2 (two) times daily.  180 tablet 0

## 2021-04-20 NOTE — ASSESSMENT & PLAN NOTE
Blood pressure 136/77, pulse 73, resp. rate 16, height 5' 4\" (1.626 m), weight 189 lb (85.73 kg), not currently breastfeeding. The blood sugars seem very much better–almost all sugars below 100. Currently on insulin at about 22–20 units per day.   Vernell Hard Erivedge Counseling- I discussed with the patient the risks of Erivedge including but not limited to nausea, vomiting, diarrhea, constipation, weight loss, changes in the sense of taste, decreased appetite, muscle spasms, and hair loss.  The patient verbalized understanding of the proper use and possible adverse effects of Erivedge.  All of the patient's questions and concerns were addressed.

## 2021-04-21 NOTE — TELEPHONE ENCOUNTER
Patient returned call. Advised that she needs to schedule a follow up appointment and patient agreed to do this but said she would schedule through My Chart. Asking if Dr. Anne Tena would refill this prescription. Encounter routed to Dr. Anne Tena to advise.

## 2021-04-22 RX ORDER — CARVEDILOL 3.12 MG/1
3.12 TABLET ORAL 2 TIMES DAILY
Qty: 60 TABLET | Refills: 0 | Status: SHIPPED | OUTPATIENT
Start: 2021-04-22 | End: 2021-05-20

## 2021-04-23 ENCOUNTER — TELEPHONE (OUTPATIENT)
Dept: NEUROLOGY | Facility: CLINIC | Age: 66
End: 2021-04-23

## 2021-04-23 NOTE — TELEPHONE ENCOUNTER
Left a message for patient to call the office with an update how she is doing after receiving the injection.

## 2021-04-28 RX ORDER — PEN NEEDLE, DIABETIC 31 GX5/16"
NEEDLE, DISPOSABLE MISCELLANEOUS
Qty: 200 EACH | Refills: 1 | Status: SHIPPED | OUTPATIENT
Start: 2021-04-28 | End: 2021-10-15

## 2021-04-30 ENCOUNTER — DOCUMENTATION (OUTPATIENT)
Dept: CARDIOLOGY | Age: 66
End: 2021-04-30

## 2021-05-01 DIAGNOSIS — J30.1 ALLERGIC RHINITIS DUE TO POLLEN, UNSPECIFIED SEASONALITY: ICD-10-CM

## 2021-05-01 NOTE — TELEPHONE ENCOUNTER
Current Outpatient Medications   Medication Sig Dispense Refill   • Montelukast Sodium 10 MG Oral Tab Take 1 tablet (10 mg total) by mouth nightly.  90 tablet 1

## 2021-05-02 RX ORDER — MONTELUKAST SODIUM 10 MG/1
10 TABLET ORAL NIGHTLY
Qty: 90 TABLET | Refills: 1 | Status: SHIPPED | OUTPATIENT
Start: 2021-05-02 | End: 2021-11-12

## 2021-05-19 RX ORDER — SPIRONOLACTONE 25 MG/1
25 TABLET ORAL DAILY
Qty: 90 TABLET | Refills: 3 | Status: SHIPPED | OUTPATIENT
Start: 2021-05-19 | End: 2021-12-15 | Stop reason: SDUPTHER

## 2021-05-20 ENCOUNTER — OFFICE VISIT (OUTPATIENT)
Dept: NEUROLOGY | Facility: CLINIC | Age: 66
End: 2021-05-20
Payer: MEDICARE

## 2021-05-20 ENCOUNTER — OFFICE VISIT (OUTPATIENT)
Dept: NEPHROLOGY | Facility: CLINIC | Age: 66
End: 2021-05-20
Payer: MEDICARE

## 2021-05-20 ENCOUNTER — TELEPHONE (OUTPATIENT)
Dept: NEUROLOGY | Facility: CLINIC | Age: 66
End: 2021-05-20

## 2021-05-20 VITALS
HEIGHT: 64 IN | BODY MASS INDEX: 32.78 KG/M2 | HEART RATE: 81 BPM | DIASTOLIC BLOOD PRESSURE: 62 MMHG | WEIGHT: 192 LBS | SYSTOLIC BLOOD PRESSURE: 108 MMHG | OXYGEN SATURATION: 92 %

## 2021-05-20 VITALS
HEIGHT: 64 IN | DIASTOLIC BLOOD PRESSURE: 74 MMHG | SYSTOLIC BLOOD PRESSURE: 96 MMHG | WEIGHT: 198 LBS | HEART RATE: 74 BPM | BODY MASS INDEX: 33.8 KG/M2

## 2021-05-20 DIAGNOSIS — M54.16 RIGHT LUMBAR RADICULOPATHY: Primary | ICD-10-CM

## 2021-05-20 DIAGNOSIS — N05.1 FSGS (FOCAL SEGMENTAL GLOMERULOSCLEROSIS): Primary | ICD-10-CM

## 2021-05-20 DIAGNOSIS — G35 MULTIPLE SCLEROSIS (HCC): ICD-10-CM

## 2021-05-20 DIAGNOSIS — R80.9 NON-NEPHROTIC RANGE PROTEINURIA: ICD-10-CM

## 2021-05-20 PROCEDURE — 99214 OFFICE O/P EST MOD 30 MIN: CPT | Performed by: INTERNAL MEDICINE

## 2021-05-20 PROCEDURE — 99214 OFFICE O/P EST MOD 30 MIN: CPT | Performed by: PHYSICAL MEDICINE & REHABILITATION

## 2021-05-20 RX ORDER — LOSARTAN POTASSIUM 50 MG/1
50 TABLET ORAL 2 TIMES DAILY
Qty: 180 TABLET | Refills: 2 | Status: SHIPPED | OUTPATIENT
Start: 2021-05-20

## 2021-05-20 RX ORDER — CARVEDILOL 3.12 MG/1
3.12 TABLET ORAL 2 TIMES DAILY
Qty: 180 TABLET | Refills: 2 | Status: SHIPPED | OUTPATIENT
Start: 2021-05-20 | End: 2021-10-04

## 2021-05-20 NOTE — PROGRESS NOTES
Progress Note     Aviva Abdi is a 77 yrs old female with pmh of HL, multiple sclerosis (35 yrs), restless leg syndrome, back pain, nephrolithiasis x 3, tobacco abuse, diverticulitis who presented today for follow up    Initial lab work showed BUN/Cr Multiple sclerosis ([facility] [address]    • Renal disorder       Past Surgical History:   Procedure Laterality Date   • APPENDECTOMY     • APPENDECTOMY     •      • KNEE SURGERY             Medications (Active prior to today's visit):  Current Outpatien Take 2.5 mg by mouth twice a week.  ) 45 tablet 1   • Albuterol Sulfate HFA (PROAIR HFA) 108 (90 Base) MCG/ACT Inhalation Aero Soln Inhale 2 puffs into the lungs every 4 (four) hours as needed for Wheezing.  1 Inhaler 6   • insulin glargine [facility] intact  Nose/Mouth/Throat: mucous membranes are moist    Neck/Thyroid: neck is supple without adenopathy  Lymphatic: no abnormal cervical, supraclavicular adenopathy is noted  Respiratory:  lungs are clear to auscultation bilaterally  Cardiovascular: regul lowish  - on supplement   - some component of lymphedema in legs   - follows with HF team     3. HTN:  BP lowish in office. Asymptomtic. Hold pm dose of losartan and repeat BP at home.  Call if readings consistently low  - goal is <130/80 mmhg  - low salt d

## 2021-05-20 NOTE — PROGRESS NOTES
Progress note    C/C: Back pain    HPI: 49-year-old female with history of MS presents for follow-up, after having undergone right L5 transforaminal epidural steroid injection under fluoroscopy, local, on 4/14/2021.  80% relief of symptoms, though she has balance, and strength in the lower extremities. F/u after MRIs.      Nayana Haq DO  Physical Medicine and [address]

## 2021-05-20 NOTE — TELEPHONE ENCOUNTER
Per Medicare Guidelines -no authorization is required for radiology     Status: Approved-no authorization required per health plan     Patient notified she can proceed with scheduling C-Spine MRI w+wo CPT 87327 and T-Spine MRI w+wo CPT 47862   Patient will

## 2021-05-21 ENCOUNTER — TELEPHONE (OUTPATIENT)
Dept: CARDIOLOGY | Age: 66
End: 2021-05-21

## 2021-05-24 ENCOUNTER — TELEPHONE (OUTPATIENT)
Dept: CARDIOLOGY | Age: 66
End: 2021-05-24

## 2021-06-01 ENCOUNTER — TELEPHONE (OUTPATIENT)
Dept: CARDIOLOGY | Age: 66
End: 2021-06-01

## 2021-06-01 DIAGNOSIS — I50.32 CHRONIC HEART FAILURE WITH PRESERVED EJECTION FRACTION (CMD): Primary | ICD-10-CM

## 2021-06-01 RX ORDER — METOLAZONE 2.5 MG/1
2.5 TABLET ORAL
Qty: 46 TABLET | Refills: 3 | Status: SHIPPED | OUTPATIENT
Start: 2021-06-02 | End: 2021-10-19 | Stop reason: DRUGHIGH

## 2021-06-02 NOTE — TELEPHONE ENCOUNTER
HOPS completes w/ charge barber cruz on 2 hope. RN that is taking pt will call when ready   Urine culture positive for E-coli .  Will start on vantin 100 mg BID for 7 days    Prescription sent

## 2021-06-10 ENCOUNTER — TELEPHONE (OUTPATIENT)
Dept: INTERNAL MEDICINE CLINIC | Facility: CLINIC | Age: 66
End: 2021-06-10

## 2021-06-10 DIAGNOSIS — E11.21 TYPE 2 DIABETES MELLITUS WITH DIABETIC NEPHROPATHY, WITH LONG-TERM CURRENT USE OF INSULIN (HCC): Primary | ICD-10-CM

## 2021-06-10 DIAGNOSIS — Z79.4 TYPE 2 DIABETES MELLITUS WITH DIABETIC NEPHROPATHY, WITH LONG-TERM CURRENT USE OF INSULIN (HCC): Primary | ICD-10-CM

## 2021-06-17 ENCOUNTER — TELEPHONE (OUTPATIENT)
Dept: INTERNAL MEDICINE CLINIC | Facility: CLINIC | Age: 66
End: 2021-06-17

## 2021-06-17 ENCOUNTER — OFFICE VISIT (OUTPATIENT)
Dept: PULMONOLOGY | Facility: CLINIC | Age: 66
End: 2021-06-17
Payer: MEDICARE

## 2021-06-17 VITALS
HEART RATE: 72 BPM | DIASTOLIC BLOOD PRESSURE: 57 MMHG | OXYGEN SATURATION: 90 % | WEIGHT: 195.38 LBS | SYSTOLIC BLOOD PRESSURE: 90 MMHG | RESPIRATION RATE: 18 BRPM | BODY MASS INDEX: 33.35 KG/M2 | HEIGHT: 64 IN

## 2021-06-17 DIAGNOSIS — J96.11 CHRONIC HYPOXEMIC RESPIRATORY FAILURE (HCC): Primary | ICD-10-CM

## 2021-06-17 PROCEDURE — 99213 OFFICE O/P EST LOW 20 MIN: CPT | Performed by: INTERNAL MEDICINE

## 2021-06-17 RX ORDER — TRAZODONE HYDROCHLORIDE 100 MG/1
TABLET ORAL
Qty: 90 TABLET | Refills: 2 | Status: SHIPPED | OUTPATIENT
Start: 2021-06-17 | End: 2021-12-15

## 2021-06-17 RX ORDER — ALBUTEROL SULFATE 90 UG/1
2 AEROSOL, METERED RESPIRATORY (INHALATION) EVERY 6 HOURS PRN
Qty: 1 EACH | Refills: 5 | Status: SHIPPED | OUTPATIENT
Start: 2021-06-17

## 2021-06-18 NOTE — PROGRESS NOTES
Referring Physician  Emir Hogan MD    History of Present Illness  Patient is seen today for follow-up appointment pulmonary clinic. Admits to ongoing chronic dyspnea with exertion but improvement in overall dyspnea after returning from [place].   Using tablet, Rfl: 1  ipratropium-albuterol 0.5-2.5 (3) MG/3ML Inhalation Solution, Take 3 mL by nebulization 2 (two) times daily. , Disp: 60 vial, Rfl: 5  metolazone 2.5 MG Oral Tab, Take 1 tablet (2.5 mg total) by mouth 3 (three) times a week.  (Patient taking d Severe COPD  2. Dyspnea with exertion  3. Nicotine dependence  4. Chronic hypoxemic respiratory failure      Plan  -Patient with evidence of progressive dyspnea with exertion likely secondary to underlying COPD.   Prior pulmonary function testing with ev

## 2021-06-21 ENCOUNTER — HOSPITAL ENCOUNTER (OUTPATIENT)
Dept: MRI IMAGING | Facility: HOSPITAL | Age: 66
Discharge: HOME | End: 2021-06-21
Attending: PHYSICAL MEDICINE & REHABILITATION
Payer: MEDICARE

## 2021-06-21 ENCOUNTER — TELEPHONE (OUTPATIENT)
Dept: NEPHROLOGY | Facility: CLINIC | Age: 66
End: 2021-06-21

## 2021-06-21 DIAGNOSIS — G35 MULTIPLE SCLEROSIS (HCC): ICD-10-CM

## 2021-06-21 PROCEDURE — A9575 INJ GADOTERATE MEGLUMI 0.1ML: HCPCS | Performed by: PHYSICAL MEDICINE & REHABILITATION

## 2021-06-21 PROCEDURE — 82565 ASSAY OF CREATININE: CPT

## 2021-06-21 PROCEDURE — 72157 MRI CHEST SPINE W/O & W/DYE: CPT | Performed by: PHYSICAL MEDICINE & REHABILITATION

## 2021-06-21 PROCEDURE — 72156 MRI NECK SPINE W/O & W/DYE: CPT | Performed by: PHYSICAL MEDICINE & REHABILITATION

## 2021-06-21 NOTE — TELEPHONE ENCOUNTER
Pt states she is having MRI today and wants to know if it is ok for her to get Contrast. Please call

## 2021-06-23 ENCOUNTER — TELEPHONE (OUTPATIENT)
Dept: NEUROLOGY | Facility: CLINIC | Age: 66
End: 2021-06-23

## 2021-06-23 NOTE — TELEPHONE ENCOUNTER
----- Message from Shravan Hernandez DO sent at 6/23/2021  8:33 AM CDT -----  No new MS lesions seen on MRI of the cervical and thoracic spine. Recommend EMG of the bilateral upper extremities for further evaluation of the hand symptoms.

## 2021-06-24 ENCOUNTER — OFFICE VISIT (OUTPATIENT)
Dept: NEUROLOGY | Facility: CLINIC | Age: 66
End: 2021-06-24
Payer: MEDICARE

## 2021-06-24 ENCOUNTER — HOSPITAL ENCOUNTER (OUTPATIENT)
Dept: GENERAL RADIOLOGY | Facility: HOSPITAL | Age: 66
Discharge: HOME | End: 2021-06-24
Attending: PHYSICAL MEDICINE & REHABILITATION
Payer: MEDICARE

## 2021-06-24 VITALS
HEIGHT: 64 IN | DIASTOLIC BLOOD PRESSURE: 80 MMHG | WEIGHT: 195 LBS | BODY MASS INDEX: 33.29 KG/M2 | OXYGEN SATURATION: 90 % | SYSTOLIC BLOOD PRESSURE: 132 MMHG | HEART RATE: 86 BPM

## 2021-06-24 DIAGNOSIS — M16.11 PRIMARY OSTEOARTHRITIS OF RIGHT HIP: ICD-10-CM

## 2021-06-24 DIAGNOSIS — R20.2 TINGLING OF BOTH UPPER EXTREMITIES: Primary | ICD-10-CM

## 2021-06-24 PROCEDURE — 73502 X-RAY EXAM HIP UNI 2-3 VIEWS: CPT | Performed by: PHYSICAL MEDICINE & REHABILITATION

## 2021-06-24 PROCEDURE — 99214 OFFICE O/P EST MOD 30 MIN: CPT | Performed by: PHYSICAL MEDICINE & REHABILITATION

## 2021-06-24 RX ORDER — EMPAGLIFLOZIN 10 MG/1
10 TABLET, FILM COATED ORAL
COMMUNITY
Start: 2021-05-01

## 2021-06-24 RX ORDER — GABAPENTIN 300 MG/1
300 CAPSULE ORAL NIGHTLY
Qty: 30 CAPSULE | Refills: 0 | Status: SHIPPED | OUTPATIENT
Start: 2021-06-24 | End: 2021-07-07

## 2021-06-24 NOTE — PROGRESS NOTES
Progress note    C/C: right groin/thigh pain, tingling in both arms    HPI: 77year old female presents for follow up. She has increasing right groin and medial thigh pain that limits her ability to stand and walk.  Also has tingling in both hands and arms, with no Chiari malformation.     PARASPINAL AREA: Normal with no visible mass.     BONES: No fracture, pars defect, or osseous lesion.     CORD: No enhancing cord lesions.  Evaluation for cord T2 signal abnormality is limited by motion related artifact.  S been done since 2019, might help her move/ambulate better. Hold off on EMG of bilateral upper extremities. Gabapentin 300mg at bedtime for tingling in bilateral upper exrtremities; etiology includes MS, cervical spine stenosis.     F/u for possible hip join

## 2021-06-24 NOTE — TELEPHONE ENCOUNTER
Informed patient of imaging results and recommendation's per Dr. Belen Patel. Patient verbalized understanding and will discuss at appt today.

## 2021-06-24 NOTE — PATIENT INSTRUCTIONS
Let's take a look at the Xray and possibly do a right hip joint steroid injection. Take the gabapentin at night time and see if this reduces the tingling in the arms.

## 2021-06-28 ENCOUNTER — TELEPHONE (OUTPATIENT)
Dept: NEUROLOGY | Facility: CLINIC | Age: 66
End: 2021-06-28

## 2021-06-28 DIAGNOSIS — M16.11 PRIMARY OSTEOARTHRITIS OF RIGHT HIP: Primary | ICD-10-CM

## 2021-06-28 NOTE — TELEPHONE ENCOUNTER
Informed patient of imaging results. Patient would like to do right hip injection. Pended. Please advise.

## 2021-06-28 NOTE — TELEPHONE ENCOUNTER
Per Medicare guidelines authorization is not required for Right intra-articular hip joint injection under fluoroscopy cpt codes 21812, 98657. Will inform Nursing.

## 2021-06-28 NOTE — TELEPHONE ENCOUNTER
----- Message from Blas Kirk DO sent at 6/28/2021  7:54 AM CDT -----  XR of the right hip reviewed; moderate-severe osteoarthritis in the right hip, on my read not much worse from 2019.

## 2021-06-29 ENCOUNTER — TELEPHONE (OUTPATIENT)
Dept: INTERNAL MEDICINE CLINIC | Facility: CLINIC | Age: 66
End: 2021-06-29

## 2021-06-29 DIAGNOSIS — E11.21 TYPE 2 DIABETES MELLITUS WITH DIABETIC NEPHROPATHY, WITH LONG-TERM CURRENT USE OF INSULIN (HCC): Primary | ICD-10-CM

## 2021-06-29 DIAGNOSIS — Z79.4 TYPE 2 DIABETES MELLITUS WITH DIABETIC NEPHROPATHY, WITH LONG-TERM CURRENT USE OF INSULIN (HCC): Primary | ICD-10-CM

## 2021-06-29 NOTE — TELEPHONE ENCOUNTER
Left message for patient to call office back, please transfer call to ext [phone] when she calls office back.

## 2021-06-29 NOTE — TELEPHONE ENCOUNTER
I called pt. About referral and she said she got a call about a medicare annual.  Pt. Has appmt.  7/20, should we change that to a medicare annual?

## 2021-06-30 NOTE — TELEPHONE ENCOUNTER
Patient has been scheduled for a Right intra-articular hip joint injection on 7/7/21 at the [facility]. Medications and allergies reviewed.  Patient informed we will need verbal or written authorization from patients Primary Care Physician/Cardiologist to hold bl

## 2021-07-07 ENCOUNTER — OFFICE VISIT (OUTPATIENT)
Dept: SURGERY | Facility: CLINIC | Age: 66
End: 2021-07-07

## 2021-07-07 DIAGNOSIS — M16.11 PRIMARY OSTEOARTHRITIS OF RIGHT HIP: Primary | ICD-10-CM

## 2021-07-07 DIAGNOSIS — M54.16 RIGHT LUMBAR RADICULOPATHY: ICD-10-CM

## 2021-07-07 PROCEDURE — 77002 NEEDLE LOCALIZATION BY XRAY: CPT | Performed by: PHYSICAL MEDICINE & REHABILITATION

## 2021-07-07 PROCEDURE — 20610 DRAIN/INJ JOINT/BURSA W/O US: CPT | Performed by: PHYSICAL MEDICINE & REHABILITATION

## 2021-07-07 RX ORDER — GABAPENTIN 100 MG/1
200 CAPSULE ORAL NIGHTLY
Qty: 60 CAPSULE | Refills: 0 | Status: SHIPPED | OUTPATIENT
Start: 2021-07-07

## 2021-07-16 ENCOUNTER — LAB ENCOUNTER (OUTPATIENT)
Dept: LAB | Age: 66
End: 2021-07-16
Attending: INTERNAL MEDICINE
Payer: MEDICARE

## 2021-07-16 DIAGNOSIS — E11.21 TYPE 2 DIABETES MELLITUS WITH DIABETIC NEPHROPATHY, WITH LONG-TERM CURRENT USE OF INSULIN (HCC): ICD-10-CM

## 2021-07-16 DIAGNOSIS — Z79.4 TYPE 2 DIABETES MELLITUS WITH DIABETIC NEPHROPATHY, WITH LONG-TERM CURRENT USE OF INSULIN (HCC): ICD-10-CM

## 2021-07-16 LAB
ALBUMIN SERPL-MCNC: 3.3 G/DL (ref 3.4–5)
ALBUMIN/GLOB SERPL: 0.8 {RATIO} (ref 1–2)
ALP LIVER SERPL-CCNC: 109 U/L
ALT SERPL-CCNC: 32 U/L
ANION GAP SERPL CALC-SCNC: 7 MMOL/L (ref 0–18)
AST SERPL-CCNC: 15 U/L (ref 15–37)
BASOPHILS # BLD AUTO: 0.06 X10(3) UL (ref 0–0.2)
BASOPHILS NFR BLD AUTO: 0.8 %
BILIRUB SERPL-MCNC: 0.8 MG/DL (ref 0.1–2)
BILIRUB UR QL: NEGATIVE
BUN BLD-MCNC: 30 MG/DL (ref 7–18)
BUN/CREAT SERPL: 34.5 (ref 10–20)
CALCIUM BLD-MCNC: 9.1 MG/DL (ref 8.5–10.1)
CHLORIDE SERPL-SCNC: 103 MMOL/L (ref 98–112)
CHOLEST SMN-MCNC: 165 MG/DL (ref ?–200)
CO2 SERPL-SCNC: 27 MMOL/L (ref 21–32)
COLOR UR: YELLOW
CREAT BLD-MCNC: 0.87 MG/DL
CREAT UR-SCNC: 122 MG/DL
DEPRECATED RDW RBC AUTO: 42.6 FL (ref 35.1–46.3)
EOSINOPHIL # BLD AUTO: 0.11 X10(3) UL (ref 0–0.7)
EOSINOPHIL NFR BLD AUTO: 1.5 %
ERYTHROCYTE [DISTWIDTH] IN BLOOD BY AUTOMATED COUNT: 12.4 % (ref 11–15)
EST. AVERAGE GLUCOSE BLD GHB EST-MCNC: 160 MG/DL (ref 68–126)
GLOBULIN PLAS-MCNC: 4.2 G/DL (ref 2.8–4.4)
GLUCOSE BLD-MCNC: 126 MG/DL (ref 70–99)
GLUCOSE UR-MCNC: >=500 MG/DL
HBA1C MFR BLD HPLC: 7.2 % (ref ?–5.7)
HCT VFR BLD AUTO: 48.2 %
HDLC SERPL-MCNC: 38 MG/DL (ref 40–59)
HGB BLD-MCNC: 16.5 G/DL
HGB UR QL STRIP.AUTO: NEGATIVE
IMM GRANULOCYTES # BLD AUTO: 0.03 X10(3) UL (ref 0–1)
IMM GRANULOCYTES NFR BLD: 0.4 %
KETONES UR-MCNC: NEGATIVE MG/DL
LDLC SERPL CALC-MCNC: 109 MG/DL (ref ?–100)
LEUKOCYTE ESTERASE UR QL STRIP.AUTO: NEGATIVE
LYMPHOCYTES # BLD AUTO: 1.44 X10(3) UL (ref 1–4)
LYMPHOCYTES NFR BLD AUTO: 19.4 %
M PROTEIN MFR SERPL ELPH: 7.5 G/DL (ref 6.4–8.2)
MCH RBC QN AUTO: 31.9 PG (ref 26–34)
MCHC RBC AUTO-ENTMCNC: 34.2 G/DL (ref 31–37)
MCV RBC AUTO: 93.2 FL
MICROALBUMIN UR-MCNC: 26.7 MG/DL
MICROALBUMIN/CREAT 24H UR-RTO: 218.9 UG/MG (ref ?–30)
MONOCYTES # BLD AUTO: 0.68 X10(3) UL (ref 0.1–1)
MONOCYTES NFR BLD AUTO: 9.2 %
NEUTROPHILS # BLD AUTO: 5.1 X10 (3) UL (ref 1.5–7.7)
NEUTROPHILS # BLD AUTO: 5.1 X10(3) UL (ref 1.5–7.7)
NEUTROPHILS NFR BLD AUTO: 68.7 %
NITRITE UR QL STRIP.AUTO: NEGATIVE
NONHDLC SERPL-MCNC: 127 MG/DL (ref ?–130)
OSMOLALITY SERPL CALC.SUM OF ELEC: 292 MOSM/KG (ref 275–295)
PATIENT FASTING Y/N/NP: YES
PATIENT FASTING Y/N/NP: YES
PH UR: 6 [PH] (ref 5–8)
PLATELET # BLD AUTO: 235 10(3)UL (ref 150–450)
POTASSIUM SERPL-SCNC: 3.5 MMOL/L (ref 3.5–5.1)
PROT UR-MCNC: 100 MG/DL
RBC # BLD AUTO: 5.17 X10(6)UL
SODIUM SERPL-SCNC: 137 MMOL/L (ref 136–145)
SP GR UR STRIP: 1.02 (ref 1–1.03)
TRIGL SERPL-MCNC: 97 MG/DL (ref 30–149)
UROBILINOGEN UR STRIP-ACNC: <2
VLDLC SERPL CALC-MCNC: 17 MG/DL (ref 0–30)
WBC # BLD AUTO: 7.4 X10(3) UL (ref 4–11)

## 2021-07-16 PROCEDURE — 83036 HEMOGLOBIN GLYCOSYLATED A1C: CPT

## 2021-07-16 PROCEDURE — 80061 LIPID PANEL: CPT

## 2021-07-16 PROCEDURE — 82043 UR ALBUMIN QUANTITATIVE: CPT

## 2021-07-16 PROCEDURE — 36415 COLL VENOUS BLD VENIPUNCTURE: CPT

## 2021-07-16 PROCEDURE — 80053 COMPREHEN METABOLIC PANEL: CPT

## 2021-07-16 PROCEDURE — 82570 ASSAY OF URINE CREATININE: CPT

## 2021-07-16 PROCEDURE — 85025 COMPLETE CBC W/AUTO DIFF WBC: CPT

## 2021-07-16 PROCEDURE — 81001 URINALYSIS AUTO W/SCOPE: CPT

## 2021-07-20 ENCOUNTER — TELEPHONE (OUTPATIENT)
Dept: INTERNAL MEDICINE CLINIC | Facility: CLINIC | Age: 66
End: 2021-07-20

## 2021-07-21 ENCOUNTER — APPOINTMENT (OUTPATIENT)
Dept: CARDIOLOGY | Age: 66
End: 2021-07-21

## 2021-07-23 ENCOUNTER — PATIENT MESSAGE (OUTPATIENT)
Dept: INTERNAL MEDICINE CLINIC | Facility: CLINIC | Age: 66
End: 2021-07-23

## 2021-07-23 DIAGNOSIS — Z12.31 ENCOUNTER FOR SCREENING MAMMOGRAM FOR MALIGNANT NEOPLASM OF BREAST: Primary | ICD-10-CM

## 2021-07-23 NOTE — TELEPHONE ENCOUNTER
Sent a message that screening mammograms should be completed yearly and covered by her insurance. Advised to confirm with them and provided number to scheduling.

## 2021-07-24 ENCOUNTER — HOSPITAL ENCOUNTER (EMERGENCY)
Facility: HOSPITAL | Age: 66
Discharge: HOME | End: 2021-07-24
Payer: MEDICARE

## 2021-07-24 ENCOUNTER — PATIENT MESSAGE (OUTPATIENT)
Dept: NEPHROLOGY | Facility: CLINIC | Age: 66
End: 2021-07-24

## 2021-07-24 ENCOUNTER — APPOINTMENT (OUTPATIENT)
Dept: CT IMAGING | Facility: HOSPITAL | Age: 66
End: 2021-07-24
Attending: NURSE PRACTITIONER
Payer: MEDICARE

## 2021-07-24 VITALS
OXYGEN SATURATION: 88 % | SYSTOLIC BLOOD PRESSURE: 124 MMHG | WEIGHT: 195 LBS | BODY MASS INDEX: 33 KG/M2 | TEMPERATURE: 101 F | DIASTOLIC BLOOD PRESSURE: 56 MMHG | RESPIRATION RATE: 24 BRPM | HEART RATE: 91 BPM

## 2021-07-24 DIAGNOSIS — N12 PYELONEPHRITIS: Primary | ICD-10-CM

## 2021-07-24 DIAGNOSIS — N17.9 AKI (ACUTE KIDNEY INJURY) (HCC): Primary | ICD-10-CM

## 2021-07-24 LAB
ALBUMIN SERPL-MCNC: 3.2 G/DL (ref 3.4–5)
ALBUMIN/GLOB SERPL: 0.7 {RATIO} (ref 1–2)
ALP LIVER SERPL-CCNC: 133 U/L
ALT SERPL-CCNC: 68 U/L
ANION GAP SERPL CALC-SCNC: 7 MMOL/L (ref 0–18)
BASOPHILS # BLD AUTO: 0.04 X10(3) UL (ref 0–0.2)
BASOPHILS NFR BLD AUTO: 0.4 %
BILIRUB SERPL-MCNC: 0.9 MG/DL (ref 0.1–2)
BILIRUB UR QL: NEGATIVE
BUN BLD-MCNC: 26 MG/DL (ref 7–18)
BUN/CREAT SERPL: 31.7 (ref 10–20)
CALCIUM BLD-MCNC: 9.3 MG/DL (ref 8.5–10.1)
CHLORIDE SERPL-SCNC: 110 MMOL/L (ref 98–112)
CO2 SERPL-SCNC: 23 MMOL/L (ref 21–32)
COLOR UR: YELLOW
CREAT BLD-MCNC: 0.82 MG/DL
DEPRECATED RDW RBC AUTO: 44.2 FL (ref 35.1–46.3)
EOSINOPHIL # BLD AUTO: 0.01 X10(3) UL (ref 0–0.7)
EOSINOPHIL NFR BLD AUTO: 0.1 %
ERYTHROCYTE [DISTWIDTH] IN BLOOD BY AUTOMATED COUNT: 12.8 % (ref 11–15)
GLOBULIN PLAS-MCNC: 4.4 G/DL (ref 2.8–4.4)
GLUCOSE BLD-MCNC: 133 MG/DL (ref 70–99)
GLUCOSE UR-MCNC: >=500 MG/DL
HCT VFR BLD AUTO: 48.9 %
HGB BLD-MCNC: 16.6 G/DL
IMM GRANULOCYTES # BLD AUTO: 0.11 X10(3) UL (ref 0–1)
IMM GRANULOCYTES NFR BLD: 1 %
KETONES UR-MCNC: NEGATIVE MG/DL
LIPASE SERPL-CCNC: 45 U/L (ref 73–393)
LYMPHOCYTES # BLD AUTO: 0.38 X10(3) UL (ref 1–4)
LYMPHOCYTES NFR BLD AUTO: 3.5 %
M PROTEIN MFR SERPL ELPH: 7.6 G/DL (ref 6.4–8.2)
MCH RBC QN AUTO: 32.1 PG (ref 26–34)
MCHC RBC AUTO-ENTMCNC: 33.9 G/DL (ref 31–37)
MCV RBC AUTO: 94.6 FL
MONOCYTES # BLD AUTO: 0.26 X10(3) UL (ref 0.1–1)
MONOCYTES NFR BLD AUTO: 2.4 %
NEUTROPHILS # BLD AUTO: 10.16 X10 (3) UL (ref 1.5–7.7)
NEUTROPHILS # BLD AUTO: 10.16 X10(3) UL (ref 1.5–7.7)
NEUTROPHILS NFR BLD AUTO: 92.6 %
NITRITE UR QL STRIP.AUTO: POSITIVE
OSMOLALITY SERPL CALC.SUM OF ELEC: 297 MOSM/KG (ref 275–295)
PH UR: 6 [PH] (ref 5–8)
PLATELET # BLD AUTO: 167 10(3)UL (ref 150–450)
PROT UR-MCNC: 100 MG/DL
RBC # BLD AUTO: 5.17 X10(6)UL
RBC #/AREA URNS AUTO: >10 /HPF
SODIUM SERPL-SCNC: 140 MMOL/L (ref 136–145)
SP GR UR STRIP: 1.01 (ref 1–1.03)
UROBILINOGEN UR STRIP-ACNC: <2
WBC # BLD AUTO: 11 X10(3) UL (ref 4–11)
WBC #/AREA URNS AUTO: >50 /HPF

## 2021-07-24 PROCEDURE — 96361 HYDRATE IV INFUSION ADD-ON: CPT

## 2021-07-24 PROCEDURE — 74176 CT ABD & PELVIS W/O CONTRAST: CPT | Performed by: NURSE PRACTITIONER

## 2021-07-24 PROCEDURE — 87077 CULTURE AEROBIC IDENTIFY: CPT | Performed by: NURSE PRACTITIONER

## 2021-07-24 PROCEDURE — 87186 SC STD MICRODIL/AGAR DIL: CPT | Performed by: NURSE PRACTITIONER

## 2021-07-24 PROCEDURE — 85025 COMPLETE CBC W/AUTO DIFF WBC: CPT | Performed by: NURSE PRACTITIONER

## 2021-07-24 PROCEDURE — 99284 EMERGENCY DEPT VISIT MOD MDM: CPT

## 2021-07-24 PROCEDURE — 80053 COMPREHEN METABOLIC PANEL: CPT | Performed by: NURSE PRACTITIONER

## 2021-07-24 PROCEDURE — 87086 URINE CULTURE/COLONY COUNT: CPT | Performed by: NURSE PRACTITIONER

## 2021-07-24 PROCEDURE — 81001 URINALYSIS AUTO W/SCOPE: CPT | Performed by: NURSE PRACTITIONER

## 2021-07-24 PROCEDURE — 83690 ASSAY OF LIPASE: CPT | Performed by: NURSE PRACTITIONER

## 2021-07-24 PROCEDURE — 96365 THER/PROPH/DIAG IV INF INIT: CPT

## 2021-07-24 RX ORDER — SULFAMETHOXAZOLE AND TRIMETHOPRIM 800; 160 MG/1; MG/1
1 TABLET ORAL 2 TIMES DAILY
Qty: 20 TABLET | Refills: 0 | Status: SHIPPED | OUTPATIENT
Start: 2021-07-24 | End: 2021-08-03

## 2021-07-24 RX ORDER — KETOROLAC TROMETHAMINE 15 MG/ML
15 INJECTION, SOLUTION INTRAMUSCULAR; INTRAVENOUS ONCE
Status: DISCONTINUED | OUTPATIENT
Start: 2021-07-24 | End: 2021-07-25

## 2021-07-25 NOTE — ED INITIAL ASSESSMENT (HPI)
Pt c/o bilateral flank pain since this morning, +nausea. Dysuria this morning also. Pt appears to have labored breathing in triage, which pt states is normal for her.

## 2021-07-25 NOTE — ED PROVIDER NOTES
Patient Seen in: [facility] AND [facility] Emergency Department      History   Patient presents with:  Abdomen/Flank Pain  Urinary Symptoms    Stated Complaint: Kidney Stone    HPI/Subjective:   65yo/f w hx of anxiety, depression, MS, renal stone, HLD, DM w neph 88.5 kg   LMP  (LMP Unknown)   SpO2 91%   BMI 33.47 kg/m²         Physical Exam  Vitals and nursing note reviewed. Constitutional:       General: She is not in acute distress. Appearance: She is well-developed.    HENT:      Head: Normocephalic and at Large (*)     WBC Urine >50 (*)     RBC Urine >10 (*)     Bacteria Urine 3+ (*)     Squamous Epi.  Cells Few (*)     All other components within normal limits   CBC W/ DIFFERENTIAL - Abnormal; Notable for the following components:    HGB 16.6 (*)     HCT 48 BOWEL/MESENTERY: Colonic diverticulosis predominantly in the sigmoid region without diverticulitis. Moderate stool throughout the colon consistent with constipation/fecal retention. ABDOMINAL WALL: Small umbilical hernia containing fat.    PELVIC NODES: Plan     Clinical Impression:  Pyelonephritis  (primary encounter diagnosis)     Disposition:  Discharge  7/24/2021  9:42 pm    Follow-up:  Saintclair Ivy, MD  [facility],[place] [address]  [phone]    In 2 days      Roshan Solares MD  120

## 2021-07-26 ENCOUNTER — TELEPHONE (OUTPATIENT)
Dept: INTERNAL MEDICINE CLINIC | Facility: CLINIC | Age: 66
End: 2021-07-26

## 2021-07-26 ENCOUNTER — OFFICE VISIT (OUTPATIENT)
Dept: CARDIOLOGY | Age: 66
End: 2021-07-26

## 2021-07-26 VITALS
HEIGHT: 64 IN | SYSTOLIC BLOOD PRESSURE: 90 MMHG | BODY MASS INDEX: 33.12 KG/M2 | HEART RATE: 88 BPM | WEIGHT: 194 LBS | DIASTOLIC BLOOD PRESSURE: 50 MMHG | RESPIRATION RATE: 18 BRPM

## 2021-07-26 DIAGNOSIS — E55.9 VITAMIN D DEFICIENCY: ICD-10-CM

## 2021-07-26 DIAGNOSIS — I50.32 CHRONIC HEART FAILURE WITH PRESERVED EJECTION FRACTION (CMD): Primary | ICD-10-CM

## 2021-07-26 DIAGNOSIS — E78.00 HYPERCHOLESTEREMIA: ICD-10-CM

## 2021-07-26 PROCEDURE — 99214 OFFICE O/P EST MOD 30 MIN: CPT | Performed by: INTERNAL MEDICINE

## 2021-07-26 RX ORDER — METOPROLOL SUCCINATE 25 MG/1
12.5 TABLET, EXTENDED RELEASE ORAL DAILY
Qty: 45 TABLET | Refills: 3 | Status: SHIPPED | OUTPATIENT
Start: 2021-07-26 | End: 2022-01-01 | Stop reason: SDUPTHER

## 2021-07-26 RX ORDER — ALBUTEROL SULFATE 90 UG/1
2 AEROSOL, METERED RESPIRATORY (INHALATION)
COMMUNITY
Start: 2021-06-17 | End: 2021-11-01

## 2021-07-26 RX ORDER — SULFAMETHOXAZOLE AND TRIMETHOPRIM 800; 160 MG/1; MG/1
1 TABLET ORAL 2 TIMES DAILY
COMMUNITY
End: 2021-10-19 | Stop reason: ALTCHOICE

## 2021-07-26 RX ORDER — GABAPENTIN 100 MG/1
100 CAPSULE ORAL PRN
COMMUNITY
Start: 2021-07-07 | End: 2021-11-01

## 2021-07-26 SDOH — HEALTH STABILITY: PHYSICAL HEALTH: ON AVERAGE, HOW MANY DAYS PER WEEK DO YOU ENGAGE IN MODERATE TO STRENUOUS EXERCISE (LIKE A BRISK WALK)?: 2 DAYS

## 2021-07-26 SDOH — HEALTH STABILITY: PHYSICAL HEALTH: ON AVERAGE, HOW MANY MINUTES DO YOU ENGAGE IN EXERCISE AT THIS LEVEL?: 60 MIN

## 2021-07-26 ASSESSMENT — ENCOUNTER SYMPTOMS
SPUTUM PRODUCTION: 1
HEMOPTYSIS: 0
LOSS OF BALANCE: 1
BRUISES/BLEEDS EASILY: 0
HEMATOCHEZIA: 0
SUSPICIOUS LESIONS: 0
FEVER: 0
ALLERGIC/IMMUNOLOGIC COMMENTS: NO NEW FOOD ALLERGIES
NUMBNESS: 1
COUGH: 1

## 2021-07-26 ASSESSMENT — PATIENT HEALTH QUESTIONNAIRE - PHQ9
1. LITTLE INTEREST OR PLEASURE IN DOING THINGS: NOT AT ALL
SUM OF ALL RESPONSES TO PHQ9 QUESTIONS 1 AND 2: 0
2. FEELING DOWN, DEPRESSED OR HOPELESS: NOT AT ALL
CLINICAL INTERPRETATION OF PHQ2 SCORE: NO FURTHER SCREENING NEEDED
CLINICAL INTERPRETATION OF PHQ9 SCORE: NO FURTHER SCREENING NEEDED
SUM OF ALL RESPONSES TO PHQ9 QUESTIONS 1 AND 2: 0

## 2021-07-26 NOTE — TELEPHONE ENCOUNTER
Noted. Kidney function are stable.  Hope infection resolves quickly     You have swelling of right side of kidney - do US kidney to ensure resolution - pls order

## 2021-07-26 NOTE — TELEPHONE ENCOUNTER
From: Jamaal Murguia  To: Lei Wood MD  Sent: 7/24/2021 11:29 PM CDT  Subject: Other    Dr. Ovi Scott,  Just a heads up, I just got home from emergency Saturday night with a kidney stone and a bad kidney infection. They did IV antibiotics.  Ray Romero

## 2021-07-26 NOTE — TELEPHONE ENCOUNTER
I called pt about eye exam and she said she is having Dr. Sera Mendoza do it before cataract surgery on Aug. 4th.

## 2021-07-26 NOTE — TELEPHONE ENCOUNTER
Ultrasound order placed. My chart message sent to patient. Central scheduling phone number provided.

## 2021-07-29 ENCOUNTER — EXTERNAL RECORD (OUTPATIENT)
Dept: CARDIOLOGY | Age: 66
End: 2021-07-29

## 2021-07-29 ENCOUNTER — OFFICE VISIT (OUTPATIENT)
Dept: SLEEP CENTER | Age: 66
End: 2021-07-29
Attending: INTERNAL MEDICINE
Payer: MEDICARE

## 2021-07-29 DIAGNOSIS — D75.1 POLYCYTHEMIA: ICD-10-CM

## 2021-07-29 DIAGNOSIS — G47.30 SLEEP-DISORDERED BREATHING: ICD-10-CM

## 2021-07-29 DIAGNOSIS — G47.33 OSA (OBSTRUCTIVE SLEEP APNEA): Primary | ICD-10-CM

## 2021-07-29 DIAGNOSIS — G47.10 HYPERSOMNIA: ICD-10-CM

## 2021-07-29 PROCEDURE — 95806 SLEEP STUDY UNATT&RESP EFFT: CPT

## 2021-07-30 ENCOUNTER — TELEPHONE (OUTPATIENT)
Dept: NEPHROLOGY | Facility: CLINIC | Age: 66
End: 2021-07-30

## 2021-07-30 NOTE — TELEPHONE ENCOUNTER
Spoke to patient. When she called to schedule US she was told nothing available until the end of August, early September. Patient is asking if this is ok with Dr. Roberta Cho (not yet scheduled) Encounter routed to Dr. Roberta Cho.

## 2021-08-02 NOTE — PROCEDURES
[address]  Accredited by the [place] [employer] ([employer])    PATIENT'S NAME: Lizzie Frazier   ATTENDING PHYSICIAN: Davie Olszewski. Clair Sapp MD   REFERRING PHYSICIAN: Davie Olszewski.  Clair Sapp MD   PATIENT ACCOUNT #: [id number] LOCATION: Sle correlation is required. Actually, this patient has significant clinical syndrome, can consider proceeding to CPAP titration. 2.   Weight loss. 3.   Avoid alcohol. 4.   Avoid sedating drug. 5.   Patient should not drive if at all sleepy.     Please do

## 2021-08-03 NOTE — TELEPHONE ENCOUNTER
Left detailed message on voicemail per Dr. Laz Calhoun to schedule US for first available and see if there is a wait list. Advised patient to call back if she has any questions or concerns.

## 2021-08-05 ENCOUNTER — TELEPHONE (OUTPATIENT)
Dept: PULMONOLOGY | Facility: CLINIC | Age: 66
End: 2021-08-05

## 2021-08-05 NOTE — TELEPHONE ENCOUNTER
Dr. Yeimi Collier stated she is both a mouth and nose breather. Patient agreeable to CPAP but would like to wait until September to order CPAP r/t finances.

## 2021-08-05 NOTE — TELEPHONE ENCOUNTER
----- Message from Sedrick Saavedra DO sent at 8/4/2021  2:55 PM CDT -----  You may let the patient know that I reviewed her sleep study results with evidence of mild obstructive sleep apnea.   If she is agreeable let me know and I will place order for au

## 2021-08-05 NOTE — TELEPHONE ENCOUNTER
If that is the case then she may contact her office when she wants us to place order for CPAP device

## 2021-08-12 RX ORDER — ROSUVASTATIN CALCIUM 20 MG/1
TABLET, COATED ORAL
Qty: 90 TABLET | Refills: 1 | Status: SHIPPED | OUTPATIENT
Start: 2021-08-12

## 2021-08-12 NOTE — TELEPHONE ENCOUNTER
Refill passed per [facility] protocol.   Requested Prescriptions   Pending Prescriptions Disp Refills    ROSUVASTATIN 20 MG Oral Tab [Pharmacy Med Name: ROSUVASTATIN 20MG TABLETS] 90 tablet 1     Sig: TAKE 1 TABLET BY MOUTH EVERY NIGHT        Mo

## 2021-08-23 ENCOUNTER — OFFICE VISIT (OUTPATIENT)
Dept: CARDIOLOGY | Age: 66
End: 2021-08-23

## 2021-08-23 VITALS
HEIGHT: 64 IN | DIASTOLIC BLOOD PRESSURE: 58 MMHG | WEIGHT: 191.9 LBS | HEART RATE: 78 BPM | BODY MASS INDEX: 32.76 KG/M2 | SYSTOLIC BLOOD PRESSURE: 110 MMHG

## 2021-08-23 DIAGNOSIS — E78.00 HYPERCHOLESTEREMIA: ICD-10-CM

## 2021-08-23 DIAGNOSIS — I50.32 CHRONIC HEART FAILURE WITH PRESERVED EJECTION FRACTION (CMD): Primary | ICD-10-CM

## 2021-08-23 DIAGNOSIS — I73.9 PVD (PERIPHERAL VASCULAR DISEASE) (CMD): ICD-10-CM

## 2021-08-23 DIAGNOSIS — I10 ESSENTIAL HYPERTENSION: ICD-10-CM

## 2021-08-23 DIAGNOSIS — I27.20 PULMONARY HYPERTENSION (CMD): ICD-10-CM

## 2021-08-23 DIAGNOSIS — E11.69 TYPE 2 DIABETES MELLITUS WITH OTHER SPECIFIED COMPLICATION, WITHOUT LONG-TERM CURRENT USE OF INSULIN (CMD): ICD-10-CM

## 2021-08-23 PROCEDURE — 99214 OFFICE O/P EST MOD 30 MIN: CPT | Performed by: NURSE PRACTITIONER

## 2021-08-23 SDOH — HEALTH STABILITY: PHYSICAL HEALTH: ON AVERAGE, HOW MANY MINUTES DO YOU ENGAGE IN EXERCISE AT THIS LEVEL?: 60 MIN

## 2021-08-23 SDOH — HEALTH STABILITY: PHYSICAL HEALTH: ON AVERAGE, HOW MANY DAYS PER WEEK DO YOU ENGAGE IN MODERATE TO STRENUOUS EXERCISE (LIKE A BRISK WALK)?: 2 DAYS

## 2021-08-23 ASSESSMENT — PATIENT HEALTH QUESTIONNAIRE - PHQ9
SUM OF ALL RESPONSES TO PHQ9 QUESTIONS 1 AND 2: 0
CLINICAL INTERPRETATION OF PHQ2 SCORE: NO FURTHER SCREENING NEEDED
SUM OF ALL RESPONSES TO PHQ9 QUESTIONS 1 AND 2: 0
1. LITTLE INTEREST OR PLEASURE IN DOING THINGS: NOT AT ALL
2. FEELING DOWN, DEPRESSED OR HOPELESS: NOT AT ALL
CLINICAL INTERPRETATION OF PHQ9 SCORE: NO FURTHER SCREENING NEEDED

## 2021-08-27 ENCOUNTER — LAB ENCOUNTER (OUTPATIENT)
Dept: LAB | Facility: HOSPITAL | Age: 66
End: 2021-08-27
Attending: INTERNAL MEDICINE
Payer: MEDICARE

## 2021-08-27 ENCOUNTER — TELEPHONE (OUTPATIENT)
Dept: NEPHROLOGY | Facility: CLINIC | Age: 66
End: 2021-08-27

## 2021-08-27 DIAGNOSIS — R82.90 CLOUDY URINE: ICD-10-CM

## 2021-08-27 DIAGNOSIS — R82.90 CLOUDY URINE: Primary | ICD-10-CM

## 2021-08-27 LAB
BILIRUB UR QL: NEGATIVE
CLARITY UR: CLEAR
COLOR UR: COLORLESS
GLUCOSE UR-MCNC: 50 MG/DL
KETONES UR-MCNC: NEGATIVE MG/DL
NITRITE UR QL STRIP.AUTO: NEGATIVE
PH UR: 7 [PH] (ref 5–8)
PROT UR-MCNC: NEGATIVE MG/DL
SP GR UR STRIP: 1.01 (ref 1–1.03)
UROBILINOGEN UR STRIP-ACNC: <2

## 2021-08-27 PROCEDURE — 87186 SC STD MICRODIL/AGAR DIL: CPT

## 2021-08-27 PROCEDURE — 87088 URINE BACTERIA CULTURE: CPT

## 2021-08-27 PROCEDURE — 81001 URINALYSIS AUTO W/SCOPE: CPT

## 2021-08-27 PROCEDURE — 87086 URINE CULTURE/COLONY COUNT: CPT

## 2021-08-27 RX ORDER — CIPROFLOXACIN 500 MG/1
500 TABLET, FILM COATED ORAL 2 TIMES DAILY
Qty: 10 TABLET | Refills: 0 | Status: SHIPPED | OUTPATIENT
Start: 2021-08-27 | End: 2021-10-04

## 2021-08-27 NOTE — TELEPHONE ENCOUNTER
Urine test ordered. Cipro send to pharmacy. Left detailed message on identified voicemail letting patient know that a urine test has been ordered and she must give a sample BEFORE starting antibiotic. Encouraged to call office with any questions.

## 2021-08-27 NOTE — TELEPHONE ENCOUNTER
Patient contacted. Symptoms started today 8/27/21. Burning with urination, cloudy urine. No visible blood seen in urine, no fever or chills, no abdominal pain. Informed patient to call PCP. Dr. Cassia Orellana is out of the office for several weeks.  Encounter rou

## 2021-09-03 ENCOUNTER — PATIENT MESSAGE (OUTPATIENT)
Dept: PULMONOLOGY | Facility: CLINIC | Age: 66
End: 2021-09-03

## 2021-09-07 ENCOUNTER — HOSPITAL ENCOUNTER (OUTPATIENT)
Dept: ULTRASOUND IMAGING | Facility: HOSPITAL | Age: 66
Discharge: HOME | End: 2021-09-07
Attending: INTERNAL MEDICINE
Payer: MEDICARE

## 2021-09-07 DIAGNOSIS — N17.9 AKI (ACUTE KIDNEY INJURY) (HCC): ICD-10-CM

## 2021-09-07 PROCEDURE — 76770 US EXAM ABDO BACK WALL COMP: CPT | Performed by: INTERNAL MEDICINE

## 2021-09-07 NOTE — TELEPHONE ENCOUNTER
From: Nguyen Hines  To: Mala Busch DO  Sent: 9/3/2021 9:01 PM CDT  Subject: Other    Dr. Tiara Maxwell,    I received a phone call regarding my sleep study. The women said I had mild sleep apnea.  I wanted to discuss this further to understand all the i

## 2021-09-09 ENCOUNTER — PATIENT MESSAGE (OUTPATIENT)
Dept: INTERNAL MEDICINE CLINIC | Facility: CLINIC | Age: 66
End: 2021-09-09

## 2021-09-10 RX ORDER — INSULIN GLARGINE 100 [IU]/ML
30 INJECTION, SOLUTION SUBCUTANEOUS DAILY
Qty: 10 EACH | Refills: 1 | Status: SHIPPED | OUTPATIENT
Start: 2021-09-10 | End: 2022-01-21

## 2021-09-10 NOTE — TELEPHONE ENCOUNTER
From: Verónica Flores  To: Maggie Bains MD  Sent: 9/9/2021 3:22 PM CDT  Subject: Prescription Question    Sky continues to try and get a new script for my insulin, Basaglar for 30 units, could you please refill the prescription.  Thank you, Reeves Paget

## 2021-09-10 NOTE — TELEPHONE ENCOUNTER
Spoke with pt,  verified. Pt is looking for rx ref for basaglar 30 units a day. Pt is out of meds. pls advise, thanks in advance.            Requested Prescriptions     Pending Prescriptions Disp Refills   • insulin glargine (BASAGLAR KWIKPEN) 100 U

## 2021-09-12 RX ORDER — LEVOTHYROXINE SODIUM 0.03 MG/1
25 TABLET ORAL DAILY
Qty: 90 TABLET | Refills: 1 | Status: SHIPPED | OUTPATIENT
Start: 2021-09-12

## 2021-09-13 ENCOUNTER — PATIENT MESSAGE (OUTPATIENT)
Dept: PULMONOLOGY | Facility: CLINIC | Age: 66
End: 2021-09-13

## 2021-09-15 NOTE — TELEPHONE ENCOUNTER
From: Alanna Becerra  To: Conrado Sparks DO  Sent: 9/13/2021 1:23 AM CDT  Subject: Sleep study    I received a message from Ammy Ford that I was too late to respond. I am under the understanding that [facility] equipment has been recalled, is this true?    Thank

## 2021-09-16 ENCOUNTER — IMMUNIZATION (OUTPATIENT)
Dept: LAB | Facility: HOSPITAL | Age: 66
End: 2021-09-16
Attending: EMERGENCY MEDICINE
Payer: MEDICARE

## 2021-09-16 DIAGNOSIS — Z23 NEED FOR VACCINATION: Primary | ICD-10-CM

## 2021-09-16 PROCEDURE — 0003A SARSCOV2 VAC 30MCG/0.3ML IM: CPT

## 2021-09-23 ENCOUNTER — OFFICE VISIT (OUTPATIENT)
Dept: NEPHROLOGY | Facility: CLINIC | Age: 66
End: 2021-09-23
Payer: MEDICARE

## 2021-09-23 VITALS
WEIGHT: 190 LBS | SYSTOLIC BLOOD PRESSURE: 101 MMHG | DIASTOLIC BLOOD PRESSURE: 63 MMHG | HEART RATE: 87 BPM | HEIGHT: 64 IN | BODY MASS INDEX: 32.44 KG/M2

## 2021-09-23 DIAGNOSIS — N18.30 STAGE 3 CHRONIC KIDNEY DISEASE, UNSPECIFIED WHETHER STAGE 3A OR 3B CKD (HCC): ICD-10-CM

## 2021-09-23 DIAGNOSIS — R80.9 NON-NEPHROTIC RANGE PROTEINURIA: Primary | ICD-10-CM

## 2021-09-23 DIAGNOSIS — N05.1 FSGS (FOCAL SEGMENTAL GLOMERULOSCLEROSIS): ICD-10-CM

## 2021-09-23 PROCEDURE — 99214 OFFICE O/P EST MOD 30 MIN: CPT | Performed by: INTERNAL MEDICINE

## 2021-09-23 NOTE — PROGRESS NOTES
Progress Note     Julian Peña is a 77 yrs old female with pmh of HL, multiple sclerosis (35 yrs), restless leg syndrome, back pain, nephrolithiasis x 3, tobacco abuse, diverticulitis who presented today for follow up    Initial lab work showed BUN/Cr Past Surgical History:   Procedure Laterality Date   • APPENDECTOMY     • APPENDECTOMY     •      • KNEE SURGERY             Medications (Active prior to today's visit):  Current Outpatient Medications   Medication Sig Dispense Refil daily as needed.) 270 tablet 1   • torsemide 20 MG Oral Tab Take 20 mg by mouth daily.      • OXcarbazepine 300 MG Oral Tab 1 tab in the morning and 1 tab in the evening 180 tablet 3   • ipratropium-albuterol 0.5-2.5 (3) MG/3ML Inhalation Solution Take 3 mL alert and responsive   Head/Face: normocephalic  Eyes/Vision: normal extraocular motion is intact  Nose/Mouth/Throat: mucous membranes are moist    Neck/Thyroid: neck is supple without adenopathy  Lymphatic: no abnormal cervical, supraclavicular adenopathy spironolactone  - potassium lowish  - on supplement   - some component of lymphedema in legs   - follows with HF team     3. HTN:  BP lowish in office.  Asymptomtic.  - goal is <130/80 mmhg  - low salt diet instructed and smoking cessation counseled at ever

## 2021-09-24 ENCOUNTER — HOSPITAL ENCOUNTER (OUTPATIENT)
Dept: MAMMOGRAPHY | Age: 66
Discharge: HOME | End: 2021-09-24
Attending: INTERNAL MEDICINE
Payer: MEDICARE

## 2021-09-24 ENCOUNTER — LAB ENCOUNTER (OUTPATIENT)
Dept: LAB | Age: 66
End: 2021-09-24
Attending: INTERNAL MEDICINE
Payer: MEDICARE

## 2021-09-24 DIAGNOSIS — R80.9 NON-NEPHROTIC RANGE PROTEINURIA: ICD-10-CM

## 2021-09-24 DIAGNOSIS — Z12.31 ENCOUNTER FOR SCREENING MAMMOGRAM FOR MALIGNANT NEOPLASM OF BREAST: ICD-10-CM

## 2021-09-24 LAB
CREAT UR-SCNC: 142 MG/DL
MICROALBUMIN UR-MCNC: 96.6 MG/DL
MICROALBUMIN/CREAT 24H UR-RTO: 680.3 UG/MG (ref ?–30)

## 2021-09-24 PROCEDURE — 77067 SCR MAMMO BI INCL CAD: CPT | Performed by: INTERNAL MEDICINE

## 2021-09-24 PROCEDURE — 77063 BREAST TOMOSYNTHESIS BI: CPT | Performed by: INTERNAL MEDICINE

## 2021-09-24 PROCEDURE — 82043 UR ALBUMIN QUANTITATIVE: CPT

## 2021-09-24 PROCEDURE — 82570 ASSAY OF URINE CREATININE: CPT

## 2021-10-04 ENCOUNTER — OFFICE VISIT (OUTPATIENT)
Dept: INTERNAL MEDICINE CLINIC | Facility: CLINIC | Age: 66
End: 2021-10-04
Payer: MEDICARE

## 2021-10-04 VITALS
DIASTOLIC BLOOD PRESSURE: 65 MMHG | BODY MASS INDEX: 33 KG/M2 | TEMPERATURE: 98 F | RESPIRATION RATE: 18 BRPM | HEIGHT: 64 IN | HEART RATE: 76 BPM | SYSTOLIC BLOOD PRESSURE: 114 MMHG | WEIGHT: 193.31 LBS

## 2021-10-04 DIAGNOSIS — M16.11 PRIMARY OSTEOARTHRITIS OF RIGHT HIP: ICD-10-CM

## 2021-10-04 DIAGNOSIS — E11.21 TYPE 2 DIABETES MELLITUS WITH DIABETIC NEPHROPATHY, WITH LONG-TERM CURRENT USE OF INSULIN (HCC): ICD-10-CM

## 2021-10-04 DIAGNOSIS — G35 MULTIPLE SCLEROSIS (HCC): ICD-10-CM

## 2021-10-04 DIAGNOSIS — N05.1 FSGS (FOCAL SEGMENTAL GLOMERULOSCLEROSIS): ICD-10-CM

## 2021-10-04 DIAGNOSIS — Z79.4 TYPE 2 DIABETES MELLITUS WITH DIABETIC NEPHROPATHY, WITH LONG-TERM CURRENT USE OF INSULIN (HCC): ICD-10-CM

## 2021-10-04 DIAGNOSIS — J43.1 PANLOBULAR EMPHYSEMA (HCC): ICD-10-CM

## 2021-10-04 DIAGNOSIS — Z00.00 MEDICARE ANNUAL WELLNESS VISIT, INITIAL: Primary | ICD-10-CM

## 2021-10-04 DIAGNOSIS — G25.81 RESTLESS LEG SYNDROME: ICD-10-CM

## 2021-10-04 DIAGNOSIS — N39.46 MIXED STRESS AND URGE URINARY INCONTINENCE: ICD-10-CM

## 2021-10-04 DIAGNOSIS — D58.2 ELEVATED HEMOGLOBIN (HCC): ICD-10-CM

## 2021-10-04 DIAGNOSIS — Z72.0 TOBACCO ABUSE DISORDER: ICD-10-CM

## 2021-10-04 DIAGNOSIS — K52.838 OTHER MICROSCOPIC COLITIS: ICD-10-CM

## 2021-10-04 DIAGNOSIS — I10 PRIMARY HYPERTENSION: ICD-10-CM

## 2021-10-04 DIAGNOSIS — Z80.0 FAMILY HISTORY OF COLON CANCER: ICD-10-CM

## 2021-10-04 DIAGNOSIS — E03.9 HYPOTHYROIDISM, UNSPECIFIED TYPE: ICD-10-CM

## 2021-10-04 DIAGNOSIS — L57.0 ACTINIC KERATOSIS: ICD-10-CM

## 2021-10-04 DIAGNOSIS — F17.210 CIGARETTE SMOKER: ICD-10-CM

## 2021-10-04 DIAGNOSIS — N04.9 NEPHROTIC SYNDROME: ICD-10-CM

## 2021-10-04 DIAGNOSIS — E78.2 MIXED HYPERLIPIDEMIA: ICD-10-CM

## 2021-10-04 DIAGNOSIS — I27.20 PULMONARY HTN (HCC): ICD-10-CM

## 2021-10-04 DIAGNOSIS — Z00.00 ENCOUNTER FOR ANNUAL HEALTH EXAMINATION: ICD-10-CM

## 2021-10-04 PROBLEM — K13.0 LIP FISSURE: Status: RESOLVED | Noted: 2021-04-15 | Resolved: 2021-10-04

## 2021-10-04 PROBLEM — I73.9 PERIPHERAL VASCULAR DISEASE (HCC): Status: RESOLVED | Noted: 2019-12-17 | Resolved: 2021-10-04

## 2021-10-04 PROBLEM — M25.551 PAIN OF RIGHT HIP JOINT: Status: RESOLVED | Noted: 2017-08-14 | Resolved: 2021-10-04

## 2021-10-04 PROBLEM — N30.00 ACUTE CYSTITIS WITHOUT HEMATURIA: Status: RESOLVED | Noted: 2018-11-07 | Resolved: 2021-10-04

## 2021-10-04 PROBLEM — K57.92 ACUTE DIVERTICULITIS: Status: RESOLVED | Noted: 2019-12-17 | Resolved: 2021-10-04

## 2021-10-04 PROBLEM — I89.0 LYMPHEDEMA: Status: RESOLVED | Noted: 2019-12-17 | Resolved: 2021-10-04

## 2021-10-04 PROBLEM — R60.0 BILATERAL LEG EDEMA: Status: RESOLVED | Noted: 2018-09-27 | Resolved: 2021-10-04

## 2021-10-04 PROBLEM — R10.11 RIGHT UPPER QUADRANT ABDOMINAL PAIN: Status: RESOLVED | Noted: 2018-11-07 | Resolved: 2021-10-04

## 2021-10-04 PROBLEM — B37.0 THRUSH: Status: RESOLVED | Noted: 2018-05-15 | Resolved: 2021-10-04

## 2021-10-04 PROBLEM — R30.0 DYSURIA: Status: RESOLVED | Noted: 2018-08-16 | Resolved: 2021-10-04

## 2021-10-04 PROCEDURE — G0009 ADMIN PNEUMOCOCCAL VACCINE: HCPCS | Performed by: INTERNAL MEDICINE

## 2021-10-04 PROCEDURE — 99406 BEHAV CHNG SMOKING 3-10 MIN: CPT | Performed by: INTERNAL MEDICINE

## 2021-10-04 PROCEDURE — G0439 PPPS, SUBSEQ VISIT: HCPCS | Performed by: INTERNAL MEDICINE

## 2021-10-04 PROCEDURE — 90670 PCV13 VACCINE IM: CPT | Performed by: INTERNAL MEDICINE

## 2021-10-04 RX ORDER — LOSARTAN POTASSIUM 25 MG/1
25 TABLET ORAL 2 TIMES DAILY
COMMUNITY
End: 2021-10-04

## 2021-10-04 NOTE — ASSESSMENT & PLAN NOTE
Normal exam.  Labs as ordered. Skin check normal.  Scattered nevi present, advised to follow-up with dermatology–Dr. Ismael Davis as recommended. Breast exam completed–no palpable abnormalities, discharge from the nipples or axillary adenopathy.   Mammogram due

## 2021-10-04 NOTE — ASSESSMENT & PLAN NOTE
Family history of colon cancer.   She has been followed up per Dr. Marley Holguin  Colonoscopy due on 06/01/2023

## 2021-10-04 NOTE — ASSESSMENT & PLAN NOTE
History of MS, currently not on any medications. Wheelchair bound but doing quite well with her regular activities around home. She is not on any specific medications at this time.   Referral to neurology provided due to increasing weakness in the legs, i

## 2021-10-04 NOTE — ASSESSMENT & PLAN NOTE
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 She has been followed up per Dr. Joy Shannon as well as Dr. Dayan Ramírez from oncology. She has completed her sleep study and has made her mind up not to use a CPAP.

## 2021-10-04 NOTE — ASSESSMENT & PLAN NOTE
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 Nephrotic range proteinuria improved with steroids. She has had some fluctuations in her proteinuria. She is monitored per nephrology.   She does have chronic lower extremity edema secondary to the pr

## 2021-10-04 NOTE — ASSESSMENT & PLAN NOTE
Chronic urinary incontinence, patient does wear pads, she does have continuous dribble. Discussed urogynecology evaluation–she does not want to pursue that at this time. She has tried medications like Vesicare–did not help and hence discontinued.

## 2021-10-04 NOTE — ASSESSMENT & PLAN NOTE
Increasing pain, stiffness, reduced range of movement at the right hip. Worsened by chronic sitting. X-rays reviewed. Patient has been seen by Dr. Kimmie Eller and has been in physical therapy. Referral for orthopedic evaluation–Dr. Vaughn Quach provided.

## 2021-10-04 NOTE — ASSESSMENT & PLAN NOTE
Severe centrilobular emphysema with scattered pleuroparenchymal scarring, diffuse bronchial wall thickening and mucous plugging from chronic bronchitis associated with smoking.   She has been advised to quit smoking multiple times, she understands the risks

## 2021-10-04 NOTE — PROGRESS NOTES
HPI:   Nathan Morales is a 77year old female who presents for a Medicare Subsequent Annual Wellness visit (Pt already had Initial Annual Wellness).       Her last annual assessment has been over 1 year: Annual Physical due on 10/04/2022         Fall/Ri directives standard forms performed Face to Face with patient and Family/surrogate (if present), and forms available to patient in AVS  16+ minutes was spent with all Advanced Care Planning elements today (up to 30 minutes for CPT 29473)     She does NOT h Encounter for routine gynecological examination     Urinary incontinence     Microscopic colitis     Nephrotic syndrome     HTN (hypertension)     Type 2 diabetes mellitus with kidney complication, with long-term current use of insulin (HCC)     Lumbar rad inhale 2 puff by inhalation route  every 4 - 6 hours as needed  losartan Potassium 50 MG Oral Tab, Take 1 tablet (50 mg total) by mouth 2 (two) times daily. (Patient taking differently: Take 50 mg by mouth 2 (two) times daily.  Pt takes 25mg in the AM and 5 daily.    aspirin 81 MG Oral Tab, Take 81 mg by mouth daily.        MEDICAL INFORMATION:   She  has a past medical history of Anxiety state, unspecified, Depression, Diabetes ([address] Essential hypertension, Hyperlipidemia, Hypothyroidism, KIDNEY STONE, Multi have a problem hearing over the telephone: No I have trouble following the conversations when two or more people are talking at the same time: No   I have trouble understanding things on the TV: No I have to strain to understand conversations: No   I have rhythm. Heart sounds: Normal heart sounds. No murmur heard. Pulmonary:      Effort: Pulmonary effort is normal. No respiratory distress. Breath sounds: Normal breath sounds. No wheezing or rales.    Chest:      Chest wall: No mass or tenderne Sensory: No sensory deficit. Motor: No weakness or abnormal muscle tone.       Coordination: Coordination normal.      Gait: Gait normal.      Deep Tendon Reflexes: Reflexes normal.   Psychiatric:         Mood and Affect: Mood normal.         Triny Bergeron lb 4.8 oz (87.7 kg), not currently breastfeeding. Blood pressures look stable, well controlled at this time on losartan, metolazone, spironolactone, and torsemide.   Kidney functions remain stable but will need to be closely monitored–proteinuria has been was seen by Dr. Mali Motley. Patient refused to take Lialda. She was advised to follow-up with Dr. Gael Patel but she has not done   Currently with chronic constipation and has been taking senna to help.   Advised on bowel protocol with Metamucil or Citrucel on a smoke.  She refuses medications at this time. Follow-up CT scan as per pulmonology. Pulmonary HTN ([facility] [address]     Patient has been monitored per Dr. Veena Deleon. Lower extremity edema continues to be an issue.   She has been trying to control her dietary ha COMP METABOLIC PANEL (14)    CBC WITH DIFFERENTIAL WITH PLATELET    HEMOGLOBIN A1C    LIPID PANEL    MICROALB/CREAT RATIO, RANDOM URINE    URINALYSIS, ROUTINE    TSH W REFLEX TO FREE T4    Urinary incontinence     Chronic urinary incontinence, patient does ordered. Lab work ordered. Patient reassured. Prescription medication ordered. Reinforced healthy diet, lifestyle, and exercise. Return in 4 months (on 2/4/2022).      Fabio Elena MD, 10/4/2021     General Health     In the past six months, have yo Cancer Screening  Covered for ages 52-80; only need ONE of the following:    Colonoscopy   Covered every 10 years    Covered every 2 years if patient is at high risk or previous colonoscopy was abnormal 06/01/2013    Colonoscopy due on 06/01/2023    Flexib elevated, may be asked to retest more frequently.     Medicare covers every 3 months Lab Results   Component Value Date     (H) 07/16/2021    A1C 7.2 (H) 07/16/2021       No recommendations at this time    Creat/alb ratio Annually Lab Results   [place] and/or counseling), to aid Esperanza Gamez in achieving agreed-upon goals by acquiring the skills, confidence, and social/environmental supports for behavior change, supplemented with adjunctive medical treatments when appropriate.  Additionally, national quitting to

## 2021-10-04 NOTE — PATIENT INSTRUCTIONS
Problem List Items Addressed This Visit        Unprioritized    Actinic keratosis     Patient has been followed up per dermatology–Dr. Coy Santoyo, advised to maintain yearly evaluations.            Relevant Orders    DERM - INTERNAL    Elevated hemoglobin (HC Primary     Normal exam.  Labs as ordered. Skin check normal.  Scattered nevi present, advised to follow-up with dermatology–Dr. Alisa Bills as recommended. Breast exam completed–no palpable abnormalities, discharge from the nipples or axillary adenopathy.   Vasquez Lei weakness in the legs, inability to extend at the hip. She has been in physical therapy. 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 Nephrotic range proteinuria improved with steroids.   She has had some fluctuati chronic restless leg syndrome, on Mirapex 1 mg 3 times daily. This has improved her muscle spasms as well as restless leg syndrome but not relieved it entirely. Follow-up with neurology as directed.          Tobacco abuse disorder     Continues to smoke a Berenice Mina's SCREENING SCHEDULE   Tests on this list are recommended by your physician but may not be covered, or covered at this frequency, by your insurer. Please check with your insurance carrier before scheduling to verify coverage.    P (CPT=77080) 09/09/2020      No recommendations at this time   Pap and Pelvic    Pap   Covered every 2 years for women at normal risk;  Annually if at high risk -  No recommendations at this time    Chlamydia Annually if high risk -  No recommendations at th not reported due to hemolysis. Test reordered by laboratory.            Creatinine   Annually Lab Results   Component Value Date    CREATSERUM 0.82 07/24/2021         BUN Annually Lab Results   Component Value Date    BUN 26 (H) 07/24/2021       Drug Serum

## 2021-10-04 NOTE — ASSESSMENT & PLAN NOTE
History of chronic restless leg syndrome, on Mirapex 1 mg 3 times daily. This has improved her muscle spasms as well as restless leg syndrome but not relieved it entirely. Follow-up with neurology as directed.

## 2021-10-04 NOTE — ASSESSMENT & PLAN NOTE
Patient has been monitored per Dr. Alpa Gannon. Lower extremity edema continues to be an issue. She has been trying to control her dietary habits.   She is currently on metoprolol 12.5 mg daily, torsemide 20 mg daily with metolazone 2.5 mg 3 times a week and

## 2021-10-04 NOTE — ASSESSMENT & PLAN NOTE
Lipid panel improved on diet control alone. Intolerant of both statins as well as fenofibrate and hence discontinued.

## 2021-10-04 NOTE — ASSESSMENT & PLAN NOTE
Blood sugars are better at this time. Hemoglobin A1c last checked was at 7.2  She has been on Basaglar and Humalog as well as Jardiance. Eye exam has been completed.   Lipid panel has improved, currently on diet control alone as she is intolerant of both

## 2021-10-04 NOTE — ASSESSMENT & PLAN NOTE
Continues to smoke about 1 to 2 packs daily. She understands the risks and dangers associated with such behavior especially with heavy smoking event condition. She does not want to discuss quitting smoking at this time.   She has been given prescription f

## 2021-10-04 NOTE — ASSESSMENT & PLAN NOTE
Patient has been followed up per dermatology–Dr. Shrilene Jacobson, advised to maintain yearly evaluations.

## 2021-10-04 NOTE — ASSESSMENT & PLAN NOTE
Blood pressure 114/65, pulse 76, temperature 97.6 °F (36.4 °C), temperature source Temporal, resp. rate 18, height 5' 4\" (1.626 m), weight 193 lb 4.8 oz (87.7 kg), not currently breastfeeding.   Blood pressures look stable, well controlled at this time on

## 2021-10-04 NOTE — ASSESSMENT & PLAN NOTE
History of microscopic colitis as per her earlier colonoscopy. She was seen by Dr. Ayden Gay. Patient refused to take Lialda.   She was advised to follow-up with Dr. Yancy Farnsworth but she has not done   Currently with chronic constipation and has been taking senna

## 2021-10-08 RX ORDER — FLUTICASONE FUROATE, UMECLIDINIUM BROMIDE AND VILANTEROL TRIFENATATE 100; 62.5; 25 UG/1; UG/1; UG/1
POWDER RESPIRATORY (INHALATION)
Qty: 60 EACH | Refills: 5 | Status: SHIPPED | OUTPATIENT
Start: 2021-10-08

## 2021-10-09 ENCOUNTER — TELEPHONE (OUTPATIENT)
Dept: INTERNAL MEDICINE CLINIC | Facility: CLINIC | Age: 66
End: 2021-10-09

## 2021-10-15 ENCOUNTER — TELEPHONE (OUTPATIENT)
Dept: CARDIOLOGY | Age: 66
End: 2021-10-15

## 2021-10-15 RX ORDER — PEN NEEDLE, DIABETIC 31 GX5/16"
NEEDLE, DISPOSABLE MISCELLANEOUS
Qty: 200 EACH | Refills: 1 | Status: SHIPPED | OUTPATIENT
Start: 2021-10-15 | End: 2021-10-15

## 2021-10-15 RX ORDER — PEN NEEDLE, DIABETIC 31 GX5/16"
NEEDLE, DISPOSABLE MISCELLANEOUS
Qty: 200 EACH | Refills: 1 | Status: SHIPPED | OUTPATIENT
Start: 2021-10-15

## 2021-10-15 NOTE — TELEPHONE ENCOUNTER
Pt states uses insulin 4 times day in total and Sig is incorrect on pen needles. Sig changed to 4 times daily on Rx and resent to pt pharmacy.

## 2021-10-15 NOTE — TELEPHONE ENCOUNTER
Pt calling stating that she received a message from her pharmacy that her medication was denied. Pt also stated that dosage changed from 4 times a day to 2 times a day. Requesting call back to confirm. Please advise.

## 2021-10-18 ENCOUNTER — TELEPHONE (OUTPATIENT)
Dept: CARDIOLOGY | Age: 66
End: 2021-10-18

## 2021-10-19 ENCOUNTER — HOSPITAL ENCOUNTER (OUTPATIENT)
Dept: CARDIOLOGY | Age: 66
Discharge: HOME | End: 2021-10-19
Attending: INTERNAL MEDICINE

## 2021-10-19 ENCOUNTER — OFFICE VISIT (OUTPATIENT)
Dept: CARDIOLOGY | Age: 66
End: 2021-10-19
Attending: NURSE PRACTITIONER

## 2021-10-19 VITALS
SYSTOLIC BLOOD PRESSURE: 136 MMHG | HEART RATE: 80 BPM | DIASTOLIC BLOOD PRESSURE: 66 MMHG | WEIGHT: 203.48 LBS | BODY MASS INDEX: 34.93 KG/M2

## 2021-10-19 DIAGNOSIS — I27.20 PULMONARY HYPERTENSION (CMD): ICD-10-CM

## 2021-10-19 DIAGNOSIS — I89.0 LYMPHEDEMA: ICD-10-CM

## 2021-10-19 DIAGNOSIS — I50.32 CHRONIC HEART FAILURE WITH PRESERVED EJECTION FRACTION (CMD): ICD-10-CM

## 2021-10-19 DIAGNOSIS — I50.32 CHRONIC HEART FAILURE WITH PRESERVED EJECTION FRACTION (CMD): Primary | ICD-10-CM

## 2021-10-19 DIAGNOSIS — R60.0 LEG EDEMA: ICD-10-CM

## 2021-10-19 LAB
ALBUMIN SERPL-MCNC: 2.7 G/DL (ref 3.6–5.1)
ALBUMIN/GLOB SERPL: 0.7 {RATIO} (ref 1–2.4)
ALP SERPL-CCNC: 115 UNITS/L (ref 45–117)
ALT SERPL-CCNC: 47 UNITS/L
ANION GAP SERPL CALC-SCNC: 5 MMOL/L (ref 10–20)
AST SERPL-CCNC: 25 UNITS/L
BILIRUB SERPL-MCNC: 0.3 MG/DL (ref 0.2–1)
BUN SERPL-MCNC: 67 MG/DL (ref 6–20)
BUN/CREAT SERPL: 44 (ref 7–25)
CALCIUM SERPL-MCNC: 8.2 MG/DL (ref 8.4–10.2)
CHLORIDE SERPL-SCNC: 106 MMOL/L (ref 98–107)
CO2 SERPL-SCNC: 33 MMOL/L (ref 21–32)
CREAT SERPL-MCNC: 1.51 MG/DL (ref 0.51–0.95)
DEPRECATED RDW RBC: 47.8 FL (ref 39–50)
ERYTHROCYTE [DISTWIDTH] IN BLOOD: 13.5 % (ref 11–15)
FASTING DURATION TIME PATIENT: ABNORMAL H
GFR SERPLBLD BASED ON 1.73 SQ M-ARVRAT: 36 ML/MIN
GLOBULIN SER-MCNC: 3.9 G/DL (ref 2–4)
GLUCOSE SERPL-MCNC: 165 MG/DL (ref 70–99)
HCT VFR BLD CALC: 44 % (ref 36–46.5)
HGB BLD-MCNC: 14.5 G/DL (ref 12–15.5)
MCH RBC QN AUTO: 31.4 PG (ref 26–34)
MCHC RBC AUTO-ENTMCNC: 33 G/DL (ref 32–36.5)
MCV RBC AUTO: 95.2 FL (ref 78–100)
NRBC BLD MANUAL-RTO: 0 /100 WBC
NT-PROBNP SERPL-MCNC: 470 PG/ML
PLATELET # BLD AUTO: 263 K/MCL (ref 140–450)
POTASSIUM SERPL-SCNC: 4.1 MMOL/L (ref 3.4–5.1)
PROT SERPL-MCNC: 6.6 G/DL (ref 6.4–8.2)
RBC # BLD: 4.62 MIL/MCL (ref 4–5.2)
SODIUM SERPL-SCNC: 140 MMOL/L (ref 135–145)
WBC # BLD: 9.2 K/MCL (ref 4.2–11)

## 2021-10-19 PROCEDURE — 99214 OFFICE O/P EST MOD 30 MIN: CPT | Performed by: NURSE PRACTITIONER

## 2021-10-19 PROCEDURE — 10002801 HB RX 250 W/O HCPCS: Performed by: NURSE PRACTITIONER

## 2021-10-19 PROCEDURE — 10002805 HB CONTRAST AGENT: Performed by: INTERNAL MEDICINE

## 2021-10-19 PROCEDURE — 93306 TTE W/DOPPLER COMPLETE: CPT | Performed by: INTERNAL MEDICINE

## 2021-10-19 PROCEDURE — 96374 THER/PROPH/DIAG INJ IV PUSH: CPT | Performed by: NURSE PRACTITIONER

## 2021-10-19 PROCEDURE — 85027 COMPLETE CBC AUTOMATED: CPT | Performed by: NURSE PRACTITIONER

## 2021-10-19 PROCEDURE — 93306 TTE W/DOPPLER COMPLETE: CPT

## 2021-10-19 PROCEDURE — 36415 COLL VENOUS BLD VENIPUNCTURE: CPT | Performed by: NURSE PRACTITIONER

## 2021-10-19 PROCEDURE — 83880 ASSAY OF NATRIURETIC PEPTIDE: CPT | Performed by: NURSE PRACTITIONER

## 2021-10-19 PROCEDURE — 80053 COMPREHEN METABOLIC PANEL: CPT | Performed by: NURSE PRACTITIONER

## 2021-10-19 RX ORDER — LOSARTAN POTASSIUM 50 MG/1
25 TABLET ORAL 2 TIMES DAILY
COMMUNITY
End: 2021-11-12 | Stop reason: SDUPTHER

## 2021-10-19 RX ORDER — TORSEMIDE 20 MG/1
20 TABLET ORAL 2 TIMES DAILY
Qty: 30 TABLET | Refills: 11 | Status: SHIPPED | COMMUNITY
Start: 2021-10-19 | End: 2021-10-25 | Stop reason: SDUPTHER

## 2021-10-19 RX ORDER — BUMETANIDE 0.25 MG/ML
2 INJECTION INTRAMUSCULAR; INTRAVENOUS ONCE
Status: COMPLETED | OUTPATIENT
Start: 2021-10-19 | End: 2021-10-19

## 2021-10-19 RX ORDER — METOLAZONE 2.5 MG/1
2.5 TABLET ORAL
Qty: 46 TABLET | Refills: 3 | Status: ON HOLD | COMMUNITY
End: 2021-11-07 | Stop reason: HOSPADM

## 2021-10-19 RX ADMIN — PERFLUTREN 4 ML: 6.52 INJECTION, SUSPENSION INTRAVENOUS at 15:51

## 2021-10-19 RX ADMIN — BUMETANIDE 2 MG: 0.25 INJECTION, SOLUTION INTRAMUSCULAR; INTRAVENOUS at 15:44

## 2021-10-21 ENCOUNTER — OFFICE VISIT (OUTPATIENT)
Dept: CARDIOLOGY | Age: 66
End: 2021-10-21
Attending: INTERNAL MEDICINE

## 2021-10-21 ENCOUNTER — TELEPHONE (OUTPATIENT)
Dept: CARDIOLOGY | Age: 66
End: 2021-10-21

## 2021-10-21 VITALS
BODY MASS INDEX: 34.63 KG/M2 | WEIGHT: 201.72 LBS | DIASTOLIC BLOOD PRESSURE: 60 MMHG | SYSTOLIC BLOOD PRESSURE: 128 MMHG | HEART RATE: 88 BPM | RESPIRATION RATE: 22 BRPM

## 2021-10-21 DIAGNOSIS — R06.09 DYSPNEA ON EXERTION: ICD-10-CM

## 2021-10-21 DIAGNOSIS — I50.9 CONGESTIVE HEART FAILURE, UNSPECIFIED HF CHRONICITY, UNSPECIFIED HEART FAILURE TYPE (CMD): Primary | ICD-10-CM

## 2021-10-21 DIAGNOSIS — I27.20 PULMONARY HYPERTENSION (CMD): ICD-10-CM

## 2021-10-21 DIAGNOSIS — I89.0 LYMPHEDEMA: ICD-10-CM

## 2021-10-21 DIAGNOSIS — E87.6 HYPOKALEMIA: ICD-10-CM

## 2021-10-21 LAB
ALBUMIN SERPL-MCNC: 2.7 G/DL (ref 3.6–5.1)
ALBUMIN/GLOB SERPL: 0.7 {RATIO} (ref 1–2.4)
ALP SERPL-CCNC: 111 UNITS/L (ref 45–117)
ALT SERPL-CCNC: 40 UNITS/L
ANION GAP SERPL CALC-SCNC: 7 MMOL/L (ref 10–20)
AST SERPL-CCNC: 20 UNITS/L
BILIRUB SERPL-MCNC: 0.3 MG/DL (ref 0.2–1)
BUN SERPL-MCNC: 73 MG/DL (ref 6–20)
BUN/CREAT SERPL: 57 (ref 7–25)
CALCIUM SERPL-MCNC: 8.7 MG/DL (ref 8.4–10.2)
CHLORIDE SERPL-SCNC: 103 MMOL/L (ref 98–107)
CO2 SERPL-SCNC: 32 MMOL/L (ref 21–32)
CREAT SERPL-MCNC: 1.27 MG/DL (ref 0.51–0.95)
FASTING DURATION TIME PATIENT: ABNORMAL H
GFR SERPLBLD BASED ON 1.73 SQ M-ARVRAT: 44 ML/MIN
GLOBULIN SER-MCNC: 3.9 G/DL (ref 2–4)
GLUCOSE SERPL-MCNC: 182 MG/DL (ref 70–99)
NT-PROBNP SERPL-MCNC: 364 PG/ML
POTASSIUM SERPL-SCNC: 3.5 MMOL/L (ref 3.4–5.1)
PROT SERPL-MCNC: 6.6 G/DL (ref 6.4–8.2)
SODIUM SERPL-SCNC: 138 MMOL/L (ref 135–145)

## 2021-10-21 PROCEDURE — 96365 THER/PROPH/DIAG IV INF INIT: CPT | Performed by: NURSE PRACTITIONER

## 2021-10-21 PROCEDURE — 96366 THER/PROPH/DIAG IV INF ADDON: CPT | Performed by: NURSE PRACTITIONER

## 2021-10-21 PROCEDURE — 10002803 HB RX 637: Performed by: NURSE PRACTITIONER

## 2021-10-21 PROCEDURE — 99215 OFFICE O/P EST HI 40 MIN: CPT | Performed by: NURSE PRACTITIONER

## 2021-10-21 PROCEDURE — 83880 ASSAY OF NATRIURETIC PEPTIDE: CPT | Performed by: NURSE PRACTITIONER

## 2021-10-21 PROCEDURE — 36415 COLL VENOUS BLD VENIPUNCTURE: CPT | Performed by: NURSE PRACTITIONER

## 2021-10-21 PROCEDURE — 80053 COMPREHEN METABOLIC PANEL: CPT | Performed by: NURSE PRACTITIONER

## 2021-10-21 PROCEDURE — 10002801 HB RX 250 W/O HCPCS: Performed by: NURSE PRACTITIONER

## 2021-10-21 PROCEDURE — 10002807 HB RX 258: Performed by: NURSE PRACTITIONER

## 2021-10-21 RX ORDER — POTASSIUM CHLORIDE 10 MEQ
10 TABLET, EXT RELEASE, PARTICLES/CRYSTALS ORAL ONCE
Status: COMPLETED | OUTPATIENT
Start: 2021-10-21 | End: 2021-10-21

## 2021-10-21 RX ADMIN — POTASSIUM CHLORIDE 10 MEQ: 750 TABLET, FILM COATED, EXTENDED RELEASE ORAL at 09:49

## 2021-10-21 RX ADMIN — BUMETANIDE 1 MG/HR: 0.25 INJECTION INTRAMUSCULAR; INTRAVENOUS at 08:43

## 2021-10-21 RX ADMIN — POTASSIUM CHLORIDE 10 MEQ: 750 TABLET, FILM COATED, EXTENDED RELEASE ORAL at 09:48

## 2021-10-22 ENCOUNTER — TELEPHONE (OUTPATIENT)
Dept: CARDIOLOGY | Age: 66
End: 2021-10-22

## 2021-10-25 ENCOUNTER — OFFICE VISIT (OUTPATIENT)
Dept: CARDIOLOGY | Age: 66
End: 2021-10-25
Attending: NURSE PRACTITIONER

## 2021-10-25 VITALS
DIASTOLIC BLOOD PRESSURE: 64 MMHG | RESPIRATION RATE: 20 BRPM | HEART RATE: 82 BPM | SYSTOLIC BLOOD PRESSURE: 146 MMHG | WEIGHT: 200.95 LBS | BODY MASS INDEX: 34.31 KG/M2 | HEIGHT: 64 IN

## 2021-10-25 DIAGNOSIS — R60.0 LEG EDEMA: ICD-10-CM

## 2021-10-25 DIAGNOSIS — I10 ESSENTIAL HYPERTENSION: ICD-10-CM

## 2021-10-25 DIAGNOSIS — I50.32 CHRONIC HEART FAILURE WITH PRESERVED EJECTION FRACTION (CMD): ICD-10-CM

## 2021-10-25 DIAGNOSIS — I50.9 ACUTE ON CHRONIC HEART FAILURE, UNSPECIFIED HEART FAILURE TYPE (CMD): Primary | ICD-10-CM

## 2021-10-25 DIAGNOSIS — R60.9 EDEMA, UNSPECIFIED TYPE: ICD-10-CM

## 2021-10-25 DIAGNOSIS — R06.09 DYSPNEA ON EXERTION: ICD-10-CM

## 2021-10-25 LAB
ALBUMIN SERPL-MCNC: 2.6 G/DL (ref 3.6–5.1)
ALBUMIN/GLOB SERPL: 0.6 {RATIO} (ref 1–2.4)
ALP SERPL-CCNC: 117 UNITS/L (ref 45–117)
ALT SERPL-CCNC: 32 UNITS/L
ANION GAP SERPL CALC-SCNC: 9 MMOL/L (ref 10–20)
AST SERPL-CCNC: 14 UNITS/L
BILIRUB SERPL-MCNC: 0.3 MG/DL (ref 0.2–1)
BUN SERPL-MCNC: 71 MG/DL (ref 6–20)
BUN/CREAT SERPL: 53 (ref 7–25)
CALCIUM SERPL-MCNC: 8.6 MG/DL (ref 8.4–10.2)
CHLORIDE SERPL-SCNC: 103 MMOL/L (ref 98–107)
CO2 SERPL-SCNC: 30 MMOL/L (ref 21–32)
CREAT SERPL-MCNC: 1.35 MG/DL (ref 0.51–0.95)
FASTING DURATION TIME PATIENT: ABNORMAL H
GFR SERPLBLD BASED ON 1.73 SQ M-ARVRAT: 41 ML/MIN
GLOBULIN SER-MCNC: 4.2 G/DL (ref 2–4)
GLUCOSE SERPL-MCNC: 149 MG/DL (ref 70–99)
NT-PROBNP SERPL-MCNC: 267 PG/ML
POTASSIUM SERPL-SCNC: 3.7 MMOL/L (ref 3.4–5.1)
PROT SERPL-MCNC: 6.8 G/DL (ref 6.4–8.2)
SODIUM SERPL-SCNC: 138 MMOL/L (ref 135–145)

## 2021-10-25 PROCEDURE — 96365 THER/PROPH/DIAG IV INF INIT: CPT | Performed by: NURSE PRACTITIONER

## 2021-10-25 PROCEDURE — 99215 OFFICE O/P EST HI 40 MIN: CPT | Performed by: NURSE PRACTITIONER

## 2021-10-25 PROCEDURE — 96366 THER/PROPH/DIAG IV INF ADDON: CPT | Performed by: NURSE PRACTITIONER

## 2021-10-25 PROCEDURE — 10002801 HB RX 250 W/O HCPCS: Performed by: INTERNAL MEDICINE

## 2021-10-25 PROCEDURE — 83880 ASSAY OF NATRIURETIC PEPTIDE: CPT | Performed by: NURSE PRACTITIONER

## 2021-10-25 PROCEDURE — 10002807 HB RX 258: Performed by: INTERNAL MEDICINE

## 2021-10-25 PROCEDURE — 80053 COMPREHEN METABOLIC PANEL: CPT | Performed by: NURSE PRACTITIONER

## 2021-10-25 PROCEDURE — 36415 COLL VENOUS BLD VENIPUNCTURE: CPT | Performed by: NURSE PRACTITIONER

## 2021-10-25 RX ORDER — TORSEMIDE 20 MG/1
40 TABLET ORAL DAILY
Qty: 60 TABLET | Refills: 11 | Status: ON HOLD | OUTPATIENT
Start: 2021-10-25 | End: 2021-11-07 | Stop reason: SDUPTHER

## 2021-10-25 RX ADMIN — BUMETANIDE 1 MG/HR: 0.25 INJECTION INTRAMUSCULAR; INTRAVENOUS at 11:56

## 2021-10-27 ENCOUNTER — TELEPHONE (OUTPATIENT)
Dept: CARDIOLOGY | Age: 66
End: 2021-10-27

## 2021-10-28 ENCOUNTER — OFFICE VISIT (OUTPATIENT)
Dept: CARDIOLOGY | Age: 66
End: 2021-10-28
Attending: NURSE PRACTITIONER

## 2021-10-28 VITALS
HEART RATE: 74 BPM | DIASTOLIC BLOOD PRESSURE: 58 MMHG | WEIGHT: 201.72 LBS | SYSTOLIC BLOOD PRESSURE: 128 MMHG | BODY MASS INDEX: 34.63 KG/M2

## 2021-10-28 DIAGNOSIS — E11.69 TYPE 2 DIABETES MELLITUS WITH OTHER SPECIFIED COMPLICATION, WITHOUT LONG-TERM CURRENT USE OF INSULIN (CMD): ICD-10-CM

## 2021-10-28 DIAGNOSIS — I50.32 CHRONIC HEART FAILURE WITH PRESERVED EJECTION FRACTION (CMD): Primary | ICD-10-CM

## 2021-10-28 DIAGNOSIS — I89.0 LYMPHEDEMA: ICD-10-CM

## 2021-10-28 DIAGNOSIS — I35.1 NONRHEUMATIC AORTIC (VALVE) INSUFFICIENCY: ICD-10-CM

## 2021-10-28 DIAGNOSIS — I27.20 PULMONARY HYPERTENSION (CMD): ICD-10-CM

## 2021-10-28 LAB
ALBUMIN SERPL-MCNC: 2.6 G/DL (ref 3.6–5.1)
ALBUMIN/GLOB SERPL: 0.6 {RATIO} (ref 1–2.4)
ALP SERPL-CCNC: 115 UNITS/L (ref 45–117)
ALT SERPL-CCNC: 46 UNITS/L
ANION GAP SERPL CALC-SCNC: 7 MMOL/L (ref 10–20)
AST SERPL-CCNC: 36 UNITS/L
BILIRUB SERPL-MCNC: 0.2 MG/DL (ref 0.2–1)
BUN SERPL-MCNC: 71 MG/DL (ref 6–20)
BUN/CREAT SERPL: 53 (ref 7–25)
CALCIUM SERPL-MCNC: 8.5 MG/DL (ref 8.4–10.2)
CHLORIDE SERPL-SCNC: 104 MMOL/L (ref 98–107)
CO2 SERPL-SCNC: 31 MMOL/L (ref 21–32)
CREAT SERPL-MCNC: 1.34 MG/DL (ref 0.51–0.95)
FASTING DURATION TIME PATIENT: ABNORMAL H
GFR SERPLBLD BASED ON 1.73 SQ M-ARVRAT: 41 ML/MIN
GLOBULIN SER-MCNC: 4.1 G/DL (ref 2–4)
GLUCOSE SERPL-MCNC: 192 MG/DL (ref 70–99)
NT-PROBNP SERPL-MCNC: 313 PG/ML
POTASSIUM SERPL-SCNC: 3.9 MMOL/L (ref 3.4–5.1)
PROT SERPL-MCNC: 6.7 G/DL (ref 6.4–8.2)
RAINBOW EXTRA TUBES HOLD SPECIMEN: NORMAL
SODIUM SERPL-SCNC: 138 MMOL/L (ref 135–145)

## 2021-10-28 PROCEDURE — 80053 COMPREHEN METABOLIC PANEL: CPT | Performed by: NURSE PRACTITIONER

## 2021-10-28 PROCEDURE — 10002801 HB RX 250 W/O HCPCS: Performed by: NURSE PRACTITIONER

## 2021-10-28 PROCEDURE — 96366 THER/PROPH/DIAG IV INF ADDON: CPT | Performed by: NURSE PRACTITIONER

## 2021-10-28 PROCEDURE — 36415 COLL VENOUS BLD VENIPUNCTURE: CPT | Performed by: NURSE PRACTITIONER

## 2021-10-28 PROCEDURE — 10002807 HB RX 258: Performed by: NURSE PRACTITIONER

## 2021-10-28 PROCEDURE — 99215 OFFICE O/P EST HI 40 MIN: CPT | Performed by: NURSE PRACTITIONER

## 2021-10-28 PROCEDURE — 83880 ASSAY OF NATRIURETIC PEPTIDE: CPT | Performed by: NURSE PRACTITIONER

## 2021-10-28 PROCEDURE — 96365 THER/PROPH/DIAG IV INF INIT: CPT | Performed by: NURSE PRACTITIONER

## 2021-10-28 RX ADMIN — BUMETANIDE 1 MG/HR: 0.25 INJECTION INTRAMUSCULAR; INTRAVENOUS at 09:54

## 2021-10-29 ENCOUNTER — TELEPHONE (OUTPATIENT)
Dept: CARDIOLOGY | Age: 66
End: 2021-10-29

## 2021-11-01 ENCOUNTER — HOSPITAL ENCOUNTER (INPATIENT)
Age: 66
LOS: 6 days | Discharge: HOME HEALTH | DRG: 291 | End: 2021-11-07
Attending: EMERGENCY MEDICINE | Admitting: INTERNAL MEDICINE

## 2021-11-01 ENCOUNTER — APPOINTMENT (OUTPATIENT)
Dept: GENERAL RADIOLOGY | Age: 66
DRG: 291 | End: 2021-11-01
Attending: EMERGENCY MEDICINE

## 2021-11-01 ENCOUNTER — OFFICE VISIT (OUTPATIENT)
Dept: CARDIOLOGY | Age: 66
End: 2021-11-01
Attending: INTERNAL MEDICINE

## 2021-11-01 VITALS
BODY MASS INDEX: 35.31 KG/M2 | RESPIRATION RATE: 20 BRPM | HEART RATE: 84 BPM | WEIGHT: 205.69 LBS | SYSTOLIC BLOOD PRESSURE: 136 MMHG | DIASTOLIC BLOOD PRESSURE: 70 MMHG

## 2021-11-01 DIAGNOSIS — I50.32 CHRONIC HEART FAILURE WITH PRESERVED EJECTION FRACTION (CMD): ICD-10-CM

## 2021-11-01 DIAGNOSIS — R60.0 LEG EDEMA: ICD-10-CM

## 2021-11-01 DIAGNOSIS — J44.9 CHRONIC OBSTRUCTIVE PULMONARY DISEASE, UNSPECIFIED COPD TYPE (CMD): Chronic | ICD-10-CM

## 2021-11-01 DIAGNOSIS — Z79.4 TYPE 2 DIABETES MELLITUS WITH OTHER SPECIFIED COMPLICATION, WITH LONG-TERM CURRENT USE OF INSULIN (CMD): ICD-10-CM

## 2021-11-01 DIAGNOSIS — I73.9 PVD (PERIPHERAL VASCULAR DISEASE) (CMD): ICD-10-CM

## 2021-11-01 DIAGNOSIS — I50.9 ACUTE ON CHRONIC HEART FAILURE, UNSPECIFIED HEART FAILURE TYPE (CMD): Primary | ICD-10-CM

## 2021-11-01 DIAGNOSIS — N18.9 CHRONIC KIDNEY DISEASE, UNSPECIFIED CKD STAGE: ICD-10-CM

## 2021-11-01 DIAGNOSIS — R06.02 SHORTNESS OF BREATH: Primary | ICD-10-CM

## 2021-11-01 DIAGNOSIS — I10 ESSENTIAL HYPERTENSION: ICD-10-CM

## 2021-11-01 DIAGNOSIS — I50.9 ACUTE ON CHRONIC CONGESTIVE HEART FAILURE, UNSPECIFIED HEART FAILURE TYPE (CMD): ICD-10-CM

## 2021-11-01 DIAGNOSIS — R06.09 DYSPNEA ON EXERTION: ICD-10-CM

## 2021-11-01 DIAGNOSIS — N28.9 RENAL INSUFFICIENCY: ICD-10-CM

## 2021-11-01 DIAGNOSIS — E78.00 HYPERCHOLESTEREMIA: ICD-10-CM

## 2021-11-01 DIAGNOSIS — N04.9 NEPHROTIC SYNDROME: Chronic | ICD-10-CM

## 2021-11-01 DIAGNOSIS — I27.20 PULMONARY HYPERTENSION (CMD): ICD-10-CM

## 2021-11-01 DIAGNOSIS — I89.0 LYMPHEDEMA: ICD-10-CM

## 2021-11-01 DIAGNOSIS — I50.33 ACUTE ON CHRONIC HEART FAILURE WITH PRESERVED EJECTION FRACTION (CMD): ICD-10-CM

## 2021-11-01 DIAGNOSIS — E11.69 TYPE 2 DIABETES MELLITUS WITH OTHER SPECIFIED COMPLICATION, WITH LONG-TERM CURRENT USE OF INSULIN (CMD): ICD-10-CM

## 2021-11-01 DIAGNOSIS — E11.69 TYPE 2 DIABETES MELLITUS WITH OTHER SPECIFIED COMPLICATION, WITHOUT LONG-TERM CURRENT USE OF INSULIN (CMD): ICD-10-CM

## 2021-11-01 LAB
ALBUMIN SERPL-MCNC: 2.4 G/DL (ref 3.6–5.1)
ALBUMIN/GLOB SERPL: 0.6 {RATIO} (ref 1–2.4)
ALP SERPL-CCNC: 115 UNITS/L (ref 45–117)
ALT SERPL-CCNC: 32 UNITS/L
ANION GAP SERPL CALC-SCNC: 9 MMOL/L (ref 10–20)
AST SERPL-CCNC: 13 UNITS/L
BASOPHILS # BLD: 0 K/MCL (ref 0–0.3)
BASOPHILS NFR BLD: 0 %
BILIRUB SERPL-MCNC: 0.3 MG/DL (ref 0.2–1)
BUN SERPL-MCNC: 76 MG/DL (ref 6–20)
BUN/CREAT SERPL: 47 (ref 7–25)
CALCIUM SERPL-MCNC: 8.4 MG/DL (ref 8.4–10.2)
CHLORIDE SERPL-SCNC: 105 MMOL/L (ref 98–107)
CO2 SERPL-SCNC: 29 MMOL/L (ref 21–32)
CREAT SERPL-MCNC: 1.61 MG/DL (ref 0.51–0.95)
DEPRECATED RDW RBC: 46.4 FL (ref 39–50)
EOSINOPHIL # BLD: 0.2 K/MCL (ref 0–0.5)
EOSINOPHIL NFR BLD: 2 %
ERYTHROCYTE [DISTWIDTH] IN BLOOD: 13.2 % (ref 11–15)
FASTING DURATION TIME PATIENT: ABNORMAL H
GFR SERPLBLD BASED ON 1.73 SQ M-ARVRAT: 33 ML/MIN
GLOBULIN SER-MCNC: 4 G/DL (ref 2–4)
GLUCOSE BLDC GLUCOMTR-MCNC: 177 MG/DL (ref 70–99)
GLUCOSE SERPL-MCNC: 152 MG/DL (ref 70–99)
HCT VFR BLD CALC: 43.2 % (ref 36–46.5)
HGB BLD-MCNC: 14.2 G/DL (ref 12–15.5)
IMM GRANULOCYTES # BLD AUTO: 0 K/MCL (ref 0–0.2)
IMM GRANULOCYTES # BLD: 0 %
LYMPHOCYTES # BLD: 1 K/MCL (ref 1–4)
LYMPHOCYTES NFR BLD: 15 %
MAGNESIUM SERPL-MCNC: 2.7 MG/DL (ref 1.7–2.4)
MCH RBC QN AUTO: 31.1 PG (ref 26–34)
MCHC RBC AUTO-ENTMCNC: 32.9 G/DL (ref 32–36.5)
MCV RBC AUTO: 94.7 FL (ref 78–100)
MONOCYTES # BLD: 0.5 K/MCL (ref 0.3–0.9)
MONOCYTES NFR BLD: 8 %
NEUTROPHILS # BLD: 4.9 K/MCL (ref 1.8–7.7)
NEUTROPHILS NFR BLD: 75 %
NRBC BLD MANUAL-RTO: 0 /100 WBC
NT-PROBNP SERPL-MCNC: 292 PG/ML
PLATELET # BLD AUTO: 213 K/MCL (ref 140–450)
POTASSIUM SERPL-SCNC: 3.8 MMOL/L (ref 3.4–5.1)
PROT SERPL-MCNC: 6.4 G/DL (ref 6.4–8.2)
RAINBOW EXTRA TUBES HOLD SPECIMEN: NORMAL
RBC # BLD: 4.56 MIL/MCL (ref 4–5.2)
SARS-COV-2 RNA RESP QL NAA+PROBE: NOT DETECTED
SERVICE CMNT-IMP: NORMAL
SERVICE CMNT-IMP: NORMAL
SODIUM SERPL-SCNC: 139 MMOL/L (ref 135–145)
T4 FREE SERPL-MCNC: 0.8 NG/DL (ref 0.8–1.5)
TSH SERPL-ACNC: 1.52 MCUNITS/ML (ref 0.35–5)
WBC # BLD: 6.6 K/MCL (ref 4.2–11)

## 2021-11-01 PROCEDURE — 83735 ASSAY OF MAGNESIUM: CPT | Performed by: INTERNAL MEDICINE

## 2021-11-01 PROCEDURE — 87635 SARS-COV-2 COVID-19 AMP PRB: CPT | Performed by: EMERGENCY MEDICINE

## 2021-11-01 PROCEDURE — 71045 X-RAY EXAM CHEST 1 VIEW: CPT

## 2021-11-01 PROCEDURE — 10002801 HB RX 250 W/O HCPCS: Performed by: INTERNAL MEDICINE

## 2021-11-01 PROCEDURE — 13003289 HB OXYGEN THERAPY DAILY

## 2021-11-01 PROCEDURE — 99215 OFFICE O/P EST HI 40 MIN: CPT | Performed by: NURSE PRACTITIONER

## 2021-11-01 PROCEDURE — 80053 COMPREHEN METABOLIC PANEL: CPT | Performed by: NURSE PRACTITIONER

## 2021-11-01 PROCEDURE — 84439 ASSAY OF FREE THYROXINE: CPT | Performed by: INTERNAL MEDICINE

## 2021-11-01 PROCEDURE — 36415 COLL VENOUS BLD VENIPUNCTURE: CPT | Performed by: INTERNAL MEDICINE

## 2021-11-01 PROCEDURE — 84443 ASSAY THYROID STIM HORMONE: CPT | Performed by: INTERNAL MEDICINE

## 2021-11-01 PROCEDURE — C9803 HOPD COVID-19 SPEC COLLECT: HCPCS

## 2021-11-01 PROCEDURE — 96366 THER/PROPH/DIAG IV INF ADDON: CPT | Performed by: NURSE PRACTITIONER

## 2021-11-01 PROCEDURE — 10003585 HB ROOM CHARGE INTERMEDIATE CARE

## 2021-11-01 PROCEDURE — 82962 GLUCOSE BLOOD TEST: CPT

## 2021-11-01 PROCEDURE — 10002807 HB RX 258: Performed by: INTERNAL MEDICINE

## 2021-11-01 PROCEDURE — 99285 EMERGENCY DEPT VISIT HI MDM: CPT

## 2021-11-01 PROCEDURE — 85025 COMPLETE CBC W/AUTO DIFF WBC: CPT | Performed by: INTERNAL MEDICINE

## 2021-11-01 PROCEDURE — 96365 THER/PROPH/DIAG IV INF INIT: CPT | Performed by: NURSE PRACTITIONER

## 2021-11-01 PROCEDURE — 83880 ASSAY OF NATRIURETIC PEPTIDE: CPT | Performed by: NURSE PRACTITIONER

## 2021-11-01 RX ADMIN — BUMETANIDE 1 MG/HR: 0.25 INJECTION INTRAMUSCULAR; INTRAVENOUS at 15:02

## 2021-11-01 RX ADMIN — BUMETANIDE 1 MG/HR: 0.25 INJECTION INTRAMUSCULAR; INTRAVENOUS at 10:26

## 2021-11-01 ASSESSMENT — PATIENT HEALTH QUESTIONNAIRE - PHQ9
IS PATIENT ABLE TO COMPLETE PHQ2 OR PHQ9: YES
1. LITTLE INTEREST OR PLEASURE IN DOING THINGS: NOT AT ALL
SUM OF ALL RESPONSES TO PHQ9 QUESTIONS 1 AND 2: 0
2. FEELING DOWN, DEPRESSED OR HOPELESS: NOT AT ALL
SUM OF ALL RESPONSES TO PHQ9 QUESTIONS 1 AND 2: 0
CLINICAL INTERPRETATION OF PHQ9 SCORE: NO FURTHER SCREENING NEEDED
SUM OF ALL RESPONSES TO PHQ9 QUESTIONS 1 AND 2: 0
CLINICAL INTERPRETATION OF PHQ2 SCORE: NO FURTHER SCREENING NEEDED

## 2021-11-01 ASSESSMENT — COLUMBIA-SUICIDE SEVERITY RATING SCALE - C-SSRS
2. HAVE YOU ACTUALLY HAD ANY THOUGHTS OF KILLING YOURSELF?: NO
IS THE PATIENT ABLE TO COMPLETE C-SSRS: YES
6. HAVE YOU EVER DONE ANYTHING, STARTED TO DO ANYTHING, OR PREPARED TO DO ANYTHING TO END YOUR LIFE?: NO
5. HAVE YOU STARTED TO WORK OUT OR WORKED OUT THE DETAILS OF HOW TO KILL YOURSELF? DO YOU INTEND TO CARRY OUT THIS PLAN?: NO
4. HAVE YOU HAD THESE THOUGHTS AND HAD SOME INTENTION OF ACTING ON THEM?: NO
3. HAVE YOU BEEN THINKING ABOUT HOW YOU MIGHT KILL YOURSELF?: NO
1. WITHIN THE PAST MONTH, HAVE YOU WISHED YOU WERE DEAD OR WISHED YOU COULD GO TO SLEEP AND NOT WAKE UP?: NO

## 2021-11-01 ASSESSMENT — ENCOUNTER SYMPTOMS
FEVER: 0
SHORTNESS OF BREATH: 1
NEUROLOGICAL NEGATIVE: 1
WEAKNESS: 1
APPETITE CHANGE: 1
ACTIVITY CHANGE: 1

## 2021-11-01 ASSESSMENT — LIFESTYLE VARIABLES
AUDIT-C TOTAL SCORE: 0
HOW MANY STANDARD DRINKS CONTAINING ALCOHOL DO YOU HAVE ON A TYPICAL DAY: 0,1 OR 2
HOW OFTEN DO YOU HAVE 6 OR MORE DRINKS ON ONE OCCASION: NEVER
HOW OFTEN DO YOU HAVE A DRINK CONTAINING ALCOHOL: NEVER
ALCOHOL_USE_STATUS: NO OR LOW RISK WITH VALIDATED TOOL

## 2021-11-01 ASSESSMENT — COGNITIVE AND FUNCTIONAL STATUS - GENERAL
ARE YOU DEAF OR DO YOU HAVE SERIOUS DIFFICULTY  HEARING: NO
ARE YOU BLIND OR DO YOU HAVE SERIOUS DIFFICULTY SEEING, EVEN WHEN WEARING GLASSES: NO
BECAUSE OF A PHYSICAL, MENTAL, OR EMOTIONAL CONDITION, DO YOU HAVE SERIOUS DIFFICULTY CONCENTRATING, REMEMBERING OR MAKING DECISIONS: NO
BECAUSE OF A PHYSICAL, MENTAL, OR EMOTIONAL CONDITION, DO YOU HAVE DIFFICULTY DOING ERRANDS ALONE: NO
DO YOU HAVE SERIOUS DIFFICULTY WALKING OR CLIMBING STAIRS: NO
DO YOU HAVE DIFFICULTY DRESSING OR BATHING: NO

## 2021-11-01 ASSESSMENT — ACTIVITIES OF DAILY LIVING (ADL)
ADL_SHORT_OF_BREATH: YES
MOBILITY_ASSIST_DEVICES: WHEELCHAIR
DESCRIBE HOW PAIN IMPACTS YOUR LIFE: MOBILITY
ADL_SCORE: 12
ADL_BEFORE_ADMISSION: INDEPENDENT
CHRONIC_PAIN_PRESENT: YES, CHRONIC
RECENT_DECLINE_ADL: YES, ACUTE ILLNESS WITHOUT THERAPY NEEDS

## 2021-11-01 ASSESSMENT — PAIN SCALES - GENERAL: PAINLEVEL_OUTOF10: 5

## 2021-11-02 LAB
ANION GAP SERPL CALC-SCNC: 10 MMOL/L (ref 10–20)
ANION GAP SERPL CALC-SCNC: 8 MMOL/L (ref 10–20)
BASOPHILS # BLD: 0 K/MCL (ref 0–0.3)
BASOPHILS NFR BLD: 0 %
BUN SERPL-MCNC: 74 MG/DL (ref 6–20)
BUN SERPL-MCNC: 75 MG/DL (ref 6–20)
BUN/CREAT SERPL: 60 (ref 7–25)
BUN/CREAT SERPL: 61 (ref 7–25)
CALCIUM SERPL-MCNC: 8.4 MG/DL (ref 8.4–10.2)
CALCIUM SERPL-MCNC: 8.5 MG/DL (ref 8.4–10.2)
CHLORIDE SERPL-SCNC: 101 MMOL/L (ref 98–107)
CHLORIDE SERPL-SCNC: 103 MMOL/L (ref 98–107)
CO2 SERPL-SCNC: 29 MMOL/L (ref 21–32)
CO2 SERPL-SCNC: 31 MMOL/L (ref 21–32)
CREAT SERPL-MCNC: 1.22 MG/DL (ref 0.51–0.95)
CREAT SERPL-MCNC: 1.24 MG/DL (ref 0.51–0.95)
CREAT UR-MCNC: 24.4 MG/DL
DEPRECATED RDW RBC: 44.3 FL (ref 39–50)
EOSINOPHIL # BLD: 0.2 K/MCL (ref 0–0.5)
EOSINOPHIL NFR BLD: 3 %
ERYTHROCYTE [DISTWIDTH] IN BLOOD: 13.2 % (ref 11–15)
FASTING DURATION TIME PATIENT: ABNORMAL H
FASTING DURATION TIME PATIENT: ABNORMAL H
GFR SERPLBLD BASED ON 1.73 SQ M-ARVRAT: 45 ML/MIN
GFR SERPLBLD BASED ON 1.73 SQ M-ARVRAT: 46 ML/MIN
GLUCOSE BLDC GLUCOMTR-MCNC: 104 MG/DL (ref 70–99)
GLUCOSE BLDC GLUCOMTR-MCNC: 111 MG/DL (ref 70–99)
GLUCOSE BLDC GLUCOMTR-MCNC: 131 MG/DL (ref 70–99)
GLUCOSE BLDC GLUCOMTR-MCNC: 162 MG/DL (ref 70–99)
GLUCOSE BLDC GLUCOMTR-MCNC: 83 MG/DL (ref 70–99)
GLUCOSE SERPL-MCNC: 150 MG/DL (ref 70–99)
GLUCOSE SERPL-MCNC: 161 MG/DL (ref 70–99)
HCT VFR BLD CALC: 43.5 % (ref 36–46.5)
HGB BLD-MCNC: 14.6 G/DL (ref 12–15.5)
IMM GRANULOCYTES # BLD AUTO: 0 K/MCL (ref 0–0.2)
IMM GRANULOCYTES # BLD: 0 %
LYMPHOCYTES # BLD: 1.3 K/MCL (ref 1–4)
LYMPHOCYTES NFR BLD: 15 %
MAGNESIUM SERPL-MCNC: 2.5 MG/DL (ref 1.7–2.4)
MAGNESIUM SERPL-MCNC: 2.6 MG/DL (ref 1.7–2.4)
MCH RBC QN AUTO: 31.2 PG (ref 26–34)
MCHC RBC AUTO-ENTMCNC: 33.6 G/DL (ref 32–36.5)
MCV RBC AUTO: 92.9 FL (ref 78–100)
MONOCYTES # BLD: 0.6 K/MCL (ref 0.3–0.9)
MONOCYTES NFR BLD: 6 %
NEUTROPHILS # BLD: 6.9 K/MCL (ref 1.8–7.7)
NEUTROPHILS NFR BLD: 76 %
NRBC BLD MANUAL-RTO: 0 /100 WBC
PLATELET # BLD AUTO: 225 K/MCL (ref 140–450)
POTASSIUM SERPL-SCNC: 3.9 MMOL/L (ref 3.4–5.1)
POTASSIUM SERPL-SCNC: 4.1 MMOL/L (ref 3.4–5.1)
PROT UR-MCNC: 342 MG/DL
PROT/CREAT UR: ABNORMAL MGPR/GCR
RBC # BLD: 4.68 MIL/MCL (ref 4–5.2)
SODIUM SERPL-SCNC: 136 MMOL/L (ref 135–145)
SODIUM SERPL-SCNC: 138 MMOL/L (ref 135–145)
WBC # BLD: 9.1 K/MCL (ref 4.2–11)

## 2021-11-02 PROCEDURE — 80048 BASIC METABOLIC PNL TOTAL CA: CPT | Performed by: INTERNAL MEDICINE

## 2021-11-02 PROCEDURE — 10004651 HB RX, NO CHARGE ITEM: Performed by: STUDENT IN AN ORGANIZED HEALTH CARE EDUCATION/TRAINING PROGRAM

## 2021-11-02 PROCEDURE — 10002800 HB RX 250 W HCPCS: Performed by: STUDENT IN AN ORGANIZED HEALTH CARE EDUCATION/TRAINING PROGRAM

## 2021-11-02 PROCEDURE — 10002803 HB RX 637: Performed by: INTERNAL MEDICINE

## 2021-11-02 PROCEDURE — 10002807 HB RX 258: Performed by: INTERNAL MEDICINE

## 2021-11-02 PROCEDURE — 10002801 HB RX 250 W/O HCPCS: Performed by: STUDENT IN AN ORGANIZED HEALTH CARE EDUCATION/TRAINING PROGRAM

## 2021-11-02 PROCEDURE — 36415 COLL VENOUS BLD VENIPUNCTURE: CPT | Performed by: INTERNAL MEDICINE

## 2021-11-02 PROCEDURE — 13003289 HB OXYGEN THERAPY DAILY

## 2021-11-02 PROCEDURE — 99222 1ST HOSP IP/OBS MODERATE 55: CPT | Performed by: INTERNAL MEDICINE

## 2021-11-02 PROCEDURE — 10002801 HB RX 250 W/O HCPCS: Performed by: INTERNAL MEDICINE

## 2021-11-02 PROCEDURE — 10003585 HB ROOM CHARGE INTERMEDIATE CARE

## 2021-11-02 PROCEDURE — 94640 AIRWAY INHALATION TREATMENT: CPT

## 2021-11-02 PROCEDURE — 85025 COMPLETE CBC W/AUTO DIFF WBC: CPT | Performed by: STUDENT IN AN ORGANIZED HEALTH CARE EDUCATION/TRAINING PROGRAM

## 2021-11-02 PROCEDURE — 99233 SBSQ HOSP IP/OBS HIGH 50: CPT | Performed by: INTERNAL MEDICINE

## 2021-11-02 PROCEDURE — 10002803 HB RX 637: Performed by: STUDENT IN AN ORGANIZED HEALTH CARE EDUCATION/TRAINING PROGRAM

## 2021-11-02 PROCEDURE — 84156 ASSAY OF PROTEIN URINE: CPT | Performed by: INTERNAL MEDICINE

## 2021-11-02 PROCEDURE — 80048 BASIC METABOLIC PNL TOTAL CA: CPT | Performed by: STUDENT IN AN ORGANIZED HEALTH CARE EDUCATION/TRAINING PROGRAM

## 2021-11-02 PROCEDURE — 83735 ASSAY OF MAGNESIUM: CPT | Performed by: STUDENT IN AN ORGANIZED HEALTH CARE EDUCATION/TRAINING PROGRAM

## 2021-11-02 RX ORDER — AMOXICILLIN 250 MG
2 CAPSULE ORAL DAILY PRN
Status: DISCONTINUED | OUTPATIENT
Start: 2021-11-02 | End: 2021-11-07 | Stop reason: HOSPADM

## 2021-11-02 RX ORDER — ENOXAPARIN SODIUM 100 MG/ML
40 INJECTION SUBCUTANEOUS DAILY
Status: DISCONTINUED | OUTPATIENT
Start: 2021-11-02 | End: 2021-11-07 | Stop reason: HOSPADM

## 2021-11-02 RX ORDER — ACETAMINOPHEN 325 MG/1
650 TABLET ORAL EVERY 4 HOURS PRN
Status: DISCONTINUED | OUTPATIENT
Start: 2021-11-02 | End: 2021-11-07 | Stop reason: HOSPADM

## 2021-11-02 RX ORDER — NICOTINE POLACRILEX 4 MG
15 LOZENGE BUCCAL PRN
Status: DISCONTINUED | OUTPATIENT
Start: 2021-11-02 | End: 2021-11-07 | Stop reason: HOSPADM

## 2021-11-02 RX ORDER — CHOLECALCIFEROL (VITAMIN D3) 125 MCG
1000 CAPSULE ORAL DAILY
Status: DISCONTINUED | OUTPATIENT
Start: 2021-11-02 | End: 2021-11-07 | Stop reason: HOSPADM

## 2021-11-02 RX ORDER — MULTIVIT-MIN/IRON/FOLIC ACID/K 18-600-40
2000 CAPSULE ORAL EVERY EVENING
Status: DISCONTINUED | OUTPATIENT
Start: 2021-11-02 | End: 2021-11-02 | Stop reason: CLARIF

## 2021-11-02 RX ORDER — ROSUVASTATIN CALCIUM 20 MG/1
20 TABLET, COATED ORAL NIGHTLY
Status: DISCONTINUED | OUTPATIENT
Start: 2021-11-02 | End: 2021-11-07 | Stop reason: HOSPADM

## 2021-11-02 RX ORDER — TRAZODONE HYDROCHLORIDE 50 MG/1
100 TABLET ORAL NIGHTLY
Status: DISCONTINUED | OUTPATIENT
Start: 2021-11-02 | End: 2021-11-07 | Stop reason: HOSPADM

## 2021-11-02 RX ORDER — LEVOTHYROXINE SODIUM 0.05 MG/1
25 TABLET ORAL
Status: DISCONTINUED | OUTPATIENT
Start: 2021-11-02 | End: 2021-11-07 | Stop reason: HOSPADM

## 2021-11-02 RX ORDER — LANOLIN ALCOHOL/MO/W.PET/CERES
400 CREAM (GRAM) TOPICAL DAILY
Status: DISCONTINUED | OUTPATIENT
Start: 2021-11-02 | End: 2021-11-07 | Stop reason: HOSPADM

## 2021-11-02 RX ORDER — IPRATROPIUM BROMIDE AND ALBUTEROL SULFATE 2.5; .5 MG/3ML; MG/3ML
3 SOLUTION RESPIRATORY (INHALATION)
Status: DISCONTINUED | OUTPATIENT
Start: 2021-11-02 | End: 2021-11-07 | Stop reason: HOSPADM

## 2021-11-02 RX ORDER — LOSARTAN POTASSIUM 50 MG/1
25 TABLET ORAL 2 TIMES DAILY
Status: DISCONTINUED | OUTPATIENT
Start: 2021-11-02 | End: 2021-11-07 | Stop reason: HOSPADM

## 2021-11-02 RX ORDER — MONTELUKAST SODIUM 10 MG/1
10 TABLET ORAL NIGHTLY
Status: DISCONTINUED | OUTPATIENT
Start: 2021-11-02 | End: 2021-11-07 | Stop reason: HOSPADM

## 2021-11-02 RX ORDER — PRAMIPEXOLE DIHYDROCHLORIDE 0.25 MG/1
1 TABLET ORAL NIGHTLY
Status: DISCONTINUED | OUTPATIENT
Start: 2021-11-02 | End: 2021-11-07 | Stop reason: HOSPADM

## 2021-11-02 RX ORDER — ONDANSETRON 2 MG/ML
4 INJECTION INTRAMUSCULAR; INTRAVENOUS 2 TIMES DAILY PRN
Status: DISCONTINUED | OUTPATIENT
Start: 2021-11-02 | End: 2021-11-07 | Stop reason: HOSPADM

## 2021-11-02 RX ORDER — 0.9 % SODIUM CHLORIDE 0.9 %
2 VIAL (ML) INJECTION EVERY 12 HOURS SCHEDULED
Status: DISCONTINUED | OUTPATIENT
Start: 2021-11-02 | End: 2021-11-07 | Stop reason: HOSPADM

## 2021-11-02 RX ORDER — DEXTROSE MONOHYDRATE 25 G/50ML
12.5 INJECTION, SOLUTION INTRAVENOUS PRN
Status: DISCONTINUED | OUTPATIENT
Start: 2021-11-02 | End: 2021-11-07 | Stop reason: HOSPADM

## 2021-11-02 RX ORDER — NICOTINE POLACRILEX 4 MG
30 LOZENGE BUCCAL PRN
Status: DISCONTINUED | OUTPATIENT
Start: 2021-11-02 | End: 2021-11-07 | Stop reason: HOSPADM

## 2021-11-02 RX ORDER — POTASSIUM CHLORIDE 20 MEQ/1
40 TABLET, EXTENDED RELEASE ORAL ONCE
Status: COMPLETED | OUTPATIENT
Start: 2021-11-02 | End: 2021-11-02

## 2021-11-02 RX ORDER — DEXTROSE MONOHYDRATE 25 G/50ML
25 INJECTION, SOLUTION INTRAVENOUS PRN
Status: DISCONTINUED | OUTPATIENT
Start: 2021-11-02 | End: 2021-11-07 | Stop reason: HOSPADM

## 2021-11-02 RX ORDER — INSULIN GLARGINE 100 [IU]/ML
30 INJECTION, SOLUTION SUBCUTANEOUS DAILY
Status: DISCONTINUED | OUTPATIENT
Start: 2021-11-02 | End: 2021-11-07 | Stop reason: HOSPADM

## 2021-11-02 RX ORDER — OXCARBAZEPINE 300 MG/1
300 TABLET, FILM COATED ORAL 2 TIMES DAILY
Status: DISCONTINUED | OUTPATIENT
Start: 2021-11-02 | End: 2021-11-07 | Stop reason: HOSPADM

## 2021-11-02 RX ORDER — METOPROLOL SUCCINATE 25 MG/1
12.5 TABLET, EXTENDED RELEASE ORAL DAILY
Status: DISCONTINUED | OUTPATIENT
Start: 2021-11-02 | End: 2021-11-07 | Stop reason: HOSPADM

## 2021-11-02 RX ORDER — SPIRONOLACTONE 25 MG/1
25 TABLET, FILM COATED ORAL DAILY
Status: DISCONTINUED | OUTPATIENT
Start: 2021-11-02 | End: 2021-11-07 | Stop reason: HOSPADM

## 2021-11-02 RX ORDER — LANOLIN ALCOHOL/MO/W.PET/CERES
3 CREAM (GRAM) TOPICAL ONCE
Status: COMPLETED | OUTPATIENT
Start: 2021-11-02 | End: 2021-11-02

## 2021-11-02 RX ADMIN — Medication 50 MCG: at 17:52

## 2021-11-02 RX ADMIN — OXCARBAZEPINE 300 MG: 300 TABLET, FILM COATED ORAL at 10:58

## 2021-11-02 RX ADMIN — PRAMIPEXOLE DIHYDROCHLORIDE 1 MG: 0.25 TABLET ORAL at 20:36

## 2021-11-02 RX ADMIN — TRAZODONE HYDROCHLORIDE 100 MG: 50 TABLET ORAL at 20:36

## 2021-11-02 RX ADMIN — OXCARBAZEPINE 300 MG: 300 TABLET, FILM COATED ORAL at 20:35

## 2021-11-02 RX ADMIN — SPIRONOLACTONE 25 MG: 25 TABLET, FILM COATED ORAL at 10:54

## 2021-11-02 RX ADMIN — ROSUVASTATIN CALCIUM 20 MG: 20 TABLET, FILM COATED ORAL at 01:18

## 2021-11-02 RX ADMIN — Medication 3 MG: at 02:44

## 2021-11-02 RX ADMIN — Medication 1000 MCG: at 11:16

## 2021-11-02 RX ADMIN — ASPIRIN 81 MG: 81 TABLET, COATED ORAL at 01:18

## 2021-11-02 RX ADMIN — INSULIN LISPRO 1 UNITS: 100 INJECTION, SOLUTION INTRAVENOUS; SUBCUTANEOUS at 17:51

## 2021-11-02 RX ADMIN — SODIUM CHLORIDE, PRESERVATIVE FREE 2 ML: 5 INJECTION INTRAVENOUS at 10:58

## 2021-11-02 RX ADMIN — SODIUM CHLORIDE, PRESERVATIVE FREE 2 ML: 5 INJECTION INTRAVENOUS at 01:27

## 2021-11-02 RX ADMIN — LEVOTHYROXINE SODIUM 25 MCG: 50 TABLET ORAL at 05:38

## 2021-11-02 RX ADMIN — FLUTICASONE FUROATE, UMECLIDINIUM BROMIDE AND VILANTEROL TRIFENATATE 1 PUFF: 100; 62.5; 25 POWDER RESPIRATORY (INHALATION) at 09:58

## 2021-11-02 RX ADMIN — ENOXAPARIN SODIUM 40 MG: 40 INJECTION SUBCUTANEOUS at 10:54

## 2021-11-02 RX ADMIN — BUMETANIDE 1 MG/HR: 0.25 INJECTION INTRAMUSCULAR; INTRAVENOUS at 22:36

## 2021-11-02 RX ADMIN — MONTELUKAST SODIUM 10 MG: 10 TABLET, FILM COATED ORAL at 20:35

## 2021-11-02 RX ADMIN — ROSUVASTATIN CALCIUM 20 MG: 20 TABLET, FILM COATED ORAL at 20:36

## 2021-11-02 RX ADMIN — POTASSIUM CHLORIDE 40 MEQ: 1500 TABLET, EXTENDED RELEASE ORAL at 11:16

## 2021-11-02 RX ADMIN — BUMETANIDE 1 MG/HR: 0.25 INJECTION INTRAMUSCULAR; INTRAVENOUS at 16:19

## 2021-11-02 RX ADMIN — BUMETANIDE 1 MG/HR: 0.25 INJECTION INTRAMUSCULAR; INTRAVENOUS at 11:10

## 2021-11-02 RX ADMIN — CHLOROTHIAZIDE 125 MG: 250 SUSPENSION ORAL at 11:50

## 2021-11-02 RX ADMIN — ASPIRIN 81 MG: 81 TABLET, COATED ORAL at 17:52

## 2021-11-02 RX ADMIN — MONTELUKAST SODIUM 10 MG: 10 TABLET, FILM COATED ORAL at 01:19

## 2021-11-02 RX ADMIN — METOPROLOL SUCCINATE 12.5 MG: 25 TABLET, EXTENDED RELEASE ORAL at 10:54

## 2021-11-02 RX ADMIN — Medication 400 MG: at 10:54

## 2021-11-02 ASSESSMENT — PAIN SCALES - GENERAL
PAINLEVEL_OUTOF10: 0
PAINLEVEL_OUTOF10: 0

## 2021-11-02 ASSESSMENT — ENCOUNTER SYMPTOMS
FATIGUE: 1
WEAKNESS: 1
ACTIVITY CHANGE: 1
SHORTNESS OF BREATH: 1
APPETITE CHANGE: 1

## 2021-11-03 PROBLEM — N04.9 NEPHROTIC SYNDROME: Chronic | Status: ACTIVE | Noted: 2021-11-03

## 2021-11-03 LAB
ANION GAP SERPL CALC-SCNC: 8 MMOL/L (ref 10–20)
BUN SERPL-MCNC: 77 MG/DL (ref 6–20)
BUN/CREAT SERPL: 59 (ref 7–25)
CALCIUM SERPL-MCNC: 8.2 MG/DL (ref 8.4–10.2)
CHLORIDE SERPL-SCNC: 102 MMOL/L (ref 98–107)
CO2 SERPL-SCNC: 33 MMOL/L (ref 21–32)
CREAT SERPL-MCNC: 1.31 MG/DL (ref 0.51–0.95)
CREAT UR-MCNC: 18 MG/DL
FASTING DURATION TIME PATIENT: ABNORMAL H
GFR SERPLBLD BASED ON 1.73 SQ M-ARVRAT: 42 ML/MIN
GLUCOSE BLDC GLUCOMTR-MCNC: 133 MG/DL (ref 70–99)
GLUCOSE BLDC GLUCOMTR-MCNC: 138 MG/DL (ref 70–99)
GLUCOSE BLDC GLUCOMTR-MCNC: 155 MG/DL (ref 70–99)
GLUCOSE BLDC GLUCOMTR-MCNC: 197 MG/DL (ref 70–99)
GLUCOSE SERPL-MCNC: 107 MG/DL (ref 70–99)
POTASSIUM SERPL-SCNC: 3.7 MMOL/L (ref 3.4–5.1)
PROT UR-MCNC: 214 MG/DL
PROT/CREAT UR: ABNORMAL MGPR/GCR
RAINBOW EXTRA TUBES HOLD SPECIMEN: NORMAL
SODIUM SERPL-SCNC: 139 MMOL/L (ref 135–145)

## 2021-11-03 PROCEDURE — 10002803 HB RX 637: Performed by: NURSE PRACTITIONER

## 2021-11-03 PROCEDURE — 84156 ASSAY OF PROTEIN URINE: CPT | Performed by: INTERNAL MEDICINE

## 2021-11-03 PROCEDURE — 10002803 HB RX 637: Performed by: STUDENT IN AN ORGANIZED HEALTH CARE EDUCATION/TRAINING PROGRAM

## 2021-11-03 PROCEDURE — 10002801 HB RX 250 W/O HCPCS: Performed by: INTERNAL MEDICINE

## 2021-11-03 PROCEDURE — 10002800 HB RX 250 W HCPCS: Performed by: STUDENT IN AN ORGANIZED HEALTH CARE EDUCATION/TRAINING PROGRAM

## 2021-11-03 PROCEDURE — 82570 ASSAY OF URINE CREATININE: CPT | Performed by: INTERNAL MEDICINE

## 2021-11-03 PROCEDURE — 10003585 HB ROOM CHARGE INTERMEDIATE CARE

## 2021-11-03 PROCEDURE — 99232 SBSQ HOSP IP/OBS MODERATE 35: CPT | Performed by: NURSE PRACTITIONER

## 2021-11-03 PROCEDURE — 36415 COLL VENOUS BLD VENIPUNCTURE: CPT | Performed by: INTERNAL MEDICINE

## 2021-11-03 PROCEDURE — 80048 BASIC METABOLIC PNL TOTAL CA: CPT | Performed by: NURSE PRACTITIONER

## 2021-11-03 PROCEDURE — 10002803 HB RX 637: Performed by: INTERNAL MEDICINE

## 2021-11-03 PROCEDURE — 10004281 HB COUNTER-STAFF TIME PER 15 MIN

## 2021-11-03 PROCEDURE — 13003289 HB OXYGEN THERAPY DAILY

## 2021-11-03 PROCEDURE — 99233 SBSQ HOSP IP/OBS HIGH 50: CPT | Performed by: INTERNAL MEDICINE

## 2021-11-03 PROCEDURE — 10004651 HB RX, NO CHARGE ITEM: Performed by: STUDENT IN AN ORGANIZED HEALTH CARE EDUCATION/TRAINING PROGRAM

## 2021-11-03 PROCEDURE — 10002801 HB RX 250 W/O HCPCS: Performed by: STUDENT IN AN ORGANIZED HEALTH CARE EDUCATION/TRAINING PROGRAM

## 2021-11-03 RX ORDER — POTASSIUM CHLORIDE 20 MEQ/1
40 TABLET, EXTENDED RELEASE ORAL ONCE
Status: COMPLETED | OUTPATIENT
Start: 2021-11-03 | End: 2021-11-03

## 2021-11-03 RX ORDER — METOLAZONE 2.5 MG/1
2.5 TABLET ORAL ONCE
Status: COMPLETED | OUTPATIENT
Start: 2021-11-03 | End: 2021-11-03

## 2021-11-03 RX ADMIN — Medication 50 MCG: at 17:08

## 2021-11-03 RX ADMIN — ROSUVASTATIN CALCIUM 20 MG: 20 TABLET, FILM COATED ORAL at 20:39

## 2021-11-03 RX ADMIN — FLUTICASONE FUROATE, UMECLIDINIUM BROMIDE AND VILANTEROL TRIFENATATE 1 PUFF: 100; 62.5; 25 POWDER RESPIRATORY (INHALATION) at 07:53

## 2021-11-03 RX ADMIN — LOSARTAN POTASSIUM 25 MG: 50 TABLET, FILM COATED ORAL at 20:38

## 2021-11-03 RX ADMIN — PRAMIPEXOLE DIHYDROCHLORIDE 1 MG: 0.25 TABLET ORAL at 20:39

## 2021-11-03 RX ADMIN — BUMETANIDE 1 MG/HR: 0.25 INJECTION INTRAMUSCULAR; INTRAVENOUS at 10:41

## 2021-11-03 RX ADMIN — INSULIN LISPRO 1 UNITS: 100 INJECTION, SOLUTION INTRAVENOUS; SUBCUTANEOUS at 17:18

## 2021-11-03 RX ADMIN — METOPROLOL SUCCINATE 12.5 MG: 25 TABLET, EXTENDED RELEASE ORAL at 08:10

## 2021-11-03 RX ADMIN — OXCARBAZEPINE 300 MG: 300 TABLET, FILM COATED ORAL at 08:11

## 2021-11-03 RX ADMIN — ENOXAPARIN SODIUM 40 MG: 40 INJECTION SUBCUTANEOUS at 08:10

## 2021-11-03 RX ADMIN — Medication 1000 MCG: at 08:10

## 2021-11-03 RX ADMIN — INSULIN GLARGINE 30 UNITS: 100 INJECTION, SOLUTION SUBCUTANEOUS at 08:09

## 2021-11-03 RX ADMIN — METOLAZONE 2.5 MG: 2.5 TABLET ORAL at 11:58

## 2021-11-03 RX ADMIN — TRAZODONE HYDROCHLORIDE 100 MG: 50 TABLET ORAL at 20:39

## 2021-11-03 RX ADMIN — ASPIRIN 81 MG: 81 TABLET, COATED ORAL at 17:08

## 2021-11-03 RX ADMIN — OXCARBAZEPINE 300 MG: 300 TABLET, FILM COATED ORAL at 20:39

## 2021-11-03 RX ADMIN — POTASSIUM CHLORIDE 40 MEQ: 1500 TABLET, EXTENDED RELEASE ORAL at 08:11

## 2021-11-03 RX ADMIN — LEVOTHYROXINE SODIUM 25 MCG: 50 TABLET ORAL at 05:29

## 2021-11-03 RX ADMIN — MONTELUKAST SODIUM 10 MG: 10 TABLET, FILM COATED ORAL at 20:39

## 2021-11-03 RX ADMIN — Medication 400 MG: at 08:11

## 2021-11-03 RX ADMIN — SPIRONOLACTONE 25 MG: 25 TABLET, FILM COATED ORAL at 08:11

## 2021-11-03 RX ADMIN — BUMETANIDE 1 MG/HR: 0.25 INJECTION INTRAMUSCULAR; INTRAVENOUS at 21:45

## 2021-11-03 ASSESSMENT — ENCOUNTER SYMPTOMS
ACTIVITY CHANGE: 1
WEAKNESS: 1
FATIGUE: 1
SHORTNESS OF BREATH: 1
APPETITE CHANGE: 1

## 2021-11-03 ASSESSMENT — PAIN SCALES - GENERAL
PAINLEVEL_OUTOF10: 0
PAINLEVEL_OUTOF10: 0

## 2021-11-04 ENCOUNTER — PATIENT MESSAGE (OUTPATIENT)
Dept: NEPHROLOGY | Facility: CLINIC | Age: 66
End: 2021-11-04

## 2021-11-04 ENCOUNTER — TELEPHONE (OUTPATIENT)
Dept: INTERNAL MEDICINE CLINIC | Facility: CLINIC | Age: 66
End: 2021-11-04

## 2021-11-04 PROBLEM — J96.11 CHRONIC HYPOXEMIC RESPIRATORY FAILURE (CMD): Status: ACTIVE | Noted: 2021-11-04

## 2021-11-04 LAB
ANION GAP SERPL CALC-SCNC: 9 MMOL/L (ref 10–20)
BASOPHILS # BLD: 0 K/MCL (ref 0–0.3)
BASOPHILS NFR BLD: 0 %
BUN SERPL-MCNC: 88 MG/DL (ref 6–20)
BUN/CREAT SERPL: 65 (ref 7–25)
CALCIUM SERPL-MCNC: 8.6 MG/DL (ref 8.4–10.2)
CHLORIDE SERPL-SCNC: 98 MMOL/L (ref 98–107)
CO2 SERPL-SCNC: 35 MMOL/L (ref 21–32)
CREAT SERPL-MCNC: 1.35 MG/DL (ref 0.51–0.95)
CREAT UR-MCNC: 19.5 MG/DL
CREAT UR-MCNC: 37.8 MG/DL
DEPRECATED RDW RBC: 43.4 FL (ref 39–50)
EOSINOPHIL # BLD: 0.2 K/MCL (ref 0–0.5)
EOSINOPHIL NFR BLD: 3 %
ERYTHROCYTE [DISTWIDTH] IN BLOOD: 12.8 % (ref 11–15)
FASTING DURATION TIME PATIENT: ABNORMAL H
GFR SERPLBLD BASED ON 1.73 SQ M-ARVRAT: 41 ML/MIN
GLUCOSE BLDC GLUCOMTR-MCNC: 104 MG/DL (ref 70–99)
GLUCOSE BLDC GLUCOMTR-MCNC: 141 MG/DL (ref 70–99)
GLUCOSE BLDC GLUCOMTR-MCNC: 172 MG/DL (ref 70–99)
GLUCOSE BLDC GLUCOMTR-MCNC: 98 MG/DL (ref 70–99)
GLUCOSE SERPL-MCNC: 106 MG/DL (ref 70–99)
HCT VFR BLD CALC: 41.9 % (ref 36–46.5)
HGB BLD-MCNC: 14.1 G/DL (ref 12–15.5)
IMM GRANULOCYTES # BLD AUTO: 0 K/MCL (ref 0–0.2)
IMM GRANULOCYTES # BLD: 0 %
LYMPHOCYTES # BLD: 1.2 K/MCL (ref 1–4)
LYMPHOCYTES NFR BLD: 16 %
MCH RBC QN AUTO: 31.1 PG (ref 26–34)
MCHC RBC AUTO-ENTMCNC: 33.7 G/DL (ref 32–36.5)
MCV RBC AUTO: 92.3 FL (ref 78–100)
MICROALBUMIN UR-MCNC: 150 MG/DL
MICROALBUMIN UR-MCNC: 201 MG/DL
MICROALBUMIN/CREAT UR: 3968.3 MG/G
MICROALBUMIN/CREAT UR: ABNORMAL MG/G
MONOCYTES # BLD: 0.8 K/MCL (ref 0.3–0.9)
MONOCYTES NFR BLD: 11 %
NEUTROPHILS # BLD: 5.2 K/MCL (ref 1.8–7.7)
NEUTROPHILS NFR BLD: 70 %
NRBC BLD MANUAL-RTO: 0 /100 WBC
PLATELET # BLD AUTO: 208 K/MCL (ref 140–450)
POTASSIUM SERPL-SCNC: 3 MMOL/L (ref 3.4–5.1)
POTASSIUM SERPL-SCNC: 3.7 MMOL/L (ref 3.4–5.1)
RBC # BLD: 4.54 MIL/MCL (ref 4–5.2)
SODIUM SERPL-SCNC: 139 MMOL/L (ref 135–145)
WBC # BLD: 7.5 K/MCL (ref 4.2–11)

## 2021-11-04 PROCEDURE — 10002800 HB RX 250 W HCPCS: Performed by: STUDENT IN AN ORGANIZED HEALTH CARE EDUCATION/TRAINING PROGRAM

## 2021-11-04 PROCEDURE — 10004281 HB COUNTER-STAFF TIME PER 15 MIN

## 2021-11-04 PROCEDURE — 10002803 HB RX 637: Performed by: STUDENT IN AN ORGANIZED HEALTH CARE EDUCATION/TRAINING PROGRAM

## 2021-11-04 PROCEDURE — 85025 COMPLETE CBC W/AUTO DIFF WBC: CPT | Performed by: INTERNAL MEDICINE

## 2021-11-04 PROCEDURE — 84132 ASSAY OF SERUM POTASSIUM: CPT | Performed by: INTERNAL MEDICINE

## 2021-11-04 PROCEDURE — 99232 SBSQ HOSP IP/OBS MODERATE 35: CPT | Performed by: INTERNAL MEDICINE

## 2021-11-04 PROCEDURE — 10002803 HB RX 637: Performed by: INTERNAL MEDICINE

## 2021-11-04 PROCEDURE — 13003289 HB OXYGEN THERAPY DAILY

## 2021-11-04 PROCEDURE — 80048 BASIC METABOLIC PNL TOTAL CA: CPT | Performed by: NURSE PRACTITIONER

## 2021-11-04 PROCEDURE — 10004651 HB RX, NO CHARGE ITEM: Performed by: STUDENT IN AN ORGANIZED HEALTH CARE EDUCATION/TRAINING PROGRAM

## 2021-11-04 PROCEDURE — 10003585 HB ROOM CHARGE INTERMEDIATE CARE

## 2021-11-04 PROCEDURE — 10002801 HB RX 250 W/O HCPCS: Performed by: INTERNAL MEDICINE

## 2021-11-04 PROCEDURE — 82570 ASSAY OF URINE CREATININE: CPT | Performed by: INTERNAL MEDICINE

## 2021-11-04 PROCEDURE — 10002801 HB RX 250 W/O HCPCS: Performed by: STUDENT IN AN ORGANIZED HEALTH CARE EDUCATION/TRAINING PROGRAM

## 2021-11-04 PROCEDURE — 36415 COLL VENOUS BLD VENIPUNCTURE: CPT | Performed by: NURSE PRACTITIONER

## 2021-11-04 PROCEDURE — 94640 AIRWAY INHALATION TREATMENT: CPT

## 2021-11-04 PROCEDURE — 99233 SBSQ HOSP IP/OBS HIGH 50: CPT | Performed by: INTERNAL MEDICINE

## 2021-11-04 RX ORDER — POTASSIUM CHLORIDE 20 MEQ/1
40 TABLET, EXTENDED RELEASE ORAL ONCE
Status: COMPLETED | OUTPATIENT
Start: 2021-11-04 | End: 2021-11-04

## 2021-11-04 RX ADMIN — Medication 1000 MCG: at 08:10

## 2021-11-04 RX ADMIN — OXCARBAZEPINE 300 MG: 300 TABLET, FILM COATED ORAL at 08:11

## 2021-11-04 RX ADMIN — INSULIN GLARGINE 30 UNITS: 100 INJECTION, SOLUTION SUBCUTANEOUS at 08:15

## 2021-11-04 RX ADMIN — LOSARTAN POTASSIUM 25 MG: 50 TABLET, FILM COATED ORAL at 08:11

## 2021-11-04 RX ADMIN — POTASSIUM CHLORIDE 40 MEQ: 1500 TABLET, EXTENDED RELEASE ORAL at 16:05

## 2021-11-04 RX ADMIN — METOPROLOL SUCCINATE 12.5 MG: 25 TABLET, EXTENDED RELEASE ORAL at 08:10

## 2021-11-04 RX ADMIN — LOSARTAN POTASSIUM 25 MG: 50 TABLET, FILM COATED ORAL at 22:10

## 2021-11-04 RX ADMIN — POTASSIUM CHLORIDE 40 MEQ: 1500 TABLET, EXTENDED RELEASE ORAL at 08:10

## 2021-11-04 RX ADMIN — EMPAGLIFLOZIN 10 MG: 10 TABLET, FILM COATED ORAL at 07:42

## 2021-11-04 RX ADMIN — MONTELUKAST SODIUM 10 MG: 10 TABLET, FILM COATED ORAL at 22:10

## 2021-11-04 RX ADMIN — BUMETANIDE 1 MG/HR: 0.25 INJECTION INTRAMUSCULAR; INTRAVENOUS at 23:46

## 2021-11-04 RX ADMIN — PRAMIPEXOLE DIHYDROCHLORIDE 1 MG: 0.25 TABLET ORAL at 22:11

## 2021-11-04 RX ADMIN — FLUTICASONE FUROATE, UMECLIDINIUM BROMIDE AND VILANTEROL TRIFENATATE 1 PUFF: 100; 62.5; 25 POWDER RESPIRATORY (INHALATION) at 08:43

## 2021-11-04 RX ADMIN — SPIRONOLACTONE 25 MG: 25 TABLET, FILM COATED ORAL at 08:10

## 2021-11-04 RX ADMIN — OXCARBAZEPINE 300 MG: 300 TABLET, FILM COATED ORAL at 22:10

## 2021-11-04 RX ADMIN — Medication 50 MCG: at 18:03

## 2021-11-04 RX ADMIN — LEVOTHYROXINE SODIUM 25 MCG: 50 TABLET ORAL at 05:16

## 2021-11-04 RX ADMIN — Medication 400 MG: at 08:11

## 2021-11-04 RX ADMIN — ROSUVASTATIN CALCIUM 20 MG: 20 TABLET, FILM COATED ORAL at 22:10

## 2021-11-04 RX ADMIN — TRAZODONE HYDROCHLORIDE 100 MG: 50 TABLET ORAL at 22:11

## 2021-11-04 RX ADMIN — BUMETANIDE 1 MG/HR: 0.25 INJECTION INTRAMUSCULAR; INTRAVENOUS at 08:04

## 2021-11-04 RX ADMIN — ACETAMINOPHEN 650 MG: 325 TABLET, FILM COATED ORAL at 08:09

## 2021-11-04 RX ADMIN — ENOXAPARIN SODIUM 40 MG: 40 INJECTION SUBCUTANEOUS at 08:15

## 2021-11-04 ASSESSMENT — ENCOUNTER SYMPTOMS
SHORTNESS OF BREATH: 1
APPETITE CHANGE: 1
WEAKNESS: 1
FATIGUE: 1
ACTIVITY CHANGE: 1

## 2021-11-04 ASSESSMENT — PAIN SCALES - GENERAL
PAINLEVEL_OUTOF10: 0
PAINLEVEL_OUTOF10: 1
PAINLEVEL_OUTOF10: 0
PAINLEVEL_OUTOF10: 3

## 2021-11-04 NOTE — TELEPHONE ENCOUNTER
From: Chidi Carrillo  To: Garth Loyola MD  Sent: 11/4/2021 7:44 AM CDT  Subject: How I got here. My physical rehab kept telling me to call my heart doctor about swelling in my legs.  Set me up with the heart institute at [address] [place] [address]  Was going in for

## 2021-11-04 NOTE — TELEPHONE ENCOUNTER
Patient is calling to advise PCP she has been admitted to the hospital since Monday. at [facility] and would like to share information.

## 2021-11-05 ENCOUNTER — TELEPHONE (OUTPATIENT)
Dept: NEPHROLOGY | Facility: CLINIC | Age: 66
End: 2021-11-05

## 2021-11-05 DIAGNOSIS — N18.30 STAGE 3 CHRONIC KIDNEY DISEASE, UNSPECIFIED WHETHER STAGE 3A OR 3B CKD (HCC): ICD-10-CM

## 2021-11-05 DIAGNOSIS — N05.1 FSGS (FOCAL SEGMENTAL GLOMERULOSCLEROSIS): Primary | ICD-10-CM

## 2021-11-05 LAB
ANION GAP SERPL CALC-SCNC: 9 MMOL/L (ref 10–20)
BUN SERPL-MCNC: 100 MG/DL (ref 6–20)
BUN/CREAT SERPL: 66 (ref 7–25)
CALCIUM SERPL-MCNC: 8.5 MG/DL (ref 8.4–10.2)
CHLORIDE SERPL-SCNC: 98 MMOL/L (ref 98–107)
CO2 SERPL-SCNC: 35 MMOL/L (ref 21–32)
CREAT SERPL-MCNC: 1.52 MG/DL (ref 0.51–0.95)
FASTING DURATION TIME PATIENT: ABNORMAL H
GFR SERPLBLD BASED ON 1.73 SQ M-ARVRAT: 35 ML/MIN
GLUCOSE BLDC GLUCOMTR-MCNC: 103 MG/DL (ref 70–99)
GLUCOSE BLDC GLUCOMTR-MCNC: 120 MG/DL (ref 70–99)
GLUCOSE BLDC GLUCOMTR-MCNC: 127 MG/DL (ref 70–99)
GLUCOSE BLDC GLUCOMTR-MCNC: 138 MG/DL (ref 70–99)
GLUCOSE SERPL-MCNC: 106 MG/DL (ref 70–99)
POTASSIUM SERPL-SCNC: 3.5 MMOL/L (ref 3.4–5.1)
POTASSIUM SERPL-SCNC: 3.9 MMOL/L (ref 3.4–5.1)
RAINBOW EXTRA TUBES HOLD SPECIMEN: NORMAL
SODIUM SERPL-SCNC: 138 MMOL/L (ref 135–145)

## 2021-11-05 PROCEDURE — 80048 BASIC METABOLIC PNL TOTAL CA: CPT | Performed by: INTERNAL MEDICINE

## 2021-11-05 PROCEDURE — 84132 ASSAY OF SERUM POTASSIUM: CPT | Performed by: INTERNAL MEDICINE

## 2021-11-05 PROCEDURE — 10002803 HB RX 637: Performed by: INTERNAL MEDICINE

## 2021-11-05 PROCEDURE — 10003585 HB ROOM CHARGE INTERMEDIATE CARE

## 2021-11-05 PROCEDURE — 13003289 HB OXYGEN THERAPY DAILY

## 2021-11-05 PROCEDURE — 10002800 HB RX 250 W HCPCS: Performed by: STUDENT IN AN ORGANIZED HEALTH CARE EDUCATION/TRAINING PROGRAM

## 2021-11-05 PROCEDURE — 10002803 HB RX 637: Performed by: STUDENT IN AN ORGANIZED HEALTH CARE EDUCATION/TRAINING PROGRAM

## 2021-11-05 PROCEDURE — 10004281 HB COUNTER-STAFF TIME PER 15 MIN

## 2021-11-05 PROCEDURE — 99222 1ST HOSP IP/OBS MODERATE 55: CPT | Performed by: NURSE PRACTITIONER

## 2021-11-05 PROCEDURE — 10002801 HB RX 250 W/O HCPCS: Performed by: STUDENT IN AN ORGANIZED HEALTH CARE EDUCATION/TRAINING PROGRAM

## 2021-11-05 PROCEDURE — 10002801 HB RX 250 W/O HCPCS: Performed by: INTERNAL MEDICINE

## 2021-11-05 PROCEDURE — 36415 COLL VENOUS BLD VENIPUNCTURE: CPT | Performed by: INTERNAL MEDICINE

## 2021-11-05 PROCEDURE — 94640 AIRWAY INHALATION TREATMENT: CPT

## 2021-11-05 PROCEDURE — 99233 SBSQ HOSP IP/OBS HIGH 50: CPT | Performed by: INTERNAL MEDICINE

## 2021-11-05 RX ORDER — POTASSIUM CHLORIDE 20 MEQ/1
40 TABLET, EXTENDED RELEASE ORAL ONCE
Status: COMPLETED | OUTPATIENT
Start: 2021-11-05 | End: 2021-11-05

## 2021-11-05 RX ADMIN — OXCARBAZEPINE 300 MG: 300 TABLET, FILM COATED ORAL at 21:36

## 2021-11-05 RX ADMIN — BUMETANIDE 1 MG/HR: 0.25 INJECTION INTRAMUSCULAR; INTRAVENOUS at 11:12

## 2021-11-05 RX ADMIN — Medication 400 MG: at 08:59

## 2021-11-05 RX ADMIN — METOPROLOL SUCCINATE 12.5 MG: 25 TABLET, EXTENDED RELEASE ORAL at 08:59

## 2021-11-05 RX ADMIN — BUMETANIDE 1 MG/HR: 0.25 INJECTION INTRAMUSCULAR; INTRAVENOUS at 22:01

## 2021-11-05 RX ADMIN — EMPAGLIFLOZIN 10 MG: 10 TABLET, FILM COATED ORAL at 05:10

## 2021-11-05 RX ADMIN — Medication 1000 MCG: at 08:59

## 2021-11-05 RX ADMIN — SPIRONOLACTONE 25 MG: 25 TABLET, FILM COATED ORAL at 08:59

## 2021-11-05 RX ADMIN — TRAZODONE HYDROCHLORIDE 100 MG: 50 TABLET ORAL at 21:37

## 2021-11-05 RX ADMIN — ENOXAPARIN SODIUM 40 MG: 40 INJECTION SUBCUTANEOUS at 08:59

## 2021-11-05 RX ADMIN — ROSUVASTATIN CALCIUM 20 MG: 20 TABLET, FILM COATED ORAL at 21:36

## 2021-11-05 RX ADMIN — OXCARBAZEPINE 300 MG: 300 TABLET, FILM COATED ORAL at 08:59

## 2021-11-05 RX ADMIN — LOSARTAN POTASSIUM 25 MG: 50 TABLET, FILM COATED ORAL at 08:59

## 2021-11-05 RX ADMIN — LEVOTHYROXINE SODIUM 25 MCG: 50 TABLET ORAL at 05:10

## 2021-11-05 RX ADMIN — FLUTICASONE FUROATE, UMECLIDINIUM BROMIDE AND VILANTEROL TRIFENATATE 1 PUFF: 100; 62.5; 25 POWDER RESPIRATORY (INHALATION) at 09:53

## 2021-11-05 RX ADMIN — Medication 50 MCG: at 17:02

## 2021-11-05 RX ADMIN — INSULIN GLARGINE 30 UNITS: 100 INJECTION, SOLUTION SUBCUTANEOUS at 08:59

## 2021-11-05 RX ADMIN — LOSARTAN POTASSIUM 25 MG: 50 TABLET, FILM COATED ORAL at 21:35

## 2021-11-05 RX ADMIN — PRAMIPEXOLE DIHYDROCHLORIDE 1 MG: 0.25 TABLET ORAL at 21:35

## 2021-11-05 RX ADMIN — POTASSIUM CHLORIDE 40 MEQ: 1500 TABLET, EXTENDED RELEASE ORAL at 15:15

## 2021-11-05 RX ADMIN — MONTELUKAST SODIUM 10 MG: 10 TABLET, FILM COATED ORAL at 21:36

## 2021-11-05 ASSESSMENT — ENCOUNTER SYMPTOMS
SHORTNESS OF BREATH: 1
FATIGUE: 1
APPETITE CHANGE: 1
ACTIVITY CHANGE: 1
WEAKNESS: 1

## 2021-11-05 ASSESSMENT — PAIN SCALES - GENERAL
PAINLEVEL_OUTOF10: 0
PAINLEVEL_OUTOF10: 0

## 2021-11-05 NOTE — TELEPHONE ENCOUNTER
Patient advised that Dr. Lindsay Espinoza is aware of her hospitalization. Follow up appointments with Dr. Lindsay Espinoza not available until December, patient is seeing nephrology on 11/19, please advise when patient should see Dr. Lindsay Espinoza, or Fiorella Mann.

## 2021-11-05 NOTE — TELEPHONE ENCOUNTER
Spoke with patient regarding message below. This also correlates with Triductorhart message from 11/04/21. Patient is currently admitted in [facility] and has been there since 11/01 needing IV diuretics for her swelling.  Patient states that Dr. Ivan Florentino

## 2021-11-06 LAB
ANION GAP SERPL CALC-SCNC: 5 MMOL/L (ref 10–20)
BUN SERPL-MCNC: 95 MG/DL (ref 6–20)
BUN/CREAT SERPL: 69 (ref 7–25)
CALCIUM SERPL-MCNC: 8.8 MG/DL (ref 8.4–10.2)
CHLORIDE SERPL-SCNC: 96 MMOL/L (ref 98–107)
CO2 SERPL-SCNC: 38 MMOL/L (ref 21–32)
CREAT SERPL-MCNC: 1.38 MG/DL (ref 0.51–0.95)
FASTING DURATION TIME PATIENT: ABNORMAL H
GFR SERPLBLD BASED ON 1.73 SQ M-ARVRAT: 40 ML/MIN
GLUCOSE BLDC GLUCOMTR-MCNC: 118 MG/DL (ref 70–99)
GLUCOSE BLDC GLUCOMTR-MCNC: 126 MG/DL (ref 70–99)
GLUCOSE BLDC GLUCOMTR-MCNC: 130 MG/DL (ref 70–99)
GLUCOSE BLDC GLUCOMTR-MCNC: 245 MG/DL (ref 70–99)
GLUCOSE SERPL-MCNC: 114 MG/DL (ref 70–99)
POTASSIUM SERPL-SCNC: 3.3 MMOL/L (ref 3.4–5.1)
POTASSIUM SERPL-SCNC: 3.9 MMOL/L (ref 3.4–5.1)
SODIUM SERPL-SCNC: 136 MMOL/L (ref 135–145)

## 2021-11-06 PROCEDURE — 80048 BASIC METABOLIC PNL TOTAL CA: CPT | Performed by: INTERNAL MEDICINE

## 2021-11-06 PROCEDURE — 99232 SBSQ HOSP IP/OBS MODERATE 35: CPT | Performed by: NURSE PRACTITIONER

## 2021-11-06 PROCEDURE — 10002803 HB RX 637: Performed by: INTERNAL MEDICINE

## 2021-11-06 PROCEDURE — 36415 COLL VENOUS BLD VENIPUNCTURE: CPT | Performed by: INTERNAL MEDICINE

## 2021-11-06 PROCEDURE — 99233 SBSQ HOSP IP/OBS HIGH 50: CPT | Performed by: INTERNAL MEDICINE

## 2021-11-06 PROCEDURE — 94640 AIRWAY INHALATION TREATMENT: CPT

## 2021-11-06 PROCEDURE — 10002801 HB RX 250 W/O HCPCS: Performed by: STUDENT IN AN ORGANIZED HEALTH CARE EDUCATION/TRAINING PROGRAM

## 2021-11-06 PROCEDURE — 10004281 HB COUNTER-STAFF TIME PER 15 MIN

## 2021-11-06 PROCEDURE — 13003289 HB OXYGEN THERAPY DAILY

## 2021-11-06 PROCEDURE — 10002801 HB RX 250 W/O HCPCS: Performed by: INTERNAL MEDICINE

## 2021-11-06 PROCEDURE — 10003585 HB ROOM CHARGE INTERMEDIATE CARE

## 2021-11-06 PROCEDURE — 10002800 HB RX 250 W HCPCS: Performed by: STUDENT IN AN ORGANIZED HEALTH CARE EDUCATION/TRAINING PROGRAM

## 2021-11-06 PROCEDURE — 10002803 HB RX 637: Performed by: STUDENT IN AN ORGANIZED HEALTH CARE EDUCATION/TRAINING PROGRAM

## 2021-11-06 PROCEDURE — 10004651 HB RX, NO CHARGE ITEM: Performed by: STUDENT IN AN ORGANIZED HEALTH CARE EDUCATION/TRAINING PROGRAM

## 2021-11-06 PROCEDURE — 84132 ASSAY OF SERUM POTASSIUM: CPT | Performed by: INTERNAL MEDICINE

## 2021-11-06 RX ORDER — TORSEMIDE 20 MG/1
40 TABLET ORAL 2 TIMES DAILY
Status: DISCONTINUED | OUTPATIENT
Start: 2021-11-06 | End: 2021-11-07 | Stop reason: HOSPADM

## 2021-11-06 RX ORDER — POTASSIUM CHLORIDE 20 MEQ/1
40 TABLET, EXTENDED RELEASE ORAL ONCE
Status: COMPLETED | OUTPATIENT
Start: 2021-11-06 | End: 2021-11-06

## 2021-11-06 RX ADMIN — Medication 400 MG: at 08:05

## 2021-11-06 RX ADMIN — SODIUM CHLORIDE, PRESERVATIVE FREE 2 ML: 5 INJECTION INTRAVENOUS at 08:06

## 2021-11-06 RX ADMIN — OXCARBAZEPINE 300 MG: 300 TABLET, FILM COATED ORAL at 21:10

## 2021-11-06 RX ADMIN — Medication 50 MCG: at 17:53

## 2021-11-06 RX ADMIN — OXCARBAZEPINE 300 MG: 300 TABLET, FILM COATED ORAL at 08:05

## 2021-11-06 RX ADMIN — LOSARTAN POTASSIUM 25 MG: 50 TABLET, FILM COATED ORAL at 08:05

## 2021-11-06 RX ADMIN — BUMETANIDE 1 MG/HR: 0.25 INJECTION INTRAMUSCULAR; INTRAVENOUS at 11:33

## 2021-11-06 RX ADMIN — TORSEMIDE 40 MG: 20 TABLET ORAL at 21:09

## 2021-11-06 RX ADMIN — MONTELUKAST SODIUM 10 MG: 10 TABLET, FILM COATED ORAL at 21:10

## 2021-11-06 RX ADMIN — EMPAGLIFLOZIN 10 MG: 10 TABLET, FILM COATED ORAL at 06:30

## 2021-11-06 RX ADMIN — ROSUVASTATIN CALCIUM 20 MG: 20 TABLET, FILM COATED ORAL at 21:09

## 2021-11-06 RX ADMIN — INSULIN GLARGINE 30 UNITS: 100 INJECTION, SOLUTION SUBCUTANEOUS at 08:05

## 2021-11-06 RX ADMIN — Medication 1000 MCG: at 08:04

## 2021-11-06 RX ADMIN — LOSARTAN POTASSIUM 25 MG: 50 TABLET, FILM COATED ORAL at 21:08

## 2021-11-06 RX ADMIN — TRAZODONE HYDROCHLORIDE 100 MG: 50 TABLET ORAL at 21:09

## 2021-11-06 RX ADMIN — FLUTICASONE FUROATE, UMECLIDINIUM BROMIDE AND VILANTEROL TRIFENATATE 1 PUFF: 100; 62.5; 25 POWDER RESPIRATORY (INHALATION) at 09:01

## 2021-11-06 RX ADMIN — ENOXAPARIN SODIUM 40 MG: 40 INJECTION SUBCUTANEOUS at 08:04

## 2021-11-06 RX ADMIN — METOPROLOL SUCCINATE 12.5 MG: 25 TABLET, EXTENDED RELEASE ORAL at 08:05

## 2021-11-06 RX ADMIN — LEVOTHYROXINE SODIUM 25 MCG: 50 TABLET ORAL at 06:30

## 2021-11-06 RX ADMIN — SODIUM CHLORIDE, PRESERVATIVE FREE 2 ML: 5 INJECTION INTRAVENOUS at 21:00

## 2021-11-06 RX ADMIN — POTASSIUM CHLORIDE 40 MEQ: 1500 TABLET, EXTENDED RELEASE ORAL at 11:43

## 2021-11-06 RX ADMIN — PRAMIPEXOLE DIHYDROCHLORIDE 1 MG: 0.25 TABLET ORAL at 21:07

## 2021-11-06 RX ADMIN — SPIRONOLACTONE 25 MG: 25 TABLET, FILM COATED ORAL at 08:05

## 2021-11-06 ASSESSMENT — ENCOUNTER SYMPTOMS
ACTIVITY CHANGE: 1
FATIGUE: 1
WEAKNESS: 1
APPETITE CHANGE: 1
SHORTNESS OF BREATH: 1

## 2021-11-06 ASSESSMENT — PAIN SCALES - GENERAL
PAINLEVEL_OUTOF10: 0
PAINLEVEL_OUTOF10: 0

## 2021-11-07 VITALS
WEIGHT: 189.6 LBS | OXYGEN SATURATION: 96 % | TEMPERATURE: 98.4 F | DIASTOLIC BLOOD PRESSURE: 60 MMHG | SYSTOLIC BLOOD PRESSURE: 116 MMHG | BODY MASS INDEX: 32.37 KG/M2 | RESPIRATION RATE: 18 BRPM | HEART RATE: 71 BPM | HEIGHT: 64 IN

## 2021-11-07 LAB
ANION GAP SERPL CALC-SCNC: 9 MMOL/L (ref 10–20)
BUN SERPL-MCNC: 85 MG/DL (ref 6–20)
BUN/CREAT SERPL: 65 (ref 7–25)
CALCIUM SERPL-MCNC: 8.7 MG/DL (ref 8.4–10.2)
CHLORIDE SERPL-SCNC: 97 MMOL/L (ref 98–107)
CO2 SERPL-SCNC: 35 MMOL/L (ref 21–32)
CREAT SERPL-MCNC: 1.31 MG/DL (ref 0.51–0.95)
FASTING DURATION TIME PATIENT: ABNORMAL H
GFR SERPLBLD BASED ON 1.73 SQ M-ARVRAT: 42 ML/MIN
GLUCOSE BLDC GLUCOMTR-MCNC: 139 MG/DL (ref 70–99)
GLUCOSE SERPL-MCNC: 143 MG/DL (ref 70–99)
POTASSIUM SERPL-SCNC: 3.6 MMOL/L (ref 3.4–5.1)
RAINBOW EXTRA TUBES HOLD SPECIMEN: NORMAL
SODIUM SERPL-SCNC: 137 MMOL/L (ref 135–145)

## 2021-11-07 PROCEDURE — 94667 MNPJ CHEST WALL 1ST: CPT

## 2021-11-07 PROCEDURE — 94640 AIRWAY INHALATION TREATMENT: CPT

## 2021-11-07 PROCEDURE — 10004281 HB COUNTER-STAFF TIME PER 15 MIN

## 2021-11-07 PROCEDURE — 10002803 HB RX 637: Performed by: STUDENT IN AN ORGANIZED HEALTH CARE EDUCATION/TRAINING PROGRAM

## 2021-11-07 PROCEDURE — 99239 HOSP IP/OBS DSCHRG MGMT >30: CPT | Performed by: INTERNAL MEDICINE

## 2021-11-07 PROCEDURE — 10002803 HB RX 637: Performed by: INTERNAL MEDICINE

## 2021-11-07 PROCEDURE — 99232 SBSQ HOSP IP/OBS MODERATE 35: CPT | Performed by: NURSE PRACTITIONER

## 2021-11-07 PROCEDURE — 36415 COLL VENOUS BLD VENIPUNCTURE: CPT | Performed by: INTERNAL MEDICINE

## 2021-11-07 PROCEDURE — 80048 BASIC METABOLIC PNL TOTAL CA: CPT | Performed by: INTERNAL MEDICINE

## 2021-11-07 PROCEDURE — 10004651 HB RX, NO CHARGE ITEM: Performed by: STUDENT IN AN ORGANIZED HEALTH CARE EDUCATION/TRAINING PROGRAM

## 2021-11-07 PROCEDURE — 13003289 HB OXYGEN THERAPY DAILY

## 2021-11-07 PROCEDURE — 10002800 HB RX 250 W HCPCS: Performed by: STUDENT IN AN ORGANIZED HEALTH CARE EDUCATION/TRAINING PROGRAM

## 2021-11-07 PROCEDURE — 10002019 HB COUNTER RESP ASSESSMENT

## 2021-11-07 RX ORDER — POTASSIUM CHLORIDE 20 MEQ/1
40 TABLET, EXTENDED RELEASE ORAL ONCE
Status: COMPLETED | OUTPATIENT
Start: 2021-11-07 | End: 2021-11-07

## 2021-11-07 RX ORDER — TORSEMIDE 20 MG/1
TABLET ORAL
Qty: 60 TABLET | Refills: 0 | Status: SHIPPED | OUTPATIENT
Start: 2021-11-07 | End: 2022-01-17 | Stop reason: SDUPTHER

## 2021-11-07 RX ADMIN — ACETAMINOPHEN 650 MG: 325 TABLET, FILM COATED ORAL at 05:18

## 2021-11-07 RX ADMIN — FLUTICASONE FUROATE, UMECLIDINIUM BROMIDE AND VILANTEROL TRIFENATATE 1 PUFF: 100; 62.5; 25 POWDER RESPIRATORY (INHALATION) at 08:25

## 2021-11-07 RX ADMIN — Medication 1000 MCG: at 10:13

## 2021-11-07 RX ADMIN — LEVOTHYROXINE SODIUM 25 MCG: 50 TABLET ORAL at 05:18

## 2021-11-07 RX ADMIN — EMPAGLIFLOZIN 10 MG: 10 TABLET, FILM COATED ORAL at 05:18

## 2021-11-07 RX ADMIN — POTASSIUM CHLORIDE 40 MEQ: 1500 TABLET, EXTENDED RELEASE ORAL at 10:13

## 2021-11-07 RX ADMIN — SODIUM CHLORIDE, PRESERVATIVE FREE 2 ML: 5 INJECTION INTRAVENOUS at 10:21

## 2021-11-07 RX ADMIN — INSULIN GLARGINE 30 UNITS: 100 INJECTION, SOLUTION SUBCUTANEOUS at 10:18

## 2021-11-07 RX ADMIN — LOSARTAN POTASSIUM 25 MG: 50 TABLET, FILM COATED ORAL at 10:11

## 2021-11-07 RX ADMIN — Medication 400 MG: at 10:12

## 2021-11-07 RX ADMIN — METOPROLOL SUCCINATE 12.5 MG: 25 TABLET, EXTENDED RELEASE ORAL at 10:12

## 2021-11-07 RX ADMIN — TORSEMIDE 40 MG: 20 TABLET ORAL at 10:11

## 2021-11-07 RX ADMIN — OXCARBAZEPINE 300 MG: 300 TABLET, FILM COATED ORAL at 10:12

## 2021-11-07 RX ADMIN — SPIRONOLACTONE 25 MG: 25 TABLET, FILM COATED ORAL at 10:12

## 2021-11-07 ASSESSMENT — PULMONARY FUNCTION TESTS
FEV1: 0
FEV1: 0
FEV1/FVC: UNABLE TO OBTAIN, OR GREATER THAN 70%
FEV1: 0
FEV1_PREDICTED: 0

## 2021-11-07 ASSESSMENT — PAIN SCALES - GENERAL
PAINLEVEL_OUTOF10: 0
PAINLEVEL_OUTOF10: 4

## 2021-11-09 NOTE — TELEPHONE ENCOUNTER
Please have patient do the urine test as ordered 1-2 days prior to next visit on 11/16 and will discuss the kidney biopsy at that time

## 2021-11-10 ENCOUNTER — TELEPHONE (OUTPATIENT)
Dept: CARDIOLOGY | Age: 66
End: 2021-11-10

## 2021-11-11 ENCOUNTER — OFFICE VISIT (OUTPATIENT)
Dept: CARDIOLOGY | Age: 66
End: 2021-11-11
Attending: NURSE PRACTITIONER

## 2021-11-11 VITALS
DIASTOLIC BLOOD PRESSURE: 64 MMHG | WEIGHT: 194 LBS | HEIGHT: 64 IN | BODY MASS INDEX: 33.12 KG/M2 | HEART RATE: 80 BPM | SYSTOLIC BLOOD PRESSURE: 136 MMHG | RESPIRATION RATE: 18 BRPM

## 2021-11-11 DIAGNOSIS — I89.0 LYMPHEDEMA: ICD-10-CM

## 2021-11-11 DIAGNOSIS — R60.0 LEG EDEMA: ICD-10-CM

## 2021-11-11 DIAGNOSIS — R06.09 DYSPNEA ON EXERTION: ICD-10-CM

## 2021-11-11 DIAGNOSIS — N18.9 CHRONIC KIDNEY DISEASE, UNSPECIFIED CKD STAGE: ICD-10-CM

## 2021-11-11 DIAGNOSIS — I50.9 ACUTE ON CHRONIC HEART FAILURE, UNSPECIFIED HEART FAILURE TYPE (CMD): Primary | ICD-10-CM

## 2021-11-11 LAB
ALBUMIN SERPL-MCNC: 2.7 G/DL (ref 3.6–5.1)
ALBUMIN/GLOB SERPL: 0.6 {RATIO} (ref 1–2.4)
ALP SERPL-CCNC: 122 UNITS/L (ref 45–117)
ALT SERPL-CCNC: 37 UNITS/L
ANION GAP SERPL CALC-SCNC: 8 MMOL/L (ref 10–20)
AST SERPL-CCNC: 21 UNITS/L
BILIRUB SERPL-MCNC: 0.3 MG/DL (ref 0.2–1)
BUN SERPL-MCNC: 73 MG/DL (ref 6–20)
BUN/CREAT SERPL: 53 (ref 7–25)
CALCIUM SERPL-MCNC: 8.8 MG/DL (ref 8.4–10.2)
CHLORIDE SERPL-SCNC: 100 MMOL/L (ref 98–107)
CO2 SERPL-SCNC: 30 MMOL/L (ref 21–32)
CREAT SERPL-MCNC: 1.39 MG/DL (ref 0.51–0.95)
FASTING DURATION TIME PATIENT: ABNORMAL H
GFR SERPLBLD BASED ON 1.73 SQ M-ARVRAT: 40 ML/MIN
GLOBULIN SER-MCNC: 4.3 G/DL (ref 2–4)
GLUCOSE SERPL-MCNC: 178 MG/DL (ref 70–99)
NT-PROBNP SERPL-MCNC: 163 PG/ML
POTASSIUM SERPL-SCNC: 3.8 MMOL/L (ref 3.4–5.1)
PROT SERPL-MCNC: 7 G/DL (ref 6.4–8.2)
SODIUM SERPL-SCNC: 134 MMOL/L (ref 135–145)

## 2021-11-11 PROCEDURE — 36415 COLL VENOUS BLD VENIPUNCTURE: CPT | Performed by: NURSE PRACTITIONER

## 2021-11-11 PROCEDURE — 99214 OFFICE O/P EST MOD 30 MIN: CPT | Performed by: NURSE PRACTITIONER

## 2021-11-11 PROCEDURE — 83880 ASSAY OF NATRIURETIC PEPTIDE: CPT | Performed by: NURSE PRACTITIONER

## 2021-11-11 PROCEDURE — 80053 COMPREHEN METABOLIC PANEL: CPT | Performed by: NURSE PRACTITIONER

## 2021-11-12 ENCOUNTER — TELEPHONE (OUTPATIENT)
Dept: CARDIOLOGY | Age: 66
End: 2021-11-12

## 2021-11-12 DIAGNOSIS — J30.1 ALLERGIC RHINITIS DUE TO POLLEN, UNSPECIFIED SEASONALITY: ICD-10-CM

## 2021-11-12 RX ORDER — MONTELUKAST SODIUM 10 MG/1
10 TABLET ORAL NIGHTLY
Qty: 90 TABLET | Refills: 1 | Status: SHIPPED | OUTPATIENT
Start: 2021-11-12 | End: 2023-08-05

## 2021-11-12 RX ORDER — LOSARTAN POTASSIUM 25 MG/1
25 TABLET ORAL 2 TIMES DAILY
Qty: 60 TABLET | Refills: 3 | Status: SHIPPED | OUTPATIENT
Start: 2021-11-12 | End: 2022-02-08 | Stop reason: SDUPTHER

## 2021-11-12 NOTE — TELEPHONE ENCOUNTER
Refill passed per [place] [facility] protocol.   Requested Prescriptions   Pending Prescriptions Disp Refills    MONTELUKAST 10 MG Oral Tab [Pharmacy Med Name: MONTELUKAST 10MG TABLETS] 90 tablet 1     Sig: TAKE 1 TABLET(10 MG) BY MOUTH EVERY NIGHT        Asthma & COPD Medication Protocol Passed - 11/12/2021 11:19 AM        Passed - Appointment in past 6 or next 3 months            Future Appointments         Provider Department Appt Notes    In 1 week Alisa Navarro MD [facility], [address] [facility], [address] Review of kidney problem and schedule kidney biopsy    In 2 months Dyan Prieto MD [facility], [facility] 84 4 months    In 2 months Thanh Owens MD [facility], [address], [address] Review all my medical conditions          Recent Outpatient Visits              1 month ago Aleksandra Hansen annual wellness visit, initial    [facility], [address], Estefani Stearns MD    Office Visit    1 month ago Non-nephrotic range proteinuria    [facility], [address] [facility], Dyan Mccall MD    Office Visit    3 months ago ANA (obstructive sleep apnea)    [facility]    Office Visit    4 months ago Primary osteoarthritis of right hip    [facility] Eamon Woods DO    Office Visit    4 months ago Tingling of both upper extremities    [facility], [address] [place] [address] Visit

## 2021-11-16 ENCOUNTER — TELEPHONE (OUTPATIENT)
Dept: CARDIOLOGY | Age: 66
End: 2021-11-16

## 2021-11-17 ENCOUNTER — TELEPHONE (OUTPATIENT)
Dept: NEPHROLOGY | Facility: CLINIC | Age: 66
End: 2021-11-17

## 2021-11-17 ENCOUNTER — TELEPHONE (OUTPATIENT)
Dept: CARDIOLOGY | Age: 66
End: 2021-11-17

## 2021-11-17 NOTE — TELEPHONE ENCOUNTER
Pt is requesting for the urine test order to be sent to [facility] pt is going to complete the order there for upcoming apt.  Please fax # [phone]

## 2021-11-17 NOTE — TELEPHONE ENCOUNTER
Spoke with patient regarding message below. Patient advised that we can not send over labs to another facility as Dr. Christianne Cheney does not have privileges there.  Patient states she will just make an additional trip to one of our labs to do her urine testing p

## 2021-11-18 ENCOUNTER — OFFICE VISIT (OUTPATIENT)
Dept: CARDIOLOGY | Age: 66
End: 2021-11-18
Attending: INTERNAL MEDICINE

## 2021-11-18 ENCOUNTER — LAB ENCOUNTER (OUTPATIENT)
Dept: LAB | Age: 66
End: 2021-11-18
Attending: INTERNAL MEDICINE
Payer: MEDICARE

## 2021-11-18 VITALS
WEIGHT: 198.85 LBS | HEART RATE: 72 BPM | SYSTOLIC BLOOD PRESSURE: 132 MMHG | DIASTOLIC BLOOD PRESSURE: 70 MMHG | RESPIRATION RATE: 20 BRPM | BODY MASS INDEX: 34.13 KG/M2

## 2021-11-18 DIAGNOSIS — N05.1 FSGS (FOCAL SEGMENTAL GLOMERULOSCLEROSIS): ICD-10-CM

## 2021-11-18 DIAGNOSIS — N18.9 CHRONIC KIDNEY DISEASE, UNSPECIFIED CKD STAGE: ICD-10-CM

## 2021-11-18 DIAGNOSIS — R06.09 DYSPNEA ON EXERTION: ICD-10-CM

## 2021-11-18 DIAGNOSIS — I50.33 ACUTE ON CHRONIC HEART FAILURE WITH PRESERVED EJECTION FRACTION (CMD): Primary | ICD-10-CM

## 2021-11-18 DIAGNOSIS — N18.30 STAGE 3 CHRONIC KIDNEY DISEASE, UNSPECIFIED WHETHER STAGE 3A OR 3B CKD (HCC): ICD-10-CM

## 2021-11-18 DIAGNOSIS — R60.0 LEG EDEMA: ICD-10-CM

## 2021-11-18 DIAGNOSIS — I89.0 LYMPHEDEMA: ICD-10-CM

## 2021-11-18 LAB
ALBUMIN SERPL-MCNC: 2.7 G/DL (ref 3.6–5.1)
ALBUMIN/GLOB SERPL: 0.7 {RATIO} (ref 1–2.4)
ALP SERPL-CCNC: 118 UNITS/L (ref 45–117)
ALT SERPL-CCNC: 32 UNITS/L
ANION GAP SERPL CALC-SCNC: 8 MMOL/L (ref 10–20)
AST SERPL-CCNC: 17 UNITS/L
BILIRUB SERPL-MCNC: 0.2 MG/DL (ref 0.2–1)
BUN SERPL-MCNC: 61 MG/DL (ref 6–20)
BUN/CREAT SERPL: 41 (ref 7–25)
CALCIUM SERPL-MCNC: 8.6 MG/DL (ref 8.4–10.2)
CHLORIDE SERPL-SCNC: 102 MMOL/L (ref 98–107)
CO2 SERPL-SCNC: 31 MMOL/L (ref 21–32)
CREAT SERPL-MCNC: 1.49 MG/DL (ref 0.51–0.95)
FASTING DURATION TIME PATIENT: ABNORMAL H
GFR SERPLBLD BASED ON 1.73 SQ M-ARVRAT: 36 ML/MIN
GLOBULIN SER-MCNC: 4.1 G/DL (ref 2–4)
GLUCOSE SERPL-MCNC: 99 MG/DL (ref 70–99)
NT-PROBNP SERPL-MCNC: 355 PG/ML
POTASSIUM SERPL-SCNC: 3.7 MMOL/L (ref 3.4–5.1)
PROT SERPL-MCNC: 6.8 G/DL (ref 6.4–8.2)
SODIUM SERPL-SCNC: 137 MMOL/L (ref 135–145)

## 2021-11-18 PROCEDURE — 82570 ASSAY OF URINE CREATININE: CPT

## 2021-11-18 PROCEDURE — 80053 COMPREHEN METABOLIC PANEL: CPT | Performed by: NURSE PRACTITIONER

## 2021-11-18 PROCEDURE — 99214 OFFICE O/P EST MOD 30 MIN: CPT | Performed by: NURSE PRACTITIONER

## 2021-11-18 PROCEDURE — 84156 ASSAY OF PROTEIN URINE: CPT

## 2021-11-18 PROCEDURE — 82043 UR ALBUMIN QUANTITATIVE: CPT

## 2021-11-18 PROCEDURE — 36415 COLL VENOUS BLD VENIPUNCTURE: CPT | Performed by: NURSE PRACTITIONER

## 2021-11-18 PROCEDURE — 83880 ASSAY OF NATRIURETIC PEPTIDE: CPT | Performed by: NURSE PRACTITIONER

## 2021-11-19 ENCOUNTER — TELEPHONE (OUTPATIENT)
Dept: CARDIOLOGY | Age: 66
End: 2021-11-19

## 2021-11-19 ENCOUNTER — OFFICE VISIT (OUTPATIENT)
Dept: NEPHROLOGY | Facility: CLINIC | Age: 66
End: 2021-11-19
Payer: MEDICARE

## 2021-11-19 VITALS
WEIGHT: 200 LBS | DIASTOLIC BLOOD PRESSURE: 66 MMHG | SYSTOLIC BLOOD PRESSURE: 147 MMHG | HEART RATE: 73 BPM | BODY MASS INDEX: 34.15 KG/M2 | HEIGHT: 64 IN

## 2021-11-19 DIAGNOSIS — N05.1 FSGS (FOCAL SEGMENTAL GLOMERULOSCLEROSIS): Primary | ICD-10-CM

## 2021-11-19 DIAGNOSIS — N18.30 STAGE 3 CHRONIC KIDNEY DISEASE, UNSPECIFIED WHETHER STAGE 3A OR 3B CKD (HCC): ICD-10-CM

## 2021-11-19 DIAGNOSIS — N17.9 AKI (ACUTE KIDNEY INJURY) (HCC): ICD-10-CM

## 2021-11-19 DIAGNOSIS — R80.9 NEPHROTIC RANGE PROTEINURIA: ICD-10-CM

## 2021-11-19 PROCEDURE — 99215 OFFICE O/P EST HI 40 MIN: CPT | Performed by: INTERNAL MEDICINE

## 2021-11-19 NOTE — PROGRESS NOTES
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 (Patient taking differently: Take 1 mg by mouth 3 (three) times daily as needed.) 270 tablet 1   • torsemide 20 MG Oral Tab Take 20 mg by mouth daily. thigh pain -  Neurological:  difficulty walking long distance for pain   Psychiatric:  Negative for inappropriate interaction        11/19/21  1044   BP: 147/66   Pulse: 73       PHYSICAL EXAM:     Constitutional: appears well hydrated alert and responsive team     3. HTN:  BP lowish in office.  Asymptomtic.  - goal is <130/80 mmhg  - following low sodium diet and hasn't smoked since Nov 1st - congratulated her   - losartan to 25 mg BID given   - spironolactone 25 mg daily  - monitor weights and leg swelling

## 2021-11-21 ENCOUNTER — LAB ENCOUNTER (OUTPATIENT)
Dept: LAB | Facility: HOSPITAL | Age: 66
End: 2021-11-21
Attending: INTERNAL MEDICINE
Payer: MEDICARE

## 2021-11-21 DIAGNOSIS — Z11.59 ENCOUNTER FOR SCREENING FOR OTHER VIRAL DISEASES: ICD-10-CM

## 2021-11-23 ENCOUNTER — HOSPITAL ENCOUNTER (OUTPATIENT)
Dept: CT IMAGING | Facility: HOSPITAL | Age: 66
Discharge: HOME | End: 2021-11-23
Attending: INTERNAL MEDICINE
Payer: MEDICARE

## 2021-11-23 VITALS
RESPIRATION RATE: 24 BRPM | OXYGEN SATURATION: 91 % | BODY MASS INDEX: 34.15 KG/M2 | SYSTOLIC BLOOD PRESSURE: 108 MMHG | HEART RATE: 74 BPM | WEIGHT: 200 LBS | DIASTOLIC BLOOD PRESSURE: 87 MMHG | HEIGHT: 64 IN

## 2021-11-23 DIAGNOSIS — R80.9 NEPHROTIC RANGE PROTEINURIA: ICD-10-CM

## 2021-11-23 DIAGNOSIS — Z79.4 TYPE 2 DIABETES MELLITUS WITH DIABETIC NEPHROPATHY, WITH LONG-TERM CURRENT USE OF INSULIN (HCC): ICD-10-CM

## 2021-11-23 DIAGNOSIS — N05.1 FSGS (FOCAL SEGMENTAL GLOMERULOSCLEROSIS): ICD-10-CM

## 2021-11-23 DIAGNOSIS — E11.21 TYPE 2 DIABETES MELLITUS WITH DIABETIC NEPHROPATHY, WITH LONG-TERM CURRENT USE OF INSULIN (HCC): ICD-10-CM

## 2021-11-23 DIAGNOSIS — N04.9 NEPHROTIC SYNDROME: Primary | ICD-10-CM

## 2021-11-23 PROCEDURE — 88348 ELECTRON MICROSCOPY DX: CPT | Performed by: RADIOLOGY

## 2021-11-23 PROCEDURE — 88305 TISSUE EXAM BY PATHOLOGIST: CPT | Performed by: RADIOLOGY

## 2021-11-23 PROCEDURE — 99152 MOD SED SAME PHYS/QHP 5/>YRS: CPT | Performed by: INTERNAL MEDICINE

## 2021-11-23 PROCEDURE — 77012 CT SCAN FOR NEEDLE BIOPSY: CPT | Performed by: INTERNAL MEDICINE

## 2021-11-23 PROCEDURE — 88346 IMFLUOR 1ST 1ANTB STAIN PX: CPT | Performed by: RADIOLOGY

## 2021-11-23 PROCEDURE — 88350 IMFLUOR EA ADDL 1ANTB STN PX: CPT | Performed by: RADIOLOGY

## 2021-11-23 PROCEDURE — 84443 ASSAY THYROID STIM HORMONE: CPT

## 2021-11-23 PROCEDURE — 36415 COLL VENOUS BLD VENIPUNCTURE: CPT

## 2021-11-23 PROCEDURE — 88300 SURGICAL PATH GROSS: CPT | Performed by: INTERNAL MEDICINE

## 2021-11-23 PROCEDURE — 88313 SPECIAL STAINS GROUP 2: CPT | Performed by: RADIOLOGY

## 2021-11-23 PROCEDURE — 50200 RENAL BIOPSY PERQ: CPT | Performed by: INTERNAL MEDICINE

## 2021-11-23 RX ORDER — FLUMAZENIL 0.1 MG/ML
0.2 INJECTION, SOLUTION INTRAVENOUS AS NEEDED
Status: DISCONTINUED | OUTPATIENT
Start: 2021-11-23 | End: 2021-11-25

## 2021-11-23 RX ORDER — MIDAZOLAM HYDROCHLORIDE 1 MG/ML
1 INJECTION INTRAMUSCULAR; INTRAVENOUS EVERY 5 MIN PRN
Status: DISCONTINUED | OUTPATIENT
Start: 2021-11-23 | End: 2021-11-25

## 2021-11-23 RX ORDER — NALOXONE HYDROCHLORIDE 0.4 MG/ML
80 INJECTION, SOLUTION INTRAMUSCULAR; INTRAVENOUS; SUBCUTANEOUS AS NEEDED
Status: DISCONTINUED | OUTPATIENT
Start: 2021-11-23 | End: 2021-11-25

## 2021-11-23 RX ORDER — MIDAZOLAM HYDROCHLORIDE 1 MG/ML
INJECTION INTRAMUSCULAR; INTRAVENOUS
Status: COMPLETED
Start: 2021-11-23 | End: 2021-11-23

## 2021-11-23 RX ORDER — SODIUM CHLORIDE 9 MG/ML
INJECTION, SOLUTION INTRAVENOUS CONTINUOUS
Status: DISCONTINUED | OUTPATIENT
Start: 2021-11-23 | End: 2021-11-25

## 2021-11-23 RX ADMIN — MIDAZOLAM HYDROCHLORIDE 1 MG: 1 INJECTION INTRAMUSCULAR; INTRAVENOUS at 10:45:00

## 2021-11-23 NOTE — H&P
History & Physical Examination    Patient Name: Peace Wing  MRN: [id number]  [facility]: [id number]  YOB: 1955    Diagnosis: Proteinuria    Present Illness: Multiple sclerosis, with worsening proteinuria    (Not in a hospital admission)    0.9% and benefits and alternatives with the patient/family. They understand and agree to proceed with plan of care. [ x ] I have reviewed the History and Physical done within the last 30 days. Any changes noted above.     Roger Barrera MD  11/23/2021  11:58 AM

## 2021-11-23 NOTE — IMAGING NOTE
SEE FLOWSHEET FOR RECOVERY VITALS. PT DISCHARGED WITH AVS AT 1515. DISCHARGE INSTRUCTIONS EXPLAINED TO PT AND , WITH NO QUESTIONS. WHEELED OUT BY THIS RN,  IS DRIVING HOME. SITE ASSESSED PRIOR TO DISCHARGE, C/D/I.

## 2021-11-24 ENCOUNTER — MED REC SCAN ONLY (OUTPATIENT)
Dept: PHYSICAL MEDICINE AND REHAB | Facility: CLINIC | Age: 66
End: 2021-11-24

## 2021-11-29 ENCOUNTER — TELEPHONE (OUTPATIENT)
Dept: CARDIOLOGY | Age: 66
End: 2021-11-29

## 2021-12-03 ENCOUNTER — LAB ENCOUNTER (OUTPATIENT)
Dept: LAB | Facility: HOSPITAL | Age: 66
End: 2021-12-03
Attending: INTERNAL MEDICINE
Payer: MEDICARE

## 2021-12-03 ENCOUNTER — OFFICE VISIT (OUTPATIENT)
Dept: NEPHROLOGY | Facility: CLINIC | Age: 66
End: 2021-12-03
Payer: MEDICARE

## 2021-12-03 VITALS
BODY MASS INDEX: 33.8 KG/M2 | DIASTOLIC BLOOD PRESSURE: 64 MMHG | SYSTOLIC BLOOD PRESSURE: 120 MMHG | HEART RATE: 66 BPM | HEIGHT: 64 IN | WEIGHT: 198 LBS

## 2021-12-03 DIAGNOSIS — N05.1 FSGS (FOCAL SEGMENTAL GLOMERULOSCLEROSIS): Primary | ICD-10-CM

## 2021-12-03 DIAGNOSIS — R80.9 NEPHROTIC RANGE PROTEINURIA: ICD-10-CM

## 2021-12-03 DIAGNOSIS — N05.1 FSGS (FOCAL SEGMENTAL GLOMERULOSCLEROSIS): ICD-10-CM

## 2021-12-03 DIAGNOSIS — N17.9 AKI (ACUTE KIDNEY INJURY) (HCC): ICD-10-CM

## 2021-12-03 PROCEDURE — 80069 RENAL FUNCTION PANEL: CPT

## 2021-12-03 PROCEDURE — 82570 ASSAY OF URINE CREATININE: CPT

## 2021-12-03 PROCEDURE — 82043 UR ALBUMIN QUANTITATIVE: CPT

## 2021-12-03 PROCEDURE — 84156 ASSAY OF PROTEIN URINE: CPT

## 2021-12-03 PROCEDURE — 36415 COLL VENOUS BLD VENIPUNCTURE: CPT

## 2021-12-03 PROCEDURE — 81001 URINALYSIS AUTO W/SCOPE: CPT

## 2021-12-03 PROCEDURE — 99215 OFFICE O/P EST HI 40 MIN: CPT | Performed by: INTERNAL MEDICINE

## 2021-12-03 NOTE — PROGRESS NOTES
Progress Note     Perla Galindo is a 77 yrs old female with pmh of HL, multiple sclerosis (35 yrs), restless leg syndrome, back pain, nephrolithiasis x 3, tobacco abuse, diverticulitis, HFpEF and pulmonary hypertension who presented today for follow u Calculus of kidney    • COPD (chronic obstructive pulmonary disease) ([facility])     O2 at noc 3.5 L   • Diabetes ([address]     induced by steroids   • Disorder of thyroid    • Esophageal reflux    • Essential hypertension    • High blood pressure    • High choleste at night) 180 tablet 2   • SALINE MIST SPRAY NA by Nasal route daily.      • Insulin Lispro (HUMALOG KWIKPEN) 200 UNIT/ML Subcutaneous Solution Pen-injector 5 units subcutaneously with every meal (Patient taking differently: 8 units subcutaneously with ever activity, fever, irritability and lethargy  Cardiovascular:  Negative for chest pain, sob  Respiratory: no sob or cp  Gastrointestinal:  Negative for abdominal pain, constipation  Genitourinary:  Negative for dysuria and hematuria  Hema/Lymph:  Negative fo repeating proteinuria   - cont. vitamin D 2000 units daily - history of OA and DJD  - negative VANESSA, ANCA and Hepatitis B and C and HIV non reactive. - C3 and C4 within range.  CBC normal H/H and coags wnl  - RF wnl and ESR 23  - SPEP/DARRYL negative for monoc

## 2021-12-06 ENCOUNTER — PATIENT MESSAGE (OUTPATIENT)
Dept: NEPHROLOGY | Facility: CLINIC | Age: 66
End: 2021-12-06

## 2021-12-06 ENCOUNTER — TELEPHONE (OUTPATIENT)
Dept: CARDIOLOGY | Age: 66
End: 2021-12-06

## 2021-12-06 DIAGNOSIS — N05.1 FSGS (FOCAL SEGMENTAL GLOMERULOSCLEROSIS): ICD-10-CM

## 2021-12-06 DIAGNOSIS — R80.9 NEPHROTIC RANGE PROTEINURIA: Primary | ICD-10-CM

## 2021-12-06 RX ORDER — TACROLIMUS 1 MG/1
1 CAPSULE ORAL 2 TIMES DAILY
Qty: 60 CAPSULE | Refills: 0 | Status: SHIPPED | OUTPATIENT
Start: 2021-12-06

## 2021-12-06 NOTE — TELEPHONE ENCOUNTER
Urine still has lot of protein - > 5 gm    As discussed at last office visit - will start you on tacrolimus 1 mg twice a day     Medication ordered     Please do the lab test after one week - tacrolimus trough has to be 12 hrs ordered

## 2021-12-06 NOTE — TELEPHONE ENCOUNTER
From: Kasey Zapata  To: Leonel Rodriguez MD  Sent: 12/6/2021 12:52 PM CST  Subject: Ely Retana asked that I remind her to review the test results I had last Friday.

## 2021-12-07 ENCOUNTER — OFFICE VISIT (OUTPATIENT)
Dept: CARDIOLOGY | Age: 66
End: 2021-12-07
Attending: NURSE PRACTITIONER

## 2021-12-07 VITALS
HEART RATE: 65 BPM | SYSTOLIC BLOOD PRESSURE: 122 MMHG | WEIGHT: 195.88 LBS | BODY MASS INDEX: 33.62 KG/M2 | DIASTOLIC BLOOD PRESSURE: 70 MMHG

## 2021-12-07 DIAGNOSIS — I89.0 LYMPHEDEMA: ICD-10-CM

## 2021-12-07 DIAGNOSIS — R60.0 LEG EDEMA: ICD-10-CM

## 2021-12-07 DIAGNOSIS — I50.33 ACUTE ON CHRONIC HEART FAILURE WITH PRESERVED EJECTION FRACTION (CMD): Primary | ICD-10-CM

## 2021-12-07 DIAGNOSIS — E87.6 HYPOKALEMIA: ICD-10-CM

## 2021-12-07 DIAGNOSIS — N18.9 CHRONIC KIDNEY DISEASE, UNSPECIFIED CKD STAGE: ICD-10-CM

## 2021-12-07 LAB
ANION GAP SERPL CALC-SCNC: 9 MMOL/L (ref 10–20)
BUN SERPL-MCNC: 64 MG/DL (ref 6–20)
BUN/CREAT SERPL: 53 (ref 7–25)
CALCIUM SERPL-MCNC: 9.1 MG/DL (ref 8.4–10.2)
CHLORIDE SERPL-SCNC: 98 MMOL/L (ref 98–107)
CO2 SERPL-SCNC: 29 MMOL/L (ref 21–32)
CREAT SERPL-MCNC: 1.2 MG/DL (ref 0.51–0.95)
FASTING DURATION TIME PATIENT: ABNORMAL H
GFR SERPLBLD BASED ON 1.73 SQ M-ARVRAT: 47 ML/MIN
GLUCOSE SERPL-MCNC: 158 MG/DL (ref 70–99)
NT-PROBNP SERPL-MCNC: 200 PG/ML
POTASSIUM SERPL-SCNC: 3.4 MMOL/L (ref 3.4–5.1)
SODIUM SERPL-SCNC: 133 MMOL/L (ref 135–145)

## 2021-12-07 PROCEDURE — 10002803 HB RX 637: Performed by: INTERNAL MEDICINE

## 2021-12-07 PROCEDURE — 99214 OFFICE O/P EST MOD 30 MIN: CPT | Performed by: NURSE PRACTITIONER

## 2021-12-07 PROCEDURE — 36415 COLL VENOUS BLD VENIPUNCTURE: CPT | Performed by: NURSE PRACTITIONER

## 2021-12-07 PROCEDURE — 99211 OFF/OP EST MAY X REQ PHY/QHP: CPT

## 2021-12-07 PROCEDURE — 83880 ASSAY OF NATRIURETIC PEPTIDE: CPT | Performed by: NURSE PRACTITIONER

## 2021-12-07 PROCEDURE — 80048 BASIC METABOLIC PNL TOTAL CA: CPT | Performed by: NURSE PRACTITIONER

## 2021-12-07 RX ORDER — POTASSIUM CHLORIDE 20 MEQ/1
20 TABLET, EXTENDED RELEASE ORAL DAILY
Qty: 90 TABLET | Refills: 3 | Status: SHIPPED | OUTPATIENT
Start: 2021-12-07 | End: 2021-12-15 | Stop reason: DRUGHIGH

## 2021-12-07 RX ORDER — MELATONIN 200 MCG
3 TABLET ORAL AT BEDTIME
COMMUNITY
End: 2022-01-01

## 2021-12-07 RX ORDER — METOLAZONE 2.5 MG/1
2.5 TABLET ORAL
Status: ON HOLD | COMMUNITY
End: 2022-01-01 | Stop reason: HOSPADM

## 2021-12-07 RX ORDER — POTASSIUM CHLORIDE 20 MEQ/1
40 TABLET, EXTENDED RELEASE ORAL ONCE
Status: COMPLETED | OUTPATIENT
Start: 2021-12-07 | End: 2021-12-07

## 2021-12-07 RX ADMIN — POTASSIUM CHLORIDE 40 MEQ: 1500 TABLET, EXTENDED RELEASE ORAL at 12:42

## 2021-12-15 ENCOUNTER — OFFICE VISIT (OUTPATIENT)
Dept: CARDIOLOGY | Age: 66
End: 2021-12-15
Attending: NURSE PRACTITIONER

## 2021-12-15 VITALS
BODY MASS INDEX: 33.76 KG/M2 | SYSTOLIC BLOOD PRESSURE: 104 MMHG | HEART RATE: 78 BPM | WEIGHT: 196.65 LBS | DIASTOLIC BLOOD PRESSURE: 50 MMHG | RESPIRATION RATE: 20 BRPM

## 2021-12-15 DIAGNOSIS — I89.0 LYMPHEDEMA: ICD-10-CM

## 2021-12-15 DIAGNOSIS — E87.6 HYPOKALEMIA: ICD-10-CM

## 2021-12-15 DIAGNOSIS — N18.9 CHRONIC KIDNEY DISEASE, UNSPECIFIED CKD STAGE: ICD-10-CM

## 2021-12-15 DIAGNOSIS — R06.09 DYSPNEA ON EXERTION: ICD-10-CM

## 2021-12-15 DIAGNOSIS — R60.0 LEG EDEMA: ICD-10-CM

## 2021-12-15 DIAGNOSIS — I50.33 ACUTE ON CHRONIC HEART FAILURE WITH PRESERVED EJECTION FRACTION (CMD): Primary | ICD-10-CM

## 2021-12-15 LAB
ANION GAP SERPL CALC-SCNC: 7 MMOL/L (ref 10–20)
BUN SERPL-MCNC: 71 MG/DL (ref 6–20)
BUN/CREAT SERPL: 53 (ref 7–25)
CALCIUM SERPL-MCNC: 8.6 MG/DL (ref 8.4–10.2)
CHLORIDE SERPL-SCNC: 99 MMOL/L (ref 98–107)
CO2 SERPL-SCNC: 33 MMOL/L (ref 21–32)
CREAT SERPL-MCNC: 1.35 MG/DL (ref 0.51–0.95)
FASTING DURATION TIME PATIENT: ABNORMAL H
GFR SERPLBLD BASED ON 1.73 SQ M-ARVRAT: 41 ML/MIN
GLUCOSE SERPL-MCNC: 125 MG/DL (ref 70–99)
NT-PROBNP SERPL-MCNC: 362 PG/ML
POTASSIUM SERPL-SCNC: 3.4 MMOL/L (ref 3.4–5.1)
SODIUM SERPL-SCNC: 136 MMOL/L (ref 135–145)

## 2021-12-15 PROCEDURE — 10002803 HB RX 637: Performed by: INTERNAL MEDICINE

## 2021-12-15 PROCEDURE — 99212 OFFICE O/P EST SF 10 MIN: CPT

## 2021-12-15 PROCEDURE — 80048 BASIC METABOLIC PNL TOTAL CA: CPT | Performed by: INTERNAL MEDICINE

## 2021-12-15 PROCEDURE — 36415 COLL VENOUS BLD VENIPUNCTURE: CPT | Performed by: INTERNAL MEDICINE

## 2021-12-15 PROCEDURE — 99214 OFFICE O/P EST MOD 30 MIN: CPT | Performed by: NURSE PRACTITIONER

## 2021-12-15 PROCEDURE — 83880 ASSAY OF NATRIURETIC PEPTIDE: CPT | Performed by: INTERNAL MEDICINE

## 2021-12-15 RX ORDER — POTASSIUM CHLORIDE 20 MEQ/1
20 TABLET, EXTENDED RELEASE ORAL 2 TIMES DAILY
Qty: 90 TABLET | Refills: 3 | Status: SHIPPED | OUTPATIENT
Start: 2021-12-15 | End: 2022-01-14

## 2021-12-15 RX ORDER — SPIRONOLACTONE 25 MG/1
25 TABLET ORAL 2 TIMES DAILY
Qty: 90 TABLET | Refills: 3 | Status: SHIPPED | OUTPATIENT
Start: 2021-12-15 | End: 2022-01-17 | Stop reason: SDUPTHER

## 2021-12-15 RX ORDER — POTASSIUM CHLORIDE 20 MEQ/1
40 TABLET, EXTENDED RELEASE ORAL ONCE
Status: COMPLETED | OUTPATIENT
Start: 2021-12-15 | End: 2021-12-15

## 2021-12-15 RX ADMIN — POTASSIUM CHLORIDE 40 MEQ: 1500 TABLET, EXTENDED RELEASE ORAL at 15:47

## 2021-12-15 NOTE — TELEPHONE ENCOUNTER
Medication pended due to warning      Refill passed per [facility] protocol.   Requested Prescriptions   Pending Prescriptions Disp Refills    PRAMIPEXOLE DIHYDROCHLORIDE 1 MG Oral Tab [Pharmacy Med Name: PRAMIPEXOLE 1MG TABLETS] 270 tablet 1     Sig:

## 2021-12-17 RX ORDER — PRAMIPEXOLE DIHYDROCHLORIDE 1 MG/1
1 TABLET ORAL 3 TIMES DAILY
Qty: 270 TABLET | Refills: 1 | Status: SHIPPED | OUTPATIENT
Start: 2021-12-17

## 2021-12-17 RX ORDER — TRAZODONE HYDROCHLORIDE 100 MG/1
TABLET ORAL
Qty: 90 TABLET | Refills: 1 | Status: SHIPPED | OUTPATIENT
Start: 2021-12-17

## 2021-12-21 RX ORDER — MULTIVIT,TX WITH IRON,MINERALS
250 TABLET, EXTENDED RELEASE ORAL DAILY
COMMUNITY
End: 2022-01-14 | Stop reason: SDUPTHER

## 2021-12-21 RX ORDER — POLYETHYLENE GLYCOL 3350 17 G
2-4 POWDER IN PACKET (EA) ORAL DAILY PRN
Status: ON HOLD | COMMUNITY
End: 2023-01-01 | Stop reason: HOSPADM

## 2021-12-21 SDOH — HEALTH STABILITY: PHYSICAL HEALTH: ON AVERAGE, HOW MANY DAYS PER WEEK DO YOU ENGAGE IN MODERATE TO STRENUOUS EXERCISE (LIKE A BRISK WALK)?: 0 DAYS

## 2021-12-21 SDOH — HEALTH STABILITY: PHYSICAL HEALTH: ON AVERAGE, HOW MANY MINUTES DO YOU ENGAGE IN EXERCISE AT THIS LEVEL?: 0 MIN

## 2021-12-21 ASSESSMENT — PATIENT HEALTH QUESTIONNAIRE - PHQ9
SUM OF ALL RESPONSES TO PHQ9 QUESTIONS 1 AND 2: 0
1. LITTLE INTEREST OR PLEASURE IN DOING THINGS: NOT AT ALL
CLINICAL INTERPRETATION OF PHQ2 SCORE: NO FURTHER SCREENING NEEDED
SUM OF ALL RESPONSES TO PHQ9 QUESTIONS 1 AND 2: 0
2. FEELING DOWN, DEPRESSED OR HOPELESS: NOT AT ALL

## 2021-12-23 ENCOUNTER — LAB SERVICES (OUTPATIENT)
Dept: LAB | Age: 66
End: 2021-12-23

## 2021-12-23 ENCOUNTER — OFFICE VISIT (OUTPATIENT)
Dept: CARDIOLOGY | Age: 66
End: 2021-12-23

## 2021-12-23 VITALS
HEIGHT: 64 IN | WEIGHT: 196.3 LBS | SYSTOLIC BLOOD PRESSURE: 130 MMHG | BODY MASS INDEX: 33.51 KG/M2 | DIASTOLIC BLOOD PRESSURE: 62 MMHG | HEART RATE: 72 BPM

## 2021-12-23 DIAGNOSIS — I27.20 PULMONARY HYPERTENSION (CMD): ICD-10-CM

## 2021-12-23 DIAGNOSIS — E78.00 HYPERCHOLESTEREMIA: ICD-10-CM

## 2021-12-23 DIAGNOSIS — I10 ESSENTIAL HYPERTENSION: ICD-10-CM

## 2021-12-23 DIAGNOSIS — E11.69 TYPE 2 DIABETES MELLITUS WITH OTHER SPECIFIED COMPLICATION, WITH LONG-TERM CURRENT USE OF INSULIN (CMD): ICD-10-CM

## 2021-12-23 DIAGNOSIS — Z79.4 TYPE 2 DIABETES MELLITUS WITH OTHER SPECIFIED COMPLICATION, WITH LONG-TERM CURRENT USE OF INSULIN (CMD): ICD-10-CM

## 2021-12-23 DIAGNOSIS — I73.9 PVD (PERIPHERAL VASCULAR DISEASE) (CMD): Primary | ICD-10-CM

## 2021-12-23 DIAGNOSIS — I50.33 ACUTE ON CHRONIC HEART FAILURE WITH PRESERVED EJECTION FRACTION (CMD): ICD-10-CM

## 2021-12-23 LAB
ALBUMIN SERPL-MCNC: 2.5 G/DL (ref 3.6–5.1)
ALBUMIN/GLOB SERPL: 0.6 {RATIO} (ref 1–2.4)
ALP SERPL-CCNC: 134 UNITS/L (ref 45–117)
ALT SERPL-CCNC: 29 UNITS/L
ANION GAP SERPL CALC-SCNC: 10 MMOL/L (ref 10–20)
AST SERPL-CCNC: 12 UNITS/L
BILIRUB SERPL-MCNC: 0.2 MG/DL (ref 0.2–1)
BUN SERPL-MCNC: 68 MG/DL (ref 6–20)
BUN/CREAT SERPL: 50 (ref 7–25)
CALCIUM SERPL-MCNC: 8.9 MG/DL (ref 8.4–10.2)
CHLORIDE SERPL-SCNC: 100 MMOL/L (ref 98–107)
CO2 SERPL-SCNC: 31 MMOL/L (ref 21–32)
CREAT SERPL-MCNC: 1.36 MG/DL (ref 0.51–0.95)
FASTING DURATION TIME PATIENT: 0 HOURS (ref 0–999)
GFR SERPLBLD BASED ON 1.73 SQ M-ARVRAT: 41 ML/MIN
GLOBULIN SER-MCNC: 3.9 G/DL (ref 2–4)
GLUCOSE SERPL-MCNC: 148 MG/DL (ref 70–99)
POTASSIUM SERPL-SCNC: 3.7 MMOL/L (ref 3.4–5.1)
PROT SERPL-MCNC: 6.4 G/DL (ref 6.4–8.2)
SODIUM SERPL-SCNC: 137 MMOL/L (ref 135–145)

## 2021-12-23 PROCEDURE — 80053 COMPREHEN METABOLIC PANEL: CPT | Performed by: CLINICAL MEDICAL LABORATORY

## 2021-12-23 PROCEDURE — 99214 OFFICE O/P EST MOD 30 MIN: CPT | Performed by: NURSE PRACTITIONER

## 2021-12-23 PROCEDURE — 36415 COLL VENOUS BLD VENIPUNCTURE: CPT | Performed by: CLINICAL MEDICAL LABORATORY

## 2021-12-27 ENCOUNTER — PATIENT MESSAGE (OUTPATIENT)
Dept: NEPHROLOGY | Facility: CLINIC | Age: 66
End: 2021-12-27

## 2021-12-27 ENCOUNTER — NURSE TRIAGE (OUTPATIENT)
Dept: INTERNAL MEDICINE CLINIC | Facility: CLINIC | Age: 66
End: 2021-12-27

## 2021-12-27 NOTE — TELEPHONE ENCOUNTER
Message sent to you as Dr. Refugio Medrano is out of the office this week. Patient was already advised to see her PCP or go to urgent care for her symptoms. Thank you!

## 2021-12-27 NOTE — TELEPHONE ENCOUNTER
Action Requested: Summary for Provider     []  Critical Lab, Recommendations Needed  [] Need Additional Advice  [x]   FYI    []   Need Orders  [] Need Medications Sent to Pharmacy  []  Other     SUMMARY: Patient scheduled for appt 12/28 with Dr Axel Skiff for con

## 2021-12-27 NOTE — TELEPHONE ENCOUNTER
From: Jenny Ortiz  To: Heidy Almanza MD  Sent: 12/27/2021 12:22 AM CST  Subject: Tacrolimus    Dr. Marcos Sanabria,  I have not started taking Tacrolimus for a couple of reasons. First, the medication was delayed by insurance.  I read through the drug info

## 2021-12-28 ENCOUNTER — PATIENT MESSAGE (OUTPATIENT)
Dept: NEPHROLOGY | Facility: CLINIC | Age: 66
End: 2021-12-28

## 2021-12-28 ENCOUNTER — OFFICE VISIT (OUTPATIENT)
Dept: FAMILY MEDICINE CLINIC | Facility: CLINIC | Age: 66
End: 2021-12-28
Payer: MEDICARE

## 2021-12-28 VITALS
BODY MASS INDEX: 34.31 KG/M2 | HEIGHT: 64 IN | DIASTOLIC BLOOD PRESSURE: 69 MMHG | RESPIRATION RATE: 16 BRPM | SYSTOLIC BLOOD PRESSURE: 117 MMHG | WEIGHT: 201 LBS | TEMPERATURE: 98 F | HEART RATE: 77 BPM

## 2021-12-28 DIAGNOSIS — J02.9 SORE THROAT: Primary | ICD-10-CM

## 2021-12-28 PROCEDURE — 87880 STREP A ASSAY W/OPTIC: CPT | Performed by: FAMILY MEDICINE

## 2021-12-28 PROCEDURE — 99213 OFFICE O/P EST LOW 20 MIN: CPT | Performed by: FAMILY MEDICINE

## 2021-12-28 RX ORDER — MULTIVIT,TX WITH IRON,MINERALS
250 TABLET, EXTENDED RELEASE ORAL DAILY
COMMUNITY

## 2021-12-28 RX ORDER — OXCARBAZEPINE 300 MG/1
300 TABLET, FILM COATED ORAL
COMMUNITY

## 2021-12-28 RX ORDER — METOLAZONE 2.5 MG/1
2.5 TABLET ORAL
COMMUNITY

## 2021-12-28 RX ORDER — SPIRONOLACTONE 25 MG/1
25 TABLET ORAL 2 TIMES DAILY
COMMUNITY

## 2021-12-28 NOTE — TELEPHONE ENCOUNTER
She is on high dose of diuretics so will need potassium supplement.      Order is to repeat kidney function in one week after on tacrolimus so it will help us determine potassium status as well

## 2021-12-28 NOTE — PROGRESS NOTES
Subjective:   Patient ID: Alanna Becerra is a 77year old female. Pt presents with sore throat for several weeks. Pt has had cough, PND. Has had negative COVID testing and did quit smoking. No fevers. Pt has tried otc remedies with some relief.  Pt sta into the lungs every 6 (six) hours as needed for Wheezing. inhale 2 puff by inhalation route  every 4 - 6 hours as needed (Patient taking differently: Inhale 2 puffs into the lungs every 6 (six) hours as needed for Wheezing.  inhale 2 puff by inhalation rou Exam  Vitals reviewed. Constitutional:       Appearance: She is well-developed.    HENT:      Right Ear: Tympanic membrane and ear canal normal.      Left Ear: Tympanic membrane and ear canal normal.      Mouth/Throat:      Mouth: Mucous membranes are natalie

## 2021-12-28 NOTE — TELEPHONE ENCOUNTER
Patient should do what ever Dr. Fany Ayala  Recommended she take any further issues can be discussed with the doctor when she returns from her holiday    The sore throat needs to be addressed by her PCP and her medicine does not interact with potassium

## 2021-12-29 ENCOUNTER — ADVANCED DIRECTIVES (OUTPATIENT)
Dept: HEALTH INFORMATION MANAGEMENT | Facility: OTHER | Age: 66
End: 2021-12-29

## 2021-12-29 NOTE — TELEPHONE ENCOUNTER
Please see my chart messages, Dr. Squire Shone. Pt tested negative for strep but is requesting if its safe for her to take while being on a potassium supplement. Pt has F/U apt with you on 1/10. Advised pt you are out of the office. Thanks, Dr. Squire Shone!

## 2021-12-29 NOTE — TELEPHONE ENCOUNTER
From: Naveen Police  To: Solo Pendleton MD  Sent: 12/27/2021 12:22 AM CST  Subject: Tacrolimus    Dr. Squire Shone,  I have not started taking Tacrolimus for a couple of reasons. First, the medication was delayed by insurance.  I read through the drug info

## 2021-12-30 NOTE — TELEPHONE ENCOUNTER
Pricilla Ward is quite prevalent these days and given your immunosuppressed status - DM, smoking, heart disease, overweight ( That's not including tacrolimus, as you haven't started it ye ) I would advise you to avoid gathering

## 2022-01-01 ENCOUNTER — APPOINTMENT (OUTPATIENT)
Dept: CARDIOLOGY | Age: 67
DRG: 291 | End: 2022-01-01
Attending: INTERNAL MEDICINE

## 2022-01-01 ENCOUNTER — EXTERNAL RECORD (OUTPATIENT)
Dept: CARDIOLOGY | Age: 67
End: 2022-01-01

## 2022-01-01 ENCOUNTER — OFF PREMISE (OUTPATIENT)
Dept: CARDIOLOGY | Age: 67
End: 2022-01-01

## 2022-01-01 ENCOUNTER — TELEPHONE (OUTPATIENT)
Dept: CARDIOLOGY | Age: 67
End: 2022-01-01

## 2022-01-01 ENCOUNTER — APPOINTMENT (OUTPATIENT)
Dept: GENERAL RADIOLOGY | Age: 67
DRG: 291 | End: 2022-01-01
Attending: EMERGENCY MEDICINE

## 2022-01-01 ENCOUNTER — PREP FOR CASE (OUTPATIENT)
Dept: CARDIOLOGY | Age: 67
End: 2022-01-01

## 2022-01-01 ENCOUNTER — OFFICE VISIT (OUTPATIENT)
Dept: CARDIOLOGY | Age: 67
End: 2022-01-01
Attending: NURSE PRACTITIONER

## 2022-01-01 ENCOUNTER — HOSPITAL ENCOUNTER (OUTPATIENT)
Age: 67
Discharge: HOME | End: 2022-09-15
Attending: INTERNAL MEDICINE | Admitting: INTERNAL MEDICINE

## 2022-01-01 ENCOUNTER — HOSPITAL ENCOUNTER (INPATIENT)
Age: 67
LOS: 2 days | Discharge: HOME HEALTH | DRG: 291 | End: 2022-05-04
Attending: INTERNAL MEDICINE | Admitting: INTERNAL MEDICINE

## 2022-01-01 ENCOUNTER — ANCILLARY PROCEDURE (OUTPATIENT)
Dept: CARDIOLOGY | Age: 67
End: 2022-01-01
Attending: NURSE PRACTITIONER

## 2022-01-01 ENCOUNTER — OFFICE VISIT (OUTPATIENT)
Dept: CARDIOLOGY | Age: 67
End: 2022-01-01

## 2022-01-01 ENCOUNTER — APPOINTMENT (OUTPATIENT)
Dept: CARDIOLOGY | Age: 67
End: 2022-01-01
Attending: NURSE PRACTITIONER

## 2022-01-01 ENCOUNTER — HOSPITAL ENCOUNTER (OUTPATIENT)
Dept: LAB | Age: 67
Discharge: HOME | End: 2022-09-12
Attending: INTERNAL MEDICINE

## 2022-01-01 ENCOUNTER — APPOINTMENT (OUTPATIENT)
Dept: GENERAL RADIOLOGY | Age: 67
DRG: 291 | End: 2022-01-01
Attending: INTERNAL MEDICINE

## 2022-01-01 ENCOUNTER — ANCILLARY PROCEDURE (OUTPATIENT)
Dept: CARDIOLOGY | Age: 67
End: 2022-01-01
Attending: INTERNAL MEDICINE

## 2022-01-01 ENCOUNTER — APPOINTMENT (OUTPATIENT)
Dept: ULTRASOUND IMAGING | Age: 67
DRG: 291 | End: 2022-01-01
Attending: INTERNAL MEDICINE

## 2022-01-01 ENCOUNTER — E-ADVICE (OUTPATIENT)
Dept: CARDIOLOGY | Age: 67
End: 2022-01-01

## 2022-01-01 ENCOUNTER — HOSPITAL ENCOUNTER (INPATIENT)
Age: 67
LOS: 4 days | Discharge: HOME HEALTH | DRG: 291 | End: 2023-01-03
Attending: EMERGENCY MEDICINE | Admitting: HOSPITALIST

## 2022-01-01 ENCOUNTER — HOSPITAL ENCOUNTER (OUTPATIENT)
Dept: EDUCATION SERVICES | Age: 67
Discharge: HOME | End: 2022-06-03
Attending: INTERNAL MEDICINE

## 2022-01-01 VITALS
BODY MASS INDEX: 34.97 KG/M2 | HEART RATE: 74 BPM | DIASTOLIC BLOOD PRESSURE: 64 MMHG | SYSTOLIC BLOOD PRESSURE: 106 MMHG | WEIGHT: 203.71 LBS

## 2022-01-01 VITALS
HEIGHT: 64 IN | OXYGEN SATURATION: 92 % | TEMPERATURE: 96.8 F | WEIGHT: 199.74 LBS | RESPIRATION RATE: 18 BRPM | HEART RATE: 81 BPM | DIASTOLIC BLOOD PRESSURE: 58 MMHG | BODY MASS INDEX: 34.1 KG/M2 | SYSTOLIC BLOOD PRESSURE: 113 MMHG

## 2022-01-01 VITALS
WEIGHT: 209.22 LBS | HEART RATE: 70 BPM | DIASTOLIC BLOOD PRESSURE: 70 MMHG | RESPIRATION RATE: 22 BRPM | BODY MASS INDEX: 35.91 KG/M2 | SYSTOLIC BLOOD PRESSURE: 118 MMHG

## 2022-01-01 VITALS
SYSTOLIC BLOOD PRESSURE: 138 MMHG | WEIGHT: 205.14 LBS | BODY MASS INDEX: 35.21 KG/M2 | DIASTOLIC BLOOD PRESSURE: 70 MMHG | HEART RATE: 70 BPM

## 2022-01-01 VITALS
RESPIRATION RATE: 20 BRPM | SYSTOLIC BLOOD PRESSURE: 94 MMHG | HEART RATE: 70 BPM | WEIGHT: 201.17 LBS | DIASTOLIC BLOOD PRESSURE: 60 MMHG | BODY MASS INDEX: 34.53 KG/M2

## 2022-01-01 VITALS
SYSTOLIC BLOOD PRESSURE: 86 MMHG | WEIGHT: 202.6 LBS | HEART RATE: 72 BPM | DIASTOLIC BLOOD PRESSURE: 56 MMHG | BODY MASS INDEX: 34.78 KG/M2

## 2022-01-01 VITALS
DIASTOLIC BLOOD PRESSURE: 70 MMHG | BODY MASS INDEX: 35.16 KG/M2 | SYSTOLIC BLOOD PRESSURE: 142 MMHG | HEART RATE: 74 BPM | RESPIRATION RATE: 18 BRPM | WEIGHT: 204.81 LBS

## 2022-01-01 VITALS
DIASTOLIC BLOOD PRESSURE: 60 MMHG | WEIGHT: 200.18 LBS | HEART RATE: 70 BPM | BODY MASS INDEX: 34.36 KG/M2 | SYSTOLIC BLOOD PRESSURE: 102 MMHG

## 2022-01-01 VITALS
HEART RATE: 72 BPM | SYSTOLIC BLOOD PRESSURE: 82 MMHG | BODY MASS INDEX: 34.47 KG/M2 | BODY MASS INDEX: 34.81 KG/M2 | RESPIRATION RATE: 20 BRPM | DIASTOLIC BLOOD PRESSURE: 50 MMHG | DIASTOLIC BLOOD PRESSURE: 66 MMHG | SYSTOLIC BLOOD PRESSURE: 116 MMHG | WEIGHT: 200.84 LBS | HEART RATE: 64 BPM | WEIGHT: 202.82 LBS

## 2022-01-01 VITALS
DIASTOLIC BLOOD PRESSURE: 60 MMHG | SYSTOLIC BLOOD PRESSURE: 104 MMHG | WEIGHT: 199.96 LBS | BODY MASS INDEX: 34.32 KG/M2 | HEART RATE: 73 BPM

## 2022-01-01 VITALS
DIASTOLIC BLOOD PRESSURE: 52 MMHG | BODY MASS INDEX: 34.78 KG/M2 | HEART RATE: 86 BPM | WEIGHT: 202.6 LBS | RESPIRATION RATE: 20 BRPM | SYSTOLIC BLOOD PRESSURE: 94 MMHG

## 2022-01-01 VITALS
DIASTOLIC BLOOD PRESSURE: 52 MMHG | SYSTOLIC BLOOD PRESSURE: 100 MMHG | RESPIRATION RATE: 22 BRPM | HEART RATE: 72 BPM | BODY MASS INDEX: 34.93 KG/M2 | WEIGHT: 203.48 LBS

## 2022-01-01 VITALS
SYSTOLIC BLOOD PRESSURE: 108 MMHG | DIASTOLIC BLOOD PRESSURE: 62 MMHG | HEART RATE: 80 BPM | BODY MASS INDEX: 35.12 KG/M2 | WEIGHT: 204.59 LBS | RESPIRATION RATE: 22 BRPM

## 2022-01-01 VITALS
WEIGHT: 207.45 LBS | DIASTOLIC BLOOD PRESSURE: 60 MMHG | SYSTOLIC BLOOD PRESSURE: 112 MMHG | HEART RATE: 80 BPM | BODY MASS INDEX: 35.61 KG/M2

## 2022-01-01 VITALS
OXYGEN SATURATION: 91 % | DIASTOLIC BLOOD PRESSURE: 75 MMHG | HEIGHT: 64 IN | SYSTOLIC BLOOD PRESSURE: 114 MMHG | HEART RATE: 71 BPM | RESPIRATION RATE: 14 BRPM | TEMPERATURE: 97.5 F | BODY MASS INDEX: 34.55 KG/M2 | WEIGHT: 202.38 LBS

## 2022-01-01 VITALS
HEIGHT: 64 IN | DIASTOLIC BLOOD PRESSURE: 70 MMHG | SYSTOLIC BLOOD PRESSURE: 118 MMHG | BODY MASS INDEX: 35.55 KG/M2 | WEIGHT: 208.2 LBS | RESPIRATION RATE: 18 BRPM | HEART RATE: 95 BPM

## 2022-01-01 VITALS
WEIGHT: 204.2 LBS | HEIGHT: 64 IN | BODY MASS INDEX: 34.86 KG/M2 | DIASTOLIC BLOOD PRESSURE: 60 MMHG | HEART RATE: 77 BPM | RESPIRATION RATE: 18 BRPM | SYSTOLIC BLOOD PRESSURE: 106 MMHG

## 2022-01-01 VITALS
SYSTOLIC BLOOD PRESSURE: 114 MMHG | HEART RATE: 78 BPM | DIASTOLIC BLOOD PRESSURE: 62 MMHG | BODY MASS INDEX: 35.31 KG/M2 | WEIGHT: 205.69 LBS

## 2022-01-01 VITALS
DIASTOLIC BLOOD PRESSURE: 60 MMHG | BODY MASS INDEX: 34.4 KG/M2 | RESPIRATION RATE: 22 BRPM | WEIGHT: 200.4 LBS | HEART RATE: 72 BPM | SYSTOLIC BLOOD PRESSURE: 102 MMHG

## 2022-01-01 VITALS
WEIGHT: 202.6 LBS | DIASTOLIC BLOOD PRESSURE: 60 MMHG | SYSTOLIC BLOOD PRESSURE: 92 MMHG | HEART RATE: 68 BPM | BODY MASS INDEX: 34.78 KG/M2

## 2022-01-01 VITALS
BODY MASS INDEX: 34.89 KG/M2 | WEIGHT: 203.26 LBS | DIASTOLIC BLOOD PRESSURE: 60 MMHG | SYSTOLIC BLOOD PRESSURE: 110 MMHG

## 2022-01-01 DIAGNOSIS — J44.9 CHRONIC OBSTRUCTIVE PULMONARY DISEASE, UNSPECIFIED COPD TYPE (CMD): Chronic | ICD-10-CM

## 2022-01-01 DIAGNOSIS — N04.9 NEPHROTIC SYNDROME: ICD-10-CM

## 2022-01-01 DIAGNOSIS — R60.9 EDEMA, UNSPECIFIED TYPE: ICD-10-CM

## 2022-01-01 DIAGNOSIS — R07.89 OTHER CHEST PAIN: ICD-10-CM

## 2022-01-01 DIAGNOSIS — E87.1 HYPONATREMIA: ICD-10-CM

## 2022-01-01 DIAGNOSIS — I50.33 ACUTE ON CHRONIC HEART FAILURE WITH PRESERVED EJECTION FRACTION (CMD): ICD-10-CM

## 2022-01-01 DIAGNOSIS — I89.0 LYMPHEDEMA: ICD-10-CM

## 2022-01-01 DIAGNOSIS — N18.9 CHRONIC KIDNEY DISEASE, UNSPECIFIED CKD STAGE: ICD-10-CM

## 2022-01-01 DIAGNOSIS — R06.09 DYSPNEA ON EXERTION: ICD-10-CM

## 2022-01-01 DIAGNOSIS — R53.83 OTHER FATIGUE: ICD-10-CM

## 2022-01-01 DIAGNOSIS — I50.33 ACUTE ON CHRONIC HEART FAILURE WITH PRESERVED EJECTION FRACTION (CMD): Primary | ICD-10-CM

## 2022-01-01 DIAGNOSIS — E87.6 HYPOKALEMIA: ICD-10-CM

## 2022-01-01 DIAGNOSIS — E87.70 HYPERVOLEMIA, UNSPECIFIED HYPERVOLEMIA TYPE: Primary | ICD-10-CM

## 2022-01-01 DIAGNOSIS — N04.9 NEPHROTIC SYNDROME: Chronic | ICD-10-CM

## 2022-01-01 DIAGNOSIS — I50.32 CHRONIC DIASTOLIC HEART FAILURE (CMD): ICD-10-CM

## 2022-01-01 DIAGNOSIS — I27.20 PULMONARY HYPERTENSION (CMD): ICD-10-CM

## 2022-01-01 DIAGNOSIS — I50.9 ACUTE ON CHRONIC HEART FAILURE, UNSPECIFIED HEART FAILURE TYPE (CMD): Primary | ICD-10-CM

## 2022-01-01 DIAGNOSIS — R60.0 LEG EDEMA: ICD-10-CM

## 2022-01-01 DIAGNOSIS — E78.00 HYPERCHOLESTEREMIA: ICD-10-CM

## 2022-01-01 DIAGNOSIS — I50.32 CHRONIC HEART FAILURE WITH PRESERVED EJECTION FRACTION (CMD): ICD-10-CM

## 2022-01-01 DIAGNOSIS — M79.604 LEG PAIN, BILATERAL: ICD-10-CM

## 2022-01-01 DIAGNOSIS — I50.9 ACUTE ON CHRONIC HEART FAILURE, UNSPECIFIED HEART FAILURE TYPE (CMD): ICD-10-CM

## 2022-01-01 DIAGNOSIS — R40.0 DAYTIME SOMNOLENCE: ICD-10-CM

## 2022-01-01 DIAGNOSIS — E11.65 SEVERE HYPERGLYCEMIA DUE TO DIABETES MELLITUS (CMD): ICD-10-CM

## 2022-01-01 DIAGNOSIS — I50.9 ACUTE ON CHRONIC CONGESTIVE HEART FAILURE, UNSPECIFIED HEART FAILURE TYPE (CMD): Primary | ICD-10-CM

## 2022-01-01 DIAGNOSIS — G47.33 OSA (OBSTRUCTIVE SLEEP APNEA): Primary | ICD-10-CM

## 2022-01-01 DIAGNOSIS — I73.9 PVD (PERIPHERAL VASCULAR DISEASE) (CMD): ICD-10-CM

## 2022-01-01 DIAGNOSIS — E13.29 DIABETES MELLITUS OF OTHER TYPE WITH OTHER KIDNEY COMPLICATION, UNSPECIFIED WHETHER LONG TERM INSULIN USE (CMD): ICD-10-CM

## 2022-01-01 DIAGNOSIS — M79.605 LEG PAIN, BILATERAL: ICD-10-CM

## 2022-01-01 DIAGNOSIS — R06.00 DYSPNEA, UNSPECIFIED TYPE: ICD-10-CM

## 2022-01-01 DIAGNOSIS — E55.9 VITAMIN D DEFICIENCY: ICD-10-CM

## 2022-01-01 LAB
ALBUMIN SERPL-MCNC: 3.3 G/DL (ref 3.6–5.1)
ALBUMIN SERPL-MCNC: 3.3 G/DL (ref 3.6–5.1)
ALBUMIN SERPL-MCNC: 3.4 G/DL (ref 3.6–5.1)
ALBUMIN SERPL-MCNC: 3.5 G/DL (ref 3.6–5.1)
ALBUMIN SERPL-MCNC: 3.6 G/DL (ref 3.6–5.1)
ALBUMIN SERPL-MCNC: 3.7 G/DL (ref 3.6–5.1)
ALBUMIN SERPL-MCNC: 3.7 G/DL (ref 3.6–5.1)
ALBUMIN SERPL-MCNC: 3.8 G/DL (ref 3.6–5.1)
ALBUMIN SERPL-MCNC: 4 G/DL (ref 3.6–5.1)
ALBUMIN/GLOB SERPL: 0.7 {RATIO} (ref 1–2.4)
ALBUMIN/GLOB SERPL: 0.8 {RATIO} (ref 1–2.4)
ALBUMIN/GLOB SERPL: 0.9 {RATIO} (ref 1–2.4)
ALP SERPL-CCNC: 109 UNITS/L (ref 45–117)
ALP SERPL-CCNC: 118 UNITS/L (ref 45–117)
ALP SERPL-CCNC: 121 UNITS/L (ref 45–117)
ALP SERPL-CCNC: 124 UNITS/L (ref 45–117)
ALP SERPL-CCNC: 129 UNITS/L (ref 45–117)
ALP SERPL-CCNC: 134 UNITS/L (ref 45–117)
ALP SERPL-CCNC: 135 UNITS/L (ref 45–117)
ALP SERPL-CCNC: 136 UNITS/L (ref 45–117)
ALP SERPL-CCNC: 139 UNITS/L (ref 45–117)
ALP SERPL-CCNC: 141 UNITS/L (ref 45–117)
ALP SERPL-CCNC: 154 UNITS/L (ref 45–117)
ALP SERPL-CCNC: 160 UNITS/L (ref 45–117)
ALT SERPL-CCNC: 29 UNITS/L
ALT SERPL-CCNC: 30 UNITS/L
ALT SERPL-CCNC: 31 UNITS/L
ALT SERPL-CCNC: 31 UNITS/L
ALT SERPL-CCNC: 32 UNITS/L
ALT SERPL-CCNC: 34 UNITS/L
ALT SERPL-CCNC: 36 UNITS/L
ALT SERPL-CCNC: 37 UNITS/L
ALT SERPL-CCNC: 38 UNITS/L
ALT SERPL-CCNC: 39 UNITS/L
ALT SERPL-CCNC: 39 UNITS/L
ALT SERPL-CCNC: 40 UNITS/L
ALT SERPL-CCNC: 43 UNITS/L
ALT SERPL-CCNC: 47 UNITS/L
ANION GAP SERPL CALC-SCNC: 10 MMOL/L (ref 10–20)
ANION GAP SERPL CALC-SCNC: 10 MMOL/L (ref 10–20)
ANION GAP SERPL CALC-SCNC: 11 MMOL/L (ref 7–19)
ANION GAP SERPL CALC-SCNC: 12 MMOL/L (ref 10–20)
ANION GAP SERPL CALC-SCNC: 12 MMOL/L (ref 7–19)
ANION GAP SERPL CALC-SCNC: 12 MMOL/L (ref 7–19)
ANION GAP SERPL CALC-SCNC: 13 MMOL/L (ref 10–20)
ANION GAP SERPL CALC-SCNC: 13 MMOL/L (ref 10–20)
ANION GAP SERPL CALC-SCNC: 13 MMOL/L (ref 7–19)
ANION GAP SERPL CALC-SCNC: 14 MMOL/L (ref 7–19)
ANION GAP SERPL CALC-SCNC: 15 MMOL/L (ref 10–20)
ANION GAP SERPL CALC-SCNC: 17 MMOL/L (ref 7–19)
ANION GAP SERPL CALC-SCNC: 9 MMOL/L (ref 7–19)
ANION GAP SERPL CALC-SCNC: 9 MMOL/L (ref 7–19)
AORTIC VALVE AREA (AVA): 0.6
AORTIC VALVE AREA: 2.48
APPEARANCE UR: ABNORMAL
APPEARANCE UR: CLEAR
APTT PPP: 25 SEC (ref 22–30)
AST SERPL-CCNC: 11 UNITS/L
AST SERPL-CCNC: 12 UNITS/L
AST SERPL-CCNC: 12 UNITS/L
AST SERPL-CCNC: 14 UNITS/L
AST SERPL-CCNC: 16 UNITS/L
AST SERPL-CCNC: 16 UNITS/L
AST SERPL-CCNC: 18 UNITS/L
AST SERPL-CCNC: 19 UNITS/L
AST SERPL-CCNC: 20 UNITS/L
AST SERPL-CCNC: 20 UNITS/L
ATRIAL RATE (BPM): 69
ATRIAL RATE (BPM): 81
AV MEAN GRADIENT (AVMG): 5
AV MEAN VELOCITY (AVMV): 0.99
AV PEAK GRADIENT (AVPG): 12
AV PEAK VELOCITY (AVPV): 1.71
AV STENOSIS SEVERITY TEXT: NORMAL
AVI LVOT PEAK GRADIENT (LVOTMG): 1
BACTERIA #/AREA URNS HPF: ABNORMAL /HPF
BASOPHILS # BLD: 0 K/MCL (ref 0–0.3)
BASOPHILS NFR BLD: 0 %
BASOPHILS NFR BLD: 1 %
BASOPHILS NFR BLD: 1 %
BILIRUB SERPL-MCNC: 0.3 MG/DL (ref 0.2–1)
BILIRUB SERPL-MCNC: 0.4 MG/DL (ref 0.2–1)
BILIRUB SERPL-MCNC: 0.6 MG/DL (ref 0.2–1)
BILIRUB SERPL-MCNC: 0.6 MG/DL (ref 0.2–1)
BILIRUB SERPL-MCNC: 0.7 MG/DL (ref 0.2–1)
BILIRUB UR QL STRIP: NEGATIVE
BUN SERPL-MCNC: 100 MG/DL (ref 6–20)
BUN SERPL-MCNC: 105 MG/DL (ref 6–20)
BUN SERPL-MCNC: 106 MG/DL (ref 6–20)
BUN SERPL-MCNC: 46 MG/DL (ref 6–20)
BUN SERPL-MCNC: 48 MG/DL (ref 6–20)
BUN SERPL-MCNC: 52 MG/DL (ref 6–20)
BUN SERPL-MCNC: 53 MG/DL (ref 6–20)
BUN SERPL-MCNC: 58 MG/DL (ref 6–20)
BUN SERPL-MCNC: 59 MG/DL (ref 6–20)
BUN SERPL-MCNC: 59 MG/DL (ref 6–20)
BUN SERPL-MCNC: 61 MG/DL (ref 6–20)
BUN SERPL-MCNC: 61 MG/DL (ref 6–20)
BUN SERPL-MCNC: 66 MG/DL (ref 6–20)
BUN SERPL-MCNC: 68 MG/DL (ref 6–20)
BUN SERPL-MCNC: 71 MG/DL (ref 6–20)
BUN SERPL-MCNC: 75 MG/DL (ref 6–20)
BUN SERPL-MCNC: 77 MG/DL (ref 6–20)
BUN SERPL-MCNC: 90 MG/DL (ref 6–20)
BUN SERPL-MCNC: 91 MG/DL (ref 6–20)
BUN SERPL-MCNC: 91 MG/DL (ref 6–20)
BUN SERPL-MCNC: 95 MG/DL (ref 6–20)
BUN SERPL-MCNC: 98 MG/DL (ref 6–20)
BUN SERPL-MCNC: 99 MG/DL (ref 6–20)
BUN/CREAT SERPL: 33 (ref 7–25)
BUN/CREAT SERPL: 33 (ref 7–25)
BUN/CREAT SERPL: 35 (ref 7–25)
BUN/CREAT SERPL: 36 (ref 7–25)
BUN/CREAT SERPL: 36 (ref 7–25)
BUN/CREAT SERPL: 37 (ref 7–25)
BUN/CREAT SERPL: 38 (ref 7–25)
BUN/CREAT SERPL: 41 (ref 7–25)
BUN/CREAT SERPL: 41 (ref 7–25)
BUN/CREAT SERPL: 42 (ref 7–25)
BUN/CREAT SERPL: 45 (ref 7–25)
BUN/CREAT SERPL: 50 (ref 7–25)
BUN/CREAT SERPL: 51 (ref 7–25)
BUN/CREAT SERPL: 52 (ref 7–25)
BUN/CREAT SERPL: 52 (ref 7–25)
BUN/CREAT SERPL: 54 (ref 7–25)
BUN/CREAT SERPL: 55 (ref 7–25)
BUN/CREAT SERPL: 55 (ref 7–25)
BUN/CREAT SERPL: 58 (ref 7–25)
BUN/CREAT SERPL: 59 (ref 7–25)
CALCIUM SERPL-MCNC: 8.8 MG/DL (ref 8.4–10.2)
CALCIUM SERPL-MCNC: 8.9 MG/DL (ref 8.4–10.2)
CALCIUM SERPL-MCNC: 8.9 MG/DL (ref 8.4–10.2)
CALCIUM SERPL-MCNC: 9 MG/DL (ref 8.4–10.2)
CALCIUM SERPL-MCNC: 9.1 MG/DL (ref 8.4–10.2)
CALCIUM SERPL-MCNC: 9.2 MG/DL (ref 8.4–10.2)
CALCIUM SERPL-MCNC: 9.3 MG/DL (ref 8.4–10.2)
CALCIUM SERPL-MCNC: 9.4 MG/DL (ref 8.4–10.2)
CALCIUM SERPL-MCNC: 9.5 MG/DL (ref 8.4–10.2)
CALCIUM SERPL-MCNC: 9.6 MG/DL (ref 8.4–10.2)
CALCIUM SERPL-MCNC: 9.7 MG/DL (ref 8.4–10.2)
CALCIUM SERPL-MCNC: 9.7 MG/DL (ref 8.4–10.2)
CALCIUM SERPL-MCNC: 9.8 MG/DL (ref 8.4–10.2)
CALCIUM SERPL-MCNC: 9.9 MG/DL (ref 8.4–10.2)
CHLORIDE SERPL-SCNC: 100 MMOL/L (ref 97–110)
CHLORIDE SERPL-SCNC: 100 MMOL/L (ref 97–110)
CHLORIDE SERPL-SCNC: 102 MMOL/L (ref 97–110)
CHLORIDE SERPL-SCNC: 104 MMOL/L (ref 97–110)
CHLORIDE SERPL-SCNC: 104 MMOL/L (ref 98–107)
CHLORIDE SERPL-SCNC: 89 MMOL/L (ref 97–110)
CHLORIDE SERPL-SCNC: 89 MMOL/L (ref 98–107)
CHLORIDE SERPL-SCNC: 91 MMOL/L (ref 97–110)
CHLORIDE SERPL-SCNC: 91 MMOL/L (ref 98–107)
CHLORIDE SERPL-SCNC: 91 MMOL/L (ref 98–107)
CHLORIDE SERPL-SCNC: 92 MMOL/L (ref 97–110)
CHLORIDE SERPL-SCNC: 93 MMOL/L (ref 97–110)
CHLORIDE SERPL-SCNC: 93 MMOL/L (ref 98–107)
CHLORIDE SERPL-SCNC: 94 MMOL/L (ref 97–110)
CHLORIDE SERPL-SCNC: 96 MMOL/L (ref 97–110)
CHLORIDE SERPL-SCNC: 96 MMOL/L (ref 97–110)
CHLORIDE SERPL-SCNC: 96 MMOL/L (ref 98–107)
CHLORIDE SERPL-SCNC: 97 MMOL/L (ref 97–110)
CHLORIDE SERPL-SCNC: 98 MMOL/L (ref 97–110)
CHLORIDE SERPL-SCNC: 99 MMOL/L (ref 97–110)
CO2 SERPL-SCNC: 24 MMOL/L (ref 21–32)
CO2 SERPL-SCNC: 25 MMOL/L (ref 21–32)
CO2 SERPL-SCNC: 26 MMOL/L (ref 21–32)
CO2 SERPL-SCNC: 26 MMOL/L (ref 21–32)
CO2 SERPL-SCNC: 27 MMOL/L (ref 21–32)
CO2 SERPL-SCNC: 27 MMOL/L (ref 21–32)
CO2 SERPL-SCNC: 28 MMOL/L (ref 21–32)
CO2 SERPL-SCNC: 29 MMOL/L (ref 21–32)
CO2 SERPL-SCNC: 30 MMOL/L (ref 21–32)
CO2 SERPL-SCNC: 30 MMOL/L (ref 21–32)
CO2 SERPL-SCNC: 31 MMOL/L (ref 21–32)
COLOR UR: ABNORMAL
COLOR UR: COLORLESS
COLOR UR: YELLOW
CREAT SERPL-MCNC: 1.32 MG/DL (ref 0.51–0.95)
CREAT SERPL-MCNC: 1.32 MG/DL (ref 0.51–0.95)
CREAT SERPL-MCNC: 1.33 MG/DL (ref 0.51–0.95)
CREAT SERPL-MCNC: 1.37 MG/DL (ref 0.51–0.95)
CREAT SERPL-MCNC: 1.39 MG/DL (ref 0.51–0.95)
CREAT SERPL-MCNC: 1.44 MG/DL (ref 0.51–0.95)
CREAT SERPL-MCNC: 1.52 MG/DL (ref 0.51–0.95)
CREAT SERPL-MCNC: 1.59 MG/DL (ref 0.51–0.95)
CREAT SERPL-MCNC: 1.63 MG/DL (ref 0.51–0.95)
CREAT SERPL-MCNC: 1.64 MG/DL (ref 0.51–0.95)
CREAT SERPL-MCNC: 1.64 MG/DL (ref 0.51–0.95)
CREAT SERPL-MCNC: 1.66 MG/DL (ref 0.51–0.95)
CREAT SERPL-MCNC: 1.75 MG/DL (ref 0.51–0.95)
CREAT SERPL-MCNC: 1.79 MG/DL (ref 0.51–0.95)
CREAT SERPL-MCNC: 1.81 MG/DL (ref 0.51–0.95)
CREAT SERPL-MCNC: 1.84 MG/DL (ref 0.51–0.95)
CREAT SERPL-MCNC: 1.85 MG/DL (ref 0.51–0.95)
CREAT SERPL-MCNC: 1.9 MG/DL (ref 0.51–0.95)
CREAT SERPL-MCNC: 1.92 MG/DL (ref 0.51–0.95)
CREAT SERPL-MCNC: 1.97 MG/DL (ref 0.51–0.95)
CREAT SERPL-MCNC: 1.97 MG/DL (ref 0.51–0.95)
CREAT SERPL-MCNC: 2.06 MG/DL (ref 0.51–0.95)
CREAT SERPL-MCNC: 2.31 MG/DL (ref 0.51–0.95)
CREAT UR-MCNC: 22.1 MG/DL
CREAT UR-MCNC: 22.9 MG/DL
CREAT UR-MCNC: 26.9 MG/DL
CREAT UR-MCNC: 27.6 MG/DL
CREAT UR-MCNC: 28.3 MG/DL
CREAT UR-MCNC: 32.4 MG/DL
CREAT UR-MCNC: 33.4 MG/DL
CREAT UR-MCNC: 38.1 MG/DL
CREAT UR-MCNC: 38.6 MG/DL
CREAT UR-MCNC: 39.6 MG/DL
CREAT UR-MCNC: 40.1 MG/DL
CREAT UR-MCNC: 41.8 MG/DL
CREAT UR-MCNC: 41.8 MG/DL
CREAT UR-MCNC: 42.6 MG/DL
CREAT UR-MCNC: 65 MG/DL
CREAT UR-MCNC: 67.4 MG/DL
DEPRECATED RDW RBC: 37.1 FL (ref 39–50)
DEPRECATED RDW RBC: 37.2 FL (ref 39–50)
DEPRECATED RDW RBC: 38.1 FL (ref 39–50)
DEPRECATED RDW RBC: 39.8 FL (ref 39–50)
DEPRECATED RDW RBC: 40.7 FL (ref 39–50)
DEPRECATED RDW RBC: 40.8 FL (ref 39–50)
DEPRECATED RDW RBC: 41 FL (ref 39–50)
DEPRECATED RDW RBC: 41.1 FL (ref 39–50)
DEPRECATED RDW RBC: 41.1 FL (ref 39–50)
DEPRECATED RDW RBC: 41.2 FL (ref 39–50)
DEPRECATED RDW RBC: 41.6 FL (ref 39–50)
DEPRECATED RDW RBC: 41.6 FL (ref 39–50)
DEPRECATED RDW RBC: 41.7 FL (ref 39–50)
DEPRECATED RDW RBC: 41.9 FL (ref 39–50)
DEPRECATED RDW RBC: 43.3 FL (ref 39–50)
E WAVE DECELARATION TIME (MDT): 14.25
EOSINOPHIL # BLD: 0 K/MCL (ref 0–0.5)
EOSINOPHIL # BLD: 0.1 K/MCL (ref 0–0.5)
EOSINOPHIL NFR BLD: 0 %
EOSINOPHIL NFR BLD: 1 %
EOSINOPHIL NFR BLD: 2 %
ERYTHROCYTE [DISTWIDTH] IN BLOOD: 11.5 % (ref 11–15)
ERYTHROCYTE [DISTWIDTH] IN BLOOD: 11.7 % (ref 11–15)
ERYTHROCYTE [DISTWIDTH] IN BLOOD: 11.7 % (ref 11–15)
ERYTHROCYTE [DISTWIDTH] IN BLOOD: 11.9 % (ref 11–15)
ERYTHROCYTE [DISTWIDTH] IN BLOOD: 12 % (ref 11–15)
ERYTHROCYTE [DISTWIDTH] IN BLOOD: 12.1 % (ref 11–15)
ERYTHROCYTE [DISTWIDTH] IN BLOOD: 12.3 % (ref 11–15)
ERYTHROCYTE [DISTWIDTH] IN BLOOD: 12.5 % (ref 11–15)
FASTING DURATION TIME PATIENT: ABNORMAL H
FLUAV RNA RESP QL NAA+PROBE: NOT DETECTED
FLUBV RNA RESP QL NAA+PROBE: NOT DETECTED
GFR SERPLBLD BASED ON 1.73 SQ M-ARVRAT: 23 ML/MIN
GFR SERPLBLD BASED ON 1.73 SQ M-ARVRAT: 26 ML/MIN
GFR SERPLBLD BASED ON 1.73 SQ M-ARVRAT: 27 ML/MIN
GFR SERPLBLD BASED ON 1.73 SQ M-ARVRAT: 27 ML/MIN
GFR SERPLBLD BASED ON 1.73 SQ M-ARVRAT: 28 ML/MIN
GFR SERPLBLD BASED ON 1.73 SQ M-ARVRAT: 29 ML/MIN
GFR SERPLBLD BASED ON 1.73 SQ M-ARVRAT: 30 ML/MIN
GFR SERPLBLD BASED ON 1.73 SQ M-ARVRAT: 31 ML/MIN
GFR SERPLBLD BASED ON 1.73 SQ M-ARVRAT: 32 ML/MIN
GFR SERPLBLD BASED ON 1.73 SQ M-ARVRAT: 34 ML/MIN
GFR SERPLBLD BASED ON 1.73 SQ M-ARVRAT: 35 ML/MIN
GFR SERPLBLD BASED ON 1.73 SQ M-ARVRAT: 37 ML/MIN
GFR SERPLBLD BASED ON 1.73 SQ M-ARVRAT: 40 ML/MIN
GFR SERPLBLD BASED ON 1.73 SQ M-ARVRAT: 40 ML/MIN
GFR SERPLBLD BASED ON 1.73 SQ M-ARVRAT: 42 ML/MIN
GFR SERPLBLD BASED ON 1.73 SQ M-ARVRAT: 44 ML/MIN
GLOBULIN SER-MCNC: 3.9 G/DL (ref 2–4)
GLOBULIN SER-MCNC: 4.1 G/DL (ref 2–4)
GLOBULIN SER-MCNC: 4.2 G/DL (ref 2–4)
GLOBULIN SER-MCNC: 4.3 G/DL (ref 2–4)
GLOBULIN SER-MCNC: 4.4 G/DL (ref 2–4)
GLOBULIN SER-MCNC: 4.4 G/DL (ref 2–4)
GLOBULIN SER-MCNC: 4.6 G/DL (ref 2–4)
GLOBULIN SER-MCNC: 4.6 G/DL (ref 2–4)
GLOBULIN SER-MCNC: 4.7 G/DL (ref 2–4)
GLUCOSE BLDC GLUCOMTR-MCNC: 130 MG/DL (ref 70–99)
GLUCOSE BLDC GLUCOMTR-MCNC: 134 MG/DL (ref 70–99)
GLUCOSE BLDC GLUCOMTR-MCNC: 134 MG/DL (ref 70–99)
GLUCOSE BLDC GLUCOMTR-MCNC: 135 MG/DL (ref 70–99)
GLUCOSE BLDC GLUCOMTR-MCNC: 145 MG/DL (ref 70–99)
GLUCOSE BLDC GLUCOMTR-MCNC: 151 MG/DL (ref 70–99)
GLUCOSE BLDC GLUCOMTR-MCNC: 154 MG/DL (ref 70–99)
GLUCOSE BLDC GLUCOMTR-MCNC: 154 MG/DL (ref 70–99)
GLUCOSE BLDC GLUCOMTR-MCNC: 176 MG/DL (ref 70–99)
GLUCOSE BLDC GLUCOMTR-MCNC: 185 MG/DL (ref 70–99)
GLUCOSE BLDC GLUCOMTR-MCNC: 198 MG/DL (ref 70–99)
GLUCOSE BLDC GLUCOMTR-MCNC: 212 MG/DL (ref 70–99)
GLUCOSE BLDC GLUCOMTR-MCNC: 217 MG/DL (ref 70–99)
GLUCOSE BLDC GLUCOMTR-MCNC: 218 MG/DL (ref 70–99)
GLUCOSE BLDC GLUCOMTR-MCNC: 249 MG/DL (ref 70–99)
GLUCOSE BLDC GLUCOMTR-MCNC: 265 MG/DL (ref 70–99)
GLUCOSE BLDC GLUCOMTR-MCNC: 337 MG/DL (ref 70–99)
GLUCOSE BLDC GLUCOMTR-MCNC: 537 MG/DL (ref 70–99)
GLUCOSE SERPL-MCNC: 119 MG/DL (ref 70–99)
GLUCOSE SERPL-MCNC: 122 MG/DL (ref 70–99)
GLUCOSE SERPL-MCNC: 129 MG/DL (ref 70–99)
GLUCOSE SERPL-MCNC: 130 MG/DL (ref 70–99)
GLUCOSE SERPL-MCNC: 141 MG/DL (ref 70–99)
GLUCOSE SERPL-MCNC: 163 MG/DL (ref 70–99)
GLUCOSE SERPL-MCNC: 168 MG/DL (ref 70–99)
GLUCOSE SERPL-MCNC: 174 MG/DL (ref 70–99)
GLUCOSE SERPL-MCNC: 176 MG/DL (ref 70–99)
GLUCOSE SERPL-MCNC: 188 MG/DL (ref 70–99)
GLUCOSE SERPL-MCNC: 193 MG/DL (ref 70–99)
GLUCOSE SERPL-MCNC: 193 MG/DL (ref 70–99)
GLUCOSE SERPL-MCNC: 202 MG/DL (ref 70–99)
GLUCOSE SERPL-MCNC: 205 MG/DL (ref 70–99)
GLUCOSE SERPL-MCNC: 220 MG/DL (ref 70–99)
GLUCOSE SERPL-MCNC: 221 MG/DL (ref 70–99)
GLUCOSE SERPL-MCNC: 227 MG/DL (ref 70–99)
GLUCOSE SERPL-MCNC: 237 MG/DL (ref 70–99)
GLUCOSE SERPL-MCNC: 287 MG/DL (ref 70–99)
GLUCOSE SERPL-MCNC: 289 MG/DL (ref 70–99)
GLUCOSE SERPL-MCNC: 419 MG/DL (ref 70–99)
GLUCOSE SERPL-MCNC: 468 MG/DL (ref 70–99)
GLUCOSE SERPL-MCNC: 600 MG/DL (ref 70–99)
GLUCOSE UR STRIP-MCNC: 150 MG/DL
GLUCOSE UR STRIP-MCNC: 150 MG/DL
GLUCOSE UR STRIP-MCNC: 300 MG/DL
GLUCOSE UR STRIP-MCNC: 500 MG/DL
GLUCOSE UR STRIP-MCNC: 500 MG/DL
GLUCOSE UR STRIP-MCNC: >=500 MG/DL
HBA1C MFR BLD: 12.1 % (ref 4.5–5.6)
HBA1C MFR BLD: 9.4 % (ref 4.5–5.6)
HCT VFR BLD CALC: 37.6 % (ref 36–46.5)
HCT VFR BLD CALC: 37.7 % (ref 36–46.5)
HCT VFR BLD CALC: 37.8 % (ref 36–46.5)
HCT VFR BLD CALC: 37.9 % (ref 36–46.5)
HCT VFR BLD CALC: 38 % (ref 36–46.5)
HCT VFR BLD CALC: 38.2 % (ref 36–46.5)
HCT VFR BLD CALC: 39.1 % (ref 36–46.5)
HCT VFR BLD CALC: 40.2 % (ref 36–46.5)
HCT VFR BLD CALC: 40.4 % (ref 36–46.5)
HCT VFR BLD CALC: 40.5 % (ref 36–46.5)
HCT VFR BLD CALC: 40.6 % (ref 36–46.5)
HCT VFR BLD CALC: 40.7 % (ref 36–46.5)
HCT VFR BLD CALC: 41.4 % (ref 36–46.5)
HCT VFR BLD CALC: 42.7 % (ref 36–46.5)
HCT VFR BLD CALC: 42.8 % (ref 36–46.5)
HCT VFR BLD CALC: 45.4 % (ref 36–46.5)
HEART RATE RESERVE PREDICTED: 43.79 BPM
HGB BLD-MCNC: 12.7 G/DL (ref 12–15.5)
HGB BLD-MCNC: 12.7 G/DL (ref 12–15.5)
HGB BLD-MCNC: 13.1 G/DL (ref 12–15.5)
HGB BLD-MCNC: 13.2 G/DL (ref 12–15.5)
HGB BLD-MCNC: 13.3 G/DL (ref 12–15.5)
HGB BLD-MCNC: 13.5 G/DL (ref 12–15.5)
HGB BLD-MCNC: 13.6 G/DL (ref 12–15.5)
HGB BLD-MCNC: 13.6 G/DL (ref 12–15.5)
HGB BLD-MCNC: 13.8 G/DL (ref 12–15.5)
HGB BLD-MCNC: 13.9 G/DL (ref 12–15.5)
HGB BLD-MCNC: 13.9 G/DL (ref 12–15.5)
HGB BLD-MCNC: 14.1 G/DL (ref 12–15.5)
HGB BLD-MCNC: 14.6 G/DL (ref 12–15.5)
HGB BLD-MCNC: 14.7 G/DL (ref 12–15.5)
HGB BLD-MCNC: 15.2 G/DL (ref 12–15.5)
HGB UR QL STRIP: NEGATIVE
HYALINE CASTS #/AREA URNS LPF: ABNORMAL /LPF
IMM GRANULOCYTES # BLD AUTO: 0 K/MCL (ref 0–0.2)
IMM GRANULOCYTES # BLD AUTO: 0.1 K/MCL (ref 0–0.2)
IMM GRANULOCYTES # BLD: 1 %
INR PPP: 1.1
INR PPP: 1.1
INTERVENTRICULAR SEPTUM IN END DIASTOLE (IVSD): 2.67
KETONES UR STRIP-MCNC: NEGATIVE MG/DL
LEFT INTERNAL DIMENSION IN SYSTOLE (LVSD): 1
LEFT VENTRICULAR INTERNAL DIMENSION IN DIASTOLE (LVDD): 2.8
LEFT VENTRICULAR POSTERIOR WALL IN END DIASTOLE (LVPW): 4.2
LEUKOCYTE ESTERASE UR QL STRIP: NEGATIVE
LV EF: 87 %
LV EF: NORMAL %
LVOT 2D (LVOTD): 24.2
LVOT VTI (LVOTVTI): 1.36
LYMPHOCYTES # BLD: 0.8 K/MCL (ref 1–4)
LYMPHOCYTES # BLD: 1 K/MCL (ref 1–4)
LYMPHOCYTES # BLD: 1.2 K/MCL (ref 1–4)
LYMPHOCYTES # BLD: 1.3 K/MCL (ref 1–4)
LYMPHOCYTES # BLD: 1.4 K/MCL (ref 1–4)
LYMPHOCYTES # BLD: 1.5 K/MCL (ref 1–4)
LYMPHOCYTES # BLD: 1.6 K/MCL (ref 1–4)
LYMPHOCYTES # BLD: 1.7 K/MCL (ref 1–4)
LYMPHOCYTES # BLD: 1.7 K/MCL (ref 1–4)
LYMPHOCYTES # BLD: 1.8 K/MCL (ref 1–4)
LYMPHOCYTES # BLD: 1.8 K/MCL (ref 1–4)
LYMPHOCYTES # BLD: 2 K/MCL (ref 1–4)
LYMPHOCYTES NFR BLD: 10 %
LYMPHOCYTES NFR BLD: 11 %
LYMPHOCYTES NFR BLD: 13 %
LYMPHOCYTES NFR BLD: 13 %
LYMPHOCYTES NFR BLD: 14 %
LYMPHOCYTES NFR BLD: 15 %
LYMPHOCYTES NFR BLD: 16 %
LYMPHOCYTES NFR BLD: 16 %
LYMPHOCYTES NFR BLD: 19 %
LYMPHOCYTES NFR BLD: 21 %
LYMPHOCYTES NFR BLD: 21 %
LYMPHOCYTES NFR BLD: 7 %
MAGNESIUM SERPL-MCNC: 2.3 MG/DL (ref 1.7–2.4)
MAGNESIUM SERPL-MCNC: 2.3 MG/DL (ref 1.7–2.4)
MAGNESIUM SERPL-MCNC: 2.6 MG/DL (ref 1.7–2.4)
MCH RBC QN AUTO: 31.1 PG (ref 26–34)
MCH RBC QN AUTO: 31.1 PG (ref 26–34)
MCH RBC QN AUTO: 31.3 PG (ref 26–34)
MCH RBC QN AUTO: 31.3 PG (ref 26–34)
MCH RBC QN AUTO: 31.4 PG (ref 26–34)
MCH RBC QN AUTO: 31.4 PG (ref 26–34)
MCH RBC QN AUTO: 31.5 PG (ref 26–34)
MCH RBC QN AUTO: 31.5 PG (ref 26–34)
MCH RBC QN AUTO: 31.6 PG (ref 26–34)
MCH RBC QN AUTO: 31.7 PG (ref 26–34)
MCH RBC QN AUTO: 31.8 PG (ref 26–34)
MCH RBC QN AUTO: 31.8 PG (ref 26–34)
MCH RBC QN AUTO: 31.9 PG (ref 26–34)
MCHC RBC AUTO-ENTMCNC: 33.5 G/DL (ref 32–36.5)
MCHC RBC AUTO-ENTMCNC: 33.8 G/DL (ref 32–36.5)
MCHC RBC AUTO-ENTMCNC: 33.8 G/DL (ref 32–36.5)
MCHC RBC AUTO-ENTMCNC: 34 G/DL (ref 32–36.5)
MCHC RBC AUTO-ENTMCNC: 34.1 G/DL (ref 32–36.5)
MCHC RBC AUTO-ENTMCNC: 34.1 G/DL (ref 32–36.5)
MCHC RBC AUTO-ENTMCNC: 34.2 G/DL (ref 32–36.5)
MCHC RBC AUTO-ENTMCNC: 34.2 G/DL (ref 32–36.5)
MCHC RBC AUTO-ENTMCNC: 34.3 G/DL (ref 32–36.5)
MCHC RBC AUTO-ENTMCNC: 34.4 G/DL (ref 32–36.5)
MCHC RBC AUTO-ENTMCNC: 34.7 G/DL (ref 32–36.5)
MCHC RBC AUTO-ENTMCNC: 34.9 G/DL (ref 32–36.5)
MCHC RBC AUTO-ENTMCNC: 35.3 G/DL (ref 32–36.5)
MCV RBC AUTO: 88 FL (ref 78–100)
MCV RBC AUTO: 88.9 FL (ref 78–100)
MCV RBC AUTO: 90.2 FL (ref 78–100)
MCV RBC AUTO: 91.6 FL (ref 78–100)
MCV RBC AUTO: 91.6 FL (ref 78–100)
MCV RBC AUTO: 92.3 FL (ref 78–100)
MCV RBC AUTO: 92.6 FL (ref 78–100)
MCV RBC AUTO: 92.7 FL (ref 78–100)
MCV RBC AUTO: 93.2 FL (ref 78–100)
MCV RBC AUTO: 93.4 FL (ref 78–100)
MCV RBC AUTO: 93.5 FL (ref 78–100)
MCV RBC AUTO: 93.5 FL (ref 78–100)
MCV RBC AUTO: 93.8 FL (ref 78–100)
MCV RBC AUTO: 94.3 FL (ref 78–100)
MCV RBC AUTO: 94.4 FL (ref 78–100)
MICROALBUMIN UR-MCNC: 11.2 MG/DL
MICROALBUMIN UR-MCNC: 13 MG/DL
MICROALBUMIN UR-MCNC: 13.7 MG/DL
MICROALBUMIN UR-MCNC: 14.1 MG/DL
MICROALBUMIN UR-MCNC: 26.9 MG/DL
MICROALBUMIN UR-MCNC: 4.7 MG/DL
MICROALBUMIN UR-MCNC: 8.71 MG/DL
MICROALBUMIN UR-MCNC: 9.14 MG/DL
MICROALBUMIN/CREAT UR: 174.7 MG/G
MICROALBUMIN/CREAT UR: 192.9 MG/G
MICROALBUMIN/CREAT UR: 214.6 MG/G
MICROALBUMIN/CREAT UR: 335.3 MG/G
MICROALBUMIN/CREAT UR: 365.3 MG/G
MICROALBUMIN/CREAT UR: 394.1 MG/G
MICROALBUMIN/CREAT UR: 484.1 MG/G
MICROALBUMIN/CREAT UR: 643.5 MG/G
MONOCYTES # BLD: 0.5 K/MCL (ref 0.3–0.9)
MONOCYTES # BLD: 0.6 K/MCL (ref 0.3–0.9)
MONOCYTES # BLD: 0.7 K/MCL (ref 0.3–0.9)
MONOCYTES # BLD: 0.9 K/MCL (ref 0.3–0.9)
MONOCYTES NFR BLD: 4 %
MONOCYTES NFR BLD: 4 %
MONOCYTES NFR BLD: 5 %
MONOCYTES NFR BLD: 6 %
MONOCYTES NFR BLD: 7 %
MONOCYTES NFR BLD: 8 %
MONOCYTES NFR BLD: 9 %
MRSA DNA SPEC QL NAA+PROBE: NOT DETECTED
MUCOUS THREADS URNS QL MICRO: PRESENT
MV E TISSUE VEL MED (MESV): 5.77
MV E WAVE VEL/E TISSUE VEL MED(MSR): 4.24
MV PEAK A VELOCITY (MVPAV): 298
MV PEAK E VELOCITY (MVPEV): 1.08
NEUTROPHILS # BLD: 10.1 K/MCL (ref 1.8–7.7)
NEUTROPHILS # BLD: 10.2 K/MCL (ref 1.8–7.7)
NEUTROPHILS # BLD: 5 K/MCL (ref 1.8–7.7)
NEUTROPHILS # BLD: 5.9 K/MCL (ref 1.8–7.7)
NEUTROPHILS # BLD: 6 K/MCL (ref 1.8–7.7)
NEUTROPHILS # BLD: 6.2 K/MCL (ref 1.8–7.7)
NEUTROPHILS # BLD: 6.4 K/MCL (ref 1.8–7.7)
NEUTROPHILS # BLD: 6.5 K/MCL (ref 1.8–7.7)
NEUTROPHILS # BLD: 7.5 K/MCL (ref 1.8–7.7)
NEUTROPHILS # BLD: 7.8 K/MCL (ref 1.8–7.7)
NEUTROPHILS # BLD: 7.9 K/MCL (ref 1.8–7.7)
NEUTROPHILS # BLD: 8.4 K/MCL (ref 1.8–7.7)
NEUTROPHILS # BLD: 9.2 K/MCL (ref 1.8–7.7)
NEUTROPHILS # BLD: 9.2 K/MCL (ref 1.8–7.7)
NEUTROPHILS NFR BLD: 67 %
NEUTROPHILS NFR BLD: 68 %
NEUTROPHILS NFR BLD: 71 %
NEUTROPHILS NFR BLD: 72 %
NEUTROPHILS NFR BLD: 73 %
NEUTROPHILS NFR BLD: 74 %
NEUTROPHILS NFR BLD: 75 %
NEUTROPHILS NFR BLD: 76 %
NEUTROPHILS NFR BLD: 78 %
NEUTROPHILS NFR BLD: 79 %
NEUTROPHILS NFR BLD: 82 %
NEUTROPHILS NFR BLD: 82 %
NEUTROPHILS NFR BLD: 83 %
NEUTROPHILS NFR BLD: 85 %
NITRITE UR QL STRIP: NEGATIVE
NRBC BLD MANUAL-RTO: 0 /100 WBC
NT-PROBNP SERPL-MCNC: 146 PG/ML
NT-PROBNP SERPL-MCNC: 153 PG/ML
NT-PROBNP SERPL-MCNC: 163 PG/ML
NT-PROBNP SERPL-MCNC: 169 PG/ML
NT-PROBNP SERPL-MCNC: 204 PG/ML
NT-PROBNP SERPL-MCNC: 272 PG/ML
NT-PROBNP SERPL-MCNC: 306 PG/ML
NT-PROBNP SERPL-MCNC: 344 PG/ML
NT-PROBNP SERPL-MCNC: 430 PG/ML
NT-PROBNP SERPL-MCNC: 562 PG/ML
P AXIS (DEGREES): 63
P AXIS (DEGREES): 74
PH UR STRIP: 5.5 [PH] (ref 5–7)
PH UR STRIP: 6 [PH] (ref 5–7)
PH UR STRIP: 6.5 [PH] (ref 5–7)
PH UR STRIP: 6.5 [PH] (ref 5–7)
PH UR STRIP: 7 [PH] (ref 5–7)
PHOSPHATE SERPL-MCNC: 3.6 MG/DL (ref 2.4–4.7)
PHOSPHATE SERPL-MCNC: 3.9 MG/DL (ref 2.4–4.7)
PHOSPHATE SERPL-MCNC: 4.4 MG/DL (ref 2.4–4.7)
PLATELET # BLD AUTO: 234 K/MCL (ref 140–450)
PLATELET # BLD AUTO: 241 K/MCL (ref 140–450)
PLATELET # BLD AUTO: 253 K/MCL (ref 140–450)
PLATELET # BLD AUTO: 263 K/MCL (ref 140–450)
PLATELET # BLD AUTO: 268 K/MCL (ref 140–450)
PLATELET # BLD AUTO: 273 K/MCL (ref 140–450)
PLATELET # BLD AUTO: 282 K/MCL (ref 140–450)
PLATELET # BLD AUTO: 285 K/MCL (ref 140–450)
PLATELET # BLD AUTO: 288 K/MCL (ref 140–450)
PLATELET # BLD AUTO: 294 K/MCL (ref 140–450)
PLATELET # BLD AUTO: 296 K/MCL (ref 140–450)
PLATELET # BLD AUTO: 300 K/MCL (ref 140–450)
PLATELET # BLD AUTO: 303 K/MCL (ref 140–450)
PLATELET # BLD AUTO: 306 K/MCL (ref 140–450)
PLATELET # BLD AUTO: 321 K/MCL (ref 140–450)
POTASSIUM SERPL-SCNC: 3.3 MMOL/L (ref 3.4–5.1)
POTASSIUM SERPL-SCNC: 3.3 MMOL/L (ref 3.4–5.1)
POTASSIUM SERPL-SCNC: 3.5 MMOL/L (ref 3.4–5.1)
POTASSIUM SERPL-SCNC: 3.7 MMOL/L (ref 3.4–5.1)
POTASSIUM SERPL-SCNC: 3.8 MMOL/L (ref 3.4–5.1)
POTASSIUM SERPL-SCNC: 3.8 MMOL/L (ref 3.4–5.1)
POTASSIUM SERPL-SCNC: 3.9 MMOL/L (ref 3.4–5.1)
POTASSIUM SERPL-SCNC: 3.9 MMOL/L (ref 3.4–5.1)
POTASSIUM SERPL-SCNC: 4 MMOL/L (ref 3.4–5.1)
POTASSIUM SERPL-SCNC: 4.1 MMOL/L (ref 3.4–5.1)
POTASSIUM SERPL-SCNC: 4.1 MMOL/L (ref 3.4–5.1)
POTASSIUM SERPL-SCNC: 4.2 MMOL/L (ref 3.4–5.1)
POTASSIUM SERPL-SCNC: 4.2 MMOL/L (ref 3.4–5.1)
POTASSIUM SERPL-SCNC: 4.3 MMOL/L (ref 3.4–5.1)
POTASSIUM SERPL-SCNC: 4.3 MMOL/L (ref 3.4–5.1)
POTASSIUM SERPL-SCNC: 4.4 MMOL/L (ref 3.4–5.1)
POTASSIUM SERPL-SCNC: 4.4 MMOL/L (ref 3.4–5.1)
POTASSIUM SERPL-SCNC: 4.5 MMOL/L (ref 3.4–5.1)
POTASSIUM SERPL-SCNC: 4.7 MMOL/L (ref 3.4–5.1)
POTASSIUM SERPL-SCNC: 4.7 MMOL/L (ref 3.4–5.1)
POTASSIUM SERPL-SCNC: 4.8 MMOL/L (ref 3.4–5.1)
POTASSIUM SERPL-SCNC: 4.8 MMOL/L (ref 3.4–5.1)
PR-INTERVAL (MSEC): 186
PR-INTERVAL (MSEC): 214
PROT SERPL-MCNC: 7.5 G/DL (ref 6.4–8.2)
PROT SERPL-MCNC: 7.6 G/DL (ref 6.4–8.2)
PROT SERPL-MCNC: 7.7 G/DL (ref 6.4–8.2)
PROT SERPL-MCNC: 7.7 G/DL (ref 6.4–8.2)
PROT SERPL-MCNC: 7.8 G/DL (ref 6.4–8.2)
PROT SERPL-MCNC: 7.9 G/DL (ref 6.4–8.2)
PROT SERPL-MCNC: 8 G/DL (ref 6.4–8.2)
PROT SERPL-MCNC: 8.1 G/DL (ref 6.4–8.2)
PROT SERPL-MCNC: 8.1 G/DL (ref 6.4–8.2)
PROT SERPL-MCNC: 8.2 G/DL (ref 6.4–8.2)
PROT SERPL-MCNC: 8.2 G/DL (ref 6.4–8.2)
PROT SERPL-MCNC: 8.3 G/DL (ref 6.4–8.2)
PROT UR STRIP-MCNC: 30 MG/DL
PROT UR STRIP-MCNC: 30 MG/DL
PROT UR STRIP-MCNC: ABNORMAL MG/DL
PROT UR STRIP-MCNC: ABNORMAL MG/DL
PROT UR STRIP-MCNC: NEGATIVE MG/DL
PROT UR-MCNC: 12 MG/DL
PROT UR-MCNC: 15 MG/DL
PROT UR-MCNC: 19 MG/DL
PROT UR-MCNC: 20 MG/DL
PROT UR-MCNC: 23 MG/DL
PROT UR-MCNC: 26 MG/DL
PROT UR-MCNC: 26 MG/DL
PROT UR-MCNC: 37 MG/DL
PROT UR-MCNC: 38 MG/DL
PROT UR-MCNC: 8 MG/DL
PROT/CREAT UR: 909 MGPR/GCR
PROT/CREAT UR: 942 MGPR/GCR
PROTHROMBIN TIME: 10.8 SEC (ref 9.7–11.8)
PROTHROMBIN TIME: 11.4 SEC (ref 9.7–11.8)
QRS-INTERVAL (MSEC): 80
QRS-INTERVAL (MSEC): 90
QT-INTERVAL (MSEC): 408
QT-INTERVAL (MSEC): 420
QTC: 450
QTC: 474
R AXIS (DEGREES): 25
R AXIS (DEGREES): 33
RAINBOW EXTRA TUBES HOLD SPECIMEN: NORMAL
RBC # BLD: 4.02 MIL/MCL (ref 4–5.2)
RBC # BLD: 4.02 MIL/MCL (ref 4–5.2)
RBC # BLD: 4.18 MIL/MCL (ref 4–5.2)
RBC # BLD: 4.18 MIL/MCL (ref 4–5.2)
RBC # BLD: 4.25 MIL/MCL (ref 4–5.2)
RBC # BLD: 4.33 MIL/MCL (ref 4–5.2)
RBC # BLD: 4.34 MIL/MCL (ref 4–5.2)
RBC # BLD: 4.34 MIL/MCL (ref 4–5.2)
RBC # BLD: 4.39 MIL/MCL (ref 4–5.2)
RBC # BLD: 4.39 MIL/MCL (ref 4–5.2)
RBC # BLD: 4.41 MIL/MCL (ref 4–5.2)
RBC # BLD: 4.44 MIL/MCL (ref 4–5.2)
RBC # BLD: 4.57 MIL/MCL (ref 4–5.2)
RBC # BLD: 4.67 MIL/MCL (ref 4–5.2)
RBC # BLD: 4.81 MIL/MCL (ref 4–5.2)
RBC #/AREA URNS HPF: ABNORMAL /HPF
REPORT TEXT: NORMAL
REPORT TEXT: NORMAL
RESTING HR ACHIEVED: 77 BPM
RSV AG NPH QL IA.RAPID: NOT DETECTED
RV END SYSTOLIC LONGITUDINAL STRAIN FREE WALL (RVGS): 2
SARS-COV-2 RNA RESP QL NAA+PROBE: NOT DETECTED
SARS-COV-2 RNA RESP QL NAA+PROBE: NOT DETECTED
SERVICE CMNT-IMP: NORMAL
SODIUM SERPL-SCNC: 125 MMOL/L (ref 135–145)
SODIUM SERPL-SCNC: 127 MMOL/L (ref 135–145)
SODIUM SERPL-SCNC: 128 MMOL/L (ref 135–145)
SODIUM SERPL-SCNC: 128 MMOL/L (ref 135–145)
SODIUM SERPL-SCNC: 129 MMOL/L (ref 135–145)
SODIUM SERPL-SCNC: 130 MMOL/L (ref 135–145)
SODIUM SERPL-SCNC: 130 MMOL/L (ref 135–145)
SODIUM SERPL-SCNC: 131 MMOL/L (ref 135–145)
SODIUM SERPL-SCNC: 131 MMOL/L (ref 135–145)
SODIUM SERPL-SCNC: 133 MMOL/L (ref 135–145)
SODIUM SERPL-SCNC: 133 MMOL/L (ref 135–145)
SODIUM SERPL-SCNC: 134 MMOL/L (ref 135–145)
SODIUM SERPL-SCNC: 135 MMOL/L (ref 135–145)
SODIUM SERPL-SCNC: 135 MMOL/L (ref 135–145)
SODIUM SERPL-SCNC: 136 MMOL/L (ref 135–145)
SODIUM SERPL-SCNC: 137 MMOL/L (ref 135–145)
SODIUM SERPL-SCNC: 137 MMOL/L (ref 135–145)
SP GR UR STRIP: 1 (ref 1–1.03)
SP GR UR STRIP: 1.01 (ref 1–1.03)
SQUAMOUS #/AREA URNS HPF: ABNORMAL /HPF
STRESS BASELINE BP: NORMAL MMHG
STRESS PEAK HR: 86 BPM
STRESS PERCENT HR: 56 %
STRESS POST EXERCISE DUR MIN: 1 MIN
STRESS POST PEAK BP: NORMAL MMHG
STRESS TARGET HR: 153 BPM
T AXIS (DEGREES): 43
T AXIS (DEGREES): 43
TACROLIMUS BLD-MCNC: 6.9 NG/ML (ref 5–20)
TRICUSPID VALVE PEAK REGURGITATION VELOCITY (TRPV): 2.5
TROPONIN I SERPL DL<=0.01 NG/ML-MCNC: 4 NG/L
TV ESTIMATED RIGHT ARTERIAL PRESSURE (RAP): 13.1
UROBILINOGEN UR STRIP-MCNC: 0.2 MG/DL
VENTRICULAR RATE EKG/MIN (BPM): 69
VENTRICULAR RATE EKG/MIN (BPM): 81
WBC # BLD: 10 K/MCL (ref 4.2–11)
WBC # BLD: 10.4 K/MCL (ref 4.2–11)
WBC # BLD: 11 K/MCL (ref 4.2–11)
WBC # BLD: 11.1 K/MCL (ref 4.2–11)
WBC # BLD: 11.4 K/MCL (ref 4.2–11)
WBC # BLD: 11.9 K/MCL (ref 4.2–11)
WBC # BLD: 12.4 K/MCL (ref 4.2–11)
WBC # BLD: 6.7 K/MCL (ref 4.2–11)
WBC # BLD: 8.2 K/MCL (ref 4.2–11)
WBC # BLD: 8.5 K/MCL (ref 4.2–11)
WBC # BLD: 8.7 K/MCL (ref 4.2–11)
WBC # BLD: 8.9 K/MCL (ref 4.2–11)
WBC # BLD: 8.9 K/MCL (ref 4.2–11)
WBC # BLD: 9 K/MCL (ref 4.2–11)
WBC # BLD: 9.9 K/MCL (ref 4.2–11)
WBC #/AREA URNS HPF: ABNORMAL /HPF

## 2022-01-01 PROCEDURE — G2066 INTER DEVC REMOTE 30D: HCPCS

## 2022-01-01 PROCEDURE — 10002803 HB RX 637: Performed by: INTERNAL MEDICINE

## 2022-01-01 PROCEDURE — 83880 ASSAY OF NATRIURETIC PEPTIDE: CPT | Performed by: NURSE PRACTITIONER

## 2022-01-01 PROCEDURE — 85025 COMPLETE CBC W/AUTO DIFF WBC: CPT | Performed by: INTERNAL MEDICINE

## 2022-01-01 PROCEDURE — 10002801 HB RX 250 W/O HCPCS: Performed by: INTERNAL MEDICINE

## 2022-01-01 PROCEDURE — 83036 HEMOGLOBIN GLYCOSYLATED A1C: CPT | Performed by: HOSPITALIST

## 2022-01-01 PROCEDURE — 80053 COMPREHEN METABOLIC PANEL: CPT | Performed by: NURSE PRACTITIONER

## 2022-01-01 PROCEDURE — 10002800 HB RX 250 W HCPCS: Performed by: HOSPITALIST

## 2022-01-01 PROCEDURE — 10002803 HB RX 637: Performed by: HOSPITALIST

## 2022-01-01 PROCEDURE — 82043 UR ALBUMIN QUANTITATIVE: CPT | Performed by: INTERNAL MEDICINE

## 2022-01-01 PROCEDURE — 97166 OT EVAL MOD COMPLEX 45 MIN: CPT

## 2022-01-01 PROCEDURE — 36415 COLL VENOUS BLD VENIPUNCTURE: CPT | Performed by: NURSE PRACTITIONER

## 2022-01-01 PROCEDURE — 10006031 HB ROOM CHARGE TELEMETRY

## 2022-01-01 PROCEDURE — 81003 URINALYSIS AUTO W/O SCOPE: CPT | Performed by: INTERNAL MEDICINE

## 2022-01-01 PROCEDURE — 84156 ASSAY OF PROTEIN URINE: CPT | Performed by: INTERNAL MEDICINE

## 2022-01-01 PROCEDURE — 99222 1ST HOSP IP/OBS MODERATE 55: CPT | Performed by: INTERNAL MEDICINE

## 2022-01-01 PROCEDURE — 82570 ASSAY OF URINE CREATININE: CPT | Performed by: NURSE PRACTITIONER

## 2022-01-01 PROCEDURE — 85610 PROTHROMBIN TIME: CPT | Performed by: NURSE PRACTITIONER

## 2022-01-01 PROCEDURE — 10004651 HB RX, NO CHARGE ITEM: Performed by: INTERNAL MEDICINE

## 2022-01-01 PROCEDURE — 82962 GLUCOSE BLOOD TEST: CPT

## 2022-01-01 PROCEDURE — 85025 COMPLETE CBC W/AUTO DIFF WBC: CPT | Performed by: NURSE PRACTITIONER

## 2022-01-01 PROCEDURE — 99211 OFF/OP EST MAY X REQ PHY/QHP: CPT

## 2022-01-01 PROCEDURE — 10002019 HB COUNTER RESP ASSESSMENT

## 2022-01-01 PROCEDURE — 10006027 HB SUPPLY 278: Performed by: INTERNAL MEDICINE

## 2022-01-01 PROCEDURE — 84156 ASSAY OF PROTEIN URINE: CPT | Performed by: NURSE PRACTITIONER

## 2022-01-01 PROCEDURE — 81001 URINALYSIS AUTO W/SCOPE: CPT | Performed by: INTERNAL MEDICINE

## 2022-01-01 PROCEDURE — 99239 HOSP IP/OBS DSCHRG MGMT >30: CPT | Performed by: INTERNAL MEDICINE

## 2022-01-01 PROCEDURE — 94640 AIRWAY INHALATION TREATMENT: CPT

## 2022-01-01 PROCEDURE — 93264 REM MNTR WRLS P-ART PRS SNR: CPT | Performed by: NURSE PRACTITIONER

## 2022-01-01 PROCEDURE — 10002805 HB CONTRAST AGENT: Performed by: INTERNAL MEDICINE

## 2022-01-01 PROCEDURE — 99233 SBSQ HOSP IP/OBS HIGH 50: CPT | Performed by: INTERNAL MEDICINE

## 2022-01-01 PROCEDURE — 99214 OFFICE O/P EST MOD 30 MIN: CPT | Performed by: NURSE PRACTITIONER

## 2022-01-01 PROCEDURE — 13000001 HB PHASE II RECOVERY EA 30 MINUTES: Performed by: INTERNAL MEDICINE

## 2022-01-01 PROCEDURE — 71045 X-RAY EXAM CHEST 1 VIEW: CPT

## 2022-01-01 PROCEDURE — 96374 THER/PROPH/DIAG INJ IV PUSH: CPT

## 2022-01-01 PROCEDURE — 13003289 HB OXYGEN THERAPY DAILY

## 2022-01-01 PROCEDURE — 80053 COMPREHEN METABOLIC PANEL: CPT | Performed by: INTERNAL MEDICINE

## 2022-01-01 PROCEDURE — 10004651 HB RX, NO CHARGE ITEM: Performed by: HOSPITALIST

## 2022-01-01 PROCEDURE — 81001 URINALYSIS AUTO W/SCOPE: CPT | Performed by: NURSE PRACTITIONER

## 2022-01-01 PROCEDURE — 82570 ASSAY OF URINE CREATININE: CPT | Performed by: INTERNAL MEDICINE

## 2022-01-01 PROCEDURE — 99215 OFFICE O/P EST HI 40 MIN: CPT | Performed by: NURSE PRACTITIONER

## 2022-01-01 PROCEDURE — 97162 PT EVAL MOD COMPLEX 30 MIN: CPT

## 2022-01-01 PROCEDURE — 99213 OFFICE O/P EST LOW 20 MIN: CPT | Performed by: NURSE PRACTITIONER

## 2022-01-01 PROCEDURE — 93970 EXTREMITY STUDY: CPT

## 2022-01-01 PROCEDURE — 80048 BASIC METABOLIC PNL TOTAL CA: CPT | Performed by: NURSE PRACTITIONER

## 2022-01-01 PROCEDURE — 99153 MOD SED SAME PHYS/QHP EA: CPT | Performed by: INTERNAL MEDICINE

## 2022-01-01 PROCEDURE — 10004180 HB COUNTER-TRANSPORT

## 2022-01-01 PROCEDURE — 96366 THER/PROPH/DIAG IV INF ADDON: CPT

## 2022-01-01 PROCEDURE — 83735 ASSAY OF MAGNESIUM: CPT | Performed by: NURSE PRACTITIONER

## 2022-01-01 PROCEDURE — 99152 MOD SED SAME PHYS/QHP 5/>YRS: CPT | Performed by: INTERNAL MEDICINE

## 2022-01-01 PROCEDURE — 80048 BASIC METABOLIC PNL TOTAL CA: CPT | Performed by: INTERNAL MEDICINE

## 2022-01-01 PROCEDURE — 99212 OFFICE O/P EST SF 10 MIN: CPT

## 2022-01-01 PROCEDURE — 10002803 HB RX 637: Performed by: EMERGENCY MEDICINE

## 2022-01-01 PROCEDURE — 83880 ASSAY OF NATRIURETIC PEPTIDE: CPT | Performed by: EMERGENCY MEDICINE

## 2022-01-01 PROCEDURE — 99215 OFFICE O/P EST HI 40 MIN: CPT | Performed by: INTERNAL MEDICINE

## 2022-01-01 PROCEDURE — 93015 CV STRESS TEST SUPVJ I&R: CPT | Performed by: INTERNAL MEDICINE

## 2022-01-01 PROCEDURE — 99284 EMERGENCY DEPT VISIT MOD MDM: CPT

## 2022-01-01 PROCEDURE — 96365 THER/PROPH/DIAG IV INF INIT: CPT

## 2022-01-01 PROCEDURE — 33289 TCAT IMPL WRLS P-ART PRS SNR: CPT | Performed by: INTERNAL MEDICINE

## 2022-01-01 PROCEDURE — 36415 COLL VENOUS BLD VENIPUNCTURE: CPT | Performed by: INTERNAL MEDICINE

## 2022-01-01 PROCEDURE — 85730 THROMBOPLASTIN TIME PARTIAL: CPT | Performed by: NURSE PRACTITIONER

## 2022-01-01 PROCEDURE — 78452 HT MUSCLE IMAGE SPECT MULT: CPT | Performed by: INTERNAL MEDICINE

## 2022-01-01 PROCEDURE — 97802 MEDICAL NUTRITION INDIV IN: CPT

## 2022-01-01 PROCEDURE — 36415 COLL VENOUS BLD VENIPUNCTURE: CPT | Performed by: HOSPITALIST

## 2022-01-01 PROCEDURE — C1894 INTRO/SHEATH, NON-LASER: HCPCS | Performed by: INTERNAL MEDICINE

## 2022-01-01 PROCEDURE — 85025 COMPLETE CBC W/AUTO DIFF WBC: CPT | Performed by: EMERGENCY MEDICINE

## 2022-01-01 PROCEDURE — 84100 ASSAY OF PHOSPHORUS: CPT | Performed by: INTERNAL MEDICINE

## 2022-01-01 PROCEDURE — 10002801 HB RX 250 W/O HCPCS: Performed by: NURSE PRACTITIONER

## 2022-01-01 PROCEDURE — 93005 ELECTROCARDIOGRAM TRACING: CPT | Performed by: INTERNAL MEDICINE

## 2022-01-01 PROCEDURE — 99232 SBSQ HOSP IP/OBS MODERATE 35: CPT | Performed by: INTERNAL MEDICINE

## 2022-01-01 PROCEDURE — 10002800 HB RX 250 W HCPCS: Performed by: INTERNAL MEDICINE

## 2022-01-01 PROCEDURE — C1760 CLOSURE DEV, VASC: HCPCS | Performed by: INTERNAL MEDICINE

## 2022-01-01 PROCEDURE — 80069 RENAL FUNCTION PANEL: CPT | Performed by: INTERNAL MEDICINE

## 2022-01-01 PROCEDURE — 10002805 HB CONTRAST AGENT: Performed by: HOSPITALIST

## 2022-01-01 PROCEDURE — 80053 COMPREHEN METABOLIC PANEL: CPT | Performed by: EMERGENCY MEDICINE

## 2022-01-01 PROCEDURE — 83735 ASSAY OF MAGNESIUM: CPT | Performed by: HOSPITALIST

## 2022-01-01 PROCEDURE — 83036 HEMOGLOBIN GLYCOSYLATED A1C: CPT | Performed by: INTERNAL MEDICINE

## 2022-01-01 PROCEDURE — 93306 TTE W/DOPPLER COMPLETE: CPT | Performed by: INTERNAL MEDICINE

## 2022-01-01 PROCEDURE — 82043 UR ALBUMIN QUANTITATIVE: CPT | Performed by: NURSE PRACTITIONER

## 2022-01-01 PROCEDURE — 85027 COMPLETE CBC AUTOMATED: CPT | Performed by: NURSE PRACTITIONER

## 2022-01-01 PROCEDURE — 10002801 HB RX 250 W/O HCPCS: Performed by: HOSPITALIST

## 2022-01-01 PROCEDURE — G0399 HOME SLEEP TEST/TYPE 3 PORTA: HCPCS | Performed by: SPECIALIST

## 2022-01-01 PROCEDURE — G1004 CDSM NDSC: HCPCS | Performed by: INTERNAL MEDICINE

## 2022-01-01 PROCEDURE — 93306 TTE W/DOPPLER COMPLETE: CPT

## 2022-01-01 PROCEDURE — 0241U COVID/FLU/RSV PANEL: CPT | Performed by: EMERGENCY MEDICINE

## 2022-01-01 PROCEDURE — 10002807 HB RX 258: Performed by: INTERNAL MEDICINE

## 2022-01-01 PROCEDURE — 99223 1ST HOSP IP/OBS HIGH 75: CPT | Performed by: INTERNAL MEDICINE

## 2022-01-01 PROCEDURE — C9803 HOPD COVID-19 SPEC COLLECT: HCPCS

## 2022-01-01 PROCEDURE — 80197 ASSAY OF TACROLIMUS: CPT | Performed by: INTERNAL MEDICINE

## 2022-01-01 PROCEDURE — 83735 ASSAY OF MAGNESIUM: CPT | Performed by: INTERNAL MEDICINE

## 2022-01-01 PROCEDURE — 85025 COMPLETE CBC W/AUTO DIFF WBC: CPT | Performed by: HOSPITALIST

## 2022-01-01 PROCEDURE — C2624 WIRELESS PRESSURE SENSOR: HCPCS | Performed by: INTERNAL MEDICINE

## 2022-01-01 PROCEDURE — 10006023 HB SUPPLY 272: Performed by: INTERNAL MEDICINE

## 2022-01-01 PROCEDURE — 36415 COLL VENOUS BLD VENIPUNCTURE: CPT

## 2022-01-01 PROCEDURE — A9502 TC99M TETROFOSMIN: HCPCS | Performed by: INTERNAL MEDICINE

## 2022-01-01 PROCEDURE — 84132 ASSAY OF SERUM POTASSIUM: CPT | Performed by: INTERNAL MEDICINE

## 2022-01-01 PROCEDURE — 87641 MR-STAPH DNA AMP PROBE: CPT | Performed by: HOSPITALIST

## 2022-01-01 PROCEDURE — 80048 BASIC METABOLIC PNL TOTAL CA: CPT | Performed by: HOSPITALIST

## 2022-01-01 PROCEDURE — 97535 SELF CARE MNGMENT TRAINING: CPT

## 2022-01-01 PROCEDURE — U0005 INFEC AGEN DETEC AMPLI PROBE: HCPCS | Performed by: INTERNAL MEDICINE

## 2022-01-01 PROCEDURE — 94664 DEMO&/EVAL PT USE INHALER: CPT

## 2022-01-01 PROCEDURE — 93005 ELECTROCARDIOGRAM TRACING: CPT | Performed by: EMERGENCY MEDICINE

## 2022-01-01 PROCEDURE — 84484 ASSAY OF TROPONIN QUANT: CPT | Performed by: EMERGENCY MEDICINE

## 2022-01-01 PROCEDURE — C1769 GUIDE WIRE: HCPCS | Performed by: INTERNAL MEDICINE

## 2022-01-01 PROCEDURE — 85610 PROTHROMBIN TIME: CPT | Performed by: EMERGENCY MEDICINE

## 2022-01-01 DEVICE — PA SENSOR AND DELIVERY SYSTEM
Type: IMPLANTABLE DEVICE | Site: PULMONARY ARTERY | Status: FUNCTIONAL
Brand: CARDIOMEMS™

## 2022-01-01 DEVICE — THE VASCADE MVP VENOUS VASCULAR CLOSURE SYSTEM (VVCS) IS INTENDED TO SEAL FEMORAL VEINS WITH SINGLE OR MULTIPLE ACCESS SITES IN ONE OR BOTH LIMBS AT THE COMPLETION OF CATHETERIZATION PROCEDURES. THE SYSTEM IS DESIGNED TO DELIVER A RESORBABLE COLLAGEN PATCH, EXTRA-VASCULARLY, AT VESSEL PUNCTURE SITES TO ACHIEVE HEMOSTASIS. FOR USE WITH 6FR TO 12FR (15F MAXIMUM OUTER DIAMETER) INTRODUCER SHEATHS; OVERALL LENGTH OF THE SHEATH (INCLUDING THE HUB) NEEDS TO BE LESS THAN 15CM.
Type: IMPLANTABLE DEVICE | Site: FEMORAL VEIN | Status: FUNCTIONAL
Brand: CARDIVA VASCADE MVP VVCS 6-12F

## 2022-01-01 RX ORDER — HEPARIN SODIUM 5000 [USP'U]/ML
5000 INJECTION, SOLUTION INTRAVENOUS; SUBCUTANEOUS EVERY 8 HOURS SCHEDULED
Status: DISCONTINUED | OUTPATIENT
Start: 2022-01-01 | End: 2023-01-01 | Stop reason: HOSPADM

## 2022-01-01 RX ORDER — MONTELUKAST SODIUM 10 MG/1
10 TABLET ORAL NIGHTLY
Status: DISCONTINUED | OUTPATIENT
Start: 2022-01-01 | End: 2022-01-01 | Stop reason: HOSPADM

## 2022-01-01 RX ORDER — TORSEMIDE 100 MG/1
TABLET ORAL
Qty: 60 TABLET | Refills: 3 | Status: SHIPPED | OUTPATIENT
Start: 2022-01-01 | End: 2022-01-01 | Stop reason: SDUPTHER

## 2022-01-01 RX ORDER — SODIUM CHLORIDE 9 MG/ML
INJECTION, SOLUTION INTRAVENOUS CONTINUOUS
Status: DISCONTINUED | OUTPATIENT
Start: 2022-01-01 | End: 2022-01-01 | Stop reason: HOSPADM

## 2022-01-01 RX ORDER — ACETAMINOPHEN 500 MG
1000 TABLET ORAL ONCE
Status: DISCONTINUED | OUTPATIENT
Start: 2022-01-01 | End: 2022-01-01

## 2022-01-01 RX ORDER — METOLAZONE 2.5 MG/1
2.5 TABLET ORAL
COMMUNITY
End: 2022-01-01 | Stop reason: SDUPTHER

## 2022-01-01 RX ORDER — ACETAMINOPHEN 325 MG/1
650 TABLET ORAL EVERY 4 HOURS PRN
Status: CANCELLED | OUTPATIENT
Start: 2022-01-01

## 2022-01-01 RX ORDER — IPRATROPIUM BROMIDE AND ALBUTEROL SULFATE 2.5; .5 MG/3ML; MG/3ML
3 SOLUTION RESPIRATORY (INHALATION) 2 TIMES DAILY PRN
Status: DISCONTINUED | OUTPATIENT
Start: 2022-01-01 | End: 2022-01-01 | Stop reason: HOSPADM

## 2022-01-01 RX ORDER — HYDROCODONE BITARTRATE AND ACETAMINOPHEN 5; 325 MG/1; MG/1
1 TABLET ORAL EVERY 4 HOURS PRN
Status: DISCONTINUED | OUTPATIENT
Start: 2022-01-01 | End: 2023-01-01 | Stop reason: HOSPADM

## 2022-01-01 RX ORDER — LANOLIN ALCOHOL/MO/W.PET/CERES
400 CREAM (GRAM) TOPICAL EVERY EVENING
Status: DISCONTINUED | OUTPATIENT
Start: 2022-01-01 | End: 2022-01-01 | Stop reason: HOSPADM

## 2022-01-01 RX ORDER — DIPHENHYDRAMINE HYDROCHLORIDE 50 MG/ML
INJECTION INTRAMUSCULAR; INTRAVENOUS PRN
Status: DISCONTINUED | OUTPATIENT
Start: 2022-01-01 | End: 2022-01-01 | Stop reason: HOSPADM

## 2022-01-01 RX ORDER — PRAMIPEXOLE DIHYDROCHLORIDE 1 MG/1
1 TABLET ORAL NIGHTLY
Status: DISCONTINUED | OUTPATIENT
Start: 2022-01-01 | End: 2023-01-01 | Stop reason: HOSPADM

## 2022-01-01 RX ORDER — BUMETANIDE 0.25 MG/ML
2 INJECTION INTRAMUSCULAR; INTRAVENOUS ONCE
Status: COMPLETED | OUTPATIENT
Start: 2022-01-01 | End: 2022-01-01

## 2022-01-01 RX ORDER — POTASSIUM CHLORIDE 20 MEQ/1
40 TABLET, EXTENDED RELEASE ORAL ONCE
Status: COMPLETED | OUTPATIENT
Start: 2022-01-01 | End: 2022-01-01

## 2022-01-01 RX ORDER — CIPROFLOXACIN 500 MG/1
500 TABLET, FILM COATED ORAL 2 TIMES DAILY
COMMUNITY
Start: 2022-04-08 | End: 2022-01-01 | Stop reason: ALTCHOICE

## 2022-01-01 RX ORDER — REGADENOSON 0.08 MG/ML
0.4 INJECTION, SOLUTION INTRAVENOUS ONCE
Status: COMPLETED | OUTPATIENT
Start: 2022-01-01 | End: 2022-01-01

## 2022-01-01 RX ORDER — 0.9 % SODIUM CHLORIDE 0.9 %
2 VIAL (ML) INJECTION EVERY 12 HOURS SCHEDULED
Status: CANCELLED | OUTPATIENT
Start: 2022-01-01

## 2022-01-01 RX ORDER — INSULIN GLARGINE 100 [IU]/ML
25 INJECTION, SOLUTION SUBCUTANEOUS 2 TIMES DAILY
Qty: 15 ML | Refills: 0 | Status: ON HOLD | OUTPATIENT
Start: 2022-01-01 | End: 2023-01-01

## 2022-01-01 RX ORDER — IODIXANOL 320 MG/ML
INJECTION, SOLUTION INTRAVASCULAR PRN
Status: DISCONTINUED | OUTPATIENT
Start: 2022-01-01 | End: 2022-01-01 | Stop reason: HOSPADM

## 2022-01-01 RX ORDER — NICOTINE POLACRILEX 4 MG
30 LOZENGE BUCCAL PRN
Status: DISCONTINUED | OUTPATIENT
Start: 2022-01-01 | End: 2022-01-01 | Stop reason: HOSPADM

## 2022-01-01 RX ORDER — AMOXICILLIN 250 MG
2 CAPSULE ORAL DAILY PRN
Status: DISCONTINUED | OUTPATIENT
Start: 2022-01-01 | End: 2022-01-01 | Stop reason: HOSPADM

## 2022-01-01 RX ORDER — POLYETHYLENE GLYCOL 3350 17 G/17G
17 POWDER, FOR SOLUTION ORAL DAILY PRN
Status: DISCONTINUED | OUTPATIENT
Start: 2022-01-01 | End: 2023-01-01 | Stop reason: HOSPADM

## 2022-01-01 RX ORDER — POTASSIUM CHLORIDE 20 MEQ/1
40 TABLET, EXTENDED RELEASE ORAL ONCE
Status: CANCELLED | OUTPATIENT
Start: 2022-01-01 | End: 2022-01-01

## 2022-01-01 RX ORDER — POTASSIUM CHLORIDE 10 MEQ
10 TABLET, EXT RELEASE, PARTICLES/CRYSTALS ORAL ONCE
Status: COMPLETED | OUTPATIENT
Start: 2022-01-01 | End: 2022-01-01

## 2022-01-01 RX ORDER — METOPROLOL SUCCINATE 25 MG/1
12.5 TABLET, EXTENDED RELEASE ORAL DAILY
Status: DISCONTINUED | OUTPATIENT
Start: 2022-01-01 | End: 2022-01-01 | Stop reason: HOSPADM

## 2022-01-01 RX ORDER — METOPROLOL SUCCINATE 25 MG/1
12.5 TABLET, EXTENDED RELEASE ORAL DAILY
Qty: 45 TABLET | Refills: 3 | Status: SHIPPED | OUTPATIENT
Start: 2022-01-01 | End: 2023-05-04

## 2022-01-01 RX ORDER — HEPARIN SODIUM 5000 [USP'U]/ML
5000 INJECTION, SOLUTION INTRAVENOUS; SUBCUTANEOUS EVERY 8 HOURS SCHEDULED
Status: DISCONTINUED | OUTPATIENT
Start: 2022-01-01 | End: 2022-01-01 | Stop reason: HOSPADM

## 2022-01-01 RX ORDER — FLUCONAZOLE 150 MG/1
1 TABLET ORAL
COMMUNITY
Start: 2022-01-01 | End: 2022-01-01 | Stop reason: ALTCHOICE

## 2022-01-01 RX ORDER — TORSEMIDE 100 MG/1
100 TABLET ORAL 2 TIMES DAILY
Qty: 60 TABLET | Refills: 3 | Status: SHIPPED | COMMUNITY
Start: 2022-01-01 | End: 2022-01-01 | Stop reason: DRUGHIGH

## 2022-01-01 RX ORDER — ASPIRIN 81 MG/1
81 TABLET ORAL DAILY
Qty: 30 TABLET | Refills: 3 | Status: SHIPPED | OUTPATIENT
Start: 2022-01-01 | End: 2023-01-01 | Stop reason: SDUPTHER

## 2022-01-01 RX ORDER — OXCARBAZEPINE 300 MG/1
300 TABLET, FILM COATED ORAL 2 TIMES DAILY
Status: DISCONTINUED | OUTPATIENT
Start: 2022-01-01 | End: 2022-01-01 | Stop reason: HOSPADM

## 2022-01-01 RX ORDER — CLOPIDOGREL BISULFATE 75 MG/1
75 TABLET ORAL DAILY
Qty: 30 TABLET | Refills: 0 | Status: SHIPPED | OUTPATIENT
Start: 2022-01-01 | End: 2022-01-01

## 2022-01-01 RX ORDER — SPIRONOLACTONE 25 MG/1
75 TABLET ORAL 2 TIMES DAILY
Qty: 360 TABLET | Refills: 3 | Status: SHIPPED | OUTPATIENT
Start: 2022-01-01 | End: 2022-01-01 | Stop reason: DRUGHIGH

## 2022-01-01 RX ORDER — L. ACIDOPHILUS/PECTIN, CITRUS 25MM-100MG
1 TABLET ORAL 2 TIMES DAILY
Status: DISCONTINUED | OUTPATIENT
Start: 2022-01-01 | End: 2023-01-01 | Stop reason: HOSPADM

## 2022-01-01 RX ORDER — HYDROCODONE BITARTRATE AND ACETAMINOPHEN 10; 325 MG/1; MG/1
1 TABLET ORAL ONCE
Status: COMPLETED | OUTPATIENT
Start: 2022-01-01 | End: 2022-01-01

## 2022-01-01 RX ORDER — ROSUVASTATIN CALCIUM 20 MG/1
40 TABLET, COATED ORAL AT BEDTIME
Status: DISCONTINUED | OUTPATIENT
Start: 2022-01-01 | End: 2023-01-01

## 2022-01-01 RX ORDER — DEXTROSE MONOHYDRATE 25 G/50ML
12.5 INJECTION, SOLUTION INTRAVENOUS PRN
Status: DISCONTINUED | OUTPATIENT
Start: 2022-01-01 | End: 2023-01-01 | Stop reason: HOSPADM

## 2022-01-01 RX ORDER — BUMETANIDE 0.25 MG/ML
2.5 INJECTION INTRAMUSCULAR; INTRAVENOUS ONCE
Status: COMPLETED | OUTPATIENT
Start: 2022-01-01 | End: 2022-01-01

## 2022-01-01 RX ORDER — POTASSIUM CHLORIDE 1.5 G/1.58G
20 POWDER, FOR SOLUTION ORAL DAILY
COMMUNITY
End: 2022-01-01 | Stop reason: DRUGHIGH

## 2022-01-01 RX ORDER — TACROLIMUS 1 MG/1
2 CAPSULE ORAL 2 TIMES DAILY
Status: DISCONTINUED | OUTPATIENT
Start: 2022-01-01 | End: 2023-01-01 | Stop reason: HOSPADM

## 2022-01-01 RX ORDER — ROSUVASTATIN CALCIUM 20 MG/1
40 TABLET, COATED ORAL NIGHTLY
Status: DISCONTINUED | OUTPATIENT
Start: 2022-01-01 | End: 2022-01-01 | Stop reason: HOSPADM

## 2022-01-01 RX ORDER — INSULIN LISPRO 100 [IU]/ML
10 INJECTION, SOLUTION INTRAVENOUS; SUBCUTANEOUS
Status: DISCONTINUED | OUTPATIENT
Start: 2022-01-01 | End: 2023-01-01

## 2022-01-01 RX ORDER — POTASSIUM CHLORIDE 1.5 G/1.58G
20 POWDER, FOR SOLUTION ORAL SEE ADMIN INSTRUCTIONS
COMMUNITY
End: 2022-01-01 | Stop reason: SDUPTHER

## 2022-01-01 RX ORDER — DEXTROSE MONOHYDRATE 25 G/50ML
25 INJECTION, SOLUTION INTRAVENOUS PRN
Status: DISCONTINUED | OUTPATIENT
Start: 2022-01-01 | End: 2022-01-01 | Stop reason: HOSPADM

## 2022-01-01 RX ORDER — DEXTROSE MONOHYDRATE 25 G/50ML
12.5 INJECTION, SOLUTION INTRAVENOUS PRN
Status: DISCONTINUED | OUTPATIENT
Start: 2022-01-01 | End: 2022-01-01 | Stop reason: HOSPADM

## 2022-01-01 RX ORDER — TRAMADOL HYDROCHLORIDE 50 MG/1
50 TABLET ORAL EVERY 6 HOURS PRN
COMMUNITY
End: 2022-01-01

## 2022-01-01 RX ORDER — TORSEMIDE 100 MG/1
TABLET ORAL
Qty: 100 TABLET | Refills: 3 | Status: SHIPPED | COMMUNITY
Start: 2022-01-01 | End: 2023-01-01 | Stop reason: SDUPTHER

## 2022-01-01 RX ORDER — CHOLECALCIFEROL (VITAMIN D3) 125 MCG
1000 CAPSULE ORAL DAILY
Status: DISCONTINUED | OUTPATIENT
Start: 2022-01-01 | End: 2022-01-01 | Stop reason: HOSPADM

## 2022-01-01 RX ORDER — INSULIN GLARGINE 100 [IU]/ML
12 INJECTION, SOLUTION SUBCUTANEOUS NIGHTLY
Status: DISCONTINUED | OUTPATIENT
Start: 2022-01-01 | End: 2022-01-01 | Stop reason: HOSPADM

## 2022-01-01 RX ORDER — UBIDECARENONE 100 MG
1 CAPSULE ORAL
COMMUNITY
Start: 2022-03-08

## 2022-01-01 RX ORDER — ACETAMINOPHEN 325 MG/1
650 TABLET ORAL EVERY 4 HOURS PRN
Status: DISCONTINUED | OUTPATIENT
Start: 2022-01-01 | End: 2022-01-01 | Stop reason: HOSPADM

## 2022-01-01 RX ORDER — ACETAMINOPHEN 325 MG/1
650 TABLET ORAL EVERY 6 HOURS PRN
Status: SHIPPED | COMMUNITY
Start: 2022-01-01 | End: 2023-05-04

## 2022-01-01 RX ORDER — 0.9 % SODIUM CHLORIDE 0.9 %
2 VIAL (ML) INJECTION EVERY 12 HOURS SCHEDULED
Status: DISCONTINUED | OUTPATIENT
Start: 2022-01-01 | End: 2023-01-01 | Stop reason: HOSPADM

## 2022-01-01 RX ORDER — LOSARTAN POTASSIUM 25 MG/1
25 TABLET ORAL 2 TIMES DAILY
COMMUNITY
End: 2022-01-01 | Stop reason: SDUPTHER

## 2022-01-01 RX ORDER — INSULIN LISPRO 100 [IU]/ML
7 INJECTION, SOLUTION INTRAVENOUS; SUBCUTANEOUS ONCE
Status: DISCONTINUED | OUTPATIENT
Start: 2022-01-01 | End: 2022-01-01

## 2022-01-01 RX ORDER — CLINDAMYCIN HYDROCHLORIDE 300 MG/1
300 CAPSULE ORAL 3 TIMES DAILY
COMMUNITY
Start: 2022-01-01 | End: 2023-01-01

## 2022-01-01 RX ORDER — LEVOTHYROXINE SODIUM 0.03 MG/1
25 TABLET ORAL DAILY
Status: DISCONTINUED | OUTPATIENT
Start: 2022-01-01 | End: 2022-01-01 | Stop reason: HOSPADM

## 2022-01-01 RX ORDER — TACROLIMUS 1 MG/1
2 CAPSULE ORAL 2 TIMES DAILY
Status: DISCONTINUED | OUTPATIENT
Start: 2022-01-01 | End: 2022-01-01 | Stop reason: HOSPADM

## 2022-01-01 RX ORDER — CLINDAMYCIN HYDROCHLORIDE 150 MG/1
300 CAPSULE ORAL 3 TIMES DAILY
Status: DISCONTINUED | OUTPATIENT
Start: 2022-01-01 | End: 2023-01-01 | Stop reason: HOSPADM

## 2022-01-01 RX ORDER — POTASSIUM CHLORIDE 1.5 G/1.58G
20 POWDER, FOR SOLUTION ORAL DAILY
Qty: 30 PACKET | Refills: 3 | Status: SHIPPED | COMMUNITY
Start: 2022-01-01 | End: 2022-01-01 | Stop reason: DRUGHIGH

## 2022-01-01 RX ORDER — OXCARBAZEPINE 300 MG/1
300 TABLET, FILM COATED ORAL NIGHTLY
COMMUNITY
End: 2022-01-01

## 2022-01-01 RX ORDER — MIDAZOLAM HYDROCHLORIDE 1 MG/ML
INJECTION, SOLUTION INTRAMUSCULAR; INTRAVENOUS PRN
Status: DISCONTINUED | OUTPATIENT
Start: 2022-01-01 | End: 2022-01-01 | Stop reason: HOSPADM

## 2022-01-01 RX ORDER — GABAPENTIN 100 MG/1
100 CAPSULE ORAL
COMMUNITY
End: 2022-01-01 | Stop reason: SINTOL

## 2022-01-01 RX ORDER — TRAMADOL HYDROCHLORIDE 50 MG/1
50 TABLET ORAL EVERY 6 HOURS PRN
Status: DISCONTINUED | OUTPATIENT
Start: 2022-01-01 | End: 2022-01-01 | Stop reason: HOSPADM

## 2022-01-01 RX ORDER — LOSARTAN POTASSIUM 25 MG/1
25 TABLET ORAL DAILY
Qty: 30 TABLET | Refills: 3 | Status: SHIPPED | COMMUNITY
Start: 2022-01-01 | End: 2023-01-01

## 2022-01-01 RX ORDER — LIDOCAINE HYDROCHLORIDE 20 MG/ML
INJECTION, SOLUTION EPIDURAL; INFILTRATION; INTRACAUDAL; PERINEURAL PRN
Status: DISCONTINUED | OUTPATIENT
Start: 2022-01-01 | End: 2022-01-01 | Stop reason: HOSPADM

## 2022-01-01 RX ORDER — DEXTROSE MONOHYDRATE 25 G/50ML
25 INJECTION, SOLUTION INTRAVENOUS PRN
Status: DISCONTINUED | OUTPATIENT
Start: 2022-01-01 | End: 2023-01-01 | Stop reason: HOSPADM

## 2022-01-01 RX ORDER — METOLAZONE 2.5 MG/1
2.5 TABLET ORAL
Qty: 10 TABLET | Refills: 3 | Status: SHIPPED | COMMUNITY
Start: 2022-01-01 | End: 2022-01-01 | Stop reason: ALTCHOICE

## 2022-01-01 RX ORDER — PRAMIPEXOLE DIHYDROCHLORIDE 1 MG/1
1 TABLET ORAL NIGHTLY
Status: DISCONTINUED | OUTPATIENT
Start: 2022-01-01 | End: 2022-01-01 | Stop reason: HOSPADM

## 2022-01-01 RX ORDER — CLOPIDOGREL BISULFATE 75 MG/1
75 TABLET ORAL DAILY
Qty: 90 TABLET | Refills: 3 | Status: SHIPPED | OUTPATIENT
Start: 2022-01-01 | End: 2022-01-01

## 2022-01-01 RX ORDER — LANOLIN ALCOHOL/MO/W.PET/CERES
6 CREAM (GRAM) TOPICAL NIGHTLY PRN
Status: DISCONTINUED | OUTPATIENT
Start: 2022-01-01 | End: 2022-01-01 | Stop reason: HOSPADM

## 2022-01-01 RX ORDER — POTASSIUM CHLORIDE 20 MEQ/1
40 TABLET, EXTENDED RELEASE ORAL ONCE
Status: DISCONTINUED | OUTPATIENT
Start: 2022-01-01 | End: 2022-01-01

## 2022-01-01 RX ORDER — ACETAMINOPHEN 650 MG/1
650 SUPPOSITORY RECTAL EVERY 4 HOURS PRN
Status: CANCELLED | OUTPATIENT
Start: 2022-01-01

## 2022-01-01 RX ORDER — LANOLIN ALCOHOL/MO/W.PET/CERES
6 CREAM (GRAM) TOPICAL NIGHTLY PRN
Status: DISCONTINUED | OUTPATIENT
Start: 2022-01-01 | End: 2023-01-01 | Stop reason: HOSPADM

## 2022-01-01 RX ORDER — TRAZODONE HYDROCHLORIDE 50 MG/1
100 TABLET ORAL NIGHTLY
Status: DISCONTINUED | OUTPATIENT
Start: 2022-01-01 | End: 2022-01-01 | Stop reason: HOSPADM

## 2022-01-01 RX ORDER — TORSEMIDE 100 MG/1
100 TABLET ORAL 2 TIMES DAILY
Qty: 100 TABLET | Refills: 3 | Status: SHIPPED | OUTPATIENT
Start: 2022-01-01 | End: 2022-01-01 | Stop reason: DRUGHIGH

## 2022-01-01 RX ORDER — HYDROCODONE BITARTRATE AND ACETAMINOPHEN 5; 325 MG/1; MG/1
1 TABLET ORAL ONCE
Status: DISCONTINUED | OUTPATIENT
Start: 2022-01-01 | End: 2022-01-01 | Stop reason: HOSPADM

## 2022-01-01 RX ORDER — TORSEMIDE 100 MG/1
TABLET ORAL
Qty: 250 TABLET | Refills: 3 | Status: SHIPPED | OUTPATIENT
Start: 2022-01-01 | End: 2022-01-01 | Stop reason: ALTCHOICE

## 2022-01-01 RX ORDER — CLINDAMYCIN HYDROCHLORIDE 300 MG/1
300 CAPSULE ORAL 3 TIMES DAILY
Status: DISCONTINUED | OUTPATIENT
Start: 2022-01-01 | End: 2022-01-01 | Stop reason: ALTCHOICE

## 2022-01-01 RX ORDER — HEPARIN SODIUM 1000 [USP'U]/ML
INJECTION, SOLUTION INTRAVENOUS; SUBCUTANEOUS PRN
Status: DISCONTINUED | OUTPATIENT
Start: 2022-01-01 | End: 2022-01-01 | Stop reason: HOSPADM

## 2022-01-01 RX ORDER — INSULIN GLARGINE 100 [IU]/ML
25 INJECTION, SOLUTION SUBCUTANEOUS EVERY 12 HOURS SCHEDULED
Status: DISCONTINUED | OUTPATIENT
Start: 2022-01-01 | End: 2022-01-01

## 2022-01-01 RX ORDER — ASPIRIN 325 MG
TABLET ORAL PRN
Status: DISCONTINUED | OUTPATIENT
Start: 2022-01-01 | End: 2022-01-01 | Stop reason: HOSPADM

## 2022-01-01 RX ORDER — INSULIN GLARGINE 100 [IU]/ML
38 INJECTION, SOLUTION SUBCUTANEOUS DAILY
Status: DISCONTINUED | OUTPATIENT
Start: 2022-01-01 | End: 2022-01-01 | Stop reason: HOSPADM

## 2022-01-01 RX ORDER — TORSEMIDE 20 MG/1
20 TABLET ORAL
Status: DISCONTINUED | OUTPATIENT
Start: 2022-01-01 | End: 2022-01-01 | Stop reason: HOSPADM

## 2022-01-01 RX ORDER — INSULIN GLARGINE 100 [IU]/ML
25 INJECTION, SOLUTION SUBCUTANEOUS EVERY 12 HOURS SCHEDULED
Status: DISCONTINUED | OUTPATIENT
Start: 2022-01-01 | End: 2023-01-01

## 2022-01-01 RX ORDER — TRAZODONE HYDROCHLORIDE 100 MG/1
100 TABLET ORAL NIGHTLY
Status: DISCONTINUED | OUTPATIENT
Start: 2022-01-01 | End: 2023-01-01 | Stop reason: HOSPADM

## 2022-01-01 RX ORDER — BISACODYL 10 MG
10 SUPPOSITORY, RECTAL RECTAL DAILY PRN
Status: DISCONTINUED | OUTPATIENT
Start: 2022-01-01 | End: 2022-01-01 | Stop reason: HOSPADM

## 2022-01-01 RX ORDER — INSULIN LISPRO 100 [IU]/ML
10 INJECTION, SOLUTION INTRAVENOUS; SUBCUTANEOUS
Status: DISCONTINUED | OUTPATIENT
Start: 2022-01-01 | End: 2022-01-01 | Stop reason: HOSPADM

## 2022-01-01 RX ORDER — ROSUVASTATIN CALCIUM 40 MG/1
40 TABLET, COATED ORAL AT BEDTIME
Qty: 90 TABLET | Refills: 3 | Status: SHIPPED | OUTPATIENT
Start: 2022-01-01 | End: 2022-01-01 | Stop reason: SDUPTHER

## 2022-01-01 RX ORDER — ONDANSETRON 2 MG/ML
4 INJECTION INTRAMUSCULAR; INTRAVENOUS 4 TIMES DAILY PRN
Status: DISCONTINUED | OUTPATIENT
Start: 2022-01-01 | End: 2022-01-01 | Stop reason: HOSPADM

## 2022-01-01 RX ORDER — NICOTINE POLACRILEX 4 MG
30 LOZENGE BUCCAL PRN
Status: DISCONTINUED | OUTPATIENT
Start: 2022-01-01 | End: 2023-01-01 | Stop reason: HOSPADM

## 2022-01-01 RX ORDER — NICOTINE POLACRILEX 4 MG
15 LOZENGE BUCCAL PRN
Status: DISCONTINUED | OUTPATIENT
Start: 2022-01-01 | End: 2022-01-01 | Stop reason: HOSPADM

## 2022-01-01 RX ORDER — BUMETANIDE 0.25 MG/ML
2 INJECTION INTRAMUSCULAR; INTRAVENOUS ONCE
Status: DISCONTINUED | OUTPATIENT
Start: 2022-01-01 | End: 2022-01-01

## 2022-01-01 RX ORDER — OMEPRAZOLE 20 MG/1
20 CAPSULE, DELAYED RELEASE ORAL DAILY
COMMUNITY

## 2022-01-01 RX ORDER — TRAMADOL HYDROCHLORIDE 50 MG/1
50 TABLET ORAL EVERY 6 HOURS PRN
Qty: 8 TABLET | Refills: 0 | Status: SHIPPED | OUTPATIENT
Start: 2022-01-01 | End: 2022-01-01

## 2022-01-01 RX ORDER — LANOLIN ALCOHOL/MO/W.PET/CERES
400 CREAM (GRAM) TOPICAL NIGHTLY
Status: DISCONTINUED | OUTPATIENT
Start: 2022-01-01 | End: 2023-01-01 | Stop reason: HOSPADM

## 2022-01-01 RX ORDER — SODIUM CHLORIDE 9 MG/ML
INJECTION, SOLUTION INTRAVENOUS CONTINUOUS
Status: CANCELLED | OUTPATIENT
Start: 2022-01-01

## 2022-01-01 RX ORDER — ROSUVASTATIN CALCIUM 40 MG/1
40 TABLET, COATED ORAL AT BEDTIME
Qty: 90 TABLET | Refills: 3 | Status: SHIPPED | OUTPATIENT
Start: 2022-01-01 | End: 2023-05-04

## 2022-01-01 RX ORDER — MONTELUKAST SODIUM 10 MG/1
10 TABLET ORAL NIGHTLY
Status: DISCONTINUED | OUTPATIENT
Start: 2022-01-01 | End: 2023-01-01 | Stop reason: HOSPADM

## 2022-01-01 RX ORDER — ROSUVASTATIN CALCIUM 20 MG/1
40 TABLET, COATED ORAL NIGHTLY
Qty: 180 TABLET | Refills: 3 | Status: SHIPPED | OUTPATIENT
Start: 2022-01-01 | End: 2022-01-01 | Stop reason: DRUGHIGH

## 2022-01-01 RX ORDER — POLYETHYLENE GLYCOL 3350 17 G/17G
17 POWDER, FOR SOLUTION ORAL PRN
Status: DISCONTINUED | OUTPATIENT
Start: 2022-01-01 | End: 2022-01-01 | Stop reason: HOSPADM

## 2022-01-01 RX ORDER — METRONIDAZOLE 500 MG/1
500 TABLET ORAL 3 TIMES DAILY
COMMUNITY
Start: 2022-04-08 | End: 2022-01-01 | Stop reason: ALTCHOICE

## 2022-01-01 RX ORDER — NICOTINE POLACRILEX 4 MG
15 LOZENGE BUCCAL PRN
Status: DISCONTINUED | OUTPATIENT
Start: 2022-01-01 | End: 2023-01-01 | Stop reason: HOSPADM

## 2022-01-01 RX ORDER — 0.9 % SODIUM CHLORIDE 0.9 %
2 VIAL (ML) INJECTION EVERY 12 HOURS SCHEDULED
Status: DISCONTINUED | OUTPATIENT
Start: 2022-01-01 | End: 2022-01-01 | Stop reason: HOSPADM

## 2022-01-01 RX ORDER — PILOCARPINE HYDROCHLORIDE 10 MG/ML
1 SOLUTION/ DROPS OPHTHALMIC DAILY PRN
COMMUNITY
Start: 2022-01-01 | End: 2022-01-01

## 2022-01-01 RX ORDER — LEVOTHYROXINE SODIUM 0.03 MG/1
25 TABLET ORAL
Status: DISCONTINUED | OUTPATIENT
Start: 2022-01-01 | End: 2023-01-01 | Stop reason: HOSPADM

## 2022-01-01 RX ORDER — SPIRONOLACTONE 25 MG/1
50 TABLET ORAL
Status: DISCONTINUED | OUTPATIENT
Start: 2022-01-01 | End: 2022-01-01 | Stop reason: HOSPADM

## 2022-01-01 RX ORDER — TORSEMIDE 100 MG/1
100 TABLET ORAL 2 TIMES DAILY
Qty: 60 TABLET | Refills: 3 | Status: SHIPPED | OUTPATIENT
Start: 2022-01-01 | End: 2022-01-01 | Stop reason: SDUPTHER

## 2022-01-01 RX ORDER — CLOPIDOGREL BISULFATE 75 MG/1
TABLET ORAL PRN
Status: DISCONTINUED | OUTPATIENT
Start: 2022-01-01 | End: 2022-01-01 | Stop reason: HOSPADM

## 2022-01-01 RX ORDER — PANTOPRAZOLE SODIUM 40 MG/1
40 TABLET, DELAYED RELEASE ORAL
Status: DISCONTINUED | OUTPATIENT
Start: 2022-01-01 | End: 2023-01-01 | Stop reason: HOSPADM

## 2022-01-01 RX ORDER — LEVOCETIRIZINE DIHYDROCHLORIDE 5 MG/1
5 TABLET, FILM COATED ORAL
COMMUNITY
Start: 2022-01-01 | End: 2022-01-01

## 2022-01-01 RX ADMIN — BUMETANIDE 2.5 MG: 0.25 INJECTION INTRAMUSCULAR; INTRAVENOUS at 14:33

## 2022-01-01 RX ADMIN — PRAMIPEXOLE DIHYDROCHLORIDE 1 MG: 1 TABLET ORAL at 20:29

## 2022-01-01 RX ADMIN — FLUTICASONE FUROATE, UMECLIDINIUM BROMIDE AND VILANTEROL TRIFENATATE 1 PUFF: 100; 62.5; 25 POWDER RESPIRATORY (INHALATION) at 09:46

## 2022-01-01 RX ADMIN — BUMETANIDE 2.5 MG: 0.25 INJECTION INTRAMUSCULAR; INTRAVENOUS at 14:21

## 2022-01-01 RX ADMIN — HYDROCODONE BITARTRATE AND ACETAMINOPHEN 1 TABLET: 10; 325 TABLET ORAL at 15:11

## 2022-01-01 RX ADMIN — ASPIRIN 81 MG: 81 TABLET, COATED ORAL at 20:30

## 2022-01-01 RX ADMIN — SPIRONOLACTONE 50 MG: 25 TABLET ORAL at 11:19

## 2022-01-01 RX ADMIN — TACROLIMUS 2 MG: 1 CAPSULE ORAL at 11:23

## 2022-01-01 RX ADMIN — INSULIN LISPRO 10 UNITS: 100 INJECTION, SOLUTION INTRAVENOUS; SUBCUTANEOUS at 16:43

## 2022-01-01 RX ADMIN — HEPARIN SODIUM 5000 UNITS: 5000 INJECTION INTRAVENOUS; SUBCUTANEOUS at 20:31

## 2022-01-01 RX ADMIN — BUMETANIDE 2 MG: 0.25 INJECTION, SOLUTION INTRAMUSCULAR; INTRAVENOUS at 13:48

## 2022-01-01 RX ADMIN — ASPIRIN 81 MG: 81 TABLET, COATED ORAL at 20:29

## 2022-01-01 RX ADMIN — Medication 0.5 MG/HR: at 20:51

## 2022-01-01 RX ADMIN — EMPAGLIFLOZIN 10 MG: 10 TABLET, FILM COATED ORAL at 06:08

## 2022-01-01 RX ADMIN — INSULIN LISPRO 12 UNITS: 100 INJECTION, SOLUTION INTRAVENOUS; SUBCUTANEOUS at 12:57

## 2022-01-01 RX ADMIN — CLINDAMYCIN HYDROCHLORIDE 300 MG: 150 CAPSULE ORAL at 15:27

## 2022-01-01 RX ADMIN — SODIUM CHLORIDE, PRESERVATIVE FREE 2 ML: 5 INJECTION INTRAVENOUS at 09:03

## 2022-01-01 RX ADMIN — Medication 1 MG/HR: at 22:59

## 2022-01-01 RX ADMIN — SODIUM CHLORIDE, PRESERVATIVE FREE 2 ML: 5 INJECTION INTRAVENOUS at 20:31

## 2022-01-01 RX ADMIN — Medication 400 MG: at 20:30

## 2022-01-01 RX ADMIN — PANTOPRAZOLE SODIUM 40 MG: 40 TABLET, DELAYED RELEASE ORAL at 05:04

## 2022-01-01 RX ADMIN — POTASSIUM CHLORIDE 40 MEQ: 1500 TABLET, EXTENDED RELEASE ORAL at 16:04

## 2022-01-01 RX ADMIN — REGADENOSON 0.4 MG: 0.08 INJECTION, SOLUTION INTRAVENOUS at 12:00

## 2022-01-01 RX ADMIN — TRAMADOL HYDROCHLORIDE 50 MG: 50 TABLET, COATED ORAL at 20:47

## 2022-01-01 RX ADMIN — PRAMIPEXOLE DIHYDROCHLORIDE 1 MG: 1 TABLET ORAL at 20:47

## 2022-01-01 RX ADMIN — OXCARBAZEPINE 300 MG: 300 TABLET, FILM COATED ORAL at 20:47

## 2022-01-01 RX ADMIN — ASPIRIN 81 MG: 81 TABLET, COATED ORAL at 18:25

## 2022-01-01 RX ADMIN — BUMETANIDE 2.5 MG: 0.25 INJECTION, SOLUTION INTRAMUSCULAR; INTRAVENOUS at 12:30

## 2022-01-01 RX ADMIN — ROSUVASTATIN CALCIUM 40 MG: 20 TABLET, FILM COATED ORAL at 20:29

## 2022-01-01 RX ADMIN — BUMETANIDE 1 MG/HR: 0.25 INJECTION INTRAMUSCULAR; INTRAVENOUS at 11:54

## 2022-01-01 RX ADMIN — INSULIN LISPRO 1 UNITS: 100 INJECTION, SOLUTION INTRAVENOUS; SUBCUTANEOUS at 09:04

## 2022-01-01 RX ADMIN — INSULIN LISPRO 10 UNITS: 100 INJECTION, SOLUTION INTRAVENOUS; SUBCUTANEOUS at 13:42

## 2022-01-01 RX ADMIN — Medication 1000 MCG: at 11:18

## 2022-01-01 RX ADMIN — HEPARIN SODIUM 5000 UNITS: 5000 INJECTION INTRAVENOUS; SUBCUTANEOUS at 20:33

## 2022-01-01 RX ADMIN — Medication 1 MG/HR: at 17:12

## 2022-01-01 RX ADMIN — POTASSIUM CHLORIDE 10 MEQ: 750 TABLET, FILM COATED, EXTENDED RELEASE ORAL at 12:30

## 2022-01-01 RX ADMIN — TORSEMIDE 20 MG: 20 TABLET ORAL at 18:25

## 2022-01-01 RX ADMIN — MONTELUKAST SODIUM 10 MG: 10 TABLET, FILM COATED ORAL at 20:48

## 2022-01-01 RX ADMIN — OXCARBAZEPINE 300 MG: 300 TABLET, FILM COATED ORAL at 11:19

## 2022-01-01 RX ADMIN — POTASSIUM CHLORIDE 40 MEQ: 1500 TABLET, EXTENDED RELEASE ORAL at 11:19

## 2022-01-01 RX ADMIN — HEPARIN SODIUM 5000 UNITS: 5000 INJECTION INTRAVENOUS; SUBCUTANEOUS at 17:13

## 2022-01-01 RX ADMIN — TRAMADOL HYDROCHLORIDE 50 MG: 50 TABLET, COATED ORAL at 14:48

## 2022-01-01 RX ADMIN — TRAMADOL HYDROCHLORIDE 50 MG: 50 TABLET, COATED ORAL at 09:02

## 2022-01-01 RX ADMIN — HEPARIN SODIUM 5000 UNITS: 5000 INJECTION INTRAVENOUS; SUBCUTANEOUS at 15:38

## 2022-01-01 RX ADMIN — OXCARBAZEPINE 300 MG: 300 TABLET, FILM COATED ORAL at 20:10

## 2022-01-01 RX ADMIN — INSULIN LISPRO 10 UNITS: 100 INJECTION, SOLUTION INTRAVENOUS; SUBCUTANEOUS at 09:03

## 2022-01-01 RX ADMIN — BUMETANIDE 2.5 MG: 0.25 INJECTION INTRAMUSCULAR; INTRAVENOUS at 10:38

## 2022-01-01 RX ADMIN — Medication 6 MG: at 21:54

## 2022-01-01 RX ADMIN — TRAMADOL HYDROCHLORIDE 50 MG: 50 TABLET, COATED ORAL at 03:39

## 2022-01-01 RX ADMIN — POLYETHYLENE GLYCOL (3350) 17 G: 17 POWDER, FOR SOLUTION ORAL at 10:54

## 2022-01-01 RX ADMIN — Medication 400 MG: at 20:29

## 2022-01-01 RX ADMIN — METOPROLOL SUCCINATE 12.5 MG: 25 TABLET, EXTENDED RELEASE ORAL at 09:15

## 2022-01-01 RX ADMIN — Medication 2000 UNITS: at 18:25

## 2022-01-01 RX ADMIN — SPIRONOLACTONE 50 MG: 25 TABLET ORAL at 08:57

## 2022-01-01 RX ADMIN — INSULIN LISPRO 6 UNITS: 100 INJECTION, SOLUTION INTRAVENOUS; SUBCUTANEOUS at 09:18

## 2022-01-01 RX ADMIN — Medication 1 MG/HR: at 06:17

## 2022-01-01 RX ADMIN — TACROLIMUS 2 MG: 1 CAPSULE ORAL at 10:54

## 2022-01-01 RX ADMIN — FLUTICASONE FUROATE, UMECLIDINIUM BROMIDE AND VILANTEROL TRIFENATATE 1 PUFF: 100; 62.5; 25 POWDER RESPIRATORY (INHALATION) at 08:05

## 2022-01-01 RX ADMIN — ROSUVASTATIN CALCIUM 40 MG: 20 TABLET, FILM COATED ORAL at 20:10

## 2022-01-01 RX ADMIN — HEPARIN SODIUM 5000 UNITS: 5000 INJECTION INTRAVENOUS; SUBCUTANEOUS at 16:43

## 2022-01-01 RX ADMIN — TACROLIMUS 2 MG: 1 CAPSULE ORAL at 22:56

## 2022-01-01 RX ADMIN — TORSEMIDE 20 MG: 20 TABLET ORAL at 11:19

## 2022-01-01 RX ADMIN — PERFLUTREN 2 ML: 6.52 INJECTION, SUSPENSION INTRAVENOUS at 12:04

## 2022-01-01 RX ADMIN — INSULIN LISPRO 10 UNITS: 100 INJECTION, SOLUTION INTRAVENOUS; SUBCUTANEOUS at 07:55

## 2022-01-01 RX ADMIN — INSULIN GLARGINE 12 UNITS: 100 INJECTION, SOLUTION SUBCUTANEOUS at 20:56

## 2022-01-01 RX ADMIN — TRAZODONE HYDROCHLORIDE 100 MG: 100 TABLET ORAL at 20:30

## 2022-01-01 RX ADMIN — TRAZODONE HYDROCHLORIDE 100 MG: 50 TABLET ORAL at 20:10

## 2022-01-01 RX ADMIN — INSULIN GLARGINE 25 UNITS: 100 INJECTION, SOLUTION SUBCUTANEOUS at 21:54

## 2022-01-01 RX ADMIN — HYDROCODONE BITARTRATE AND ACETAMINOPHEN 1 TABLET: 5; 325 TABLET ORAL at 19:27

## 2022-01-01 RX ADMIN — MONTELUKAST SODIUM 10 MG: 10 TABLET, FILM COATED ORAL at 20:30

## 2022-01-01 RX ADMIN — ACETAMINOPHEN 650 MG: 325 TABLET ORAL at 02:48

## 2022-01-01 RX ADMIN — INSULIN LISPRO 10 UNITS: 100 INJECTION, SOLUTION INTRAVENOUS; SUBCUTANEOUS at 18:41

## 2022-01-01 RX ADMIN — HEPARIN SODIUM 5000 UNITS: 5000 INJECTION INTRAVENOUS; SUBCUTANEOUS at 06:07

## 2022-01-01 RX ADMIN — BUMETANIDE 1 MG/HR: 0.25 INJECTION, SOLUTION INTRAMUSCULAR; INTRAVENOUS at 11:37

## 2022-01-01 RX ADMIN — EMPAGLIFLOZIN 10 MG: 10 TABLET, FILM COATED ORAL at 07:56

## 2022-01-01 RX ADMIN — Medication 6 MG: at 21:57

## 2022-01-01 RX ADMIN — SODIUM CHLORIDE, PRESERVATIVE FREE 2 ML: 5 INJECTION INTRAVENOUS at 20:33

## 2022-01-01 RX ADMIN — PRAMIPEXOLE DIHYDROCHLORIDE 1 MG: 1 TABLET ORAL at 20:10

## 2022-01-01 RX ADMIN — SPIRONOLACTONE 50 MG: 25 TABLET ORAL at 18:41

## 2022-01-01 RX ADMIN — SPIRONOLACTONE 50 MG: 25 TABLET ORAL at 17:07

## 2022-01-01 RX ADMIN — CLINDAMYCIN HYDROCHLORIDE 300 MG: 150 CAPSULE ORAL at 20:30

## 2022-01-01 RX ADMIN — INSULIN LISPRO 15 UNITS: 100 INJECTION, SOLUTION INTRAVENOUS; SUBCUTANEOUS at 16:43

## 2022-01-01 RX ADMIN — SODIUM CHLORIDE, PRESERVATIVE FREE 2 ML: 5 INJECTION INTRAVENOUS at 20:48

## 2022-01-01 RX ADMIN — BUMETANIDE 2.5 MG: 0.25 INJECTION INTRAMUSCULAR; INTRAVENOUS at 12:11

## 2022-01-01 RX ADMIN — INSULIN LISPRO 1 UNITS: 100 INJECTION, SOLUTION INTRAVENOUS; SUBCUTANEOUS at 18:27

## 2022-01-01 RX ADMIN — Medication 400 MG: at 18:31

## 2022-01-01 RX ADMIN — INSULIN GLARGINE 12 UNITS: 100 INJECTION, SOLUTION SUBCUTANEOUS at 21:57

## 2022-01-01 RX ADMIN — Medication 1 MG/HR: at 02:54

## 2022-01-01 RX ADMIN — BUMETANIDE 2 MG: 0.25 INJECTION INTRAMUSCULAR; INTRAVENOUS at 14:44

## 2022-01-01 RX ADMIN — CLINDAMYCIN HYDROCHLORIDE 300 MG: 150 CAPSULE ORAL at 20:29

## 2022-01-01 RX ADMIN — HYDROCODONE BITARTRATE AND ACETAMINOPHEN 1 TABLET: 5; 325 TABLET ORAL at 05:40

## 2022-01-01 RX ADMIN — ROSUVASTATIN CALCIUM 40 MG: 20 TABLET, FILM COATED ORAL at 20:47

## 2022-01-01 RX ADMIN — POTASSIUM CHLORIDE 40 MEQ: 1500 TABLET, EXTENDED RELEASE ORAL at 17:06

## 2022-01-01 RX ADMIN — Medication 1 TABLET: at 20:29

## 2022-01-01 RX ADMIN — SPIRONOLACTONE 50 MG: 25 TABLET ORAL at 18:25

## 2022-01-01 RX ADMIN — INSULIN LISPRO 10 UNITS: 100 INJECTION, SOLUTION INTRAVENOUS; SUBCUTANEOUS at 12:57

## 2022-01-01 RX ADMIN — CLINDAMYCIN HYDROCHLORIDE 300 MG: 150 CAPSULE ORAL at 09:17

## 2022-01-01 RX ADMIN — METOPROLOL SUCCINATE 12.5 MG: 25 TABLET, EXTENDED RELEASE ORAL at 08:58

## 2022-01-01 RX ADMIN — INSULIN GLARGINE 25 UNITS: 100 INJECTION, SOLUTION SUBCUTANEOUS at 15:42

## 2022-01-01 RX ADMIN — HEPARIN SODIUM 5000 UNITS: 5000 INJECTION INTRAVENOUS; SUBCUTANEOUS at 22:48

## 2022-01-01 RX ADMIN — Medication 400 MG: at 17:07

## 2022-01-01 RX ADMIN — LEVOTHYROXINE SODIUM 25 MCG: 0.03 TABLET ORAL at 05:04

## 2022-01-01 RX ADMIN — PERFLUTREN 2 ML: 6.52 INJECTION, SUSPENSION INTRAVENOUS at 15:35

## 2022-01-01 RX ADMIN — INSULIN LISPRO 3 UNITS: 100 INJECTION, SOLUTION INTRAVENOUS; SUBCUTANEOUS at 18:49

## 2022-01-01 RX ADMIN — INSULIN LISPRO 10 UNITS: 100 INJECTION, SOLUTION INTRAVENOUS; SUBCUTANEOUS at 18:49

## 2022-01-01 RX ADMIN — Medication 2000 UNITS: at 17:07

## 2022-01-01 RX ADMIN — INSULIN GLARGINE 12 UNITS: 100 INJECTION, SOLUTION SUBCUTANEOUS at 18:28

## 2022-01-01 RX ADMIN — Medication 2000 UNITS: at 18:40

## 2022-01-01 RX ADMIN — INSULIN LISPRO 10 UNITS: 100 INJECTION, SOLUTION INTRAVENOUS; SUBCUTANEOUS at 18:26

## 2022-01-01 RX ADMIN — HEPARIN SODIUM 5000 UNITS: 5000 INJECTION INTRAVENOUS; SUBCUTANEOUS at 05:52

## 2022-01-01 RX ADMIN — INSULIN LISPRO 10 UNITS: 100 INJECTION, SOLUTION INTRAVENOUS; SUBCUTANEOUS at 09:18

## 2022-01-01 RX ADMIN — INSULIN GLARGINE 25 UNITS: 100 INJECTION, SOLUTION SUBCUTANEOUS at 20:31

## 2022-01-01 RX ADMIN — Medication 1 TABLET: at 09:16

## 2022-01-01 RX ADMIN — TACROLIMUS 2 MG: 1 CAPSULE ORAL at 22:09

## 2022-01-01 RX ADMIN — HEPARIN SODIUM 5000 UNITS: 5000 INJECTION INTRAVENOUS; SUBCUTANEOUS at 22:59

## 2022-01-01 RX ADMIN — BUMETANIDE 1 MG/HR: 0.25 INJECTION INTRAMUSCULAR; INTRAVENOUS at 09:09

## 2022-01-01 RX ADMIN — LEVOTHYROXINE SODIUM 25 MCG: 0.03 TABLET ORAL at 11:19

## 2022-01-01 RX ADMIN — INSULIN LISPRO 1 UNITS: 100 INJECTION, SOLUTION INTRAVENOUS; SUBCUTANEOUS at 18:42

## 2022-01-01 RX ADMIN — SODIUM CHLORIDE, PRESERVATIVE FREE 2 ML: 5 INJECTION INTRAVENOUS at 20:11

## 2022-01-01 RX ADMIN — INSULIN LISPRO 10 UNITS: 100 INJECTION, SOLUTION INTRAVENOUS; SUBCUTANEOUS at 17:44

## 2022-01-01 RX ADMIN — HEPARIN SODIUM 5000 UNITS: 5000 INJECTION INTRAVENOUS; SUBCUTANEOUS at 05:06

## 2022-01-01 RX ADMIN — TRAZODONE HYDROCHLORIDE 100 MG: 50 TABLET ORAL at 20:47

## 2022-01-01 RX ADMIN — TACROLIMUS 2 MG: 1 CAPSULE ORAL at 10:47

## 2022-01-01 RX ADMIN — INSULIN GLARGINE 38 UNITS: 100 INJECTION, SOLUTION SUBCUTANEOUS at 07:55

## 2022-01-01 RX ADMIN — ACETAMINOPHEN 650 MG: 325 TABLET ORAL at 22:01

## 2022-01-01 RX ADMIN — HEPARIN SODIUM 5000 UNITS: 5000 INJECTION INTRAVENOUS; SUBCUTANEOUS at 15:27

## 2022-01-01 RX ADMIN — FLUTICASONE FUROATE, UMECLIDINIUM BROMIDE AND VILANTEROL TRIFENATATE 1 PUFF: 100; 62.5; 25 POWDER RESPIRATORY (INHALATION) at 08:30

## 2022-01-01 RX ADMIN — INSULIN GLARGINE 38 UNITS: 100 INJECTION, SOLUTION SUBCUTANEOUS at 09:04

## 2022-01-01 RX ADMIN — SODIUM CHLORIDE, PRESERVATIVE FREE 2 ML: 5 INJECTION INTRAVENOUS at 09:10

## 2022-01-01 RX ADMIN — INSULIN LISPRO 6 UNITS: 100 INJECTION, SOLUTION INTRAVENOUS; SUBCUTANEOUS at 17:43

## 2022-01-01 RX ADMIN — TACROLIMUS 2 MG: 1 CAPSULE ORAL at 22:02

## 2022-01-01 RX ADMIN — ASPIRIN 81 MG: 81 TABLET, COATED ORAL at 17:06

## 2022-01-01 RX ADMIN — LEVOTHYROXINE SODIUM 25 MCG: 0.03 TABLET ORAL at 08:58

## 2022-01-01 RX ADMIN — OXCARBAZEPINE 300 MG: 300 TABLET, FILM COATED ORAL at 08:57

## 2022-01-01 RX ADMIN — INSULIN LISPRO 10 UNITS: 100 INJECTION, SOLUTION INTRAVENOUS; SUBCUTANEOUS at 15:41

## 2022-01-01 RX ADMIN — INSULIN LISPRO 10 UNITS: 100 INJECTION, SOLUTION INTRAVENOUS; SUBCUTANEOUS at 13:08

## 2022-01-01 RX ADMIN — METOPROLOL SUCCINATE 12.5 MG: 25 TABLET, EXTENDED RELEASE ORAL at 11:19

## 2022-01-01 RX ADMIN — TACROLIMUS 2 MG: 1 CAPSULE ORAL at 22:47

## 2022-01-01 RX ADMIN — HYDROCODONE BITARTRATE AND ACETAMINOPHEN 1 TABLET: 5; 325 TABLET ORAL at 15:29

## 2022-01-01 RX ADMIN — Medication 1000 MCG: at 08:58

## 2022-01-01 RX ADMIN — Medication 400 MG: at 18:41

## 2022-01-01 RX ADMIN — ASPIRIN 81 MG: 81 TABLET, COATED ORAL at 18:41

## 2022-01-01 RX ADMIN — INSULIN GLARGINE 25 UNITS: 100 INJECTION, SOLUTION SUBCUTANEOUS at 09:17

## 2022-01-01 SDOH — ECONOMIC STABILITY: TRANSPORTATION INSECURITY
IN THE PAST 12 MONTHS, HAS LACK OF TRANSPORTATION KEPT YOU FROM MEETINGS, WORK, OR FROM GETTING THINGS NEEDED FOR DAILY LIVING?: NO

## 2022-01-01 SDOH — HEALTH STABILITY: PHYSICAL HEALTH: ON AVERAGE, HOW MANY MINUTES DO YOU ENGAGE IN EXERCISE AT THIS LEVEL?: 0 MIN

## 2022-01-01 SDOH — ECONOMIC STABILITY: GENERAL

## 2022-01-01 SDOH — ECONOMIC STABILITY: HOUSING INSECURITY: WHAT IS YOUR LIVING SITUATION TODAY?: HOUSE

## 2022-01-01 SDOH — HEALTH STABILITY: PHYSICAL HEALTH: ON AVERAGE, HOW MANY DAYS PER WEEK DO YOU ENGAGE IN MODERATE TO STRENUOUS EXERCISE (LIKE A BRISK WALK)?: 0 DAYS

## 2022-01-01 SDOH — HEALTH STABILITY: GENERAL
BECAUSE OF A PHYSICAL, MENTAL, OR EMOTIONAL CONDITION, DO YOU HAVE SERIOUS DIFFICULTY CONCENTRATING, REMEMBERING OR MAKING DECISIONS?: NO

## 2022-01-01 SDOH — SOCIAL STABILITY: SOCIAL NETWORK: SUPPORT SYSTEMS: SPOUSE/SIGNIFICANT OTHER

## 2022-01-01 SDOH — SOCIAL STABILITY: SOCIAL NETWORK
HOW OFTEN DO YOU SEE OR TALK TO PEOPLE THAT YOU CARE ABOUT AND FEEL CLOSE TO? (FOR EXAMPLE: TALKING TO FRIENDS ON THE PHONE, VISITING FRIENDS OR FAMILY, GOING TO CHURCH OR CLUB MEETINGS): 3 TO 5 TIMES A WEEK

## 2022-01-01 SDOH — ECONOMIC STABILITY: HOUSING INSECURITY: ARE YOU WORRIED ABOUT LOSING YOUR HOUSING?: NO

## 2022-01-01 SDOH — ECONOMIC STABILITY: TRANSPORTATION INSECURITY
IN THE PAST 12 MONTHS, HAS THE LACK OF TRANSPORTATION KEPT YOU FROM MEDICAL APPOINTMENTS OR FROM GETTING MEDICATIONS?: NO

## 2022-01-01 SDOH — ECONOMIC STABILITY: FOOD INSECURITY: HOW OFTEN IN THE PAST 12 MONTHS WERE YOU WORRIED OR STRESSED ABOUT HAVING ENOUGH MONEY TO BUY NUTRITIOUS MEALS?: NEVER

## 2022-01-01 SDOH — HEALTH STABILITY: PHYSICAL HEALTH: DO YOU HAVE DIFFICULTY DRESSING OR BATHING?: NO

## 2022-01-01 SDOH — HEALTH STABILITY: GENERAL: BECAUSE OF A PHYSICAL, MENTAL, OR EMOTIONAL CONDITION, DO YOU HAVE DIFFICULTY DOING ERRANDS ALONE?: NO

## 2022-01-01 SDOH — HEALTH STABILITY: PHYSICAL HEALTH: DO YOU HAVE SERIOUS DIFFICULTY WALKING OR CLIMBING STAIRS?: NO

## 2022-01-01 SDOH — ECONOMIC STABILITY: HOUSING INSECURITY: WHAT IS YOUR LIVING SITUATION TODAY?: SPOUSE

## 2022-01-01 ASSESSMENT — PULMONARY FUNCTION TESTS
FEV1: 0
FEV1: 0
FEV1/FVC: UNABLE TO OBTAIN, OR GREATER THAN 70%
FEV1_PREDICTED: 0
FEV1: 0

## 2022-01-01 ASSESSMENT — EXERCISE STRESS TEST
PEAK_RPP: 11160
PEAK_HR: 93
PEAK_BP: 120/76
PEAK_HR: 86
PEAK_HR: 86
PEAK_RPP: 10492
PEAK_RPP: 7920
COMMENTS: REGA INJECT
PEAK_BP: 116/74
PEAK_RPP: 9240
STAGE_CATEGORIES: 1
STAGE_CATEGORIES: RECOVERY 1
PEAK_BP: 122/70
PEAK_BP: 110/70
STAGE_CATEGORIES: RESTING
PEAK_BP: 120/70
STAGE_CATEGORIES: RECOVERY 0
PEAK_HR: 72
PEAK_RPP: 9976
STOPPAGE_REASON: PROTOCOL COMPLETE
STAGE_CATEGORIES: RECOVERY 2
PEAK_HR: 77

## 2022-01-01 ASSESSMENT — PATIENT HEALTH QUESTIONNAIRE - PHQ9
SUM OF ALL RESPONSES TO PHQ9 QUESTIONS 1 AND 2: 2
CLINICAL INTERPRETATION OF PHQ2 SCORE: NO FURTHER SCREENING NEEDED
SUM OF ALL RESPONSES TO PHQ9 QUESTIONS 1 AND 2: 0
CLINICAL INTERPRETATION OF PHQ2 SCORE: NO FURTHER SCREENING NEEDED
2. FEELING DOWN, DEPRESSED OR HOPELESS: MORE THAN HALF THE DAYS
SUM OF ALL RESPONSES TO PHQ9 QUESTIONS 1 AND 2: 2
1. LITTLE INTEREST OR PLEASURE IN DOING THINGS: NOT AT ALL
2. FEELING DOWN, DEPRESSED OR HOPELESS: NOT AT ALL
IS PATIENT ABLE TO COMPLETE PHQ2 OR PHQ9: YES
CLINICAL INTERPRETATION OF PHQ2 SCORE: NO FURTHER SCREENING NEEDED
IS PATIENT ABLE TO COMPLETE PHQ2 OR PHQ9: YES
CLINICAL INTERPRETATION OF PHQ2 SCORE: NO FURTHER SCREENING NEEDED
SUM OF ALL RESPONSES TO PHQ9 QUESTIONS 1 AND 2: 0
SUM OF ALL RESPONSES TO PHQ9 QUESTIONS 1 AND 2: 0
1. LITTLE INTEREST OR PLEASURE IN DOING THINGS: NOT AT ALL
1. LITTLE INTEREST OR PLEASURE IN DOING THINGS: NOT AT ALL
SUM OF ALL RESPONSES TO PHQ9 QUESTIONS 1 AND 2: 0
2. FEELING DOWN, DEPRESSED OR HOPELESS: NOT AT ALL
2. FEELING DOWN, DEPRESSED OR HOPELESS: NOT AT ALL
1. LITTLE INTEREST OR PLEASURE IN DOING THINGS: NOT AT ALL

## 2022-01-01 ASSESSMENT — PAIN SCALES - GENERAL
PAINLEVEL_OUTOF10: 0
PAINLEVEL_OUTOF10: 3
PAINLEVEL_OUTOF10: 6
PAINLEVEL_OUTOF10: 0
PAINLEVEL_OUTOF10: 0
PAINLEVEL_OUTOF10: 10
PAINLEVEL_OUTOF10: 3
PAINLEVEL_OUTOF10: 0
PAINLEVEL_OUTOF10: 2
PAINLEVEL_OUTOF10: 2
PAINLEVEL_OUTOF10: 3
PAINLEVEL_OUTOF10: 0
PAINLEVEL_OUTOF10: 0
PAINLEVEL_OUTOF10: 3
PAINLEVEL_OUTOF10: 0
PAINLEVEL_OUTOF10: 5
PAINLEVEL_OUTOF10: 0
PAINLEVEL_OUTOF10: 7
PAINLEVEL_OUTOF10: 3
PAINLEVEL_OUTOF10: 9
PAINLEVEL_OUTOF10: 4
PAINLEVEL_OUTOF10: 0
PAINLEVEL_OUTOF10: 5
PAINLEVEL_OUTOF10: 3
PAINLEVEL_OUTOF10: 0
PAINLEVEL_OUTOF10: 0
PAINLEVEL_OUTOF10: 7
PAINLEVEL_OUTOF10: 0
PAINLEVEL_OUTOF10: 3
PAINLEVEL_OUTOF10: 2
PAINLEVEL_OUTOF10: 6
PAINLEVEL_OUTOF10: 5
PAINLEVEL_OUTOF10: 5

## 2022-01-01 ASSESSMENT — ENCOUNTER SYMPTOMS
BACK PAIN: 0
DIARRHEA: 0
ALLERGIC/IMMUNOLOGIC COMMENTS: NO NEW FOOD ALLERGIES
SUSPICIOUS LESIONS: 0
HEMATOCHEZIA: 0
ABDOMINAL DISTENTION: 1
FATIGUE: 1
ABDOMINAL PAIN: 0
LIGHT-HEADEDNESS: 1
FATIGUE: 1
DIZZINESS: 1
SHORTNESS OF BREATH: 1
ENDOCRINE NEGATIVE: 1
ENDOCRINE NEGATIVE: 1
SUSPICIOUS LESIONS: 0
HEMATOLOGIC/LYMPHATIC NEGATIVE: 1
PAIN SEVERITY NOW: 7
HEMOPTYSIS: 0
LIGHT-HEADEDNESS: 1
WHEEZING: 1
SPUTUM PRODUCTION: 1
FEVER: 0
WEAKNESS: 1
BRUISES/BLEEDS EASILY: 0
ADENOPATHY: 0
NUMBNESS: 1
HEMATOLOGIC/LYMPHATIC NEGATIVE: 1
FATIGUE: 1
ABDOMINAL DISTENTION: 1
SHORTNESS OF BREATH: 1
NUMBNESS: 1
BLOATING: 1
GASTROINTESTINAL NEGATIVE: 1
EYE REDNESS: 0
DIZZINESS: 1
INSOMNIA: 0
LOSS OF BALANCE: 1
CONSTIPATION: 0
SHORTNESS OF BREATH: 1
FEVER: 0
ACTIVITY CHANGE: 1
PAIN SEVERITY NOW: 5
FEVER: 0
COUGH: 1
WEIGHT LOSS: 1
WEAKNESS: 1
APPETITE CHANGE: 1
ALLERGIC/IMMUNOLOGIC COMMENTS: NO NEW FOOD ALLERGIES
DIARRHEA: 0
NEUROLOGICAL NEGATIVE: 1
DEPRESSION: 0
LOSS OF BALANCE: 1
SPUTUM PRODUCTION: 1
EYES NEGATIVE: 1
SLEEP DISTURBANCES DUE TO BREATHING: 1
BLOATING: 1
CONSTIPATION: 0
CONSTITUTIONAL NEGATIVE: 1
WEIGHT GAIN: 0
ACTIVITY CHANGE: 1
ALLERGIC/IMMUNOLOGIC NEGATIVE: 1
SHORTNESS OF BREATH: 0
HEMATOCHEZIA: 0
COUGH: 1
SHORTNESS OF BREATH: 0
WEIGHT GAIN: 1
NEUROLOGICAL NEGATIVE: 1
PSYCHIATRIC NEGATIVE: 1
CHEST TIGHTNESS: 0
EYES NEGATIVE: 1
COUGH: 1
HEMOPTYSIS: 0
ENDOCRINE NEGATIVE: 1
EYES NEGATIVE: 1
ABDOMINAL PAIN: 0
FEVER: 0
INSOMNIA: 0
BRUISES/BLEEDS EASILY: 0
ABDOMINAL PAIN: 0
SLEEP DISTURBANCES DUE TO BREATHING: 1
NUMBNESS: 1
DIZZINESS: 0
ABDOMINAL PAIN: 0
PSYCHIATRIC NEGATIVE: 1
SHORTNESS OF BREATH: 1
HEMATOLOGIC/LYMPHATIC NEGATIVE: 1
DEPRESSION: 0
PSYCHIATRIC NEGATIVE: 1

## 2022-01-01 ASSESSMENT — COGNITIVE AND FUNCTIONAL STATUS - GENERAL
ARE YOU DEAF OR DO YOU HAVE SERIOUS DIFFICULTY  HEARING: NO
BECAUSE OF A PHYSICAL, MENTAL, OR EMOTIONAL CONDITION, DO YOU HAVE SERIOUS DIFFICULTY CONCENTRATING, REMEMBERING OR MAKING DECISIONS: NO
DAILY_ACTIVITY_CONVERTED_SCORE: 42.03
HELP NEEDED FOR BATHING: A LITTLE
DO YOU HAVE DIFFICULTY DRESSING OR BATHING: NO
BECAUSE OF A PHYSICAL, MENTAL, OR EMOTIONAL CONDITION, DO YOU HAVE DIFFICULTY DOING ERRANDS ALONE: NO
BECAUSE OF A PHYSICAL, MENTAL, OR EMOTIONAL CONDITION, DO YOU HAVE SERIOUS DIFFICULTY CONCENTRATING, REMEMBERING OR MAKING DECISIONS: NO
HELP NEEDED DRESSING REGULAR LOWER BODY CLOTHING: A LITTLE
BASIC_MOBILITY_RAW_SCORE: 17
HELP NEEDED FOR PERSONAL GROOMING: A LITTLE
DO YOU HAVE DIFFICULTY DRESSING OR BATHING: YES
BASIC_MOBILITY_CONVERTED_SCORE: 39.67
DO YOU HAVE SERIOUS DIFFICULTY WALKING OR CLIMBING STAIRS: YES
ARE YOU BLIND OR DO YOU HAVE SERIOUS DIFFICULTY SEEING, EVEN WHEN WEARING GLASSES: NO
DO YOU HAVE SERIOUS DIFFICULTY WALKING OR CLIMBING STAIRS: YES
HELP NEEDED FOR TOILETING: A LITTLE
BECAUSE OF A PHYSICAL, MENTAL, OR EMOTIONAL CONDITION, DO YOU HAVE DIFFICULTY DOING ERRANDS ALONE: YES
DAILY_ACTIVITY_RAW_SCORE: 20

## 2022-01-01 ASSESSMENT — COLUMBIA-SUICIDE SEVERITY RATING SCALE - C-SSRS
1. WITHIN THE PAST MONTH, HAVE YOU WISHED YOU WERE DEAD OR WISHED YOU COULD GO TO SLEEP AND NOT WAKE UP?: NO
6. HAVE YOU EVER DONE ANYTHING, STARTED TO DO ANYTHING, OR PREPARED TO DO ANYTHING TO END YOUR LIFE?: NO
2. HAVE YOU ACTUALLY HAD ANY THOUGHTS OF KILLING YOURSELF?: NO
IS THE PATIENT ABLE TO COMPLETE C-SSRS: YES

## 2022-01-01 ASSESSMENT — ACTIVITIES OF DAILY LIVING (ADL)
CHRONIC_PAIN_PRESENT: NO
PRIOR_ADL: MODIFIED INDEPENDENT
PRIOR_ADL_TOILETING: MODIFIED INDEPENDENT
MOBILITY_ASSIST_DEVICES: WHEELCHAIR
RECENT_DECLINE_ADL: YES, ACUTE ILLNESS WITHOUT THERAPY NEEDS
ADL_SHORT_OF_BREATH: YES
ADL_SHORT_OF_BREATH: YES
RECENT_DECLINE_ADL: YES, DECLINE IN BATHING/DRESSING/FEEDING, COLLABORATE WITH PROVIDER (T)
ADL_SCORE: 12
ADL_BEFORE_ADMISSION: INDEPENDENT
HOME_MANAGEMENT_TIME_ENTRY: 13
ADL_SCORE: 12
EATING: INDEPENDENT
PRIOR_ADL_BATHING: MODIFIED INDEPENDENT
ADL_BEFORE_ADMISSION: INDEPENDENT
GROOMING: INDEPENDENT

## 2022-01-01 ASSESSMENT — LIFESTYLE VARIABLES
HOW MANY STANDARD DRINKS CONTAINING ALCOHOL DO YOU HAVE ON A TYPICAL DAY: 0,1 OR 2
AUDIT-C TOTAL SCORE: 0
HOW OFTEN DO YOU HAVE 6 OR MORE DRINKS ON ONE OCCASION: NEVER
ALCOHOL_USE_STATUS: NO OR LOW RISK WITH VALIDATED TOOL
ALCOHOL_USE_STATUS: NO OR LOW RISK WITH VALIDATED TOOL
HOW OFTEN DO YOU HAVE 6 OR MORE DRINKS ON ONE OCCASION: NEVER
HOW MANY STANDARD DRINKS CONTAINING ALCOHOL DO YOU HAVE ON A TYPICAL DAY: 0,1 OR 2
HOW OFTEN DO YOU HAVE A DRINK CONTAINING ALCOHOL: NEVER

## 2022-01-03 ENCOUNTER — TELEPHONE (OUTPATIENT)
Dept: CARDIOLOGY | Age: 67
End: 2022-01-03

## 2022-01-05 NOTE — PROGRESS NOTES
Mr. Garcia is seen during his hemodialysis. He remains on max support overall, intubated, ventilated, and quite ill. His hypotension markedly impairs fluid removal with dialysis. [place] poor.   Patient Name: Mary Martell  YOB: 1955          MRN number:  [id number]  Date:  10/12/2017  Referring Physician:  Dr. Robyn Ratliff MD  Dx:      Primary osteoarthritis of right hip (M16.11)  Lumbar stenosis (M48.06)  Lumbar radiculopathy (M54.16) sidelying prolonged holds to facilitate PGM; 5\"x5 at R   Manual Therapy - Supine: at R hip, gentle harmonics (LAD, approximation; Abd/ADD); MFR at R TFL and iliopsoas; PROM for R hip flex/ext & ER  - Sidelying: lumbar distraction; L5/S1 distraction  - Tommy standing for at least 5 minutes without provocation of R hip pain to facilitate better ease with cooking. (IN PROGRESS)  5.  Patient will ambulate at least 50 feet with RW with swing-through pattern and adequate foot clearance to improve efficiency and redu

## 2022-01-13 ENCOUNTER — TELEPHONE (OUTPATIENT)
Dept: CARDIOLOGY | Age: 67
End: 2022-01-13

## 2022-01-13 ENCOUNTER — OFF PREMISE (OUTPATIENT)
Dept: CARDIOLOGY | Age: 67
End: 2022-01-13

## 2022-01-14 ENCOUNTER — OFFICE VISIT (OUTPATIENT)
Dept: CARDIOLOGY | Age: 67
End: 2022-01-14
Attending: NURSE PRACTITIONER

## 2022-01-14 VITALS
BODY MASS INDEX: 35.61 KG/M2 | HEART RATE: 77 BPM | SYSTOLIC BLOOD PRESSURE: 130 MMHG | WEIGHT: 207.45 LBS | DIASTOLIC BLOOD PRESSURE: 60 MMHG

## 2022-01-14 DIAGNOSIS — J44.9 CHRONIC OBSTRUCTIVE PULMONARY DISEASE, UNSPECIFIED COPD TYPE (CMD): Chronic | ICD-10-CM

## 2022-01-14 DIAGNOSIS — E78.00 HYPERCHOLESTEREMIA: ICD-10-CM

## 2022-01-14 DIAGNOSIS — I50.33 ACUTE ON CHRONIC HEART FAILURE WITH PRESERVED EJECTION FRACTION (CMD): Primary | ICD-10-CM

## 2022-01-14 DIAGNOSIS — I27.20 PULMONARY HYPERTENSION (CMD): ICD-10-CM

## 2022-01-14 DIAGNOSIS — I73.9 PVD (PERIPHERAL VASCULAR DISEASE) (CMD): ICD-10-CM

## 2022-01-14 DIAGNOSIS — I10 ESSENTIAL HYPERTENSION: ICD-10-CM

## 2022-01-14 LAB
ALBUMIN SERPL-MCNC: 2.4 G/DL (ref 3.6–5.1)
ALBUMIN/GLOB SERPL: 0.6 {RATIO} (ref 1–2.4)
ALP SERPL-CCNC: 111 UNITS/L (ref 45–117)
ALT SERPL-CCNC: 31 UNITS/L
ANION GAP SERPL CALC-SCNC: 8 MMOL/L (ref 10–20)
AST SERPL-CCNC: 14 UNITS/L
BILIRUB SERPL-MCNC: 0.2 MG/DL (ref 0.2–1)
BUN SERPL-MCNC: 77 MG/DL (ref 6–20)
BUN/CREAT SERPL: 47 (ref 7–25)
CALCIUM SERPL-MCNC: 9.1 MG/DL (ref 8.4–10.2)
CHLORIDE SERPL-SCNC: 99 MMOL/L (ref 98–107)
CO2 SERPL-SCNC: 34 MMOL/L (ref 21–32)
CREAT SERPL-MCNC: 1.63 MG/DL (ref 0.51–0.95)
FASTING DURATION TIME PATIENT: ABNORMAL H
GFR SERPLBLD BASED ON 1.73 SQ M-ARVRAT: 33 ML/MIN
GLOBULIN SER-MCNC: 4.1 G/DL (ref 2–4)
GLUCOSE SERPL-MCNC: 164 MG/DL (ref 70–99)
NT-PROBNP SERPL-MCNC: 350 PG/ML
POTASSIUM SERPL-SCNC: 3.8 MMOL/L (ref 3.4–5.1)
PROT SERPL-MCNC: 6.5 G/DL (ref 6.4–8.2)
SODIUM SERPL-SCNC: 137 MMOL/L (ref 135–145)

## 2022-01-14 PROCEDURE — 10002807 HB RX 258: Performed by: NURSE PRACTITIONER

## 2022-01-14 PROCEDURE — 10002801 HB RX 250 W/O HCPCS: Performed by: NURSE PRACTITIONER

## 2022-01-14 PROCEDURE — 36415 COLL VENOUS BLD VENIPUNCTURE: CPT | Performed by: NURSE PRACTITIONER

## 2022-01-14 PROCEDURE — 80053 COMPREHEN METABOLIC PANEL: CPT | Performed by: NURSE PRACTITIONER

## 2022-01-14 PROCEDURE — 96365 THER/PROPH/DIAG IV INF INIT: CPT

## 2022-01-14 PROCEDURE — 83880 ASSAY OF NATRIURETIC PEPTIDE: CPT | Performed by: NURSE PRACTITIONER

## 2022-01-14 PROCEDURE — 99214 OFFICE O/P EST MOD 30 MIN: CPT | Performed by: NURSE PRACTITIONER

## 2022-01-14 PROCEDURE — 96366 THER/PROPH/DIAG IV INF ADDON: CPT

## 2022-01-14 RX ORDER — POLYETHYLENE GLYCOL 3350 17 G/17G
17 POWDER, FOR SOLUTION ORAL DAILY PRN
COMMUNITY

## 2022-01-14 RX ORDER — POTASSIUM CHLORIDE 1.5 G/1.58G
40 POWDER, FOR SOLUTION ORAL DAILY
COMMUNITY
End: 2022-01-21 | Stop reason: SDUPTHER

## 2022-01-14 RX ORDER — TACROLIMUS 1 MG/1
2 CAPSULE ORAL 2 TIMES DAILY
COMMUNITY
End: 2022-01-17 | Stop reason: SDUPTHER

## 2022-01-14 RX ADMIN — BUMETANIDE 1 MG/HR: 0.25 INJECTION, SOLUTION INTRAMUSCULAR; INTRAVENOUS at 11:14

## 2022-01-17 ENCOUNTER — OFFICE VISIT (OUTPATIENT)
Dept: CARDIOLOGY | Age: 67
End: 2022-01-17
Attending: NURSE PRACTITIONER

## 2022-01-17 VITALS
HEART RATE: 81 BPM | SYSTOLIC BLOOD PRESSURE: 122 MMHG | DIASTOLIC BLOOD PRESSURE: 60 MMHG | WEIGHT: 206.79 LBS | BODY MASS INDEX: 35.5 KG/M2

## 2022-01-17 DIAGNOSIS — I50.33 ACUTE ON CHRONIC HEART FAILURE WITH PRESERVED EJECTION FRACTION (CMD): Primary | ICD-10-CM

## 2022-01-17 DIAGNOSIS — I89.0 LYMPHEDEMA: ICD-10-CM

## 2022-01-17 DIAGNOSIS — R60.0 LEG EDEMA: ICD-10-CM

## 2022-01-17 DIAGNOSIS — N18.9 CHRONIC KIDNEY DISEASE, UNSPECIFIED CKD STAGE: ICD-10-CM

## 2022-01-17 DIAGNOSIS — I50.32 CHRONIC HEART FAILURE WITH PRESERVED EJECTION FRACTION (CMD): ICD-10-CM

## 2022-01-17 DIAGNOSIS — E87.6 HYPOKALEMIA: ICD-10-CM

## 2022-01-17 DIAGNOSIS — R06.00 DYSPNEA, UNSPECIFIED TYPE: ICD-10-CM

## 2022-01-17 LAB
ANION GAP SERPL CALC-SCNC: 11 MMOL/L (ref 10–20)
APPEARANCE UR: CLEAR
BACTERIA #/AREA URNS HPF: ABNORMAL /HPF
BASOPHILS # BLD: 0.1 K/MCL (ref 0–0.3)
BASOPHILS NFR BLD: 0 %
BILIRUB UR QL STRIP: NEGATIVE
BUN SERPL-MCNC: 87 MG/DL (ref 6–20)
BUN/CREAT SERPL: 55 (ref 7–25)
CALCIUM SERPL-MCNC: 8.8 MG/DL (ref 8.4–10.2)
CHLORIDE SERPL-SCNC: 97 MMOL/L (ref 98–107)
CO2 SERPL-SCNC: 31 MMOL/L (ref 21–32)
COLOR UR: ABNORMAL
CREAT SERPL-MCNC: 1.58 MG/DL (ref 0.51–0.95)
CREAT UR-MCNC: 23.4 MG/DL
CREAT UR-MCNC: 24.6 MG/DL
DEPRECATED RDW RBC: 40.8 FL (ref 39–50)
EOSINOPHIL # BLD: 0.1 K/MCL (ref 0–0.5)
EOSINOPHIL NFR BLD: 1 %
ERYTHROCYTE [DISTWIDTH] IN BLOOD: 12.2 % (ref 11–15)
FASTING DURATION TIME PATIENT: ABNORMAL H
GFR SERPLBLD BASED ON 1.73 SQ M-ARVRAT: 34 ML/MIN
GLUCOSE SERPL-MCNC: 208 MG/DL (ref 70–99)
GLUCOSE UR STRIP-MCNC: >=500 MG/DL
HCT VFR BLD CALC: 39.5 % (ref 36–46.5)
HGB BLD-MCNC: 13.4 G/DL (ref 12–15.5)
HGB UR QL STRIP: ABNORMAL
HYALINE CASTS #/AREA URNS LPF: ABNORMAL /LPF
IMM GRANULOCYTES # BLD AUTO: 0.1 K/MCL (ref 0–0.2)
IMM GRANULOCYTES # BLD: 1 %
KETONES UR STRIP-MCNC: NEGATIVE MG/DL
LEUKOCYTE ESTERASE UR QL STRIP: NEGATIVE
LYMPHOCYTES # BLD: 0.8 K/MCL (ref 1–4)
LYMPHOCYTES NFR BLD: 5 %
MCH RBC QN AUTO: 31.2 PG (ref 26–34)
MCHC RBC AUTO-ENTMCNC: 33.9 G/DL (ref 32–36.5)
MCV RBC AUTO: 91.9 FL (ref 78–100)
MICROALBUMIN UR-MCNC: 67.6 MG/DL
MICROALBUMIN/CREAT UR: 2748 MG/G
MONOCYTES # BLD: 1 K/MCL (ref 0.3–0.9)
MONOCYTES NFR BLD: 6 %
MUCOUS THREADS URNS QL MICRO: PRESENT
NEUTROPHILS # BLD: 14.7 K/MCL (ref 1.8–7.7)
NEUTROPHILS NFR BLD: 87 %
NITRITE UR QL STRIP: NEGATIVE
NRBC BLD MANUAL-RTO: 0 /100 WBC
PH UR STRIP: 6 [PH] (ref 5–7)
PLATELET # BLD AUTO: 256 K/MCL (ref 140–450)
POTASSIUM SERPL-SCNC: 3.3 MMOL/L (ref 3.4–5.1)
PROT UR STRIP-MCNC: 100 MG/DL
PROT UR-MCNC: 101 MG/DL
RBC # BLD: 4.3 MIL/MCL (ref 4–5.2)
RBC #/AREA URNS HPF: ABNORMAL /HPF
SARS-COV-2 RNA RESP QL NAA+PROBE: NOT DETECTED
SERVICE CMNT-IMP: NORMAL
SERVICE CMNT-IMP: NORMAL
SODIUM SERPL-SCNC: 136 MMOL/L (ref 135–145)
SP GR UR STRIP: 1.01 (ref 1–1.03)
SQUAMOUS #/AREA URNS HPF: ABNORMAL /HPF
UROBILINOGEN UR STRIP-MCNC: 0.2 MG/DL
WBC # BLD: 16.7 K/MCL (ref 4.2–11)
WBC #/AREA URNS HPF: ABNORMAL /HPF

## 2022-01-17 PROCEDURE — 96366 THER/PROPH/DIAG IV INF ADDON: CPT

## 2022-01-17 PROCEDURE — 82570 ASSAY OF URINE CREATININE: CPT | Performed by: INTERNAL MEDICINE

## 2022-01-17 PROCEDURE — 96365 THER/PROPH/DIAG IV INF INIT: CPT

## 2022-01-17 PROCEDURE — 99215 OFFICE O/P EST HI 40 MIN: CPT | Performed by: NURSE PRACTITIONER

## 2022-01-17 PROCEDURE — 10002801 HB RX 250 W/O HCPCS: Performed by: INTERNAL MEDICINE

## 2022-01-17 PROCEDURE — 85025 COMPLETE CBC W/AUTO DIFF WBC: CPT | Performed by: INTERNAL MEDICINE

## 2022-01-17 PROCEDURE — 81001 URINALYSIS AUTO W/SCOPE: CPT | Performed by: INTERNAL MEDICINE

## 2022-01-17 PROCEDURE — 36415 COLL VENOUS BLD VENIPUNCTURE: CPT | Performed by: NURSE PRACTITIONER

## 2022-01-17 PROCEDURE — 10002803 HB RX 637: Performed by: INTERNAL MEDICINE

## 2022-01-17 PROCEDURE — 84156 ASSAY OF PROTEIN URINE: CPT | Performed by: INTERNAL MEDICINE

## 2022-01-17 PROCEDURE — 87635 SARS-COV-2 COVID-19 AMP PRB: CPT | Performed by: INTERNAL MEDICINE

## 2022-01-17 PROCEDURE — 10002807 HB RX 258: Performed by: INTERNAL MEDICINE

## 2022-01-17 PROCEDURE — 80048 BASIC METABOLIC PNL TOTAL CA: CPT | Performed by: NURSE PRACTITIONER

## 2022-01-17 RX ORDER — POTASSIUM CHLORIDE 20 MEQ/1
40 TABLET, EXTENDED RELEASE ORAL ONCE
Status: COMPLETED | OUTPATIENT
Start: 2022-01-17 | End: 2022-01-17

## 2022-01-17 RX ORDER — TORSEMIDE 20 MG/1
TABLET ORAL
Qty: 200 TABLET | Refills: 3 | Status: SHIPPED | OUTPATIENT
Start: 2022-01-17 | End: 2022-01-26 | Stop reason: SDUPTHER

## 2022-01-17 RX ORDER — TACROLIMUS 1 MG/1
2 CAPSULE ORAL 2 TIMES DAILY
Qty: 80 CAPSULE | Refills: 3 | Status: SHIPPED | OUTPATIENT
Start: 2022-01-17 | End: 2023-01-01 | Stop reason: SDUPTHER

## 2022-01-17 RX ORDER — SPIRONOLACTONE 25 MG/1
50 TABLET ORAL 2 TIMES DAILY
Qty: 90 TABLET | Refills: 3 | Status: SHIPPED | OUTPATIENT
Start: 2022-01-17 | End: 2022-03-10 | Stop reason: SDUPTHER

## 2022-01-17 RX ADMIN — BUMETANIDE 1 MG/HR: 0.25 INJECTION, SOLUTION INTRAMUSCULAR; INTRAVENOUS at 10:06

## 2022-01-17 RX ADMIN — POTASSIUM CHLORIDE 40 MEQ: 1500 TABLET, EXTENDED RELEASE ORAL at 10:10

## 2022-01-20 ENCOUNTER — APPOINTMENT (OUTPATIENT)
Dept: CARDIOLOGY | Age: 67
End: 2022-01-20
Attending: NURSE PRACTITIONER

## 2022-01-20 ENCOUNTER — TELEPHONE (OUTPATIENT)
Dept: CARDIOLOGY | Age: 67
End: 2022-01-20

## 2022-01-21 ENCOUNTER — OFFICE VISIT (OUTPATIENT)
Dept: CARDIOLOGY | Age: 67
End: 2022-01-21
Attending: NURSE PRACTITIONER

## 2022-01-21 VITALS
RESPIRATION RATE: 20 BRPM | WEIGHT: 199.3 LBS | DIASTOLIC BLOOD PRESSURE: 64 MMHG | SYSTOLIC BLOOD PRESSURE: 116 MMHG | BODY MASS INDEX: 34.21 KG/M2

## 2022-01-21 DIAGNOSIS — I73.9 PVD (PERIPHERAL VASCULAR DISEASE) (CMD): ICD-10-CM

## 2022-01-21 DIAGNOSIS — J44.9 CHRONIC OBSTRUCTIVE PULMONARY DISEASE, UNSPECIFIED COPD TYPE (CMD): Chronic | ICD-10-CM

## 2022-01-21 DIAGNOSIS — E11.69 TYPE 2 DIABETES MELLITUS WITH OTHER SPECIFIED COMPLICATION, WITH LONG-TERM CURRENT USE OF INSULIN (CMD): ICD-10-CM

## 2022-01-21 DIAGNOSIS — Z79.4 TYPE 2 DIABETES MELLITUS WITH OTHER SPECIFIED COMPLICATION, WITH LONG-TERM CURRENT USE OF INSULIN (CMD): ICD-10-CM

## 2022-01-21 DIAGNOSIS — E78.00 HYPERCHOLESTEREMIA: ICD-10-CM

## 2022-01-21 DIAGNOSIS — I27.20 PULMONARY HYPERTENSION (CMD): ICD-10-CM

## 2022-01-21 DIAGNOSIS — I10 ESSENTIAL HYPERTENSION: ICD-10-CM

## 2022-01-21 DIAGNOSIS — I89.0 LYMPHEDEMA: ICD-10-CM

## 2022-01-21 DIAGNOSIS — N04.9 NEPHROTIC SYNDROME: Chronic | ICD-10-CM

## 2022-01-21 DIAGNOSIS — I50.33 ACUTE ON CHRONIC HEART FAILURE WITH PRESERVED EJECTION FRACTION (CMD): Primary | ICD-10-CM

## 2022-01-21 LAB
25(OH)D3+25(OH)D2 SERPL-MCNC: 36.8 NG/ML (ref 30–100)
ALBUMIN SERPL-MCNC: 2.2 G/DL (ref 3.6–5.1)
ALBUMIN/GLOB SERPL: 0.4 {RATIO} (ref 1–2.4)
ALP SERPL-CCNC: 103 UNITS/L (ref 45–117)
ALT SERPL-CCNC: 50 UNITS/L
ANION GAP SERPL CALC-SCNC: 9 MMOL/L (ref 10–20)
AST SERPL-CCNC: 24 UNITS/L
BASOPHILS # BLD: 0.1 K/MCL (ref 0–0.3)
BASOPHILS NFR BLD: 1 %
BILIRUB SERPL-MCNC: 0.3 MG/DL (ref 0.2–1)
BUN SERPL-MCNC: 75 MG/DL (ref 6–20)
BUN/CREAT SERPL: 48 (ref 7–25)
CALCIUM SERPL-MCNC: 9.3 MG/DL (ref 8.4–10.2)
CHLORIDE SERPL-SCNC: 90 MMOL/L (ref 98–107)
CHOLEST SERPL-MCNC: 237 MG/DL
CHOLEST/HDLC SERPL: 6.1 {RATIO}
CO2 SERPL-SCNC: 35 MMOL/L (ref 21–32)
CREAT SERPL-MCNC: 1.57 MG/DL (ref 0.51–0.95)
DEPRECATED RDW RBC: 39.8 FL (ref 39–50)
EOSINOPHIL # BLD: 0.1 K/MCL (ref 0–0.5)
EOSINOPHIL NFR BLD: 2 %
ERYTHROCYTE [DISTWIDTH] IN BLOOD: 12.1 % (ref 11–15)
FASTING DURATION TIME PATIENT: ABNORMAL H
FASTING DURATION TIME PATIENT: ABNORMAL H
GFR SERPLBLD BASED ON 1.73 SQ M-ARVRAT: 34 ML/MIN
GLOBULIN SER-MCNC: 5.3 G/DL (ref 2–4)
GLUCOSE SERPL-MCNC: 196 MG/DL (ref 70–99)
HCT VFR BLD CALC: 40.2 % (ref 36–46.5)
HDLC SERPL-MCNC: 39 MG/DL
HGB BLD-MCNC: 13.9 G/DL (ref 12–15.5)
IMM GRANULOCYTES # BLD AUTO: 0.1 K/MCL (ref 0–0.2)
IMM GRANULOCYTES # BLD: 1 %
LDLC SERPL CALC-MCNC: 172 MG/DL
LYMPHOCYTES # BLD: 1.7 K/MCL (ref 1–4)
LYMPHOCYTES NFR BLD: 18 %
MCH RBC QN AUTO: 31 PG (ref 26–34)
MCHC RBC AUTO-ENTMCNC: 34.6 G/DL (ref 32–36.5)
MCV RBC AUTO: 89.7 FL (ref 78–100)
MONOCYTES # BLD: 0.7 K/MCL (ref 0.3–0.9)
MONOCYTES NFR BLD: 8 %
NEUTROPHILS # BLD: 6.9 K/MCL (ref 1.8–7.7)
NEUTROPHILS NFR BLD: 70 %
NONHDLC SERPL-MCNC: 198 MG/DL
NRBC BLD MANUAL-RTO: 0 /100 WBC
NT-PROBNP SERPL-MCNC: 103 PG/ML
PLATELET # BLD AUTO: 333 K/MCL (ref 140–450)
POTASSIUM SERPL-SCNC: 3.2 MMOL/L (ref 3.4–5.1)
PROT SERPL-MCNC: 7.5 G/DL (ref 6.4–8.2)
RBC # BLD: 4.48 MIL/MCL (ref 4–5.2)
SODIUM SERPL-SCNC: 131 MMOL/L (ref 135–145)
TRIGL SERPL-MCNC: 131 MG/DL
TSH SERPL-ACNC: 1.89 MCUNITS/ML (ref 0.35–5)
WBC # BLD: 9.7 K/MCL (ref 4.2–11)

## 2022-01-21 PROCEDURE — 10002801 HB RX 250 W/O HCPCS: Performed by: NURSE PRACTITIONER

## 2022-01-21 PROCEDURE — 80061 LIPID PANEL: CPT | Performed by: NURSE PRACTITIONER

## 2022-01-21 PROCEDURE — 36415 COLL VENOUS BLD VENIPUNCTURE: CPT | Performed by: NURSE PRACTITIONER

## 2022-01-21 PROCEDURE — 84443 ASSAY THYROID STIM HORMONE: CPT | Performed by: NURSE PRACTITIONER

## 2022-01-21 PROCEDURE — 99214 OFFICE O/P EST MOD 30 MIN: CPT | Performed by: NURSE PRACTITIONER

## 2022-01-21 PROCEDURE — 80053 COMPREHEN METABOLIC PANEL: CPT | Performed by: NURSE PRACTITIONER

## 2022-01-21 PROCEDURE — 85025 COMPLETE CBC W/AUTO DIFF WBC: CPT | Performed by: NURSE PRACTITIONER

## 2022-01-21 PROCEDURE — 82306 VITAMIN D 25 HYDROXY: CPT | Performed by: NURSE PRACTITIONER

## 2022-01-21 PROCEDURE — 10002803 HB RX 637: Performed by: NURSE PRACTITIONER

## 2022-01-21 PROCEDURE — 96374 THER/PROPH/DIAG INJ IV PUSH: CPT

## 2022-01-21 PROCEDURE — 83880 ASSAY OF NATRIURETIC PEPTIDE: CPT | Performed by: NURSE PRACTITIONER

## 2022-01-21 RX ORDER — BUMETANIDE 0.25 MG/ML
2 INJECTION INTRAMUSCULAR; INTRAVENOUS ONCE
Status: COMPLETED | OUTPATIENT
Start: 2022-01-21 | End: 2022-01-21

## 2022-01-21 RX ORDER — POTASSIUM CHLORIDE 1.5 G/1.58G
60 POWDER, FOR SOLUTION ORAL DAILY
Qty: 100 EACH | Refills: 3 | Status: SHIPPED
Start: 2022-01-21 | End: 2022-03-10 | Stop reason: ALTCHOICE

## 2022-01-21 RX ORDER — POTASSIUM CHLORIDE 20 MEQ/1
40 TABLET, EXTENDED RELEASE ORAL ONCE
Status: COMPLETED | OUTPATIENT
Start: 2022-01-21 | End: 2022-01-21

## 2022-01-21 RX ADMIN — POTASSIUM CHLORIDE 40 MEQ: 1500 TABLET, EXTENDED RELEASE ORAL at 10:46

## 2022-01-21 RX ADMIN — BUMETANIDE 2 MG: 0.25 INJECTION, SOLUTION INTRAMUSCULAR; INTRAVENOUS at 10:26

## 2022-01-21 NOTE — TELEPHONE ENCOUNTER
Please review; protocol failed/ no protocol.     Requested Prescriptions   Pending Prescriptions Disp Refills    BASAGLAR KWIKPEN 100 UNIT/ML Subcutaneous Solution Pen-injector [Pharmacy Med Name: BASAGLAR 100 U/ML KWIKPEN INJ 3ML] 30 mL 0     Sig: ADMINIST

## 2022-01-23 RX ORDER — INSULIN GLARGINE 100 [IU]/ML
30 INJECTION, SOLUTION SUBCUTANEOUS DAILY
Qty: 30 ML | Refills: 1 | Status: SHIPPED | OUTPATIENT
Start: 2022-01-23

## 2022-01-26 ENCOUNTER — OFFICE VISIT (OUTPATIENT)
Dept: CARDIOLOGY | Age: 67
End: 2022-01-26

## 2022-01-26 VITALS
HEART RATE: 82 BPM | SYSTOLIC BLOOD PRESSURE: 138 MMHG | BODY MASS INDEX: 34.11 KG/M2 | RESPIRATION RATE: 18 BRPM | WEIGHT: 199.8 LBS | DIASTOLIC BLOOD PRESSURE: 72 MMHG | HEIGHT: 64 IN

## 2022-01-26 DIAGNOSIS — I50.32 CHRONIC HEART FAILURE WITH PRESERVED EJECTION FRACTION (CMD): ICD-10-CM

## 2022-01-26 DIAGNOSIS — E78.00 HYPERCHOLESTEREMIA: Primary | ICD-10-CM

## 2022-01-26 PROCEDURE — 99214 OFFICE O/P EST MOD 30 MIN: CPT | Performed by: INTERNAL MEDICINE

## 2022-01-26 RX ORDER — ROSUVASTATIN CALCIUM 20 MG/1
40 TABLET, COATED ORAL NIGHTLY
Qty: 180 TABLET | Refills: 3 | Status: SHIPPED | OUTPATIENT
Start: 2022-01-26 | End: 2022-01-01 | Stop reason: SDUPTHER

## 2022-01-26 RX ORDER — TORSEMIDE 20 MG/1
40 TABLET ORAL 2 TIMES DAILY
Qty: 180 TABLET | Refills: 3 | Status: SHIPPED | COMMUNITY
Start: 2022-01-26 | End: 2022-01-01 | Stop reason: DRUGHIGH

## 2022-01-26 SDOH — HEALTH STABILITY: PHYSICAL HEALTH: ON AVERAGE, HOW MANY DAYS PER WEEK DO YOU ENGAGE IN MODERATE TO STRENUOUS EXERCISE (LIKE A BRISK WALK)?: 0 DAYS

## 2022-01-26 SDOH — HEALTH STABILITY: PHYSICAL HEALTH: ON AVERAGE, HOW MANY MINUTES DO YOU ENGAGE IN EXERCISE AT THIS LEVEL?: 0 MIN

## 2022-01-26 ASSESSMENT — ENCOUNTER SYMPTOMS
HEMATOCHEZIA: 0
FEVER: 0
BLOATING: 1
SHORTNESS OF BREATH: 0
CONSTIPATION: 1
ALLERGIC/IMMUNOLOGIC COMMENTS: NO NEW FOOD ALLERGIES
SLEEP DISTURBANCES DUE TO BREATHING: 1
BRUISES/BLEEDS EASILY: 0
DIARRHEA: 0
DIZZINESS: 1
HEADACHES: 1
HEMOPTYSIS: 0
COUGH: 1
SPUTUM PRODUCTION: 1
LOSS OF BALANCE: 1
WEIGHT GAIN: 1
ABDOMINAL PAIN: 0
LIGHT-HEADEDNESS: 1
INSOMNIA: 0
NUMBNESS: 1
DEPRESSION: 0
SUSPICIOUS LESIONS: 0

## 2022-01-26 ASSESSMENT — PATIENT HEALTH QUESTIONNAIRE - PHQ9
1. LITTLE INTEREST OR PLEASURE IN DOING THINGS: NOT AT ALL
CLINICAL INTERPRETATION OF PHQ2 SCORE: NO FURTHER SCREENING NEEDED
SUM OF ALL RESPONSES TO PHQ9 QUESTIONS 1 AND 2: 0
SUM OF ALL RESPONSES TO PHQ9 QUESTIONS 1 AND 2: 0
2. FEELING DOWN, DEPRESSED OR HOPELESS: NOT AT ALL

## 2022-01-28 ENCOUNTER — TELEPHONE (OUTPATIENT)
Dept: PULMONOLOGY | Facility: CLINIC | Age: 67
End: 2022-01-28

## 2022-01-28 ENCOUNTER — TELEPHONE (OUTPATIENT)
Dept: CARDIOLOGY | Age: 67
End: 2022-01-28

## 2022-01-29 DIAGNOSIS — I50.32 CHRONIC HEART FAILURE WITH PRESERVED EJECTION FRACTION (CMD): ICD-10-CM

## 2022-01-29 DIAGNOSIS — E11.69 TYPE 2 DIABETES MELLITUS WITH OTHER SPECIFIED COMPLICATION, WITHOUT LONG-TERM CURRENT USE OF INSULIN (CMD): ICD-10-CM

## 2022-01-31 ENCOUNTER — OFFICE VISIT (OUTPATIENT)
Dept: CARDIOLOGY | Age: 67
End: 2022-01-31
Attending: NURSE PRACTITIONER

## 2022-01-31 VITALS
BODY MASS INDEX: 34.7 KG/M2 | WEIGHT: 202.16 LBS | DIASTOLIC BLOOD PRESSURE: 62 MMHG | HEART RATE: 73 BPM | SYSTOLIC BLOOD PRESSURE: 114 MMHG

## 2022-01-31 DIAGNOSIS — N18.9 CHRONIC KIDNEY DISEASE, UNSPECIFIED CKD STAGE: ICD-10-CM

## 2022-01-31 DIAGNOSIS — I89.0 LYMPHEDEMA: ICD-10-CM

## 2022-01-31 DIAGNOSIS — I50.33 ACUTE ON CHRONIC HEART FAILURE WITH PRESERVED EJECTION FRACTION (CMD): Primary | ICD-10-CM

## 2022-01-31 LAB
ALBUMIN SERPL-MCNC: 2.5 G/DL (ref 3.6–5.1)
ALBUMIN/GLOB SERPL: 0.6 {RATIO} (ref 1–2.4)
ALP SERPL-CCNC: 113 UNITS/L (ref 45–117)
ALT SERPL-CCNC: 42 UNITS/L
ANION GAP SERPL CALC-SCNC: 7 MMOL/L (ref 10–20)
APPEARANCE UR: CLEAR
AST SERPL-CCNC: 18 UNITS/L
BACTERIA #/AREA URNS HPF: ABNORMAL /HPF
BASOPHILS # BLD: 0.1 K/MCL (ref 0–0.3)
BASOPHILS NFR BLD: 1 %
BILIRUB SERPL-MCNC: 0.3 MG/DL (ref 0.2–1)
BILIRUB UR QL STRIP: NEGATIVE
BUN SERPL-MCNC: 69 MG/DL (ref 6–20)
BUN/CREAT SERPL: 46 (ref 7–25)
CALCIUM SERPL-MCNC: 9.1 MG/DL (ref 8.4–10.2)
CHLORIDE SERPL-SCNC: 99 MMOL/L (ref 98–107)
CO2 SERPL-SCNC: 32 MMOL/L (ref 21–32)
COLOR UR: ABNORMAL
CREAT SERPL-MCNC: 1.5 MG/DL (ref 0.51–0.95)
CREAT UR-MCNC: 22.9 MG/DL
CREAT UR-MCNC: 23.1 MG/DL
DEPRECATED RDW RBC: 40.7 FL (ref 39–50)
EOSINOPHIL # BLD: 0.1 K/MCL (ref 0–0.5)
EOSINOPHIL NFR BLD: 1 %
ERYTHROCYTE [DISTWIDTH] IN BLOOD: 12.2 % (ref 11–15)
FASTING DURATION TIME PATIENT: ABNORMAL H
GFR SERPLBLD BASED ON 1.73 SQ M-ARVRAT: 36 ML/MIN
GLOBULIN SER-MCNC: 4.5 G/DL (ref 2–4)
GLUCOSE SERPL-MCNC: 179 MG/DL (ref 70–99)
GLUCOSE UR STRIP-MCNC: 150 MG/DL
HCT VFR BLD CALC: 40.6 % (ref 36–46.5)
HGB BLD-MCNC: 13.8 G/DL (ref 12–15.5)
HGB UR QL STRIP: NEGATIVE
HYALINE CASTS #/AREA URNS LPF: ABNORMAL /LPF
IMM GRANULOCYTES # BLD AUTO: 0.1 K/MCL (ref 0–0.2)
IMM GRANULOCYTES # BLD: 1 %
KETONES UR STRIP-MCNC: NEGATIVE MG/DL
LEUKOCYTE ESTERASE UR QL STRIP: NEGATIVE
LYMPHOCYTES # BLD: 2.1 K/MCL (ref 1–4)
LYMPHOCYTES NFR BLD: 25 %
MCH RBC QN AUTO: 31.1 PG (ref 26–34)
MCHC RBC AUTO-ENTMCNC: 34 G/DL (ref 32–36.5)
MCV RBC AUTO: 91.4 FL (ref 78–100)
MICROALBUMIN UR-MCNC: 44.7 MG/DL
MICROALBUMIN/CREAT UR: 1952 MG/G
MONOCYTES # BLD: 0.6 K/MCL (ref 0.3–0.9)
MONOCYTES NFR BLD: 7 %
MUCOUS THREADS URNS QL MICRO: PRESENT
NEUTROPHILS # BLD: 5.4 K/MCL (ref 1.8–7.7)
NEUTROPHILS NFR BLD: 65 %
NITRITE UR QL STRIP: NEGATIVE
NRBC BLD MANUAL-RTO: 0 /100 WBC
NT-PROBNP SERPL-MCNC: 403 PG/ML
PH UR STRIP: 7 [PH] (ref 5–7)
PLATELET # BLD AUTO: 334 K/MCL (ref 140–450)
POTASSIUM SERPL-SCNC: 4.4 MMOL/L (ref 3.4–5.1)
PROT SERPL-MCNC: 7 G/DL (ref 6.4–8.2)
PROT UR STRIP-MCNC: 100 MG/DL
PROT UR-MCNC: 72 MG/DL
RBC # BLD: 4.44 MIL/MCL (ref 4–5.2)
RBC #/AREA URNS HPF: ABNORMAL /HPF
SODIUM SERPL-SCNC: 134 MMOL/L (ref 135–145)
SP GR UR STRIP: 1.01 (ref 1–1.03)
SQUAMOUS #/AREA URNS HPF: ABNORMAL /HPF
UROBILINOGEN UR STRIP-MCNC: 0.2 MG/DL
WBC # BLD: 8.3 K/MCL (ref 4.2–11)
WBC #/AREA URNS HPF: ABNORMAL /HPF

## 2022-01-31 PROCEDURE — 99213 OFFICE O/P EST LOW 20 MIN: CPT | Performed by: NURSE PRACTITIONER

## 2022-01-31 PROCEDURE — 81001 URINALYSIS AUTO W/SCOPE: CPT | Performed by: INTERNAL MEDICINE

## 2022-01-31 PROCEDURE — 80053 COMPREHEN METABOLIC PANEL: CPT | Performed by: NURSE PRACTITIONER

## 2022-01-31 PROCEDURE — 83880 ASSAY OF NATRIURETIC PEPTIDE: CPT | Performed by: NURSE PRACTITIONER

## 2022-01-31 PROCEDURE — 85025 COMPLETE CBC W/AUTO DIFF WBC: CPT | Performed by: INTERNAL MEDICINE

## 2022-01-31 PROCEDURE — 36415 COLL VENOUS BLD VENIPUNCTURE: CPT | Performed by: NURSE PRACTITIONER

## 2022-01-31 PROCEDURE — 99211 OFF/OP EST MAY X REQ PHY/QHP: CPT

## 2022-01-31 PROCEDURE — 84156 ASSAY OF PROTEIN URINE: CPT | Performed by: INTERNAL MEDICINE

## 2022-01-31 PROCEDURE — 82570 ASSAY OF URINE CREATININE: CPT | Performed by: INTERNAL MEDICINE

## 2022-01-31 RX ORDER — EMPAGLIFLOZIN 10 MG/1
TABLET, FILM COATED ORAL
Qty: 90 TABLET | Refills: 3 | Status: SHIPPED | OUTPATIENT
Start: 2022-01-31 | End: 2022-01-01 | Stop reason: ALTCHOICE

## 2022-02-03 ENCOUNTER — OFFICE VISIT (OUTPATIENT)
Dept: INTERNAL MEDICINE CLINIC | Facility: CLINIC | Age: 67
End: 2022-02-03
Payer: MEDICARE

## 2022-02-03 VITALS
DIASTOLIC BLOOD PRESSURE: 68 MMHG | TEMPERATURE: 97 F | WEIGHT: 201 LBS | SYSTOLIC BLOOD PRESSURE: 128 MMHG | RESPIRATION RATE: 22 BRPM | BODY MASS INDEX: 34.31 KG/M2 | HEART RATE: 80 BPM | HEIGHT: 64 IN

## 2022-02-03 DIAGNOSIS — Z79.4 TYPE 2 DIABETES MELLITUS WITH DIABETIC NEPHROPATHY, WITH LONG-TERM CURRENT USE OF INSULIN (HCC): ICD-10-CM

## 2022-02-03 DIAGNOSIS — E11.21 TYPE 2 DIABETES MELLITUS WITH DIABETIC NEPHROPATHY, WITH LONG-TERM CURRENT USE OF INSULIN (HCC): ICD-10-CM

## 2022-02-03 DIAGNOSIS — I27.20 PULMONARY HTN (HCC): ICD-10-CM

## 2022-02-03 DIAGNOSIS — E78.2 MIXED HYPERLIPIDEMIA: Primary | ICD-10-CM

## 2022-02-03 DIAGNOSIS — E03.9 HYPOTHYROIDISM, UNSPECIFIED TYPE: ICD-10-CM

## 2022-02-03 DIAGNOSIS — N05.1 FSGS (FOCAL SEGMENTAL GLOMERULOSCLEROSIS): ICD-10-CM

## 2022-02-03 PROCEDURE — 99214 OFFICE O/P EST MOD 30 MIN: CPT | Performed by: INTERNAL MEDICINE

## 2022-02-04 NOTE — ASSESSMENT & PLAN NOTE
Patient with a history of nephrotic syndrome secondary to biopsy-proven FSGS. Last biopsy completed in November. Patient did move to [facility] for management of nephrology care. She is currently followed up per Dr. Luzma Valentin. Unable to access office notes and consults on the system. Patient will ensure that these consults are mailed to us. Currently on tacrolimus 2 mg twice daily, she has been off steroids. Blood pressure seems stable, patient is euvolemic at this time. Kidney functions look stable, last labs completed at [facility] showed BUN at 69, creatinine at 1.50 and GFR of 36.

## 2022-02-04 NOTE — ASSESSMENT & PLAN NOTE
Has been on basaglar-30 units and humalog 8 units with 1-2 meals per day and jardiance 10 mgs a day  Hemoglobin A1c last completed was a while back and is currently due for recheck labs. She will have labs completed as directed. Eye exam referral for Dr. Corral Board provided. She has had cataract surgery with some problems with the lenses and tearing at this point. No signs of glaucoma but will follow-up after eye exam is completed.

## 2022-02-04 NOTE — ASSESSMENT & PLAN NOTE
Patient with a history of pulmonary hypertension, lower extremity edema/lymphedema. Has been monitored at cardiology clinic in [facility] per Dr. Chico Israel. She gets intermittent IV diuretics to help maintain euvolemia. Last BNP levels completed were at 403. Patient has been feeling well. Currently on torsemide 20 mg 2 tablets in the morning, spironolactone 25 mg 2 tablets in the morning, repeated in the mid days. Additionally on metoprolol 25 mg half a tablet daily, losartan 25 mg 1 tablet daily. She is on potassium supplements. She does take metolazone 2.5 mg 3 times a week.

## 2022-02-08 ENCOUNTER — TELEPHONE (OUTPATIENT)
Dept: CARDIOLOGY | Age: 67
End: 2022-02-08

## 2022-02-08 DIAGNOSIS — I50.33 ACUTE ON CHRONIC HEART FAILURE WITH PRESERVED EJECTION FRACTION (CMD): Primary | ICD-10-CM

## 2022-02-08 RX ORDER — LOSARTAN POTASSIUM 25 MG/1
25 TABLET ORAL 2 TIMES DAILY
Qty: 180 TABLET | Refills: 3 | Status: ON HOLD | OUTPATIENT
Start: 2022-02-08 | End: 2022-01-01 | Stop reason: HOSPADM

## 2022-03-02 NOTE — PROCEDURES
The patient is here for a right  hip joint injection done under ultrasound guidance. Under ultrasound guidance the right hip joint was identified in longitudinal oblique view with the acetabulum superiorly and femoral head and neck junction visible.   The Location: lower face, chin, upper neck

## 2022-03-03 ENCOUNTER — TELEPHONE (OUTPATIENT)
Dept: INTERNAL MEDICINE CLINIC | Facility: CLINIC | Age: 67
End: 2022-03-03

## 2022-03-03 NOTE — TELEPHONE ENCOUNTER
Patient calling about billing issues. She has been charged for her physical. She was double charged for an appointment and a physical.     Billing told her that it would be taken care of however it is still showing up on her statement. Transferred to the billing department.

## 2022-03-04 ENCOUNTER — APPOINTMENT (OUTPATIENT)
Dept: LAB | Age: 67
End: 2022-03-04
Attending: EMERGENCY MEDICINE
Payer: MEDICARE

## 2022-03-04 ENCOUNTER — IMMUNIZATION (OUTPATIENT)
Dept: LAB | Age: 67
End: 2022-03-04
Attending: EMERGENCY MEDICINE
Payer: MEDICARE

## 2022-03-04 ENCOUNTER — LAB ENCOUNTER (OUTPATIENT)
Dept: LAB | Age: 67
End: 2022-03-04
Attending: EMERGENCY MEDICINE
Payer: MEDICARE

## 2022-03-04 DIAGNOSIS — Z23 NEED FOR VACCINATION: Primary | ICD-10-CM

## 2022-03-04 DIAGNOSIS — I50.31 ACUTE DIASTOLIC HEART FAILURE (HCC): ICD-10-CM

## 2022-03-04 DIAGNOSIS — I50.32 CHRONIC DIASTOLIC HEART FAILURE (HCC): ICD-10-CM

## 2022-03-04 DIAGNOSIS — N18.32 STAGE 3B CHRONIC KIDNEY DISEASE (HCC): ICD-10-CM

## 2022-03-04 DIAGNOSIS — J44.9 CHRONIC OBSTRUCTIVE PULMONARY DISEASE (COPD) (HCC): ICD-10-CM

## 2022-03-04 DIAGNOSIS — E87.70 FLUID OVERLOAD: Primary | ICD-10-CM

## 2022-03-04 LAB
ALBUMIN SERPL-MCNC: 3.1 G/DL (ref 3.4–5)
ANION GAP SERPL CALC-SCNC: 7 MMOL/L (ref 0–18)
BASOPHILS # BLD AUTO: 0.02 X10(3) UL (ref 0–0.2)
BASOPHILS NFR BLD AUTO: 0.2 %
BILIRUB UR QL: NEGATIVE
BUN BLD-MCNC: 70 MG/DL (ref 7–18)
BUN/CREAT SERPL: 38.3 (ref 10–20)
CALCIUM BLD-MCNC: 8.8 MG/DL (ref 8.5–10.1)
CHLORIDE SERPL-SCNC: 96 MMOL/L (ref 98–112)
CLARITY UR: CLEAR
CO2 SERPL-SCNC: 30 MMOL/L (ref 21–32)
COLOR UR: YELLOW
CREAT BLD-MCNC: 1.83 MG/DL
CREAT UR-SCNC: 30.7 MG/DL
CREAT UR-SCNC: 30.7 MG/DL
DEPRECATED RDW RBC AUTO: 40.9 FL (ref 35.1–46.3)
EOSINOPHIL # BLD AUTO: 0.11 X10(3) UL (ref 0–0.7)
EOSINOPHIL NFR BLD AUTO: 1.1 %
ERYTHROCYTE [DISTWIDTH] IN BLOOD BY AUTOMATED COUNT: 11.9 % (ref 11–15)
GLUCOSE BLD-MCNC: 201 MG/DL (ref 70–99)
GLUCOSE UR-MCNC: >=500 MG/DL
HCT VFR BLD AUTO: 37.6 %
HGB BLD-MCNC: 12.6 G/DL
HGB UR QL STRIP.AUTO: NEGATIVE
HYALINE CASTS #/AREA URNS AUTO: PRESENT /LPF
IMM GRANULOCYTES # BLD AUTO: 0.06 X10(3) UL (ref 0–1)
IMM GRANULOCYTES NFR BLD: 0.6 %
KETONES UR-MCNC: NEGATIVE MG/DL
LEUKOCYTE ESTERASE UR QL STRIP.AUTO: NEGATIVE
LYMPHOCYTES # BLD AUTO: 1.53 X10(3) UL (ref 1–4)
LYMPHOCYTES NFR BLD AUTO: 15.1 %
MCH RBC QN AUTO: 31.7 PG (ref 26–34)
MCHC RBC AUTO-ENTMCNC: 33.5 G/DL (ref 31–37)
MCV RBC AUTO: 94.7 FL
MICROALBUMIN UR-MCNC: 30.5 MG/DL
MICROALBUMIN/CREAT 24H UR-RTO: 993.5 UG/MG (ref ?–30)
MONOCYTES # BLD AUTO: 0.64 X10(3) UL (ref 0.1–1)
MONOCYTES NFR BLD AUTO: 6.3 %
NEUTROPHILS # BLD AUTO: 7.74 X10 (3) UL (ref 1.5–7.7)
NEUTROPHILS # BLD AUTO: 7.74 X10(3) UL (ref 1.5–7.7)
NEUTROPHILS NFR BLD AUTO: 76.7 %
NITRITE UR QL STRIP.AUTO: NEGATIVE
OSMOLALITY SERPL CALC.SUM OF ELEC: 302 MOSM/KG (ref 275–295)
PHOSPHATE SERPL-MCNC: 4.5 MG/DL (ref 2.5–4.9)
PLATELET # BLD AUTO: 286 10(3)UL (ref 150–450)
POTASSIUM SERPL-SCNC: 4.7 MMOL/L (ref 3.5–5.1)
PROT UR-MCNC: 100 MG/DL
PROT UR-MCNC: 49.8 MG/DL
RBC # BLD AUTO: 3.97 X10(6)UL
SODIUM SERPL-SCNC: 133 MMOL/L (ref 136–145)
SP GR UR STRIP: 1.01 (ref 1–1.03)
UROBILINOGEN UR STRIP-ACNC: <2
VIT C UR-MCNC: NEGATIVE MG/DL
WBC # BLD AUTO: 10.1 X10(3) UL (ref 4–11)

## 2022-03-04 PROCEDURE — 82043 UR ALBUMIN QUANTITATIVE: CPT

## 2022-03-04 PROCEDURE — 80069 RENAL FUNCTION PANEL: CPT

## 2022-03-04 PROCEDURE — 36415 COLL VENOUS BLD VENIPUNCTURE: CPT

## 2022-03-04 PROCEDURE — 82570 ASSAY OF URINE CREATININE: CPT

## 2022-03-04 PROCEDURE — 84156 ASSAY OF PROTEIN URINE: CPT

## 2022-03-04 PROCEDURE — 81001 URINALYSIS AUTO W/SCOPE: CPT

## 2022-03-04 PROCEDURE — 0054A SARSCOV2 VAC 30MCG TRS SUCR: CPT

## 2022-03-04 PROCEDURE — 85025 COMPLETE CBC W/AUTO DIFF WBC: CPT

## 2022-03-08 ENCOUNTER — OFF PREMISE (OUTPATIENT)
Dept: CARDIOLOGY | Age: 67
End: 2022-03-08

## 2022-03-09 NOTE — TELEPHONE ENCOUNTER
Per patient is calling to follow up on refill request from last week (chart review shows no evidence of communication from patient or pharmacy). Patient is requesting a refill on her Oxcarbazepine Rx. Per patient provider has refilled medication in the past but is only showing in chart as patient reported. Routing to provider for consideration. Patient has 1 week of medication remaining on hand.

## 2022-03-09 NOTE — TELEPHONE ENCOUNTER
Refill passed per [address] protocol. Chart review shows medication as patient reported, routing to provider to advise.    Requested Prescriptions   Pending Prescriptions Disp Refills    OXcarbazepine 300 MG Oral Tab 180 tablet 1     Sig: TAKE 1 TABLET BY MOUTH IN THE MORNING AND IN THE EVENING        Neurology Medications Passed - 3/9/2022  2:10 PM        Passed - Appointment in the past 6 or next 3 months             Recent Outpatient Visits              1 month ago Mixed hyperlipidemia    Azalia Whitten MD    Office Visit    2 months ago Sore throat    Rios Stuart MD    Office Visit    3 months ago FSGS (focal segmental glomerulosclerosis)    Cal Coleman Nuala Kos, MD    Office Visit    3 months ago FSGS (focal segmental glomerulosclerosis)    Beau Coleman Nuala Kos, MD    Office Visit    5 months ago Medicare annual wellness visit, initial    Azalia Whitten MD    Office Visit

## 2022-03-10 ENCOUNTER — E-ADVICE (OUTPATIENT)
Dept: CARDIOLOGY | Age: 67
End: 2022-03-10

## 2022-03-10 DIAGNOSIS — I50.33 ACUTE ON CHRONIC HEART FAILURE WITH PRESERVED EJECTION FRACTION (CMD): Primary | ICD-10-CM

## 2022-03-10 DIAGNOSIS — E78.00 HYPERCHOLESTEREMIA: ICD-10-CM

## 2022-03-10 RX ORDER — PEN NEEDLE, DIABETIC 31 GX5/16"
NEEDLE, DISPOSABLE MISCELLANEOUS
Qty: 200 EACH | Refills: 5 | Status: SHIPPED | OUTPATIENT
Start: 2022-03-10

## 2022-03-10 RX ORDER — OXCARBAZEPINE 300 MG/1
TABLET, FILM COATED ORAL
Qty: 180 TABLET | Refills: 1 | Status: SHIPPED | OUTPATIENT
Start: 2022-03-10

## 2022-03-10 RX ORDER — LEVOTHYROXINE SODIUM 0.03 MG/1
25 TABLET ORAL DAILY
Qty: 90 TABLET | Refills: 1 | Status: SHIPPED | OUTPATIENT
Start: 2022-03-10

## 2022-03-10 RX ORDER — POTASSIUM CHLORIDE 1500 MG/1
20 TABLET, EXTENDED RELEASE ORAL 2 TIMES DAILY
Qty: 180 TABLET | Refills: 3 | Status: SHIPPED | OUTPATIENT
Start: 2022-03-10 | End: 2022-03-23 | Stop reason: SDUPTHER

## 2022-03-10 RX ORDER — SPIRONOLACTONE 25 MG/1
50 TABLET ORAL 2 TIMES DAILY
Qty: 360 TABLET | Refills: 3 | Status: SHIPPED | OUTPATIENT
Start: 2022-03-10 | End: 2022-01-01 | Stop reason: SDUPTHER

## 2022-03-11 NOTE — TELEPHONE ENCOUNTER
Refill passed per [facility] protocol   Requested Prescriptions   Pending Prescriptions Disp Refills    levothyroxine 25 MCG Oral Tab 90 tablet 1     Sig: Take 1 tablet (25 mcg total) by mouth daily.         Thyroid Medication Protocol Passed - 3/10/2022  7:31 PM        Passed - TSH in past 12 months        Passed - Last TSH value is normal     Lab Results   Component Value Date    TSH 0.871 04/13/2021    THYROIDFUNC 0.87 05/25/2016                 Passed - Appointment in past 12 or next 3 months           Insulin Pen Needle (TECHLITE PEN NEEDLES) 31G X 8 MM Does not apply Misc 200 each 1     Sig: USE FOUR TIMES DAILY AS DIRECTED        Diabetic Supplies Protocol Passed - 3/10/2022  7:31 PM        Passed - Appointment in past 12 or next 3 months

## 2022-03-23 RX ORDER — POTASSIUM CHLORIDE 1500 MG/1
60 TABLET, EXTENDED RELEASE ORAL DAILY
Qty: 270 TABLET | Refills: 3 | Status: SHIPPED | OUTPATIENT
Start: 2022-03-23 | End: 2022-01-01

## 2022-03-29 ENCOUNTER — TELEPHONE (OUTPATIENT)
Dept: CARDIOLOGY | Age: 67
End: 2022-03-29

## 2022-04-01 ENCOUNTER — DOCUMENTATION (OUTPATIENT)
Dept: CARDIOLOGY | Age: 67
End: 2022-04-01

## 2022-04-13 ENCOUNTER — OFFICE VISIT (OUTPATIENT)
Dept: CARDIOLOGY | Age: 67
End: 2022-04-13
Attending: NURSE PRACTITIONER

## 2022-04-13 VITALS
SYSTOLIC BLOOD PRESSURE: 102 MMHG | BODY MASS INDEX: 35.68 KG/M2 | DIASTOLIC BLOOD PRESSURE: 60 MMHG | HEART RATE: 64 BPM | RESPIRATION RATE: 22 BRPM | WEIGHT: 207.89 LBS

## 2022-04-13 DIAGNOSIS — N18.9 CHRONIC KIDNEY DISEASE, UNSPECIFIED CKD STAGE: ICD-10-CM

## 2022-04-13 DIAGNOSIS — R06.00 DYSPNEA, UNSPECIFIED TYPE: ICD-10-CM

## 2022-04-13 DIAGNOSIS — R63.5 WEIGHT GAIN: ICD-10-CM

## 2022-04-13 DIAGNOSIS — I89.0 LYMPHEDEMA: ICD-10-CM

## 2022-04-13 DIAGNOSIS — I50.33 ACUTE ON CHRONIC HEART FAILURE WITH PRESERVED EJECTION FRACTION (CMD): Primary | ICD-10-CM

## 2022-04-13 DIAGNOSIS — I12.9 HYPERTENSIVE CHRONIC KIDNEY DISEASE, UNSPECIFIED CKD STAGE: ICD-10-CM

## 2022-04-13 LAB
ALBUMIN SERPL-MCNC: 3.2 G/DL (ref 3.6–5.1)
ALBUMIN/GLOB SERPL: 0.8 {RATIO} (ref 1–2.4)
ALP SERPL-CCNC: 122 UNITS/L (ref 45–117)
ALT SERPL-CCNC: 31 UNITS/L
ANION GAP SERPL CALC-SCNC: 11 MMOL/L (ref 10–20)
APPEARANCE UR: CLEAR
AST SERPL-CCNC: 20 UNITS/L
BACTERIA #/AREA URNS HPF: ABNORMAL /HPF
BASOPHILS # BLD: 0.1 K/MCL (ref 0–0.3)
BASOPHILS NFR BLD: 1 %
BILIRUB SERPL-MCNC: 0.3 MG/DL (ref 0.2–1)
BILIRUB UR QL STRIP: NEGATIVE
BUN SERPL-MCNC: 54 MG/DL (ref 6–20)
BUN/CREAT SERPL: 32 (ref 7–25)
CALCIUM SERPL-MCNC: 9 MG/DL (ref 8.4–10.2)
CHLORIDE SERPL-SCNC: 95 MMOL/L (ref 98–107)
CHOLEST SERPL-MCNC: 169 MG/DL
CHOLEST/HDLC SERPL: 4.7 {RATIO}
CO2 SERPL-SCNC: 31 MMOL/L (ref 21–32)
COLOR UR: YELLOW
CREAT SERPL-MCNC: 1.69 MG/DL (ref 0.51–0.95)
CREAT UR-MCNC: 64.3 MG/DL
CREAT UR-MCNC: 65.2 MG/DL
DEPRECATED RDW RBC: 40 FL (ref 39–50)
EOSINOPHIL # BLD: 0.2 K/MCL (ref 0–0.5)
EOSINOPHIL NFR BLD: 2 %
ERYTHROCYTE [DISTWIDTH] IN BLOOD: 11.9 % (ref 11–15)
FASTING DURATION TIME PATIENT: ABNORMAL H
FASTING DURATION TIME PATIENT: ABNORMAL H
GFR SERPLBLD BASED ON 1.73 SQ M-ARVRAT: 31 ML/MIN
GLOBULIN SER-MCNC: 4 G/DL (ref 2–4)
GLUCOSE SERPL-MCNC: 193 MG/DL (ref 70–99)
GLUCOSE UR STRIP-MCNC: >=500 MG/DL
HCT VFR BLD CALC: 38.2 % (ref 36–46.5)
HDLC SERPL-MCNC: 36 MG/DL
HGB BLD-MCNC: 13.3 G/DL (ref 12–15.5)
HGB UR QL STRIP: NEGATIVE
HYALINE CASTS #/AREA URNS LPF: ABNORMAL /LPF
IMM GRANULOCYTES # BLD AUTO: 0.1 K/MCL (ref 0–0.2)
IMM GRANULOCYTES # BLD: 1 %
KETONES UR STRIP-MCNC: NEGATIVE MG/DL
LDLC SERPL CALC-MCNC: 85 MG/DL
LEUKOCYTE ESTERASE UR QL STRIP: ABNORMAL
LYMPHOCYTES # BLD: 1.5 K/MCL (ref 1–4)
LYMPHOCYTES NFR BLD: 16 %
MCH RBC QN AUTO: 31.9 PG (ref 26–34)
MCHC RBC AUTO-ENTMCNC: 34.8 G/DL (ref 32–36.5)
MCV RBC AUTO: 91.6 FL (ref 78–100)
MICROALBUMIN UR-MCNC: 71.5 MG/DL
MICROALBUMIN/CREAT UR: 1096.6 MG/G
MONOCYTES # BLD: 0.7 K/MCL (ref 0.3–0.9)
MONOCYTES NFR BLD: 8 %
MUCOUS THREADS URNS QL MICRO: PRESENT
NEUTROPHILS # BLD: 6.8 K/MCL (ref 1.8–7.7)
NEUTROPHILS NFR BLD: 72 %
NITRITE UR QL STRIP: NEGATIVE
NONHDLC SERPL-MCNC: 133 MG/DL
NRBC BLD MANUAL-RTO: 0 /100 WBC
NT-PROBNP SERPL-MCNC: 197 PG/ML
PH UR STRIP: 6 [PH] (ref 5–7)
PHOSPHATE SERPL-MCNC: 4.1 MG/DL (ref 2.4–4.7)
PLATELET # BLD AUTO: 274 K/MCL (ref 140–450)
POTASSIUM SERPL-SCNC: 3.6 MMOL/L (ref 3.4–5.1)
PROT SERPL-MCNC: 7.2 G/DL (ref 6.4–8.2)
PROT UR STRIP-MCNC: 100 MG/DL
PROT UR-MCNC: 103 MG/DL
RBC # BLD: 4.17 MIL/MCL (ref 4–5.2)
RBC #/AREA URNS HPF: ABNORMAL /HPF
SODIUM SERPL-SCNC: 133 MMOL/L (ref 135–145)
SP GR UR STRIP: 1.01 (ref 1–1.03)
SQUAMOUS #/AREA URNS HPF: ABNORMAL /HPF
TRIGL SERPL-MCNC: 240 MG/DL
UROBILINOGEN UR STRIP-MCNC: 0.2 MG/DL
WBC # BLD: 9.2 K/MCL (ref 4.2–11)
WBC #/AREA URNS HPF: ABNORMAL /HPF

## 2022-04-13 PROCEDURE — 36415 COLL VENOUS BLD VENIPUNCTURE: CPT | Performed by: NURSE PRACTITIONER

## 2022-04-13 PROCEDURE — 87086 URINE CULTURE/COLONY COUNT: CPT | Performed by: NURSE PRACTITIONER

## 2022-04-13 PROCEDURE — 80053 COMPREHEN METABOLIC PANEL: CPT | Performed by: NURSE PRACTITIONER

## 2022-04-13 PROCEDURE — 96374 THER/PROPH/DIAG INJ IV PUSH: CPT

## 2022-04-13 PROCEDURE — 84100 ASSAY OF PHOSPHORUS: CPT | Performed by: NURSE PRACTITIONER

## 2022-04-13 PROCEDURE — 80061 LIPID PANEL: CPT | Performed by: NURSE PRACTITIONER

## 2022-04-13 PROCEDURE — 99215 OFFICE O/P EST HI 40 MIN: CPT | Performed by: NURSE PRACTITIONER

## 2022-04-13 PROCEDURE — 85025 COMPLETE CBC W/AUTO DIFF WBC: CPT | Performed by: NURSE PRACTITIONER

## 2022-04-13 PROCEDURE — 83880 ASSAY OF NATRIURETIC PEPTIDE: CPT | Performed by: NURSE PRACTITIONER

## 2022-04-13 PROCEDURE — 84156 ASSAY OF PROTEIN URINE: CPT | Performed by: NURSE PRACTITIONER

## 2022-04-13 PROCEDURE — 82570 ASSAY OF URINE CREATININE: CPT | Performed by: NURSE PRACTITIONER

## 2022-04-13 PROCEDURE — 82043 UR ALBUMIN QUANTITATIVE: CPT | Performed by: NURSE PRACTITIONER

## 2022-04-13 PROCEDURE — 81001 URINALYSIS AUTO W/SCOPE: CPT | Performed by: NURSE PRACTITIONER

## 2022-04-13 PROCEDURE — 10002801 HB RX 250 W/O HCPCS: Performed by: INTERNAL MEDICINE

## 2022-04-13 RX ORDER — BUMETANIDE 0.25 MG/ML
2.5 INJECTION INTRAMUSCULAR; INTRAVENOUS ONCE
Status: COMPLETED | OUTPATIENT
Start: 2022-04-13 | End: 2022-04-13

## 2022-04-13 RX ADMIN — BUMETANIDE 2.5 MG: 0.25 INJECTION INTRAMUSCULAR; INTRAVENOUS at 11:22

## 2022-04-14 LAB — BACTERIA UR CULT: NORMAL

## 2022-04-19 ENCOUNTER — TELEPHONE (OUTPATIENT)
Dept: PULMONOLOGY | Facility: CLINIC | Age: 67
End: 2022-04-19

## 2022-04-22 ENCOUNTER — PATIENT MESSAGE (OUTPATIENT)
Dept: PULMONOLOGY | Facility: CLINIC | Age: 67
End: 2022-04-22

## 2022-05-02 PROBLEM — E87.70 FLUID OVERLOAD: Status: ACTIVE | Noted: 2022-01-01

## 2022-05-05 ENCOUNTER — HOSPITAL ENCOUNTER (OUTPATIENT)
Dept: CT IMAGING | Facility: HOSPITAL | Age: 67
Discharge: HOME | End: 2022-05-05
Attending: INTERNAL MEDICINE
Payer: MEDICARE

## 2022-05-05 DIAGNOSIS — Z87.891 PERSONAL HISTORY OF TOBACCO USE, PRESENTING HAZARDS TO HEALTH: ICD-10-CM

## 2022-05-05 PROCEDURE — 71271 CT THORAX LUNG CANCER SCR C-: CPT | Performed by: INTERNAL MEDICINE

## 2022-05-12 ENCOUNTER — TELEPHONE (OUTPATIENT)
Dept: PULMONOLOGY | Facility: CLINIC | Age: 67
End: 2022-05-12

## 2022-05-12 NOTE — TELEPHONE ENCOUNTER
----- Message from [employer] DO Dany sent at 5/11/2022  3:05 PM CDT -----  You may let the patient know that I reviewed lung screening CT results with stable findings.   Recommend annual lung screening CT May 2023 and please place in chronic calendar

## 2022-05-16 ENCOUNTER — LAB ENCOUNTER (OUTPATIENT)
Dept: LAB | Age: 67
End: 2022-05-16
Attending: INTERNAL MEDICINE
Payer: MEDICARE

## 2022-05-16 DIAGNOSIS — N18.32 STAGE 3B CHRONIC KIDNEY DISEASE (HCC): ICD-10-CM

## 2022-05-16 DIAGNOSIS — J44.9 CHRONIC OBSTRUCTIVE PULMONARY DISEASE (COPD) (HCC): ICD-10-CM

## 2022-05-16 DIAGNOSIS — I50.32 CHRONIC DIASTOLIC HEART FAILURE (HCC): ICD-10-CM

## 2022-05-16 DIAGNOSIS — I50.31 ACUTE DIASTOLIC HEART FAILURE (HCC): ICD-10-CM

## 2022-05-16 DIAGNOSIS — E87.70 FLUID OVERLOAD: ICD-10-CM

## 2022-05-16 LAB
ALBUMIN SERPL-MCNC: 3.6 G/DL (ref 3.4–5)
ANION GAP SERPL CALC-SCNC: 9 MMOL/L (ref 0–18)
BASOPHILS # BLD AUTO: 0.04 X10(3) UL (ref 0–0.2)
BASOPHILS NFR BLD AUTO: 0.4 %
BUN BLD-MCNC: 35 MG/DL (ref 7–18)
BUN/CREAT SERPL: 23.3 (ref 10–20)
CALCIUM BLD-MCNC: 8.9 MG/DL (ref 8.5–10.1)
CHLORIDE SERPL-SCNC: 105 MMOL/L (ref 98–112)
CO2 SERPL-SCNC: 26 MMOL/L (ref 21–32)
CREAT BLD-MCNC: 1.5 MG/DL
DEPRECATED RDW RBC AUTO: 42.7 FL (ref 35.1–46.3)
EOSINOPHIL # BLD AUTO: 0.07 X10(3) UL (ref 0–0.7)
EOSINOPHIL NFR BLD AUTO: 0.7 %
ERYTHROCYTE [DISTWIDTH] IN BLOOD BY AUTOMATED COUNT: 12.3 % (ref 11–15)
GLUCOSE BLD-MCNC: 145 MG/DL (ref 70–99)
HCT VFR BLD AUTO: 43.2 %
HGB BLD-MCNC: 14 G/DL
IMM GRANULOCYTES # BLD AUTO: 0.04 X10(3) UL (ref 0–1)
IMM GRANULOCYTES NFR BLD: 0.4 %
LYMPHOCYTES # BLD AUTO: 1.32 X10(3) UL (ref 1–4)
LYMPHOCYTES NFR BLD AUTO: 13 %
MCH RBC QN AUTO: 31.1 PG (ref 26–34)
MCHC RBC AUTO-ENTMCNC: 32.4 G/DL (ref 31–37)
MCV RBC AUTO: 96 FL
MONOCYTES # BLD AUTO: 0.73 X10(3) UL (ref 0.1–1)
MONOCYTES NFR BLD AUTO: 7.2 %
NEUTROPHILS # BLD AUTO: 7.96 X10 (3) UL (ref 1.5–7.7)
NEUTROPHILS # BLD AUTO: 7.96 X10(3) UL (ref 1.5–7.7)
NEUTROPHILS NFR BLD AUTO: 78.3 %
OSMOLALITY SERPL CALC.SUM OF ELEC: 301 MOSM/KG (ref 275–295)
PHOSPHATE SERPL-MCNC: 2.7 MG/DL (ref 2.5–4.9)
PLATELET # BLD AUTO: 265 10(3)UL (ref 150–450)
POTASSIUM SERPL-SCNC: 4 MMOL/L (ref 3.5–5.1)
RBC # BLD AUTO: 4.5 X10(6)UL
SODIUM SERPL-SCNC: 140 MMOL/L (ref 136–145)
WBC # BLD AUTO: 10.2 X10(3) UL (ref 4–11)

## 2022-05-16 PROCEDURE — 36415 COLL VENOUS BLD VENIPUNCTURE: CPT

## 2022-05-16 PROCEDURE — 85025 COMPLETE CBC W/AUTO DIFF WBC: CPT

## 2022-05-16 PROCEDURE — 80069 RENAL FUNCTION PANEL: CPT

## 2022-05-17 ENCOUNTER — LAB ENCOUNTER (OUTPATIENT)
Dept: LAB | Facility: HOSPITAL | Age: 67
End: 2022-05-17
Attending: INTERNAL MEDICINE
Payer: MEDICARE

## 2022-05-17 LAB
BILIRUB UR QL: NEGATIVE
CLARITY UR: CLEAR
COLOR UR: YELLOW
CREAT UR-SCNC: 54.4 MG/DL
CREAT UR-SCNC: 54.4 MG/DL
GLUCOSE UR-MCNC: 250 MG/DL
HGB UR QL STRIP.AUTO: NEGATIVE
HYALINE CASTS #/AREA URNS AUTO: PRESENT /LPF
KETONES UR-MCNC: NEGATIVE MG/DL
LEUKOCYTE ESTERASE UR QL STRIP.AUTO: NEGATIVE
MICROALBUMIN UR-MCNC: 229 MG/DL
MICROALBUMIN/CREAT 24H UR-RTO: 4209.6 UG/MG (ref ?–30)
NITRITE UR QL STRIP.AUTO: POSITIVE
PH UR: 6 [PH] (ref 5–8)
PROT UR-MCNC: 265.2 MG/DL
PROT UR-MCNC: >=300 MG/DL
SP GR UR STRIP: 1.02 (ref 1–1.03)
UROBILINOGEN UR STRIP-ACNC: 0.2

## 2022-05-17 PROCEDURE — 87077 CULTURE AEROBIC IDENTIFY: CPT

## 2022-05-17 PROCEDURE — 87086 URINE CULTURE/COLONY COUNT: CPT

## 2022-05-17 PROCEDURE — 87186 SC STD MICRODIL/AGAR DIL: CPT

## 2022-05-17 PROCEDURE — 84156 ASSAY OF PROTEIN URINE: CPT

## 2022-05-17 PROCEDURE — 81001 URINALYSIS AUTO W/SCOPE: CPT

## 2022-05-17 PROCEDURE — 82570 ASSAY OF URINE CREATININE: CPT

## 2022-05-17 PROCEDURE — 82043 UR ALBUMIN QUANTITATIVE: CPT

## 2022-06-02 ENCOUNTER — LAB ENCOUNTER (OUTPATIENT)
Dept: LAB | Facility: HOSPITAL | Age: 67
End: 2022-06-02
Attending: INTERNAL MEDICINE
Payer: MEDICARE

## 2022-06-02 DIAGNOSIS — I50.9 ACUTE HEART FAILURE, UNSPECIFIED HEART FAILURE TYPE (HCC): Primary | ICD-10-CM

## 2022-06-02 LAB
ALBUMIN SERPL-MCNC: 3.8 G/DL (ref 3.4–5)
ALBUMIN/GLOB SERPL: 0.8 {RATIO} (ref 1–2)
ALP LIVER SERPL-CCNC: 122 U/L
ALT SERPL-CCNC: 32 U/L
ANION GAP SERPL CALC-SCNC: 10 MMOL/L (ref 0–18)
AST SERPL-CCNC: 14 U/L (ref 15–37)
BILIRUB SERPL-MCNC: 0.4 MG/DL (ref 0.1–2)
BUN BLD-MCNC: 88 MG/DL (ref 7–18)
BUN/CREAT SERPL: 47.3 (ref 10–20)
CALCIUM BLD-MCNC: 9.7 MG/DL (ref 8.5–10.1)
CHLORIDE SERPL-SCNC: 91 MMOL/L (ref 98–112)
CO2 SERPL-SCNC: 30 MMOL/L (ref 21–32)
CREAT BLD-MCNC: 1.86 MG/DL
FASTING STATUS PATIENT QL REPORTED: NO
GLOBULIN PLAS-MCNC: 4.5 G/DL (ref 2.8–4.4)
GLUCOSE BLD-MCNC: 100 MG/DL (ref 70–99)
OSMOLALITY SERPL CALC.SUM OF ELEC: 299 MOSM/KG (ref 275–295)
POTASSIUM SERPL-SCNC: 3.8 MMOL/L (ref 3.5–5.1)
PROT SERPL-MCNC: 8.3 G/DL (ref 6.4–8.2)
SODIUM SERPL-SCNC: 131 MMOL/L (ref 136–145)

## 2022-06-02 PROCEDURE — 36415 COLL VENOUS BLD VENIPUNCTURE: CPT

## 2022-06-02 PROCEDURE — 80053 COMPREHEN METABOLIC PANEL: CPT

## 2022-06-09 ENCOUNTER — OFFICE VISIT (OUTPATIENT)
Dept: PULMONOLOGY | Facility: CLINIC | Age: 67
End: 2022-06-09
Payer: MEDICARE

## 2022-06-09 VITALS
DIASTOLIC BLOOD PRESSURE: 61 MMHG | HEIGHT: 64 IN | OXYGEN SATURATION: 91 % | HEART RATE: 82 BPM | BODY MASS INDEX: 34.49 KG/M2 | SYSTOLIC BLOOD PRESSURE: 97 MMHG | WEIGHT: 202 LBS

## 2022-06-09 DIAGNOSIS — G47.33 OSA (OBSTRUCTIVE SLEEP APNEA): Primary | ICD-10-CM

## 2022-06-09 PROCEDURE — 1126F AMNT PAIN NOTED NONE PRSNT: CPT | Performed by: INTERNAL MEDICINE

## 2022-06-09 PROCEDURE — 99213 OFFICE O/P EST LOW 20 MIN: CPT | Performed by: INTERNAL MEDICINE

## 2022-06-09 NOTE — PROGRESS NOTES
Referring Physician  Shannon Braswell MD    History of Present Illness  Patient is seen today for follow-up appointment pulmonary clinic. Admits to ongoing dyspnea without significant change. States recently had diuretic dose increased. Using Trelegy on daily basis. Infrequently uses nebulizer treatments. Medications  OXcarbazepine 300 MG Oral Tab, TAKE 1 TABLET BY MOUTH IN THE MORNING AND IN THE EVENING, Disp: 180 tablet, Rfl: 1  levothyroxine 25 MCG Oral Tab, Take 1 tablet (25 mcg total) by mouth daily. , Disp: 90 tablet, Rfl: 1  Insulin Pen Needle (TECHLITE PEN NEEDLES) 31G X 8 MM Does not apply Misc, USE FOUR TIMES DAILY AS DIRECTED, Disp: 200 each, Rfl: 5  insulin glargine (BASAGLAR KWIKPEN) 100 UNIT/ML Subcutaneous Solution Pen-injector, Inject 30 Units into the skin daily. , Disp: 30 mL, Rfl: 1  metOLazone 2.5 MG Oral Tab, Take 2.5 mg by mouth., Disp: , Rfl:   Magnesium Gluconate 250 MG Oral Tab, Take 250 mg by mouth daily. , Disp: , Rfl:   spironolactone 25 MG Oral Tab, Take 25 mg by mouth 2 (two) times daily. , Disp: , Rfl:   Pramipexole Dihydrochloride 1 MG Oral Tab, Take 1 tablet (1 mg total) by mouth 3 (three) times daily. , Disp: 270 tablet, Rfl: 1  traZODone 100 MG Oral Tab, TAKE 1 TABLET(100 MG) BY MOUTH EVERY NIGHT AT BEDTIME, Disp: 90 tablet, Rfl: 1  tacrolimus 1 MG Oral Cap, Take 1 capsule (1 mg total) by mouth 2 (two) times daily. , Disp: 60 capsule, Rfl: 0  montelukast 10 MG Oral Tab, Take 1 tablet (10 mg total) by mouth nightly., Disp: 90 tablet, Rfl: 1  TRELEGY ELLIPTA 100-62.5-25 MCG/INH Inhalation Aerosol Powder, Breath Activated, INHALE 1 PUFF BY MOUTH INTO THE LUNGS AS DIRECTED, Disp: 60 each, Rfl: 5  rosuvastatin 20 MG Oral Tab, TAKE 1 TABLET BY MOUTH EVERY NIGHT, Disp: 90 tablet, Rfl: 1  gabapentin 100 MG Oral Cap, Take 2 capsules (200 mg total) by mouth nightly., Disp: 60 capsule, Rfl: 0  JARDIANCE 10 MG Oral Tab, Take 10 mg by mouth., Disp: , Rfl:   Albuterol Sulfate  (90 Base) MCG/ACT Inhalation Aero Soln, Inhale 2 puffs into the lungs every 6 (six) hours as needed for Wheezing. inhale 2 puff by inhalation route  every 4 - 6 hours as needed (Patient taking differently: Inhale 2 puffs into the lungs every 6 (six) hours as needed for Wheezing. inhale 2 puff by inhalation route  every 4 - 6 hours as needed), Disp: 1 each, Rfl: 5  losartan Potassium 50 MG Oral Tab, Take 1 tablet (50 mg total) by mouth 2 (two) times daily. (Patient taking differently: Take 25 mg by mouth 2 (two) times daily. Pt takes 25mg in the AM and 25 mg at night), Disp: 180 tablet, Rfl: 2  SALINE MIST SPRAY NA, by Nasal route daily. , Disp: , Rfl:   Insulin Lispro (HUMALOG KWIKPEN) 200 UNIT/ML Subcutaneous Solution Pen-injector, 5 units subcutaneously with every meal (Patient taking differently: 8 units subcutaneously with every meal), Disp: 5 pen, Rfl: 3  torsemide 20 MG Oral Tab, Take 40 mg by mouth 2 (two) times a day. Takes 40-60mg prn for edema, Disp: , Rfl:   ipratropium-albuterol 0.5-2.5 (3) MG/3ML Inhalation Solution, Take 3 mL by nebulization 2 (two) times daily. , Disp: 60 vial, Rfl: 5  Potassium Chloride ER 20 MEQ Oral Tab CR, Take 1 tablet (20 mEq total) by mouth daily. , Disp: 270 tablet, Rfl: 0  nicotine polacrilex 2 MG Mouth/Throat Lozenge, Place 4 mg inside cheek as needed for Smoking cessation. , Disp: , Rfl:   melatonin 3 MG Oral Tab, Take 3 mg by mouth nightly., Disp: , Rfl:   Vitamin B-12 1000 MCG Oral Tab, Take 1,000 mcg by mouth daily. , Disp: , Rfl:   magnesium 250 MG Oral Tab, Take 250 mg by mouth daily. , Disp: , Rfl:   Cholecalciferol (VITAMIN D) 2000 UNITS Oral Cap, Take 2,000 Units by mouth daily. , Disp: , Rfl:   aspirin 81 MG Oral Tab, Take 81 mg by mouth daily. , Disp: , Rfl:     No current facility-administered medications on file prior to visit.       Allergies    Cefpodoxime             FEVER    Physical Exam  Constitutional: no acute distress  HEENT: PERRL  Cardio: RRR, S1 S2  Respiratory: Diminished breath sounds bilaterally  GI: abdomen soft, non tender  Extremities: no clubbing, cyanosis, edema  Neurologic: no gross motor deficits  Skin: warm, dry  Lymphatic: no supraclavicular lymphadenopathy     Assessment  1. Severe COPD  2. Dyspnea with exertion  3. Prior nicotine dependence  4. Chronic hypoxemic respiratory failure  5. Mild ANA      Plan  -Patient with evidence of progressive dyspnea with exertion likely secondary to underlying COPD. Prior pulmonary function testing with evidence of severe COPD. Advised the patient to continue Trelegy and nebulizer treatments. Encouraged more frequent use of nebulizer treatments which I advised her to use twice daily  -Encouraged patient to more frequently use oxygen therapy especially with frequent episodes of desaturation noted. -I reviewed lung screening CT from 5/5/2022 with no evidence of nodules seen. Emphysematous changes seen with some bronchial wall thickening and mild mucous plugging in lower lobes. Recommend annual lung screening CT  -Prior polysomnography in August 2021 with evidence of mild obstructive sleep apnea. Patient prefers to hold off any CPAP therapy although I encouraged patient to consider.  -Encouraged ongoing tobacco cessation.   States has not smoked cigarettes since November 2021.  -Further recommendations per cardiology        Jonathon Mendez, [address] [facility]

## 2022-06-30 ENCOUNTER — TELEPHONE (OUTPATIENT)
Dept: NEUROLOGY | Facility: CLINIC | Age: 67
End: 2022-06-30

## 2022-06-30 ENCOUNTER — OFFICE VISIT (OUTPATIENT)
Dept: PHYSICAL MEDICINE AND REHAB | Facility: CLINIC | Age: 67
End: 2022-06-30
Payer: MEDICARE

## 2022-06-30 VITALS
BODY MASS INDEX: 34.49 KG/M2 | OXYGEN SATURATION: 93 % | HEIGHT: 64 IN | WEIGHT: 202 LBS | HEART RATE: 73 BPM | SYSTOLIC BLOOD PRESSURE: 126 MMHG | DIASTOLIC BLOOD PRESSURE: 66 MMHG

## 2022-06-30 DIAGNOSIS — G35 MULTIPLE SCLEROSIS (HCC): ICD-10-CM

## 2022-06-30 DIAGNOSIS — M16.11 PRIMARY OSTEOARTHRITIS OF RIGHT HIP: Primary | ICD-10-CM

## 2022-06-30 DIAGNOSIS — M24.551 FLEXION CONTRACTURE OF HIP, RIGHT: ICD-10-CM

## 2022-06-30 PROBLEM — N18.4 CHRONIC RENAL DISEASE, STAGE IV (HCC): Status: ACTIVE | Noted: 2022-06-30

## 2022-06-30 PROCEDURE — 99214 OFFICE O/P EST MOD 30 MIN: CPT | Performed by: PHYSICAL MEDICINE & REHABILITATION

## 2022-06-30 RX ORDER — TRAMADOL HYDROCHLORIDE 50 MG/1
50 TABLET ORAL NIGHTLY
Qty: 30 TABLET | Refills: 0 | Status: SHIPPED | OUTPATIENT
Start: 2022-06-30

## 2022-06-30 NOTE — PATIENT INSTRUCTIONS
Recommend you try gabapentin 200mg at night time. If that does not work trial the tramadol at night time. Order placed for right hip joint steroid injection. We can either schedule this out in about 1 month, or you can call at another time to schedule. If you plan on seeing a hip surgeon in the future I would recommend Dr. Cornelia Gonzalez.

## 2022-06-30 NOTE — TELEPHONE ENCOUNTER
Right hip joint steroid injection under fluoroscopy CPT CODE : 99334,, 33276    Status: Approved- No pre-certification required. Per Medicare Guidelines; request is a covered benefit and pre-certification is not require. All Medicare coverage is based on Medical Necessity.    Notified nursing for scheduling

## 2022-07-06 ENCOUNTER — LAB ENCOUNTER (OUTPATIENT)
Dept: LAB | Age: 67
End: 2022-07-06
Attending: INTERNAL MEDICINE
Payer: MEDICARE

## 2022-07-06 DIAGNOSIS — N04.1 NEPHROTIC SYNDROME WITH FOCAL GLOMERULOSCLEROSIS: ICD-10-CM

## 2022-07-06 DIAGNOSIS — I12.9 RENAL HYPERTENSION: ICD-10-CM

## 2022-07-06 DIAGNOSIS — N28.9 KIDNEY DISEASE: ICD-10-CM

## 2022-07-06 DIAGNOSIS — N18.32 CHRONIC KIDNEY DISEASE (CKD) STAGE G3B/A1, MODERATELY DECREASED GLOMERULAR FILTRATION RATE (GFR) BETWEEN 30-44 ML/MIN/1.73 SQUARE METER AND ALBUMINURIA CREATININE RATIO LESS THAN 30 MG/G (HCC): ICD-10-CM

## 2022-07-06 DIAGNOSIS — I50.32 CHRONIC DIASTOLIC HEART FAILURE (HCC): ICD-10-CM

## 2022-07-06 DIAGNOSIS — E09.22: Primary | ICD-10-CM

## 2022-07-06 LAB
BILIRUB UR QL: NEGATIVE
CLARITY UR: CLEAR
COLOR UR: YELLOW
CREAT UR-SCNC: 51 MG/DL
CREAT UR-SCNC: 51 MG/DL
GLUCOSE UR-MCNC: >=500 MG/DL
HGB UR QL STRIP.AUTO: NEGATIVE
HYALINE CASTS #/AREA URNS AUTO: PRESENT /LPF
HYALINE CASTS #/AREA URNS AUTO: PRESENT /LPF
KETONES UR-MCNC: NEGATIVE MG/DL
LEUKOCYTE ESTERASE UR QL STRIP.AUTO: NEGATIVE
MICROALBUMIN UR-MCNC: 12.5 MG/DL
MICROALBUMIN/CREAT 24H UR-RTO: 245.1 UG/MG (ref ?–30)
NITRITE UR QL STRIP.AUTO: NEGATIVE
PH UR: 7 [PH] (ref 5–8)
PROT UR-MCNC: 23.6 MG/DL
PROT UR-MCNC: 30 MG/DL
SP GR UR STRIP: 1.01 (ref 1–1.03)
UROBILINOGEN UR STRIP-ACNC: <2
VIT C UR-MCNC: NEGATIVE MG/DL

## 2022-07-06 PROCEDURE — 82043 UR ALBUMIN QUANTITATIVE: CPT

## 2022-07-06 PROCEDURE — 81015 MICROSCOPIC EXAM OF URINE: CPT

## 2022-07-06 PROCEDURE — 82570 ASSAY OF URINE CREATININE: CPT

## 2022-07-06 PROCEDURE — 81001 URINALYSIS AUTO W/SCOPE: CPT

## 2022-07-06 PROCEDURE — 84156 ASSAY OF PROTEIN URINE: CPT

## 2022-07-13 ENCOUNTER — LAB ENCOUNTER (OUTPATIENT)
Dept: LAB | Age: 67
End: 2022-07-13
Attending: INTERNAL MEDICINE
Payer: MEDICARE

## 2022-07-13 DIAGNOSIS — I12.9 RENAL HYPERTENSION: ICD-10-CM

## 2022-07-13 DIAGNOSIS — N28.9 KIDNEY DISEASE: ICD-10-CM

## 2022-07-13 DIAGNOSIS — N18.32 STAGE 3B CHRONIC KIDNEY DISEASE (HCC): ICD-10-CM

## 2022-07-13 DIAGNOSIS — I50.31 ACUTE DIASTOLIC HEART FAILURE (HCC): ICD-10-CM

## 2022-07-13 DIAGNOSIS — N18.32 CHRONIC KIDNEY DISEASE (CKD) STAGE G3B/A1, MODERATELY DECREASED GLOMERULAR FILTRATION RATE (GFR) BETWEEN 30-44 ML/MIN/1.73 SQUARE METER AND ALBUMINURIA CREATININE RATIO LESS THAN 30 MG/G (HCC): ICD-10-CM

## 2022-07-13 DIAGNOSIS — J44.9 CHRONIC OBSTRUCTIVE PULMONARY DISEASE (COPD) (HCC): ICD-10-CM

## 2022-07-13 DIAGNOSIS — E87.70 FLUID OVERLOAD: ICD-10-CM

## 2022-07-13 DIAGNOSIS — E09.22: ICD-10-CM

## 2022-07-13 DIAGNOSIS — N04.1 NEPHROTIC SYNDROME WITH FOCAL GLOMERULOSCLEROSIS: ICD-10-CM

## 2022-07-13 DIAGNOSIS — I50.32 CHRONIC DIASTOLIC HEART FAILURE (HCC): ICD-10-CM

## 2022-07-13 LAB
ALBUMIN SERPL-MCNC: 3.7 G/DL (ref 3.4–5)
ANION GAP SERPL CALC-SCNC: 8 MMOL/L (ref 0–18)
BASOPHILS # BLD AUTO: 0.03 X10(3) UL (ref 0–0.2)
BASOPHILS NFR BLD AUTO: 0.3 %
BUN BLD-MCNC: 49 MG/DL (ref 7–18)
BUN/CREAT SERPL: 26.2 (ref 10–20)
CALCIUM BLD-MCNC: 9.8 MG/DL (ref 8.5–10.1)
CHLORIDE SERPL-SCNC: 100 MMOL/L (ref 98–112)
CO2 SERPL-SCNC: 29 MMOL/L (ref 21–32)
CREAT BLD-MCNC: 1.87 MG/DL
CREAT UR-SCNC: 1.03 G/24 HR (ref 0.6–1.8)
DEPRECATED RDW RBC AUTO: 42.1 FL (ref 35.1–46.3)
EOSINOPHIL # BLD AUTO: 0.05 X10(3) UL (ref 0–0.7)
EOSINOPHIL NFR BLD AUTO: 0.6 %
ERYTHROCYTE [DISTWIDTH] IN BLOOD BY AUTOMATED COUNT: 12.2 % (ref 11–15)
GLUCOSE BLD-MCNC: 164 MG/DL (ref 70–99)
HCT VFR BLD AUTO: 41.2 %
HGB BLD-MCNC: 13.9 G/DL
IMM GRANULOCYTES # BLD AUTO: 0.06 X10(3) UL (ref 0–1)
IMM GRANULOCYTES NFR BLD: 0.7 %
LYMPHOCYTES # BLD AUTO: 1.3 X10(3) UL (ref 1–4)
LYMPHOCYTES NFR BLD AUTO: 14.3 %
M PROTEIN 24H UR ELPH-MRATE: 672.5 MG/24 HR (ref ?–149.1)
MCH RBC QN AUTO: 31.5 PG (ref 26–34)
MCHC RBC AUTO-ENTMCNC: 33.7 G/DL (ref 31–37)
MCV RBC AUTO: 93.4 FL
MICROALBUMIN 24H UR-MRATE: 423 MG/24 HR (ref ?–30)
MONOCYTES # BLD AUTO: 0.4 X10(3) UL (ref 0.1–1)
MONOCYTES NFR BLD AUTO: 4.4 %
NEUTROPHILS # BLD AUTO: 7.24 X10 (3) UL (ref 1.5–7.7)
NEUTROPHILS # BLD AUTO: 7.24 X10(3) UL (ref 1.5–7.7)
NEUTROPHILS NFR BLD AUTO: 79.7 %
OSMOLALITY SERPL CALC.SUM OF ELEC: 301 MOSM/KG (ref 275–295)
PHOSPHATE SERPL-MCNC: 3.3 MG/DL (ref 2.5–4.9)
PLATELET # BLD AUTO: 285 10(3)UL (ref 150–450)
POTASSIUM SERPL-SCNC: 4 MMOL/L (ref 3.5–5.1)
RBC # BLD AUTO: 4.41 X10(6)UL
SODIUM SERPL-SCNC: 137 MMOL/L (ref 136–145)
SPECIMEN VOL UR: 2500 ML
WBC # BLD AUTO: 9.1 X10(3) UL (ref 4–11)

## 2022-07-13 PROCEDURE — 82043 UR ALBUMIN QUANTITATIVE: CPT

## 2022-07-13 PROCEDURE — 84156 ASSAY OF PROTEIN URINE: CPT

## 2022-07-13 PROCEDURE — 80069 RENAL FUNCTION PANEL: CPT

## 2022-07-13 PROCEDURE — 82570 ASSAY OF URINE CREATININE: CPT

## 2022-07-13 PROCEDURE — 36415 COLL VENOUS BLD VENIPUNCTURE: CPT

## 2022-07-13 PROCEDURE — 85025 COMPLETE CBC W/AUTO DIFF WBC: CPT

## 2022-08-08 PROBLEM — Z86.69 HISTORY OF SLEEP APNEA: Chronic | Status: ACTIVE | Noted: 2022-01-01

## 2022-08-15 ENCOUNTER — OFFICE VISIT (OUTPATIENT)
Dept: SURGERY | Facility: CLINIC | Age: 67
End: 2022-08-15

## 2022-08-15 ENCOUNTER — LAB ENCOUNTER (OUTPATIENT)
Dept: LAB | Facility: HOSPITAL | Age: 67
End: 2022-08-15
Attending: INTERNAL MEDICINE
Payer: MEDICARE

## 2022-08-15 DIAGNOSIS — I50.31 ACUTE DIASTOLIC HEART FAILURE (HCC): ICD-10-CM

## 2022-08-15 DIAGNOSIS — I50.32 CHRONIC DIASTOLIC HEART FAILURE (HCC): ICD-10-CM

## 2022-08-15 DIAGNOSIS — J44.9 CHRONIC OBSTRUCTIVE PULMONARY DISEASE (COPD) (HCC): ICD-10-CM

## 2022-08-15 DIAGNOSIS — N18.32 STAGE 3B CHRONIC KIDNEY DISEASE (HCC): ICD-10-CM

## 2022-08-15 DIAGNOSIS — M16.11 PRIMARY OSTEOARTHRITIS OF RIGHT HIP: ICD-10-CM

## 2022-08-15 DIAGNOSIS — E87.70 FLUID OVERLOAD: ICD-10-CM

## 2022-08-15 LAB
ALBUMIN SERPL-MCNC: 3.6 G/DL (ref 3.4–5)
ANION GAP SERPL CALC-SCNC: 8 MMOL/L (ref 0–18)
BASOPHILS # BLD AUTO: 0.03 X10(3) UL (ref 0–0.2)
BASOPHILS NFR BLD AUTO: 0.4 %
BILIRUB UR QL: NEGATIVE
BUN BLD-MCNC: 65 MG/DL (ref 7–18)
BUN/CREAT SERPL: 36.7 (ref 10–20)
CALCIUM BLD-MCNC: 9.5 MG/DL (ref 8.5–10.1)
CHLORIDE SERPL-SCNC: 100 MMOL/L (ref 98–112)
CLARITY UR: CLEAR
CO2 SERPL-SCNC: 26 MMOL/L (ref 21–32)
COLOR UR: YELLOW
CREAT BLD-MCNC: 1.77 MG/DL
CREAT UR-SCNC: 48.7 MG/DL
CREAT UR-SCNC: 48.7 MG/DL
DEPRECATED RDW RBC AUTO: 43.6 FL (ref 35.1–46.3)
EOSINOPHIL # BLD AUTO: 0.03 X10(3) UL (ref 0–0.7)
EOSINOPHIL NFR BLD AUTO: 0.4 %
ERYTHROCYTE [DISTWIDTH] IN BLOOD BY AUTOMATED COUNT: 12.5 % (ref 11–15)
GFR SERPLBLD BASED ON 1.73 SQ M-ARVRAT: 31 ML/MIN/1.73M2 (ref 60–?)
GLUCOSE BLD-MCNC: 167 MG/DL (ref 70–99)
GLUCOSE UR-MCNC: 500 MG/DL
HCT VFR BLD AUTO: 39.4 %
HGB BLD-MCNC: 12.9 G/DL
HGB UR QL STRIP.AUTO: NEGATIVE
IMM GRANULOCYTES # BLD AUTO: 0.03 X10(3) UL (ref 0–1)
IMM GRANULOCYTES NFR BLD: 0.4 %
KETONES UR-MCNC: NEGATIVE MG/DL
LEUKOCYTE ESTERASE UR QL STRIP.AUTO: NEGATIVE
LYMPHOCYTES # BLD AUTO: 0.99 X10(3) UL (ref 1–4)
LYMPHOCYTES NFR BLD AUTO: 13.4 %
MCH RBC QN AUTO: 31.2 PG (ref 26–34)
MCHC RBC AUTO-ENTMCNC: 32.7 G/DL (ref 31–37)
MCV RBC AUTO: 95.2 FL
MICROALBUMIN UR-MCNC: 32.3 MG/DL
MICROALBUMIN/CREAT 24H UR-RTO: 663.2 UG/MG (ref ?–30)
MONOCYTES # BLD AUTO: 0.5 X10(3) UL (ref 0.1–1)
MONOCYTES NFR BLD AUTO: 6.8 %
NEUTROPHILS # BLD AUTO: 5.79 X10 (3) UL (ref 1.5–7.7)
NEUTROPHILS # BLD AUTO: 5.79 X10(3) UL (ref 1.5–7.7)
NEUTROPHILS NFR BLD AUTO: 78.6 %
NITRITE UR QL STRIP.AUTO: NEGATIVE
OSMOLALITY SERPL CALC.SUM OF ELEC: 300 MOSM/KG (ref 275–295)
PH UR: 7 [PH] (ref 5–8)
PHOSPHATE SERPL-MCNC: 3.7 MG/DL (ref 2.5–4.9)
PLATELET # BLD AUTO: 278 10(3)UL (ref 150–450)
POTASSIUM SERPL-SCNC: 4 MMOL/L (ref 3.5–5.1)
PROT UR-MCNC: 53.5 MG/DL
RBC # BLD AUTO: 4.14 X10(6)UL
SODIUM SERPL-SCNC: 134 MMOL/L (ref 136–145)
SP GR UR STRIP: 1.01 (ref 1–1.03)
UROBILINOGEN UR STRIP-ACNC: 0.2
WBC # BLD AUTO: 7.4 X10(3) UL (ref 4–11)

## 2022-08-15 PROCEDURE — 81001 URINALYSIS AUTO W/SCOPE: CPT

## 2022-08-15 PROCEDURE — 81015 MICROSCOPIC EXAM OF URINE: CPT

## 2022-08-15 PROCEDURE — 85025 COMPLETE CBC W/AUTO DIFF WBC: CPT

## 2022-08-15 PROCEDURE — 36415 COLL VENOUS BLD VENIPUNCTURE: CPT

## 2022-08-15 PROCEDURE — 82570 ASSAY OF URINE CREATININE: CPT

## 2022-08-15 PROCEDURE — 84156 ASSAY OF PROTEIN URINE: CPT

## 2022-08-15 PROCEDURE — 80069 RENAL FUNCTION PANEL: CPT

## 2022-08-15 PROCEDURE — 82043 UR ALBUMIN QUANTITATIVE: CPT

## 2022-08-15 NOTE — PROCEDURES
right intra-articular hip injection    Indication: right hip osteoarthritis    Description of procedure: After discussion of the risks and benefits of the procedure, informed consent was signed and the patient was marked to confirm the correct hip to be injected. The patient was then brought to the procedure room. The patient was positioned in the left sidelying position. The right lateral hip was prepped and draped in the usual sterile manner using chlorhexidine. Under fluoroscopy the head of the femur was identified in the lateral view. The skin and soft tissue was anesthetized with 0.5% lidocaine without preservative. A 22g 3.5 inch needle was then advanced towards the head of the right femur. She was noted to have significant subchondral cyst formation that appeared new in comparison to Xrays done in June of 2021. Under AP view the needle was advanced to the junction of the neck and head of the right femur until the needle was felt to advance past the capsule. Under live fluroscopic imaging 0.5ml was injected and outlined the hip joint after negative aspiration for heme and air. No paresthesias were elicited. Following confirmation of proper placement of the needle with dye, 40mg of kenalog was injected with 4ml of 0.5% PF lidocaine for a total injectate of 5ml. The needle was then withdrawn. Complications: No immediate complications  Follow up: 4-6 weeks.     Beatriz [facility]  Physical Medicine and [facility] [address] [place] [address]

## 2022-09-01 ENCOUNTER — PATIENT MESSAGE (OUTPATIENT)
Dept: PULMONOLOGY | Facility: CLINIC | Age: 67
End: 2022-09-01

## 2022-09-01 RX ORDER — FLUTICASONE FUROATE, UMECLIDINIUM BROMIDE AND VILANTEROL TRIFENATATE 100; 62.5; 25 UG/1; UG/1; UG/1
1 POWDER RESPIRATORY (INHALATION) DAILY
Qty: 1 EACH | Refills: 11 | Status: SHIPPED | OUTPATIENT
Start: 2022-09-01

## 2022-09-01 NOTE — TELEPHONE ENCOUNTER
From: Vilma Brar  To: Doris Peralta DO  Sent: 9/1/2022 2:14 AM CDT  Subject: refill for Trelogy Floyce Handler has said that they contacted you for a refill. It's been about four days.  Did you ever receive this request?    Thank you,   Heena Isaac

## 2022-09-01 NOTE — TELEPHONE ENCOUNTER
LOV 6/9/22  Last refill: 10/8/21    Dr. Nancy Meier -please review and sign pended order if agreeable.

## 2022-09-23 ENCOUNTER — PATIENT MESSAGE (OUTPATIENT)
Dept: PHYSICAL MEDICINE AND REHAB | Facility: CLINIC | Age: 67
End: 2022-09-23

## 2022-09-23 NOTE — TELEPHONE ENCOUNTER
From: Willow Reyes  To: Beatriz Lepe, DO  Sent: 9/23/2022 10:02 AM CDT  Subject: Hip Injection and visit to an orhopedic, Dr. Jesus Dyer. [place]ber[place] Dr. Mohsen Curiel,  The hip injection you recently gave me worked very well, and I had some relief for quite some time. I went to see Dr. Jesus Dyer per Dr. Maria Del Rosario Abdi recommendation. Hopefully, I can attach the x-rays and after-visit summary for your review. Not sure if these attached, if not, is there another way to send these to you? The bottom line, I am defiantly in need of a hip replacement. He suggests I go to [facility], because of the complications of my health issues, mainly MS. There is a good chance they rule against the hip replacment. He gave me the recommendation of two different doctors at both facilites.     Jamilah Tavarez

## 2022-10-03 ENCOUNTER — MED REC SCAN ONLY (OUTPATIENT)
Dept: PHYSICAL MEDICINE AND REHAB | Facility: CLINIC | Age: 67
End: 2022-10-03

## 2022-10-10 NOTE — TELEPHONE ENCOUNTER
The imaging I have is Xrays of the lumbar spine dated 9/19/2022; that shows essentially mild-moderate osteoarthritis involving the lower joints of the lower back. If there are images of the hip she wanted me to compare to imaging from 2021 I do not see any new imaging of the hip.

## 2022-10-23 ENCOUNTER — IMMUNIZATION (OUTPATIENT)
Dept: LAB | Age: 67
End: 2022-10-23
Attending: EMERGENCY MEDICINE
Payer: MEDICARE

## 2022-10-23 DIAGNOSIS — Z23 NEED FOR VACCINATION: Primary | ICD-10-CM

## 2022-10-23 PROCEDURE — 0134A SARSCOV2 VAC BVL 50MCG/0.5ML: CPT

## 2022-11-08 ENCOUNTER — MED REC SCAN ONLY (OUTPATIENT)
Dept: PHYSICAL MEDICINE AND REHAB | Facility: CLINIC | Age: 67
End: 2022-11-08

## 2022-12-20 ENCOUNTER — MED REC SCAN ONLY (OUTPATIENT)
Dept: PHYSICAL MEDICINE AND REHAB | Facility: CLINIC | Age: 67
End: 2022-12-20

## 2022-12-27 ENCOUNTER — HOSPITAL ENCOUNTER (OUTPATIENT)
Dept: MRI IMAGING | Facility: HOSPITAL | Age: 67
Discharge: HOME | End: 2022-12-27
Attending: Other
Payer: MEDICARE

## 2022-12-27 DIAGNOSIS — M48.062 LUMBAR STENOSIS WITH NEUROGENIC CLAUDICATION: ICD-10-CM

## 2022-12-27 PROCEDURE — 72148 MRI LUMBAR SPINE W/O DYE: CPT | Performed by: OTHER

## 2022-12-28 ENCOUNTER — HOSPITAL ENCOUNTER (OUTPATIENT)
Dept: MAMMOGRAPHY | Facility: HOSPITAL | Age: 67
Discharge: HOME | End: 2022-12-28
Attending: OBSTETRICS & GYNECOLOGY
Payer: MEDICARE

## 2022-12-28 DIAGNOSIS — Z12.31 ENCOUNTER FOR SCREENING MAMMOGRAM FOR MALIGNANT NEOPLASM OF BREAST: ICD-10-CM

## 2022-12-28 PROCEDURE — 77067 SCR MAMMO BI INCL CAD: CPT | Performed by: OBSTETRICS & GYNECOLOGY

## 2022-12-28 PROCEDURE — 77063 BREAST TOMOSYNTHESIS BI: CPT | Performed by: OBSTETRICS & GYNECOLOGY

## 2022-12-30 PROBLEM — I50.9 ACUTE ON CHRONIC CONGESTIVE HEART FAILURE, UNSPECIFIED HEART FAILURE TYPE (CMD): Status: ACTIVE | Noted: 2022-01-01

## 2023-01-01 ENCOUNTER — TELEPHONE (OUTPATIENT)
Dept: CARDIOLOGY | Age: 68
End: 2023-01-01

## 2023-01-01 ENCOUNTER — ANCILLARY PROCEDURE (OUTPATIENT)
Dept: CARDIOLOGY | Age: 68
End: 2023-01-01
Attending: NURSE PRACTITIONER

## 2023-01-01 ENCOUNTER — APPOINTMENT (OUTPATIENT)
Dept: CARDIOLOGY | Age: 68
End: 2023-01-01
Attending: NURSE PRACTITIONER

## 2023-01-01 ENCOUNTER — OFFICE VISIT (OUTPATIENT)
Dept: CARDIOLOGY | Age: 68
End: 2023-01-01

## 2023-01-01 ENCOUNTER — OFFICE VISIT (OUTPATIENT)
Dept: CARDIOLOGY | Age: 68
End: 2023-01-01
Attending: NURSE PRACTITIONER

## 2023-01-01 ENCOUNTER — E-ADVICE (OUTPATIENT)
Dept: CARDIOLOGY | Age: 68
End: 2023-01-01

## 2023-01-01 ENCOUNTER — OFF PREMISE (OUTPATIENT)
Dept: CARDIOLOGY | Age: 68
End: 2023-01-01

## 2023-01-01 ENCOUNTER — HOSPITAL ENCOUNTER (INPATIENT)
Age: 68
LOS: 3 days | Discharge: NURSING, REHAB OR LONG-TERM CARE | DRG: 563 | End: 2023-04-06
Attending: STUDENT IN AN ORGANIZED HEALTH CARE EDUCATION/TRAINING PROGRAM | Admitting: INTERNAL MEDICINE

## 2023-01-01 ENCOUNTER — EXTERNAL LAB (OUTPATIENT)
Dept: CARDIOLOGY | Age: 68
End: 2023-01-01

## 2023-01-01 ENCOUNTER — APPOINTMENT (OUTPATIENT)
Dept: GENERAL RADIOLOGY | Age: 68
DRG: 563 | End: 2023-01-01
Attending: STUDENT IN AN ORGANIZED HEALTH CARE EDUCATION/TRAINING PROGRAM

## 2023-01-01 ENCOUNTER — APPOINTMENT (OUTPATIENT)
Dept: MRI IMAGING | Age: 68
DRG: 563 | End: 2023-01-01
Attending: INTERNAL MEDICINE

## 2023-01-01 ENCOUNTER — EXTERNAL RECORD (OUTPATIENT)
Dept: CARDIOLOGY | Age: 68
End: 2023-01-01

## 2023-01-01 VITALS
WEIGHT: 203.26 LBS | HEART RATE: 68 BPM | SYSTOLIC BLOOD PRESSURE: 127 MMHG | BODY MASS INDEX: 34.7 KG/M2 | TEMPERATURE: 97.9 F | OXYGEN SATURATION: 94 % | RESPIRATION RATE: 16 BRPM | HEIGHT: 64 IN | DIASTOLIC BLOOD PRESSURE: 77 MMHG

## 2023-01-01 VITALS
HEART RATE: 78 BPM | DIASTOLIC BLOOD PRESSURE: 64 MMHG | BODY MASS INDEX: 34.91 KG/M2 | WEIGHT: 203.37 LBS | SYSTOLIC BLOOD PRESSURE: 104 MMHG

## 2023-01-01 VITALS
HEIGHT: 64 IN | SYSTOLIC BLOOD PRESSURE: 102 MMHG | WEIGHT: 204.15 LBS | RESPIRATION RATE: 18 BRPM | HEART RATE: 92 BPM | DIASTOLIC BLOOD PRESSURE: 63 MMHG | BODY MASS INDEX: 34.85 KG/M2

## 2023-01-01 VITALS
BODY MASS INDEX: 34.63 KG/M2 | WEIGHT: 201.72 LBS | DIASTOLIC BLOOD PRESSURE: 60 MMHG | SYSTOLIC BLOOD PRESSURE: 128 MMHG | HEART RATE: 70 BPM

## 2023-01-01 VITALS
WEIGHT: 194.45 LBS | DIASTOLIC BLOOD PRESSURE: 67 MMHG | OXYGEN SATURATION: 92 % | HEIGHT: 64 IN | HEART RATE: 68 BPM | BODY MASS INDEX: 33.2 KG/M2 | TEMPERATURE: 97.5 F | SYSTOLIC BLOOD PRESSURE: 102 MMHG | RESPIRATION RATE: 16 BRPM

## 2023-01-01 DIAGNOSIS — R06.00 DYSPNEA, UNSPECIFIED TYPE: ICD-10-CM

## 2023-01-01 DIAGNOSIS — I27.20 PULMONARY HYPERTENSION (CMD): ICD-10-CM

## 2023-01-01 DIAGNOSIS — I89.0 LYMPHEDEMA: ICD-10-CM

## 2023-01-01 DIAGNOSIS — I50.33 ACUTE ON CHRONIC HEART FAILURE WITH PRESERVED EJECTION FRACTION (CMD): Primary | ICD-10-CM

## 2023-01-01 DIAGNOSIS — I50.9 ACUTE ON CHRONIC HEART FAILURE, UNSPECIFIED HEART FAILURE TYPE (CMD): ICD-10-CM

## 2023-01-01 DIAGNOSIS — Z91.81 AT HIGH RISK FOR FALLS: Primary | ICD-10-CM

## 2023-01-01 DIAGNOSIS — E78.00 HYPERCHOLESTEREMIA: ICD-10-CM

## 2023-01-01 DIAGNOSIS — Z86.69 HISTORY OF SLEEP APNEA: Chronic | ICD-10-CM

## 2023-01-01 DIAGNOSIS — R60.0 LEG EDEMA: ICD-10-CM

## 2023-01-01 DIAGNOSIS — I10 ESSENTIAL HYPERTENSION: ICD-10-CM

## 2023-01-01 DIAGNOSIS — E11.69 TYPE 2 DIABETES MELLITUS WITH OTHER SPECIFIED COMPLICATION, WITH LONG-TERM CURRENT USE OF INSULIN (CMD): ICD-10-CM

## 2023-01-01 DIAGNOSIS — Z79.4 TYPE 2 DIABETES MELLITUS WITH OTHER SPECIFIED COMPLICATION, WITH LONG-TERM CURRENT USE OF INSULIN (CMD): ICD-10-CM

## 2023-01-01 DIAGNOSIS — I73.9 PVD (PERIPHERAL VASCULAR DISEASE) (CMD): ICD-10-CM

## 2023-01-01 DIAGNOSIS — M25.462 EFFUSION OF LEFT KNEE: ICD-10-CM

## 2023-01-01 DIAGNOSIS — J44.9 CHRONIC OBSTRUCTIVE PULMONARY DISEASE, UNSPECIFIED COPD TYPE (CMD): Chronic | ICD-10-CM

## 2023-01-01 LAB
ALBUMIN SERPL-MCNC: 3.3 G/DL (ref 3.6–5.1)
ALBUMIN SERPL-MCNC: 3.4 G/DL (ref 3.6–5.1)
ALBUMIN SERPL-MCNC: 3.6 G/DL (ref 3.6–5.1)
ALBUMIN SERPL-MCNC: 3.8 G/DL (ref 3.4–5)
ALBUMIN SERPL-MCNC: 3.8 G/DL (ref 3.6–5.1)
ALBUMIN/GLOB SERPL: 0.8 {RATIO} (ref 1–2.4)
ALBUMIN/GLOB SERPL: 0.9 {RATIO} (ref 1–2)
ALBUMIN/GLOB SERPL: 0.9 {RATIO} (ref 1–2.4)
ALBUMIN/GLOB SERPL: 0.9 {RATIO} (ref 1–2.4)
ALP SERPL-CCNC: 103 UNITS/L (ref 45–117)
ALP SERPL-CCNC: 114 U/L (ref 55–142)
ALP SERPL-CCNC: 116 UNITS/L (ref 45–117)
ALP SERPL-CCNC: 118 UNITS/L (ref 45–117)
ALT SERPL-CCNC: 45 UNITS/L
ALT SERPL-CCNC: 46 UNITS/L
ALT SERPL-CCNC: 50 U/L (ref 13–56)
ALT SERPL-CCNC: 55 UNITS/L
ANION GAP SERPL CALC-SCNC: 10 MMOL/L (ref 0–18)
ANION GAP SERPL CALC-SCNC: 10 MMOL/L (ref 7–19)
ANION GAP SERPL CALC-SCNC: 11 MMOL/L (ref 7–19)
ANION GAP SERPL CALC-SCNC: 12 MMOL/L (ref 7–19)
ANION GAP SERPL CALC-SCNC: 12 MMOL/L (ref 7–19)
ANION GAP SERPL CALC-SCNC: 13 MMOL/L (ref 7–19)
ANION GAP SERPL CALC-SCNC: 9 MMOL/L (ref 7–19)
AST SERPL-CCNC: 18 UNITS/L
AST SERPL-CCNC: 18 UNITS/L
AST SERPL-CCNC: 22 UNITS/L
AST SERPL-CCNC: 24 U/L (ref 15–37)
BASOPHILS # BLD: 0 K/MCL (ref 0–0.3)
BASOPHILS NFR BLD: 0 %
BILIRUB SERPL-MCNC: 0.3 MG/DL (ref 0.1–2)
BILIRUB SERPL-MCNC: 0.3 MG/DL (ref 0.2–1)
BILIRUB SERPL-MCNC: 0.4 MG/DL (ref 0.2–1)
BILIRUB SERPL-MCNC: 0.5 MG/DL (ref 0.2–1)
BUN SERPL-MCNC: 66 MG/DL (ref 6–20)
BUN SERPL-MCNC: 68 MG/DL (ref 6–20)
BUN SERPL-MCNC: 69 MG/DL (ref 6–20)
BUN SERPL-MCNC: 69 MG/DL (ref 6–20)
BUN SERPL-MCNC: 70 MG/DL (ref 6–20)
BUN SERPL-MCNC: 70 MG/DL (ref 7–18)
BUN SERPL-MCNC: 71 MG/DL (ref 6–20)
BUN SERPL-MCNC: 72 MG/DL (ref 6–20)
BUN SERPL-MCNC: 73 MG/DL (ref 6–20)
BUN SERPL-MCNC: 79 MG/DL (ref 6–20)
BUN SERPL-MCNC: 89 MG/DL (ref 6–20)
BUN/CREAT SERPL: 24 (ref 7–25)
BUN/CREAT SERPL: 27 (ref 7–25)
BUN/CREAT SERPL: 28 (ref 7–25)
BUN/CREAT SERPL: 29 (ref 7–25)
BUN/CREAT SERPL: 31.1 (ref 10–20)
BUN/CREAT SERPL: 32 (ref 7–25)
BUN/CREAT SERPL: 43 (ref 7–25)
BUN/CREAT SERPL: 45 (ref 7–25)
BUN/CREAT SERPL: 47 (ref 7–25)
BUN/CREAT SERPL: 51 (ref 7–25)
BUN/CREAT SERPL: 54 (ref 7–25)
CALCIUM SERPL-MCNC: 9.1 MG/DL (ref 8.4–10.2)
CALCIUM SERPL-MCNC: 9.2 MG/DL (ref 8.4–10.2)
CALCIUM SERPL-MCNC: 9.2 MG/DL (ref 8.4–10.2)
CALCIUM SERPL-MCNC: 9.3 MG/DL (ref 8.4–10.2)
CALCIUM SERPL-MCNC: 9.3 MG/DL (ref 8.4–10.2)
CALCIUM SERPL-MCNC: 9.5 MG/DL (ref 8.4–10.2)
CALCIUM SERPL-MCNC: 9.6 MG/DL (ref 8.5–10.1)
CALCULATED OSMO: 302 MOSM/KG (ref 275–295)
CHLORIDE SERPL-SCNC: 100 MMOL/L (ref 98–112)
CHLORIDE SERPL-SCNC: 101 MMOL/L (ref 97–110)
CHLORIDE SERPL-SCNC: 102 MMOL/L (ref 97–110)
CHLORIDE SERPL-SCNC: 102 MMOL/L (ref 97–110)
CHLORIDE SERPL-SCNC: 103 MMOL/L (ref 97–110)
CHLORIDE SERPL-SCNC: 105 MMOL/L (ref 97–110)
CHLORIDE SERPL-SCNC: 106 MMOL/L (ref 97–110)
CHLORIDE SERPL-SCNC: 93 MMOL/L (ref 97–110)
CHLORIDE SERPL-SCNC: 95 MMOL/L (ref 97–110)
CHLORIDE SERPL-SCNC: 97 MMOL/L (ref 97–110)
CHLORIDE SERPL-SCNC: 99 MMOL/L (ref 97–110)
CO2 SERPL-SCNC: 23 MMOL/L (ref 21–32)
CO2 SERPL-SCNC: 24 MMOL/L (ref 21–32)
CO2 SERPL-SCNC: 25 MMOL/L (ref 21–32)
CO2 SERPL-SCNC: 25 MMOL/L (ref 21–32)
CO2 SERPL-SCNC: 26 MMOL/L (ref 21–32)
CO2 SERPL-SCNC: 28 MMOL/L (ref 21–32)
CO2 SERPL-SCNC: 28 MMOL/L (ref 21–32)
CO2 SERPL-SCNC: 30 MMOL/L (ref 21–32)
CREAT SERPL-MCNC: 1.38 MG/DL (ref 0.51–0.95)
CREAT SERPL-MCNC: 1.54 MG/DL (ref 0.51–0.95)
CREAT SERPL-MCNC: 1.55 MG/DL (ref 0.51–0.95)
CREAT SERPL-MCNC: 1.65 MG/DL (ref 0.51–0.95)
CREAT SERPL-MCNC: 1.83 MG/DL (ref 0.51–0.95)
CREAT SERPL-MCNC: 2.11 MG/DL (ref 0.51–0.95)
CREAT SERPL-MCNC: 2.25 MG/DL (ref 0.55–1.02)
CREAT SERPL-MCNC: 2.38 MG/DL (ref 0.51–0.95)
CREAT SERPL-MCNC: 2.52 MG/DL (ref 0.51–0.95)
CREAT SERPL-MCNC: 2.64 MG/DL (ref 0.51–0.95)
CREAT SERPL-MCNC: 2.74 MG/DL (ref 0.51–0.95)
DEPRECATED RDW RBC: 37.2 FL (ref 39–50)
DEPRECATED RDW RBC: 37.6 FL (ref 39–50)
DEPRECATED RDW RBC: 41.1 FL (ref 39–50)
DEPRECATED RDW RBC: 42.2 FL (ref 39–50)
DEPRECATED RDW RBC: 42.3 FL (ref 39–50)
DEPRECATED RDW RBC: 42.5 FL (ref 39–50)
DEPRECATED RDW RBC: 42.8 FL (ref 39–50)
EOSINOPHIL # BLD: 0.1 K/MCL (ref 0–0.5)
EOSINOPHIL NFR BLD: 1 %
EOSINOPHIL NFR BLD: 2 %
ERYTHROCYTE [DISTWIDTH] IN BLOOD: 11.7 % (ref 11–15)
ERYTHROCYTE [DISTWIDTH] IN BLOOD: 11.7 % (ref 11–15)
ERYTHROCYTE [DISTWIDTH] IN BLOOD: 11.9 % (ref 11–15)
ERYTHROCYTE [DISTWIDTH] IN BLOOD: 12.4 % (ref 11–15)
ERYTHROCYTE [DISTWIDTH] IN BLOOD: 12.5 % (ref 11–15)
ERYTHROCYTE [DISTWIDTH] IN BLOOD: 12.6 % (ref 11–15)
ERYTHROCYTE [DISTWIDTH] IN BLOOD: 12.8 % (ref 11–15)
FASTING DURATION TIME PATIENT: ABNORMAL H
FERRITIN SERPL-MCNC: 178 NG/ML (ref 8–252)
GFR SERPLBLD BASED ON 1.73 SQ M-ARVRAT: 18 ML/MIN
GFR SERPLBLD BASED ON 1.73 SQ M-ARVRAT: 19 ML/MIN
GFR SERPLBLD BASED ON 1.73 SQ M-ARVRAT: 20 ML/MIN
GFR SERPLBLD BASED ON 1.73 SQ M-ARVRAT: 22 ML/MIN
GFR SERPLBLD BASED ON 1.73 SQ M-ARVRAT: 25 ML/MIN
GFR SERPLBLD BASED ON 1.73 SQ M-ARVRAT: 30 ML/MIN
GFR SERPLBLD BASED ON 1.73 SQ M-ARVRAT: 34 ML/MIN
GFR SERPLBLD BASED ON 1.73 SQ M-ARVRAT: 36 ML/MIN
GFR SERPLBLD BASED ON 1.73 SQ M-ARVRAT: 37 ML/MIN
GFR SERPLBLD BASED ON 1.73 SQ M-ARVRAT: 42 ML/MIN
GFR SERPLBLD SCHWARTZ-ARVRAT: 23 ML/MIN/1.73M2
GLOBULIN SER-MCNC: 4.1 G/DL (ref 2–4)
GLOBULIN SER-MCNC: 4.2 G/DL (ref 2.8–4.4)
GLOBULIN SER-MCNC: 4.2 G/DL (ref 2–4)
GLOBULIN SER-MCNC: 4.3 G/DL (ref 2–4)
GLUCOSE BLDC GLUCOMTR-MCNC: 104 MG/DL (ref 70–99)
GLUCOSE BLDC GLUCOMTR-MCNC: 109 MG/DL (ref 70–99)
GLUCOSE BLDC GLUCOMTR-MCNC: 122 MG/DL (ref 70–99)
GLUCOSE BLDC GLUCOMTR-MCNC: 129 MG/DL (ref 70–99)
GLUCOSE BLDC GLUCOMTR-MCNC: 131 MG/DL (ref 70–99)
GLUCOSE BLDC GLUCOMTR-MCNC: 143 MG/DL (ref 70–99)
GLUCOSE BLDC GLUCOMTR-MCNC: 151 MG/DL (ref 70–99)
GLUCOSE BLDC GLUCOMTR-MCNC: 159 MG/DL (ref 70–99)
GLUCOSE BLDC GLUCOMTR-MCNC: 173 MG/DL (ref 70–99)
GLUCOSE BLDC GLUCOMTR-MCNC: 182 MG/DL (ref 70–99)
GLUCOSE BLDC GLUCOMTR-MCNC: 182 MG/DL (ref 70–99)
GLUCOSE BLDC GLUCOMTR-MCNC: 184 MG/DL (ref 70–99)
GLUCOSE BLDC GLUCOMTR-MCNC: 193 MG/DL (ref 70–99)
GLUCOSE BLDC GLUCOMTR-MCNC: 194 MG/DL (ref 70–99)
GLUCOSE BLDC GLUCOMTR-MCNC: 199 MG/DL (ref 70–99)
GLUCOSE BLDC GLUCOMTR-MCNC: 202 MG/DL (ref 70–99)
GLUCOSE BLDC GLUCOMTR-MCNC: 202 MG/DL (ref 70–99)
GLUCOSE BLDC GLUCOMTR-MCNC: 203 MG/DL (ref 70–99)
GLUCOSE BLDC GLUCOMTR-MCNC: 206 MG/DL (ref 70–99)
GLUCOSE BLDC GLUCOMTR-MCNC: 209 MG/DL (ref 70–99)
GLUCOSE BLDC GLUCOMTR-MCNC: 218 MG/DL (ref 70–99)
GLUCOSE BLDC GLUCOMTR-MCNC: 247 MG/DL (ref 70–99)
GLUCOSE BLDC GLUCOMTR-MCNC: 256 MG/DL (ref 70–99)
GLUCOSE BLDC GLUCOMTR-MCNC: 341 MG/DL (ref 70–99)
GLUCOSE BLDC GLUCOMTR-MCNC: 376 MG/DL (ref 70–99)
GLUCOSE BLDC GLUCOMTR-MCNC: 99 MG/DL (ref 70–99)
GLUCOSE SERPL-MCNC: 105 MG/DL (ref 70–99)
GLUCOSE SERPL-MCNC: 107 MG/DL (ref 70–99)
GLUCOSE SERPL-MCNC: 116 MG/DL (ref 70–99)
GLUCOSE SERPL-MCNC: 135 MG/DL (ref 70–99)
GLUCOSE SERPL-MCNC: 144 MG/DL (ref 70–99)
GLUCOSE SERPL-MCNC: 230 MG/DL (ref 70–99)
GLUCOSE SERPL-MCNC: 230 MG/DL (ref 70–99)
GLUCOSE SERPL-MCNC: 246 MG/DL (ref 70–99)
GLUCOSE SERPL-MCNC: 78 MG/DL (ref 70–99)
GLUCOSE SERPL-MCNC: 83 MG/DL (ref 70–99)
GLUCOSE SERPL-MCNC: 96 MG/DL (ref 70–99)
HCT VFR BLD CALC: 37.7 % (ref 36–46.5)
HCT VFR BLD CALC: 37.9 % (ref 36–46.5)
HCT VFR BLD CALC: 38.1 % (ref 36–46.5)
HCT VFR BLD CALC: 38.7 % (ref 36–46.5)
HCT VFR BLD CALC: 38.9 % (ref 36–46.5)
HCT VFR BLD CALC: 39.9 % (ref 36–46.5)
HCT VFR BLD CALC: 40.7 % (ref 36–46.5)
HGB BLD-MCNC: 12.5 G/DL (ref 12–15.5)
HGB BLD-MCNC: 12.5 G/DL (ref 12–15.5)
HGB BLD-MCNC: 12.7 G/DL (ref 12–15.5)
HGB BLD-MCNC: 13.1 G/DL (ref 12–15.5)
HGB BLD-MCNC: 13.3 G/DL (ref 12–15.5)
HGB BLD-MCNC: 13.6 G/DL (ref 12–15.5)
HGB BLD-MCNC: 13.8 G/DL (ref 12–15.5)
IMM GRANULOCYTES # BLD AUTO: 0 K/MCL (ref 0–0.2)
IMM GRANULOCYTES # BLD AUTO: 0.1 K/MCL (ref 0–0.2)
IMM GRANULOCYTES # BLD: 0 %
IMM GRANULOCYTES # BLD: 1 %
IRON SATN MFR SERPL: 29 % (ref 15–45)
IRON SERPL-MCNC: 97 MCG/DL (ref 50–170)
LENGTH OF FAST TIME PATIENT: NO H
LYMPHOCYTES # BLD: 1.1 K/MCL (ref 1–4)
LYMPHOCYTES # BLD: 1.4 K/MCL (ref 1–4)
LYMPHOCYTES # BLD: 1.8 K/MCL (ref 1–4)
LYMPHOCYTES # BLD: 1.9 K/MCL (ref 1–4)
LYMPHOCYTES NFR BLD: 11 %
LYMPHOCYTES NFR BLD: 16 %
LYMPHOCYTES NFR BLD: 16 %
LYMPHOCYTES NFR BLD: 19 %
MAGNESIUM SERPL-MCNC: 2.3 MG/DL (ref 1.7–2.4)
MAGNESIUM SERPL-MCNC: 2.4 MG/DL (ref 1.7–2.4)
MAGNESIUM SERPL-MCNC: 2.7 MG/DL (ref 1.7–2.4)
MCH RBC QN AUTO: 30.3 PG (ref 26–34)
MCH RBC QN AUTO: 30.4 PG (ref 26–34)
MCH RBC QN AUTO: 30.6 PG (ref 26–34)
MCH RBC QN AUTO: 30.9 PG (ref 26–34)
MCH RBC QN AUTO: 31.1 PG (ref 26–34)
MCH RBC QN AUTO: 31.2 PG (ref 26–34)
MCH RBC QN AUTO: 31.3 PG (ref 26–34)
MCHC RBC AUTO-ENTMCNC: 32.7 G/DL (ref 32–36.5)
MCHC RBC AUTO-ENTMCNC: 32.8 G/DL (ref 32–36.5)
MCHC RBC AUTO-ENTMCNC: 33.2 G/DL (ref 32–36.5)
MCHC RBC AUTO-ENTMCNC: 33.5 G/DL (ref 32–36.5)
MCHC RBC AUTO-ENTMCNC: 33.9 G/DL (ref 32–36.5)
MCHC RBC AUTO-ENTMCNC: 34.6 G/DL (ref 32–36.5)
MCHC RBC AUTO-ENTMCNC: 35 G/DL (ref 32–36.5)
MCV RBC AUTO: 88.8 FL (ref 78–100)
MCV RBC AUTO: 89.3 FL (ref 78–100)
MCV RBC AUTO: 92.1 FL (ref 78–100)
MCV RBC AUTO: 92.3 FL (ref 78–100)
MCV RBC AUTO: 92.4 FL (ref 78–100)
MCV RBC AUTO: 92.9 FL (ref 78–100)
MCV RBC AUTO: 93.3 FL (ref 78–100)
MONOCYTES # BLD: 0.5 K/MCL (ref 0.3–0.9)
MONOCYTES # BLD: 0.6 K/MCL (ref 0.3–0.9)
MONOCYTES # BLD: 0.8 K/MCL (ref 0.3–0.9)
MONOCYTES # BLD: 1 K/MCL (ref 0.3–0.9)
MONOCYTES NFR BLD: 5 %
MONOCYTES NFR BLD: 7 %
MONOCYTES NFR BLD: 8 %
MONOCYTES NFR BLD: 8 %
NEUTROPHILS # BLD: 6.7 K/MCL (ref 1.8–7.7)
NEUTROPHILS # BLD: 7 K/MCL (ref 1.8–7.7)
NEUTROPHILS # BLD: 8 K/MCL (ref 1.8–7.7)
NEUTROPHILS # BLD: 9.2 K/MCL (ref 1.8–7.7)
NEUTROPHILS NFR BLD: 71 %
NEUTROPHILS NFR BLD: 74 %
NEUTROPHILS NFR BLD: 75 %
NEUTROPHILS NFR BLD: 82 %
NRBC BLD MANUAL-RTO: 0 /100 WBC
NT-PROBNP SERPL-MCNC: 163 PG/ML
NT-PROBNP SERPL-MCNC: 308 PG/ML
PHOSPHATE SERPL-MCNC: 4.9 MG/DL (ref 2.4–4.7)
PLATELET # BLD AUTO: 248 K/MCL (ref 140–450)
PLATELET # BLD AUTO: 255 K/MCL (ref 140–450)
PLATELET # BLD AUTO: 264 K/MCL (ref 140–450)
PLATELET # BLD AUTO: 265 K/MCL (ref 140–450)
PLATELET # BLD AUTO: 271 K/MCL (ref 140–450)
PLATELET # BLD AUTO: 278 K/MCL (ref 140–450)
PLATELET # BLD AUTO: 278 K/MCL (ref 140–450)
POTASSIUM SERPL-SCNC: 4.3 MMOL/L (ref 3.4–5.1)
POTASSIUM SERPL-SCNC: 4.4 MMOL/L (ref 3.4–5.1)
POTASSIUM SERPL-SCNC: 4.4 MMOL/L (ref 3.4–5.1)
POTASSIUM SERPL-SCNC: 4.5 MMOL/L (ref 3.5–5.1)
POTASSIUM SERPL-SCNC: 4.6 MMOL/L (ref 3.4–5.1)
PROT SERPL-MCNC: 7.5 G/DL (ref 6.4–8.2)
PROT SERPL-MCNC: 7.8 G/DL (ref 6.4–8.2)
PROT SERPL-MCNC: 8 G/DL (ref 6.4–8.2)
PROT SERPL-MCNC: 8.1 G/DL (ref 6.4–8.2)
RAINBOW EXTRA TUBES HOLD SPECIMEN: NORMAL
RBC # BLD: 4.06 MIL/MCL (ref 4–5.2)
RBC # BLD: 4.08 MIL/MCL (ref 4–5.2)
RBC # BLD: 4.13 MIL/MCL (ref 4–5.2)
RBC # BLD: 4.2 MIL/MCL (ref 4–5.2)
RBC # BLD: 4.38 MIL/MCL (ref 4–5.2)
RBC # BLD: 4.38 MIL/MCL (ref 4–5.2)
RBC # BLD: 4.47 MIL/MCL (ref 4–5.2)
SODIUM SERPL-SCNC: 129 MMOL/L (ref 135–145)
SODIUM SERPL-SCNC: 130 MMOL/L (ref 135–145)
SODIUM SERPL-SCNC: 131 MMOL/L (ref 135–145)
SODIUM SERPL-SCNC: 133 MMOL/L (ref 135–145)
SODIUM SERPL-SCNC: 134 MMOL/L (ref 135–145)
SODIUM SERPL-SCNC: 135 MMOL/L (ref 135–145)
SODIUM SERPL-SCNC: 136 MMOL/L (ref 135–145)
SODIUM SERPL-SCNC: 136 MMOL/L (ref 135–145)
SODIUM SERPL-SCNC: 136 MMOL/L (ref 136–145)
TACROLIMUS BLD-MCNC: 9.9 NG/ML (ref 5–20)
TIBC SERPL-MCNC: 335 MCG/DL (ref 250–450)
WBC # BLD: 10.1 K/MCL (ref 4.2–11)
WBC # BLD: 12.3 K/MCL (ref 4.2–11)
WBC # BLD: 8.2 K/MCL (ref 4.2–11)
WBC # BLD: 8.9 K/MCL (ref 4.2–11)
WBC # BLD: 8.9 K/MCL (ref 4.2–11)
WBC # BLD: 9.7 K/MCL (ref 4.2–11)
WBC # BLD: 9.8 K/MCL (ref 4.2–11)

## 2023-01-01 PROCEDURE — 85025 COMPLETE CBC W/AUTO DIFF WBC: CPT | Performed by: INTERNAL MEDICINE

## 2023-01-01 PROCEDURE — 99233 SBSQ HOSP IP/OBS HIGH 50: CPT | Performed by: INTERNAL MEDICINE

## 2023-01-01 PROCEDURE — 10002803 HB RX 637: Performed by: INTERNAL MEDICINE

## 2023-01-01 PROCEDURE — G0378 HOSPITAL OBSERVATION PER HR: HCPCS

## 2023-01-01 PROCEDURE — 10004180 HB COUNTER-TRANSPORT

## 2023-01-01 PROCEDURE — 10004651 HB RX, NO CHARGE ITEM: Performed by: INTERNAL MEDICINE

## 2023-01-01 PROCEDURE — 10002800 HB RX 250 W HCPCS: Performed by: HOSPITALIST

## 2023-01-01 PROCEDURE — 20610 DRAIN/INJ JOINT/BURSA W/O US: CPT | Performed by: STUDENT IN AN ORGANIZED HEALTH CARE EDUCATION/TRAINING PROGRAM

## 2023-01-01 PROCEDURE — 10002801 HB RX 250 W/O HCPCS: Performed by: INTERNAL MEDICINE

## 2023-01-01 PROCEDURE — 80053 COMPREHEN METABOLIC PANEL: CPT | Performed by: STUDENT IN AN ORGANIZED HEALTH CARE EDUCATION/TRAINING PROGRAM

## 2023-01-01 PROCEDURE — 36415 COLL VENOUS BLD VENIPUNCTURE: CPT | Performed by: HOSPITALIST

## 2023-01-01 PROCEDURE — 94640 AIRWAY INHALATION TREATMENT: CPT

## 2023-01-01 PROCEDURE — 10002803 HB RX 637: Performed by: NURSE PRACTITIONER

## 2023-01-01 PROCEDURE — 83735 ASSAY OF MAGNESIUM: CPT | Performed by: INTERNAL MEDICINE

## 2023-01-01 PROCEDURE — 93264 REM MNTR WRLS P-ART PRS SNR: CPT | Performed by: NURSE PRACTITIONER

## 2023-01-01 PROCEDURE — 96376 TX/PRO/DX INJ SAME DRUG ADON: CPT

## 2023-01-01 PROCEDURE — 97110 THERAPEUTIC EXERCISES: CPT

## 2023-01-01 PROCEDURE — 99233 SBSQ HOSP IP/OBS HIGH 50: CPT | Performed by: NURSE PRACTITIONER

## 2023-01-01 PROCEDURE — 10004651 HB RX, NO CHARGE ITEM: Performed by: HOSPITALIST

## 2023-01-01 PROCEDURE — 10002803 HB RX 637: Performed by: HOSPITALIST

## 2023-01-01 PROCEDURE — 82962 GLUCOSE BLOOD TEST: CPT

## 2023-01-01 PROCEDURE — 10002803 HB RX 637: Performed by: STUDENT IN AN ORGANIZED HEALTH CARE EDUCATION/TRAINING PROGRAM

## 2023-01-01 PROCEDURE — 96374 THER/PROPH/DIAG INJ IV PUSH: CPT

## 2023-01-01 PROCEDURE — 85025 COMPLETE CBC W/AUTO DIFF WBC: CPT | Performed by: HOSPITALIST

## 2023-01-01 PROCEDURE — 80048 BASIC METABOLIC PNL TOTAL CA: CPT | Performed by: HOSPITALIST

## 2023-01-01 PROCEDURE — 80197 ASSAY OF TACROLIMUS: CPT | Performed by: INTERNAL MEDICINE

## 2023-01-01 PROCEDURE — 36415 COLL VENOUS BLD VENIPUNCTURE: CPT | Performed by: INTERNAL MEDICINE

## 2023-01-01 PROCEDURE — 36415 COLL VENOUS BLD VENIPUNCTURE: CPT

## 2023-01-01 PROCEDURE — 97166 OT EVAL MOD COMPLEX 45 MIN: CPT

## 2023-01-01 PROCEDURE — 96372 THER/PROPH/DIAG INJ SC/IM: CPT

## 2023-01-01 PROCEDURE — 36415 COLL VENOUS BLD VENIPUNCTURE: CPT | Performed by: NURSE PRACTITIONER

## 2023-01-01 PROCEDURE — 10002800 HB RX 250 W HCPCS: Performed by: INTERNAL MEDICINE

## 2023-01-01 PROCEDURE — 80053 COMPREHEN METABOLIC PANEL: CPT | Performed by: NURSE PRACTITIONER

## 2023-01-01 PROCEDURE — G2066 INTER DEVC REMOTE 30D: HCPCS

## 2023-01-01 PROCEDURE — 99215 OFFICE O/P EST HI 40 MIN: CPT | Performed by: NURSE PRACTITIONER

## 2023-01-01 PROCEDURE — 83540 ASSAY OF IRON: CPT | Performed by: INTERNAL MEDICINE

## 2023-01-01 PROCEDURE — G1004 CDSM NDSC: HCPCS

## 2023-01-01 PROCEDURE — 99223 1ST HOSP IP/OBS HIGH 75: CPT | Performed by: ORTHOPAEDIC SURGERY

## 2023-01-01 PROCEDURE — 80048 BASIC METABOLIC PNL TOTAL CA: CPT | Performed by: INTERNAL MEDICINE

## 2023-01-01 PROCEDURE — 10002801 HB RX 250 W/O HCPCS: Performed by: HOSPITALIST

## 2023-01-01 PROCEDURE — 13003289 HB OXYGEN THERAPY DAILY

## 2023-01-01 PROCEDURE — 10006031 HB ROOM CHARGE TELEMETRY

## 2023-01-01 PROCEDURE — 97530 THERAPEUTIC ACTIVITIES: CPT

## 2023-01-01 PROCEDURE — 83880 ASSAY OF NATRIURETIC PEPTIDE: CPT | Performed by: NURSE PRACTITIONER

## 2023-01-01 PROCEDURE — 99284 EMERGENCY DEPT VISIT MOD MDM: CPT

## 2023-01-01 PROCEDURE — 10000002 HB ROOM CHARGE MED SURG

## 2023-01-01 PROCEDURE — 83735 ASSAY OF MAGNESIUM: CPT | Performed by: HOSPITALIST

## 2023-01-01 PROCEDURE — 80069 RENAL FUNCTION PANEL: CPT | Performed by: HOSPITALIST

## 2023-01-01 PROCEDURE — 97162 PT EVAL MOD COMPLEX 30 MIN: CPT

## 2023-01-01 PROCEDURE — 85027 COMPLETE CBC AUTOMATED: CPT | Performed by: INTERNAL MEDICINE

## 2023-01-01 PROCEDURE — 99223 1ST HOSP IP/OBS HIGH 75: CPT | Performed by: INTERNAL MEDICINE

## 2023-01-01 PROCEDURE — 73562 X-RAY EXAM OF KNEE 3: CPT

## 2023-01-01 PROCEDURE — 0S9D3ZX DRAINAGE OF LEFT KNEE JOINT, PERCUTANEOUS APPROACH, DIAGNOSTIC: ICD-10-PCS | Performed by: STUDENT IN AN ORGANIZED HEALTH CARE EDUCATION/TRAINING PROGRAM

## 2023-01-01 PROCEDURE — 82728 ASSAY OF FERRITIN: CPT | Performed by: INTERNAL MEDICINE

## 2023-01-01 PROCEDURE — 99211 OFF/OP EST MAY X REQ PHY/QHP: CPT

## 2023-01-01 PROCEDURE — 97535 SELF CARE MNGMENT TRAINING: CPT

## 2023-01-01 PROCEDURE — 99215 OFFICE O/P EST HI 40 MIN: CPT | Performed by: INTERNAL MEDICINE

## 2023-01-01 PROCEDURE — 73721 MRI JNT OF LWR EXTRE W/O DYE: CPT

## 2023-01-01 PROCEDURE — 94664 DEMO&/EVAL PT USE INHALER: CPT

## 2023-01-01 PROCEDURE — 85025 COMPLETE CBC W/AUTO DIFF WBC: CPT | Performed by: STUDENT IN AN ORGANIZED HEALTH CARE EDUCATION/TRAINING PROGRAM

## 2023-01-01 RX ORDER — INSULIN GLARGINE 100 [IU]/ML
30 INJECTION, SOLUTION SUBCUTANEOUS EVERY 12 HOURS SCHEDULED
Status: DISCONTINUED | OUTPATIENT
Start: 2023-01-01 | End: 2023-01-01 | Stop reason: HOSPADM

## 2023-01-01 RX ORDER — TORSEMIDE 100 MG/1
TABLET ORAL
Status: ON HOLD | COMMUNITY
Start: 2023-01-01 | End: 2023-01-01 | Stop reason: CLARIF

## 2023-01-01 RX ORDER — 0.9 % SODIUM CHLORIDE 0.9 %
2 VIAL (ML) INJECTION EVERY 12 HOURS SCHEDULED
Status: DISCONTINUED | OUTPATIENT
Start: 2023-01-01 | End: 2023-01-01 | Stop reason: HOSPADM

## 2023-01-01 RX ORDER — PRAMIPEXOLE DIHYDROCHLORIDE 1 MG/1
1 TABLET ORAL NIGHTLY
Status: DISCONTINUED | OUTPATIENT
Start: 2023-01-01 | End: 2023-01-01 | Stop reason: HOSPADM

## 2023-01-01 RX ORDER — CHLORHEXIDINE GLUCONATE ORAL RINSE 1.2 MG/ML
1 SOLUTION DENTAL 2 TIMES DAILY
Status: ON HOLD | COMMUNITY
Start: 2023-01-01 | End: 2023-01-01 | Stop reason: HOSPADM

## 2023-01-01 RX ORDER — NICOTINE POLACRILEX 4 MG
15 LOZENGE BUCCAL PRN
Status: DISCONTINUED | OUTPATIENT
Start: 2023-01-01 | End: 2023-01-01 | Stop reason: HOSPADM

## 2023-01-01 RX ORDER — ACETAMINOPHEN 325 MG/1
650 TABLET ORAL EVERY 6 HOURS PRN
Status: DISCONTINUED | OUTPATIENT
Start: 2023-01-01 | End: 2023-01-01

## 2023-01-01 RX ORDER — PANTOPRAZOLE SODIUM 40 MG/1
40 TABLET, DELAYED RELEASE ORAL
Status: DISCONTINUED | OUTPATIENT
Start: 2023-01-01 | End: 2023-01-01 | Stop reason: HOSPADM

## 2023-01-01 RX ORDER — INSULIN GLARGINE 100 [IU]/ML
25 INJECTION, SOLUTION SUBCUTANEOUS EVERY 12 HOURS SCHEDULED
Status: DISCONTINUED | OUTPATIENT
Start: 2023-01-01 | End: 2023-01-01 | Stop reason: HOSPADM

## 2023-01-01 RX ORDER — TRAZODONE HYDROCHLORIDE 100 MG/1
100 TABLET ORAL NIGHTLY
Status: DISCONTINUED | OUTPATIENT
Start: 2023-01-01 | End: 2023-01-01 | Stop reason: HOSPADM

## 2023-01-01 RX ORDER — HEPARIN SODIUM 5000 [USP'U]/ML
5000 INJECTION, SOLUTION INTRAVENOUS; SUBCUTANEOUS EVERY 8 HOURS SCHEDULED
Status: DISCONTINUED | OUTPATIENT
Start: 2023-01-01 | End: 2023-01-01 | Stop reason: HOSPADM

## 2023-01-01 RX ORDER — HYDROCODONE BITARTRATE AND ACETAMINOPHEN 5; 325 MG/1; MG/1
1 TABLET ORAL EVERY 6 HOURS
COMMUNITY

## 2023-01-01 RX ORDER — INSULIN GLARGINE 100 [IU]/ML
25 INJECTION, SOLUTION SUBCUTANEOUS EVERY 12 HOURS SCHEDULED
Qty: 10 ML | Refills: 12 | Status: SHIPPED | OUTPATIENT
Start: 2023-01-01 | End: 2023-05-04

## 2023-01-01 RX ORDER — MONTELUKAST SODIUM 10 MG/1
10 TABLET ORAL NIGHTLY
Status: DISCONTINUED | OUTPATIENT
Start: 2023-01-01 | End: 2023-01-01 | Stop reason: HOSPADM

## 2023-01-01 RX ORDER — ROSUVASTATIN CALCIUM 20 MG/1
40 TABLET, COATED ORAL AT BEDTIME
Status: DISCONTINUED | OUTPATIENT
Start: 2023-01-01 | End: 2023-01-01 | Stop reason: HOSPADM

## 2023-01-01 RX ORDER — TORSEMIDE 100 MG/1
100 TABLET ORAL EVERY MORNING
COMMUNITY

## 2023-01-01 RX ORDER — METOPROLOL SUCCINATE 25 MG/1
12.5 TABLET, EXTENDED RELEASE ORAL DAILY
Status: DISCONTINUED | OUTPATIENT
Start: 2023-01-01 | End: 2023-01-01 | Stop reason: HOSPADM

## 2023-01-01 RX ORDER — ASPIRIN 81 MG/1
81 TABLET, CHEWABLE ORAL DAILY
Status: DISCONTINUED | OUTPATIENT
Start: 2023-01-01 | End: 2023-01-01 | Stop reason: HOSPADM

## 2023-01-01 RX ORDER — TORSEMIDE 100 MG/1
100 TABLET ORAL EVERY MORNING
Status: DISCONTINUED | OUTPATIENT
Start: 2023-01-01 | End: 2023-01-01 | Stop reason: HOSPADM

## 2023-01-01 RX ORDER — INSULIN GLARGINE 100 [IU]/ML
40 INJECTION, SOLUTION SUBCUTANEOUS 2 TIMES DAILY
Qty: 18 ML | Refills: 0 | Status: ON HOLD | COMMUNITY
Start: 2023-01-01 | End: 2023-01-01 | Stop reason: HOSPADM

## 2023-01-01 RX ORDER — CHOLECALCIFEROL (VITAMIN D3) 125 MCG
5 CAPSULE ORAL NIGHTLY
COMMUNITY

## 2023-01-01 RX ORDER — TORSEMIDE 100 MG/1
100 TABLET ORAL DAILY
Status: DISCONTINUED | OUTPATIENT
Start: 2023-01-01 | End: 2023-01-01 | Stop reason: HOSPADM

## 2023-01-01 RX ORDER — LANOLIN ALCOHOL/MO/W.PET/CERES
3 CREAM (GRAM) TOPICAL NIGHTLY
Status: DISCONTINUED | OUTPATIENT
Start: 2023-01-01 | End: 2023-01-01 | Stop reason: HOSPADM

## 2023-01-01 RX ORDER — LEVOTHYROXINE SODIUM 0.03 MG/1
25 TABLET ORAL DAILY
Status: DISCONTINUED | OUTPATIENT
Start: 2023-01-01 | End: 2023-01-01

## 2023-01-01 RX ORDER — LEVOTHYROXINE SODIUM 0.03 MG/1
25 TABLET ORAL
Status: DISCONTINUED | OUTPATIENT
Start: 2023-01-01 | End: 2023-01-01 | Stop reason: HOSPADM

## 2023-01-01 RX ORDER — TORSEMIDE 100 MG/1
50 TABLET ORAL
COMMUNITY

## 2023-01-01 RX ORDER — DEXTROSE MONOHYDRATE 25 G/50ML
25 INJECTION, SOLUTION INTRAVENOUS PRN
Status: DISCONTINUED | OUTPATIENT
Start: 2023-01-01 | End: 2023-01-01 | Stop reason: HOSPADM

## 2023-01-01 RX ORDER — HYDROCODONE BITARTRATE AND ACETAMINOPHEN 5; 325 MG/1; MG/1
1 TABLET ORAL EVERY 6 HOURS PRN
Status: DISCONTINUED | OUTPATIENT
Start: 2023-01-01 | End: 2023-01-01 | Stop reason: HOSPADM

## 2023-01-01 RX ORDER — ROSUVASTATIN CALCIUM 20 MG/1
40 TABLET, COATED ORAL AT BEDTIME
Status: DISCONTINUED | OUTPATIENT
Start: 2023-01-01 | End: 2023-01-01

## 2023-01-01 RX ORDER — DEXTROSE MONOHYDRATE 25 G/50ML
12.5 INJECTION, SOLUTION INTRAVENOUS PRN
Status: DISCONTINUED | OUTPATIENT
Start: 2023-01-01 | End: 2023-01-01 | Stop reason: HOSPADM

## 2023-01-01 RX ORDER — ENOXAPARIN SODIUM 100 MG/ML
40 INJECTION SUBCUTANEOUS DAILY
Status: DISCONTINUED | OUTPATIENT
Start: 2023-01-01 | End: 2023-01-01

## 2023-01-01 RX ORDER — INSULIN LISPRO 100 [IU]/ML
14 INJECTION, SOLUTION INTRAVENOUS; SUBCUTANEOUS
Status: DISCONTINUED | OUTPATIENT
Start: 2023-01-01 | End: 2023-01-01 | Stop reason: HOSPADM

## 2023-01-01 RX ORDER — ASPIRIN 81 MG/1
1 TABLET ORAL NIGHTLY
Status: SHIPPED | COMMUNITY
Start: 2023-01-01

## 2023-01-01 RX ORDER — LANOLIN ALCOHOL/MO/W.PET/CERES
6 CREAM (GRAM) TOPICAL NIGHTLY PRN
Status: DISCONTINUED | OUTPATIENT
Start: 2023-01-01 | End: 2023-01-01 | Stop reason: HOSPADM

## 2023-01-01 RX ORDER — MONTELUKAST SODIUM 10 MG/1
10 TABLET ORAL NIGHTLY
Status: DISCONTINUED | OUTPATIENT
Start: 2023-01-01 | End: 2023-01-01

## 2023-01-01 RX ORDER — TORSEMIDE 100 MG/1
50 TABLET ORAL
Status: DISCONTINUED | OUTPATIENT
Start: 2023-01-01 | End: 2023-01-01 | Stop reason: HOSPADM

## 2023-01-01 RX ORDER — NICOTINE POLACRILEX 4 MG
30 LOZENGE BUCCAL PRN
Status: DISCONTINUED | OUTPATIENT
Start: 2023-01-01 | End: 2023-01-01 | Stop reason: HOSPADM

## 2023-01-01 RX ORDER — ACETAMINOPHEN 325 MG/1
650 TABLET ORAL EVERY 6 HOURS PRN
Status: DISCONTINUED | OUTPATIENT
Start: 2023-01-01 | End: 2023-01-01 | Stop reason: HOSPADM

## 2023-01-01 RX ORDER — PRAMIPEXOLE DIHYDROCHLORIDE 1 MG/1
1 TABLET ORAL NIGHTLY
Status: DISCONTINUED | OUTPATIENT
Start: 2023-01-01 | End: 2023-01-01

## 2023-01-01 RX ORDER — TACROLIMUS 1 MG/1
1 CAPSULE ORAL 2 TIMES DAILY
COMMUNITY

## 2023-01-01 RX ORDER — BUMETANIDE 0.25 MG/ML
2 INJECTION INTRAMUSCULAR; INTRAVENOUS ONCE
Status: COMPLETED | OUTPATIENT
Start: 2023-01-01 | End: 2023-01-01

## 2023-01-01 RX ORDER — SPIRONOLACTONE 25 MG/1
75 TABLET ORAL 2 TIMES DAILY
Status: DISCONTINUED | OUTPATIENT
Start: 2023-01-01 | End: 2023-01-01 | Stop reason: HOSPADM

## 2023-01-01 RX ORDER — IBUPROFEN 600 MG/1
600 TABLET ORAL ONCE
Status: DISCONTINUED | OUTPATIENT
Start: 2023-01-01 | End: 2023-01-01 | Stop reason: CLARIF

## 2023-01-01 RX ORDER — TACROLIMUS 0.5 MG/1
0.5 CAPSULE ORAL 2 TIMES DAILY
COMMUNITY

## 2023-01-01 RX ORDER — HYDROCODONE BITARTRATE AND ACETAMINOPHEN 5; 325 MG/1; MG/1
1 TABLET ORAL ONCE
Status: COMPLETED | OUTPATIENT
Start: 2023-01-01 | End: 2023-01-01

## 2023-01-01 RX ORDER — TORSEMIDE 100 MG/1
100 TABLET ORAL SEE ADMIN INSTRUCTIONS
COMMUNITY
End: 2023-01-01 | Stop reason: DRUGHIGH

## 2023-01-01 RX ORDER — SACUBITRIL AND VALSARTAN 24; 26 MG/1; MG/1
0.5 TABLET, FILM COATED ORAL 2 TIMES DAILY
COMMUNITY
Start: 2023-01-01

## 2023-01-01 RX ORDER — ROSUVASTATIN CALCIUM 5 MG/1
10 TABLET, COATED ORAL AT BEDTIME
Status: DISCONTINUED | OUTPATIENT
Start: 2023-01-01 | End: 2023-01-01 | Stop reason: HOSPADM

## 2023-01-01 RX ADMIN — TORSEMIDE 50 MG: 100 TABLET ORAL at 13:31

## 2023-01-01 RX ADMIN — PRAMIPEXOLE DIHYDROCHLORIDE 1 MG: 1 TABLET ORAL at 20:39

## 2023-01-01 RX ADMIN — METOPROLOL SUCCINATE 12.5 MG: 25 TABLET, EXTENDED RELEASE ORAL at 08:44

## 2023-01-01 RX ADMIN — HEPARIN SODIUM 5000 UNITS: 5000 INJECTION INTRAVENOUS; SUBCUTANEOUS at 21:30

## 2023-01-01 RX ADMIN — Medication 1 TABLET: at 09:20

## 2023-01-01 RX ADMIN — ASPIRIN 81 MG CHEWABLE TABLET 81 MG: 81 TABLET CHEWABLE at 08:42

## 2023-01-01 RX ADMIN — HYDROCODONE BITARTRATE AND ACETAMINOPHEN 1 TABLET: 5; 325 TABLET ORAL at 08:51

## 2023-01-01 RX ADMIN — SPIRONOLACTONE 75 MG: 25 TABLET, FILM COATED ORAL at 08:41

## 2023-01-01 RX ADMIN — Medication 3 MG: at 20:29

## 2023-01-01 RX ADMIN — INSULIN GLARGINE 25 UNITS: 100 INJECTION, SOLUTION SUBCUTANEOUS at 22:21

## 2023-01-01 RX ADMIN — ASPIRIN 81 MG CHEWABLE TABLET 81 MG: 81 TABLET CHEWABLE at 08:32

## 2023-01-01 RX ADMIN — PRAMIPEXOLE DIHYDROCHLORIDE 1 MG: 1 TABLET ORAL at 21:31

## 2023-01-01 RX ADMIN — HYDROCODONE BITARTRATE AND ACETAMINOPHEN 1 TABLET: 5; 325 TABLET ORAL at 08:48

## 2023-01-01 RX ADMIN — HEPARIN SODIUM 5000 UNITS: 5000 INJECTION INTRAVENOUS; SUBCUTANEOUS at 06:35

## 2023-01-01 RX ADMIN — MORPHINE SULFATE 2 MG: 2 INJECTION, SOLUTION INTRAMUSCULAR; INTRAVENOUS at 22:12

## 2023-01-01 RX ADMIN — HYDROCODONE BITARTRATE AND ACETAMINOPHEN 1 TABLET: 5; 325 TABLET ORAL at 17:41

## 2023-01-01 RX ADMIN — TACROLIMUS 1.5 MG: 1 CAPSULE ORAL at 23:03

## 2023-01-01 RX ADMIN — HEPARIN SODIUM 5000 UNITS: 5000 INJECTION INTRAVENOUS; SUBCUTANEOUS at 21:27

## 2023-01-01 RX ADMIN — INSULIN LISPRO 1 UNITS: 100 INJECTION, SOLUTION INTRAVENOUS; SUBCUTANEOUS at 08:45

## 2023-01-01 RX ADMIN — CLINDAMYCIN HYDROCHLORIDE 300 MG: 150 CAPSULE ORAL at 09:20

## 2023-01-01 RX ADMIN — METOPROLOL SUCCINATE 12.5 MG: 25 TABLET, EXTENDED RELEASE ORAL at 08:42

## 2023-01-01 RX ADMIN — Medication 1 TABLET: at 08:42

## 2023-01-01 RX ADMIN — TACROLIMUS 1.5 MG: 1 CAPSULE ORAL at 22:58

## 2023-01-01 RX ADMIN — HYDROCODONE BITARTRATE AND ACETAMINOPHEN 1 TABLET: 5; 325 TABLET ORAL at 20:50

## 2023-01-01 RX ADMIN — ASPIRIN 81 MG CHEWABLE TABLET 81 MG: 81 TABLET CHEWABLE at 08:37

## 2023-01-01 RX ADMIN — Medication 1 TABLET: at 21:58

## 2023-01-01 RX ADMIN — TORSEMIDE 50 MG: 100 TABLET ORAL at 18:00

## 2023-01-01 RX ADMIN — TRAZODONE HYDROCHLORIDE 100 MG: 100 TABLET ORAL at 22:00

## 2023-01-01 RX ADMIN — TACROLIMUS 2 MG: 1 CAPSULE ORAL at 09:20

## 2023-01-01 RX ADMIN — HEPARIN SODIUM 5000 UNITS: 5000 INJECTION INTRAVENOUS; SUBCUTANEOUS at 06:54

## 2023-01-01 RX ADMIN — HEPARIN SODIUM 5000 UNITS: 5000 INJECTION INTRAVENOUS; SUBCUTANEOUS at 13:30

## 2023-01-01 RX ADMIN — MONTELUKAST SODIUM 10 MG: 10 TABLET, FILM COATED ORAL at 22:00

## 2023-01-01 RX ADMIN — TRAZODONE HYDROCHLORIDE 100 MG: 100 TABLET ORAL at 21:59

## 2023-01-01 RX ADMIN — ASPIRIN 81 MG: 81 TABLET, COATED ORAL at 22:00

## 2023-01-01 RX ADMIN — SPIRONOLACTONE 75 MG: 25 TABLET, FILM COATED ORAL at 20:54

## 2023-01-01 RX ADMIN — HEPARIN SODIUM 5000 UNITS: 5000 INJECTION INTRAVENOUS; SUBCUTANEOUS at 13:23

## 2023-01-01 RX ADMIN — MONTELUKAST SODIUM 10 MG: 10 TABLET, FILM COATED ORAL at 21:31

## 2023-01-01 RX ADMIN — TACROLIMUS 1.5 MG: 1 CAPSULE ORAL at 08:44

## 2023-01-01 RX ADMIN — FLUTICASONE FUROATE, UMECLIDINIUM BROMIDE AND VILANTEROL TRIFENATATE 1 PUFF: 100; 62.5; 25 POWDER RESPIRATORY (INHALATION) at 09:22

## 2023-01-01 RX ADMIN — SODIUM CHLORIDE, PRESERVATIVE FREE 2 ML: 5 INJECTION INTRAVENOUS at 09:21

## 2023-01-01 RX ADMIN — TACROLIMUS 1.5 MG: 1 CAPSULE ORAL at 23:13

## 2023-01-01 RX ADMIN — HYDROCODONE BITARTRATE AND ACETAMINOPHEN 1 TABLET: 5; 325 TABLET ORAL at 02:00

## 2023-01-01 RX ADMIN — TORSEMIDE 100 MG: 100 TABLET ORAL at 08:41

## 2023-01-01 RX ADMIN — HYDROCODONE BITARTRATE AND ACETAMINOPHEN 1 TABLET: 5; 325 TABLET ORAL at 03:20

## 2023-01-01 RX ADMIN — TORSEMIDE 100 MG: 100 TABLET ORAL at 06:50

## 2023-01-01 RX ADMIN — TRAZODONE HYDROCHLORIDE 100 MG: 100 TABLET ORAL at 21:23

## 2023-01-01 RX ADMIN — TACROLIMUS 2 MG: 1 CAPSULE ORAL at 11:29

## 2023-01-01 RX ADMIN — ROSUVASTATIN CALCIUM 40 MG: 20 TABLET, FILM COATED ORAL at 21:23

## 2023-01-01 RX ADMIN — ASPIRIN 81 MG CHEWABLE TABLET 81 MG: 81 TABLET CHEWABLE at 08:48

## 2023-01-01 RX ADMIN — SODIUM CHLORIDE, PRESERVATIVE FREE 2 ML: 5 INJECTION INTRAVENOUS at 21:59

## 2023-01-01 RX ADMIN — ROSUVASTATIN CALCIUM 40 MG: 20 TABLET, FILM COATED ORAL at 23:21

## 2023-01-01 RX ADMIN — TACROLIMUS 1.5 MG: 1 CAPSULE ORAL at 21:23

## 2023-01-01 RX ADMIN — TRAZODONE HYDROCHLORIDE 100 MG: 100 TABLET ORAL at 20:55

## 2023-01-01 RX ADMIN — INSULIN LISPRO 12 UNITS: 100 INJECTION, SOLUTION INTRAVENOUS; SUBCUTANEOUS at 08:43

## 2023-01-01 RX ADMIN — FLUTICASONE FUROATE, UMECLIDINIUM BROMIDE AND VILANTEROL TRIFENATATE 1 PUFF: 100; 62.5; 25 POWDER RESPIRATORY (INHALATION) at 08:55

## 2023-01-01 RX ADMIN — SODIUM CHLORIDE, PRESERVATIVE FREE 2 ML: 5 INJECTION INTRAVENOUS at 08:46

## 2023-01-01 RX ADMIN — FLUTICASONE FUROATE, UMECLIDINIUM BROMIDE AND VILANTEROL TRIFENATATE 1 PUFF: 100; 62.5; 25 POWDER RESPIRATORY (INHALATION) at 08:30

## 2023-01-01 RX ADMIN — ASPIRIN 81 MG CHEWABLE TABLET 81 MG: 81 TABLET CHEWABLE at 08:19

## 2023-01-01 RX ADMIN — LEVOTHYROXINE SODIUM 25 MCG: 0.03 TABLET ORAL at 06:36

## 2023-01-01 RX ADMIN — FLUTICASONE FUROATE, UMECLIDINIUM BROMIDE AND VILANTEROL TRIFENATATE 1 PUFF: 100; 62.5; 25 POWDER RESPIRATORY (INHALATION) at 08:42

## 2023-01-01 RX ADMIN — INSULIN GLARGINE 25 UNITS: 100 INJECTION, SOLUTION SUBCUTANEOUS at 08:48

## 2023-01-01 RX ADMIN — TORSEMIDE 100 MG: 100 TABLET ORAL at 08:33

## 2023-01-01 RX ADMIN — METOPROLOL SUCCINATE 12.5 MG: 25 TABLET, EXTENDED RELEASE ORAL at 11:38

## 2023-01-01 RX ADMIN — METOPROLOL SUCCINATE 12.5 MG: 25 TABLET, EXTENDED RELEASE ORAL at 08:33

## 2023-01-01 RX ADMIN — ROSUVASTATIN CALCIUM 40 MG: 20 TABLET, FILM COATED ORAL at 20:29

## 2023-01-01 RX ADMIN — INSULIN GLARGINE 30 UNITS: 100 INJECTION, SOLUTION SUBCUTANEOUS at 21:59

## 2023-01-01 RX ADMIN — PRAMIPEXOLE DIHYDROCHLORIDE 1 MG: 1 TABLET ORAL at 21:57

## 2023-01-01 RX ADMIN — INSULIN LISPRO 14 UNITS: 100 INJECTION, SOLUTION INTRAVENOUS; SUBCUTANEOUS at 15:10

## 2023-01-01 RX ADMIN — INSULIN LISPRO 2 UNITS: 100 INJECTION, SOLUTION INTRAVENOUS; SUBCUTANEOUS at 21:58

## 2023-01-01 RX ADMIN — Medication 400 MG: at 22:00

## 2023-01-01 RX ADMIN — HEPARIN SODIUM 5000 UNITS: 5000 INJECTION INTRAVENOUS; SUBCUTANEOUS at 05:37

## 2023-01-01 RX ADMIN — PRAMIPEXOLE DIHYDROCHLORIDE 1 MG: 1 TABLET ORAL at 22:00

## 2023-01-01 RX ADMIN — INSULIN LISPRO 3 UNITS: 100 INJECTION, SOLUTION INTRAVENOUS; SUBCUTANEOUS at 17:47

## 2023-01-01 RX ADMIN — CLINDAMYCIN HYDROCHLORIDE 300 MG: 150 CAPSULE ORAL at 22:00

## 2023-01-01 RX ADMIN — INSULIN LISPRO 14 UNITS: 100 INJECTION, SOLUTION INTRAVENOUS; SUBCUTANEOUS at 17:58

## 2023-01-01 RX ADMIN — PANTOPRAZOLE SODIUM 40 MG: 40 TABLET, DELAYED RELEASE ORAL at 06:34

## 2023-01-01 RX ADMIN — TACROLIMUS 1.5 MG: 1 CAPSULE ORAL at 11:22

## 2023-01-01 RX ADMIN — BUMETANIDE 2 MG: 0.25 INJECTION INTRAMUSCULAR; INTRAVENOUS at 14:11

## 2023-01-01 RX ADMIN — INSULIN LISPRO 1 UNITS: 100 INJECTION, SOLUTION INTRAVENOUS; SUBCUTANEOUS at 17:40

## 2023-01-01 RX ADMIN — SODIUM CHLORIDE, PRESERVATIVE FREE 2 ML: 5 INJECTION INTRAVENOUS at 08:35

## 2023-01-01 RX ADMIN — FLUTICASONE FUROATE, UMECLIDINIUM BROMIDE AND VILANTEROL TRIFENATATE 1 PUFF: 100; 62.5; 25 POWDER RESPIRATORY (INHALATION) at 08:26

## 2023-01-01 RX ADMIN — PANTOPRAZOLE SODIUM 40 MG: 40 TABLET, DELAYED RELEASE ORAL at 06:43

## 2023-01-01 RX ADMIN — INSULIN LISPRO 14 UNITS: 100 INJECTION, SOLUTION INTRAVENOUS; SUBCUTANEOUS at 12:34

## 2023-01-01 RX ADMIN — INSULIN LISPRO 1 UNITS: 100 INJECTION, SOLUTION INTRAVENOUS; SUBCUTANEOUS at 13:28

## 2023-01-01 RX ADMIN — HYDROCODONE BITARTRATE AND ACETAMINOPHEN 1 TABLET: 5; 325 TABLET ORAL at 15:51

## 2023-01-01 RX ADMIN — INSULIN LISPRO 2 UNITS: 100 INJECTION, SOLUTION INTRAVENOUS; SUBCUTANEOUS at 17:42

## 2023-01-01 RX ADMIN — LEVOTHYROXINE SODIUM 25 MCG: 0.03 TABLET ORAL at 06:43

## 2023-01-01 RX ADMIN — METOPROLOL SUCCINATE 12.5 MG: 25 TABLET, EXTENDED RELEASE ORAL at 08:46

## 2023-01-01 RX ADMIN — INSULIN GLARGINE 25 UNITS: 100 INJECTION, SOLUTION SUBCUTANEOUS at 21:27

## 2023-01-01 RX ADMIN — LEVOTHYROXINE SODIUM 25 MCG: 0.03 TABLET ORAL at 06:35

## 2023-01-01 RX ADMIN — TORSEMIDE 100 MG: 100 TABLET ORAL at 08:37

## 2023-01-01 RX ADMIN — PANTOPRAZOLE SODIUM 40 MG: 40 TABLET, DELAYED RELEASE ORAL at 05:37

## 2023-01-01 RX ADMIN — Medication 400 MG: at 21:57

## 2023-01-01 RX ADMIN — LEVOTHYROXINE SODIUM 25 MCG: 0.03 TABLET ORAL at 06:51

## 2023-01-01 RX ADMIN — HEPARIN SODIUM 5000 UNITS: 5000 INJECTION INTRAVENOUS; SUBCUTANEOUS at 14:29

## 2023-01-01 RX ADMIN — TORSEMIDE 50 MG: 100 TABLET ORAL at 13:17

## 2023-01-01 RX ADMIN — PANTOPRAZOLE SODIUM 40 MG: 40 TABLET, DELAYED RELEASE ORAL at 06:51

## 2023-01-01 RX ADMIN — LEVOTHYROXINE SODIUM 25 MCG: 0.03 TABLET ORAL at 05:37

## 2023-01-01 RX ADMIN — SODIUM CHLORIDE, PRESERVATIVE FREE 2 ML: 5 INJECTION INTRAVENOUS at 11:39

## 2023-01-01 RX ADMIN — MONTELUKAST SODIUM 10 MG: 10 TABLET, FILM COATED ORAL at 21:23

## 2023-01-01 RX ADMIN — MORPHINE SULFATE 2 MG: 2 INJECTION, SOLUTION INTRAMUSCULAR; INTRAVENOUS at 23:19

## 2023-01-01 RX ADMIN — MONTELUKAST SODIUM 10 MG: 10 TABLET, FILM COATED ORAL at 20:29

## 2023-01-01 RX ADMIN — INSULIN LISPRO 1 UNITS: 100 INJECTION, SOLUTION INTRAVENOUS; SUBCUTANEOUS at 13:00

## 2023-01-01 RX ADMIN — INSULIN LISPRO 2 UNITS: 100 INJECTION, SOLUTION INTRAVENOUS; SUBCUTANEOUS at 13:13

## 2023-01-01 RX ADMIN — TORSEMIDE 50 MG: 100 TABLET ORAL at 14:29

## 2023-01-01 RX ADMIN — HYDROCODONE BITARTRATE AND ACETAMINOPHEN 1 TABLET: 5; 325 TABLET ORAL at 09:22

## 2023-01-01 RX ADMIN — HEPARIN SODIUM 5000 UNITS: 5000 INJECTION INTRAVENOUS; SUBCUTANEOUS at 06:07

## 2023-01-01 RX ADMIN — SODIUM CHLORIDE, PRESERVATIVE FREE 2 ML: 5 INJECTION INTRAVENOUS at 22:03

## 2023-01-01 RX ADMIN — HEPARIN SODIUM 5000 UNITS: 5000 INJECTION INTRAVENOUS; SUBCUTANEOUS at 17:41

## 2023-01-01 RX ADMIN — SPIRONOLACTONE 75 MG: 25 TABLET, FILM COATED ORAL at 11:05

## 2023-01-01 RX ADMIN — HEPARIN SODIUM 5000 UNITS: 5000 INJECTION INTRAVENOUS; SUBCUTANEOUS at 20:36

## 2023-01-01 RX ADMIN — PANTOPRAZOLE SODIUM 40 MG: 40 TABLET, DELAYED RELEASE ORAL at 06:42

## 2023-01-01 RX ADMIN — INSULIN LISPRO 1 UNITS: 100 INJECTION, SOLUTION INTRAVENOUS; SUBCUTANEOUS at 18:01

## 2023-01-01 RX ADMIN — FLUTICASONE FUROATE, UMECLIDINIUM BROMIDE AND VILANTEROL TRIFENATATE 1 PUFF: 100; 62.5; 25 POWDER RESPIRATORY (INHALATION) at 08:28

## 2023-01-01 RX ADMIN — MORPHINE SULFATE 2 MG: 2 INJECTION, SOLUTION INTRAMUSCULAR; INTRAVENOUS at 23:13

## 2023-01-01 RX ADMIN — Medication 3 MG: at 20:55

## 2023-01-01 RX ADMIN — METOPROLOL SUCCINATE 12.5 MG: 25 TABLET, EXTENDED RELEASE ORAL at 09:20

## 2023-01-01 RX ADMIN — MONTELUKAST SODIUM 10 MG: 10 TABLET, FILM COATED ORAL at 21:57

## 2023-01-01 RX ADMIN — Medication 1 TABLET: at 21:59

## 2023-01-01 RX ADMIN — INSULIN LISPRO 3 UNITS: 100 INJECTION, SOLUTION INTRAVENOUS; SUBCUTANEOUS at 08:43

## 2023-01-01 RX ADMIN — INSULIN GLARGINE 30 UNITS: 100 INJECTION, SOLUTION SUBCUTANEOUS at 22:51

## 2023-01-01 RX ADMIN — INSULIN GLARGINE 25 UNITS: 100 INJECTION, SOLUTION SUBCUTANEOUS at 08:38

## 2023-01-01 RX ADMIN — HYDROCODONE BITARTRATE AND ACETAMINOPHEN 1 TABLET: 5; 325 TABLET ORAL at 13:13

## 2023-01-01 RX ADMIN — MORPHINE SULFATE 2 MG: 2 INJECTION, SOLUTION INTRAMUSCULAR; INTRAVENOUS at 01:00

## 2023-01-01 RX ADMIN — HEPARIN SODIUM 5000 UNITS: 5000 INJECTION INTRAVENOUS; SUBCUTANEOUS at 06:43

## 2023-01-01 RX ADMIN — ROSUVASTATIN CALCIUM 40 MG: 20 TABLET, FILM COATED ORAL at 21:59

## 2023-01-01 RX ADMIN — INSULIN LISPRO 2 UNITS: 100 INJECTION, SOLUTION INTRAVENOUS; SUBCUTANEOUS at 08:37

## 2023-01-01 RX ADMIN — INSULIN GLARGINE 30 UNITS: 100 INJECTION, SOLUTION SUBCUTANEOUS at 08:44

## 2023-01-01 RX ADMIN — PANTOPRAZOLE SODIUM 40 MG: 40 TABLET, DELAYED RELEASE ORAL at 06:35

## 2023-01-01 RX ADMIN — HEPARIN SODIUM 5000 UNITS: 5000 INJECTION INTRAVENOUS; SUBCUTANEOUS at 06:36

## 2023-01-01 RX ADMIN — HYDROCODONE BITARTRATE AND ACETAMINOPHEN 1 TABLET: 5; 325 TABLET ORAL at 17:18

## 2023-01-01 RX ADMIN — HEPARIN SODIUM 5000 UNITS: 5000 INJECTION INTRAVENOUS; SUBCUTANEOUS at 15:59

## 2023-01-01 RX ADMIN — CLINDAMYCIN HYDROCHLORIDE 300 MG: 150 CAPSULE ORAL at 15:08

## 2023-01-01 RX ADMIN — ROSUVASTATIN CALCIUM 40 MG: 20 TABLET, FILM COATED ORAL at 21:58

## 2023-01-01 RX ADMIN — Medication 1 TABLET: at 08:44

## 2023-01-01 RX ADMIN — SODIUM CHLORIDE, PRESERVATIVE FREE 2 ML: 5 INJECTION INTRAVENOUS at 08:26

## 2023-01-01 RX ADMIN — INSULIN GLARGINE 25 UNITS: 100 INJECTION, SOLUTION SUBCUTANEOUS at 23:03

## 2023-01-01 RX ADMIN — TACROLIMUS 2 MG: 1 CAPSULE ORAL at 22:50

## 2023-01-01 RX ADMIN — HEPARIN SODIUM 5000 UNITS: 5000 INJECTION INTRAVENOUS; SUBCUTANEOUS at 21:58

## 2023-01-01 RX ADMIN — ASPIRIN 81 MG: 81 TABLET, COATED ORAL at 21:57

## 2023-01-01 RX ADMIN — TACROLIMUS 2 MG: 1 CAPSULE ORAL at 23:11

## 2023-01-01 RX ADMIN — SPIRONOLACTONE 75 MG: 25 TABLET, FILM COATED ORAL at 08:32

## 2023-01-01 RX ADMIN — TACROLIMUS 1.5 MG: 1 CAPSULE ORAL at 08:45

## 2023-01-01 RX ADMIN — SODIUM CHLORIDE, PRESERVATIVE FREE 2 ML: 5 INJECTION INTRAVENOUS at 21:35

## 2023-01-01 RX ADMIN — MONTELUKAST SODIUM 10 MG: 10 TABLET, FILM COATED ORAL at 20:55

## 2023-01-01 RX ADMIN — SODIUM CHLORIDE, PRESERVATIVE FREE 2 ML: 5 INJECTION INTRAVENOUS at 21:25

## 2023-01-01 RX ADMIN — ROSUVASTATIN CALCIUM 40 MG: 20 TABLET, FILM COATED ORAL at 21:28

## 2023-01-01 RX ADMIN — SODIUM CHLORIDE, PRESERVATIVE FREE 2 ML: 5 INJECTION INTRAVENOUS at 20:55

## 2023-01-01 RX ADMIN — INSULIN LISPRO 14 UNITS: 100 INJECTION, SOLUTION INTRAVENOUS; SUBCUTANEOUS at 11:30

## 2023-01-01 RX ADMIN — MONTELUKAST SODIUM 10 MG: 10 TABLET, FILM COATED ORAL at 23:21

## 2023-01-01 RX ADMIN — CLINDAMYCIN HYDROCHLORIDE 300 MG: 150 CAPSULE ORAL at 08:44

## 2023-01-01 RX ADMIN — SODIUM CHLORIDE, PRESERVATIVE FREE 2 ML: 5 INJECTION INTRAVENOUS at 23:20

## 2023-01-01 RX ADMIN — INSULIN GLARGINE 25 UNITS: 100 INJECTION, SOLUTION SUBCUTANEOUS at 08:44

## 2023-01-01 RX ADMIN — HYDROCODONE BITARTRATE AND ACETAMINOPHEN 1 TABLET: 5; 325 TABLET ORAL at 23:14

## 2023-01-01 RX ADMIN — TACROLIMUS 1.5 MG: 1 CAPSULE ORAL at 08:19

## 2023-01-01 RX ADMIN — INSULIN LISPRO 3 UNITS: 100 INJECTION, SOLUTION INTRAVENOUS; SUBCUTANEOUS at 11:33

## 2023-01-01 RX ADMIN — HEPARIN SODIUM 5000 UNITS: 5000 INJECTION INTRAVENOUS; SUBCUTANEOUS at 21:00

## 2023-01-01 RX ADMIN — INSULIN LISPRO 14 UNITS: 100 INJECTION, SOLUTION INTRAVENOUS; SUBCUTANEOUS at 08:43

## 2023-01-01 RX ADMIN — HEPARIN SODIUM 5000 UNITS: 5000 INJECTION INTRAVENOUS; SUBCUTANEOUS at 22:00

## 2023-01-01 RX ADMIN — INSULIN GLARGINE 25 UNITS: 100 INJECTION, SOLUTION SUBCUTANEOUS at 08:42

## 2023-01-01 RX ADMIN — TACROLIMUS 1.5 MG: 1 CAPSULE ORAL at 11:38

## 2023-01-01 RX ADMIN — TRAZODONE HYDROCHLORIDE 100 MG: 100 TABLET ORAL at 20:29

## 2023-01-01 RX ADMIN — HYDROCODONE BITARTRATE AND ACETAMINOPHEN 1 TABLET: 5; 325 TABLET ORAL at 02:35

## 2023-01-01 RX ADMIN — INSULIN LISPRO 14 UNITS: 100 INJECTION, SOLUTION INTRAVENOUS; SUBCUTANEOUS at 09:21

## 2023-01-01 RX ADMIN — ROSUVASTATIN CALCIUM 40 MG: 20 TABLET, FILM COATED ORAL at 20:55

## 2023-01-01 RX ADMIN — LEVOTHYROXINE SODIUM 25 MCG: 0.03 TABLET ORAL at 06:02

## 2023-01-01 RX ADMIN — Medication 3 MG: at 21:31

## 2023-01-01 RX ADMIN — INSULIN GLARGINE 25 UNITS: 100 INJECTION, SOLUTION SUBCUTANEOUS at 08:35

## 2023-01-01 RX ADMIN — INSULIN GLARGINE 25 UNITS: 100 INJECTION, SOLUTION SUBCUTANEOUS at 00:49

## 2023-01-01 RX ADMIN — PANTOPRAZOLE SODIUM 40 MG: 40 TABLET, DELAYED RELEASE ORAL at 06:02

## 2023-01-01 RX ADMIN — PANTOPRAZOLE SODIUM 40 MG: 40 TABLET, DELAYED RELEASE ORAL at 06:36

## 2023-01-01 RX ADMIN — INSULIN LISPRO 14 UNITS: 100 INJECTION, SOLUTION INTRAVENOUS; SUBCUTANEOUS at 17:46

## 2023-01-01 RX ADMIN — SODIUM CHLORIDE, PRESERVATIVE FREE 2 ML: 5 INJECTION INTRAVENOUS at 08:52

## 2023-01-01 RX ADMIN — Medication 0.5 MG/HR: at 01:13

## 2023-01-01 RX ADMIN — TRAZODONE HYDROCHLORIDE 100 MG: 100 TABLET ORAL at 21:28

## 2023-01-01 RX ADMIN — INSULIN GLARGINE 25 UNITS: 100 INJECTION, SOLUTION SUBCUTANEOUS at 08:19

## 2023-01-01 RX ADMIN — TORSEMIDE 100 MG: 100 TABLET ORAL at 15:09

## 2023-01-01 RX ADMIN — INSULIN LISPRO 15 UNITS: 100 INJECTION, SOLUTION INTRAVENOUS; SUBCUTANEOUS at 12:34

## 2023-01-01 RX ADMIN — SODIUM CHLORIDE, PRESERVATIVE FREE 2 ML: 5 INJECTION INTRAVENOUS at 08:49

## 2023-01-01 RX ADMIN — PRAMIPEXOLE DIHYDROCHLORIDE 1 MG: 1 TABLET ORAL at 21:23

## 2023-01-01 RX ADMIN — HEPARIN SODIUM 5000 UNITS: 5000 INJECTION INTRAVENOUS; SUBCUTANEOUS at 13:13

## 2023-01-01 RX ADMIN — FLUTICASONE FUROATE, UMECLIDINIUM BROMIDE AND VILANTEROL TRIFENATATE 1 PUFF: 100; 62.5; 25 POWDER RESPIRATORY (INHALATION) at 09:32

## 2023-01-01 RX ADMIN — INSULIN GLARGINE 30 UNITS: 100 INJECTION, SOLUTION SUBCUTANEOUS at 12:05

## 2023-01-01 RX ADMIN — PRAMIPEXOLE DIHYDROCHLORIDE 1 MG: 1 TABLET ORAL at 20:55

## 2023-01-01 RX ADMIN — CLINDAMYCIN HYDROCHLORIDE 300 MG: 150 CAPSULE ORAL at 13:29

## 2023-01-01 RX ADMIN — METOPROLOL SUCCINATE 12.5 MG: 25 TABLET, EXTENDED RELEASE ORAL at 08:19

## 2023-01-01 RX ADMIN — Medication 3 MG: at 21:23

## 2023-01-01 RX ADMIN — HYDROCODONE BITARTRATE AND ACETAMINOPHEN 1 TABLET: 5; 325 TABLET ORAL at 18:38

## 2023-01-01 RX ADMIN — HYDROCODONE BITARTRATE AND ACETAMINOPHEN 1 TABLET: 5; 325 TABLET ORAL at 13:23

## 2023-01-01 RX ADMIN — CLINDAMYCIN HYDROCHLORIDE 300 MG: 150 CAPSULE ORAL at 21:58

## 2023-01-01 RX ADMIN — CLINDAMYCIN HYDROCHLORIDE 300 MG: 150 CAPSULE ORAL at 08:42

## 2023-01-01 RX ADMIN — INSULIN LISPRO 10 UNITS: 100 INJECTION, SOLUTION INTRAVENOUS; SUBCUTANEOUS at 08:42

## 2023-01-01 RX ADMIN — CLINDAMYCIN HYDROCHLORIDE 300 MG: 150 CAPSULE ORAL at 15:59

## 2023-01-01 RX ADMIN — SPIRONOLACTONE 75 MG: 25 TABLET, FILM COATED ORAL at 20:29

## 2023-01-01 RX ADMIN — HEPARIN SODIUM 5000 UNITS: 5000 INJECTION INTRAVENOUS; SUBCUTANEOUS at 15:09

## 2023-01-01 RX ADMIN — PRAMIPEXOLE DIHYDROCHLORIDE 1 MG: 1 TABLET ORAL at 23:21

## 2023-01-01 RX ADMIN — HYDROCODONE BITARTRATE AND ACETAMINOPHEN 1 TABLET: 5; 325 TABLET ORAL at 17:17

## 2023-01-01 SDOH — HEALTH STABILITY: PHYSICAL HEALTH: DO YOU HAVE DIFFICULTY DRESSING OR BATHING?: NO

## 2023-01-01 SDOH — HEALTH STABILITY: PHYSICAL HEALTH: ON AVERAGE, HOW MANY DAYS PER WEEK DO YOU ENGAGE IN MODERATE TO STRENUOUS EXERCISE (LIKE A BRISK WALK)?: 0 DAYS

## 2023-01-01 SDOH — ECONOMIC STABILITY: HOUSING INSECURITY: WHAT IS YOUR LIVING SITUATION TODAY?: SPOUSE

## 2023-01-01 SDOH — ECONOMIC STABILITY: GENERAL
IN THE PAST YEAR, HAVE YOU OR ANY FAMILY MEMBERS YOU LIVE WITH BEEN UNABLE TO GET ANY OF THE FOLLOWING WHEN IT WAS REALLY NEEDED? CHECK ALL THAT APPLY.: CLOTHING

## 2023-01-01 SDOH — ECONOMIC STABILITY: HOUSING INSECURITY: ARE YOU WORRIED ABOUT LOSING YOUR HOUSING?: NO

## 2023-01-01 SDOH — HEALTH STABILITY: GENERAL: BECAUSE OF A PHYSICAL, MENTAL, OR EMOTIONAL CONDITION, DO YOU HAVE DIFFICULTY DOING ERRANDS ALONE?: YES

## 2023-01-01 SDOH — ECONOMIC STABILITY: FOOD INSECURITY: HOW OFTEN IN THE PAST 12 MONTHS WERE YOU WORRIED OR STRESSED ABOUT HAVING ENOUGH MONEY TO BUY NUTRITIOUS MEALS?: NEVER

## 2023-01-01 SDOH — SOCIAL STABILITY: SOCIAL NETWORK
HOW OFTEN DO YOU SEE OR TALK TO PEOPLE THAT YOU CARE ABOUT AND FEEL CLOSE TO? (FOR EXAMPLE: TALKING TO FRIENDS ON THE PHONE, VISITING FRIENDS OR FAMILY, GOING TO CHURCH OR CLUB MEETINGS): 5 OR MORE TIMES A WEEK

## 2023-01-01 SDOH — HEALTH STABILITY: GENERAL
BECAUSE OF A PHYSICAL, MENTAL, OR EMOTIONAL CONDITION, DO YOU HAVE SERIOUS DIFFICULTY CONCENTRATING, REMEMBERING OR MAKING DECISIONS?: YES

## 2023-01-01 SDOH — HEALTH STABILITY: PHYSICAL HEALTH: DO YOU HAVE SERIOUS DIFFICULTY WALKING OR CLIMBING STAIRS?: YES

## 2023-01-01 SDOH — HEALTH STABILITY: PHYSICAL HEALTH: ON AVERAGE, HOW MANY MINUTES DO YOU ENGAGE IN EXERCISE AT THIS LEVEL?: 0 MIN

## 2023-01-01 SDOH — ECONOMIC STABILITY: HOUSING INSECURITY: WHAT IS YOUR LIVING SITUATION TODAY?: HOUSE

## 2023-01-01 SDOH — SOCIAL STABILITY: SOCIAL NETWORK: SUPPORT SYSTEMS: FAMILY MEMBERS

## 2023-01-01 ASSESSMENT — ENCOUNTER SYMPTOMS
COUGH: 1
SLEEP DISTURBANCES DUE TO BREATHING: 1
RESPIRATORY NEGATIVE: 1
PAIN SEVERITY NOW: 8
BRUISES/BLEEDS EASILY: 0
SUSPICIOUS LESIONS: 0
SHORTNESS OF BREATH: 1
SHORTNESS OF BREATH: 1
FATIGUE: 1
FATIGUE: 1
PAIN SEVERITY NOW: 8
ABDOMINAL PAIN: 0
SHORTNESS OF BREATH: 1
HEMATOCHEZIA: 0
WEIGHT LOSS: 0
CONSTIPATION: 0
DEPRESSION: 0
NUMBNESS: 1
PAIN SEVERITY NOW: 3
DIARRHEA: 0
INSOMNIA: 0
FEVER: 0
SPUTUM PRODUCTION: 1
HEMOPTYSIS: 0
DIZZINESS: 1
BLOATING: 0
WEIGHT GAIN: 0
LIGHT-HEADEDNESS: 1
PAIN SEVERITY NOW: 6
LOSS OF BALANCE: 1
SHORTNESS OF BREATH: 0
PAIN SEVERITY NOW: 8
FATIGUE: 1
ALLERGIC/IMMUNOLOGIC COMMENTS: NO NEW FOOD ALLERGIES

## 2023-01-01 ASSESSMENT — COGNITIVE AND FUNCTIONAL STATUS - GENERAL
DAILY_ACTIVITY_RAW_SCORE: 18
HELP NEEDED FOR TOILETING: TOTAL
BECAUSE OF A PHYSICAL, MENTAL, OR EMOTIONAL CONDITION, DO YOU HAVE SERIOUS DIFFICULTY CONCENTRATING, REMEMBERING OR MAKING DECISIONS: NO
HELP NEEDED FOR BATHING: A LOT
DAILY_ACTIVITY_RAW_SCORE: 14
DAILY_ACTIVITY_CONVERTED_SCORE: 38.66
DO YOU HAVE DIFFICULTY DRESSING OR BATHING: YES
HELP NEEDED DRESSING REGULAR LOWER BODY CLOTHING: A LOT
DAILY_ACTIVITY_CONVERTED_SCORE: 38.66
HELP NEEDED DRESSING REGULAR LOWER BODY CLOTHING: TOTAL
BASIC_MOBILITY_RAW_SCORE: 14
HELP NEEDED FOR TOILETING: A LOT
DO YOU HAVE SERIOUS DIFFICULTY WALKING OR CLIMBING STAIRS: YES
BASIC_MOBILITY_CONVERTED_SCORE: 35.55
HELP NEEDED FOR BATHING: A LOT
HELP NEEDED DRESSING REGULAR UPPER BODY CLOTHING: A LITTLE
HELP NEEDED FOR BATHING: A LOT
BASIC_MOBILITY_RAW_SCORE: 6
DAILY_ACTIVITY_RAW_SCORE: 18
HELP NEEDED DRESSING REGULAR LOWER BODY CLOTHING: A LOT
HELP NEEDED FOR PERSONAL GROOMING: A LITTLE
BASIC_MOBILITY_CONVERTED_SCORE: 16.59
HELP NEEDED FOR TOILETING: A LOT
BASIC_MOBILITY_RAW_SCORE: 11
BASIC_MOBILITY_CONVERTED_SCORE: 30.25
BECAUSE OF A PHYSICAL, MENTAL, OR EMOTIONAL CONDITION, DO YOU HAVE DIFFICULTY DOING ERRANDS ALONE: YES
DAILY_ACTIVITY_CONVERTED_SCORE: 33.39

## 2023-01-01 ASSESSMENT — LIFESTYLE VARIABLES
HOW MANY STANDARD DRINKS CONTAINING ALCOHOL DO YOU HAVE ON A TYPICAL DAY: 0,1 OR 2
ALCOHOL_USE_STATUS: NO OR LOW RISK WITH VALIDATED TOOL
HOW OFTEN DO YOU HAVE A DRINK CONTAINING ALCOHOL: NEVER

## 2023-01-01 ASSESSMENT — PATIENT HEALTH QUESTIONNAIRE - PHQ9
SUM OF ALL RESPONSES TO PHQ QUESTIONS 1-9: 12
7. TROUBLE CONCENTRATING ON THINGS, SUCH AS READING THE NEWSPAPER OR WATCHING TELEVISION: SEVERAL DAYS
1. LITTLE INTEREST OR PLEASURE IN DOING THINGS: NOT AT ALL
IS PATIENT ABLE TO COMPLETE PHQ2 OR PHQ9: YES
4. FEELING TIRED OR HAVING LITTLE ENERGY: NEARLY EVERY DAY
SUM OF ALL RESPONSES TO PHQ9 QUESTIONS 1 AND 2: 4
2. FEELING DOWN, DEPRESSED OR HOPELESS: NOT AT ALL
5. POOR APPETITE OR OVEREATING: NOT AT ALL
1. LITTLE INTEREST OR PLEASURE IN DOING THINGS: NEARLY EVERY DAY
6. FEELING BAD ABOUT YOURSELF - OR THAT YOU ARE A FAILURE OR HAVE LET YOURSELF OR YOUR FAMILY DOWN: NOT AT ALL
SUM OF ALL RESPONSES TO PHQ9 QUESTIONS 1 AND 2: 4
3. TROUBLE FALLING OR STAYING ASLEEP OR SLEEPING TOO MUCH: NEARLY EVERY DAY
CLINICAL INTERPRETATION OF PHQ2 SCORE: FURTHER SCREENING NEEDED
CLINICAL INTERPRETATION OF PHQ9 SCORE: MODERATE DEPRESSION
10. IF YOU CHECKED OFF ANY PROBLEMS, HOW DIFFICULT HAVE THESE PROBLEMS MADE IT FOR YOU TO DO YOUR WORK, TAKE CARE OF THINGS AT HOME, OR GET ALONG WITH OTHER PEOPLE: VERY DIFFICULT
8. MOVING OR SPEAKING SO SLOWLY THAT OTHER PEOPLE COULD HAVE NOTICED. OR THE OPPOSITE, BEING SO FIGETY OR RESTLESS THAT YOU HAVE BEEN MOVING AROUND A LOT MORE THAN USUAL: SEVERAL DAYS
2. FEELING DOWN, DEPRESSED OR HOPELESS: SEVERAL DAYS
9. THOUGHTS THAT YOU WOULD BE BETTER OFF DEAD, OR OF HURTING YOURSELF: NOT AT ALL
SUM OF ALL RESPONSES TO PHQ9 QUESTIONS 1 AND 2: 0
CLINICAL INTERPRETATION OF PHQ2 SCORE: NO FURTHER SCREENING NEEDED
SUM OF ALL RESPONSES TO PHQ9 QUESTIONS 1 AND 2: 0

## 2023-01-01 ASSESSMENT — PAIN SCALES - GENERAL
PAINLEVEL_OUTOF10: 8
PAINLEVEL_OUTOF10: 7
PAINLEVEL_OUTOF10: 6
PAINLEVEL_OUTOF10: 8
PAINLEVEL_OUTOF10: 4
PAINLEVEL_OUTOF10: 5
PAINLEVEL_OUTOF10: 8
PAINLEVEL_OUTOF10: 7
PAINLEVEL_OUTOF10: 2
PAINLEVEL_OUTOF10: 8
PAINLEVEL_OUTOF10: 6
PAINLEVEL_OUTOF10: 5
PAINLEVEL_OUTOF10: 6
PAINLEVEL_OUTOF10: 2
PAINLEVEL_OUTOF10: 8
PAINLEVEL_OUTOF10: 6
PAINLEVEL_OUTOF10: 0
PAINLEVEL_OUTOF10: 4
PAINLEVEL_OUTOF10: 8
PAINLEVEL_OUTOF10: 7
PAINLEVEL_OUTOF10: 0
PAINLEVEL_OUTOF10: 4
PAINLEVEL_OUTOF10: 0
PAINLEVEL_OUTOF10: 6
PAINLEVEL_OUTOF10: 7
PAINLEVEL_OUTOF10: 8
PAINLEVEL_OUTOF10: 4
PAINLEVEL_OUTOF10: 6
PAINLEVEL_OUTOF10: 4
PAINLEVEL_OUTOF10: 7
PAINLEVEL_OUTOF10: 10
PAINLEVEL_OUTOF10: 2
PAINLEVEL_OUTOF10: 6
PAINLEVEL_OUTOF10: 5

## 2023-01-01 ASSESSMENT — ACTIVITIES OF DAILY LIVING (ADL)
ADL_BEFORE_ADMISSION: INDEPENDENT
HOME_MANAGEMENT_TIME_ENTRY: 39
ADL_SHORT_OF_BREATH: YES
PRIOR_ADL: INDEPENDENT
ADL_SCORE: 12
HOME_MANAGEMENT_TIME_ENTRY: 24
HOME_MANAGEMENT_TIME_ENTRY: 15
RECENT_DECLINE_ADL: YES, DECLINE IN BATHING/DRESSING/FEEDING, COLLABORATE WITH PROVIDER (T);YES, DECLINE IN AMBULATION/TRANSFERRING, COLLABORATE WITH PROVIDER (T)

## 2023-01-01 ASSESSMENT — COLUMBIA-SUICIDE SEVERITY RATING SCALE - C-SSRS: IS THE PATIENT ABLE TO COMPLETE C-SSRS: YES

## 2023-01-17 ENCOUNTER — LAB ENCOUNTER (OUTPATIENT)
Dept: LAB | Age: 68
End: 2023-01-17
Attending: INTERNAL MEDICINE
Payer: MEDICARE

## 2023-01-17 DIAGNOSIS — N18.9 ANEMIA IN CHRONIC RENAL DISEASE: Primary | ICD-10-CM

## 2023-01-17 DIAGNOSIS — N04.1 NEPHROTIC SYNDROME WITH FOCAL AND SEGMENTAL GLOMERULAR LESIONS: ICD-10-CM

## 2023-01-17 DIAGNOSIS — N18.32 STAGE 3B CHRONIC KIDNEY DISEASE (HCC): ICD-10-CM

## 2023-01-17 DIAGNOSIS — D63.1 ANEMIA IN CHRONIC RENAL DISEASE: Primary | ICD-10-CM

## 2023-01-17 DIAGNOSIS — E09.22: ICD-10-CM

## 2023-01-17 DIAGNOSIS — N28.9 DISORDER OF KIDNEY AND URETER: ICD-10-CM

## 2023-01-17 DIAGNOSIS — I12.9 HYPERTENSIVE KIDNEY DISEASE WITH CHRONIC KIDNEY DISEASE, STAGE 1-4 OR UNSPECIFIED CHRONIC KIDNEY DISEASE: ICD-10-CM

## 2023-01-17 DIAGNOSIS — I50.32 CHRONIC DIASTOLIC HEART FAILURE (HCC): ICD-10-CM

## 2023-01-17 LAB
ALBUMIN SERPL-MCNC: 3.8 G/DL (ref 3.4–5)
ANION GAP SERPL CALC-SCNC: 7 MMOL/L (ref 0–18)
BASOPHILS # BLD AUTO: 0.03 X10(3) UL (ref 0–0.2)
BASOPHILS NFR BLD AUTO: 0.3 %
BILIRUB UR QL: NEGATIVE
BUN BLD-MCNC: 71 MG/DL (ref 7–18)
BUN/CREAT SERPL: 31.8 (ref 10–20)
CALCIUM BLD-MCNC: 9.4 MG/DL (ref 8.5–10.1)
CHLORIDE SERPL-SCNC: 100 MMOL/L (ref 98–112)
CLARITY UR: CLEAR
CO2 SERPL-SCNC: 25 MMOL/L (ref 21–32)
COLOR UR: YELLOW
CREAT BLD-MCNC: 2.23 MG/DL
CREAT UR-SCNC: 78.9 MG/DL
CREAT UR-SCNC: 78.9 MG/DL
DEPRECATED HBV CORE AB SER IA-ACNC: 164.4 NG/ML
DEPRECATED RDW RBC AUTO: 40.5 FL (ref 35.1–46.3)
EOSINOPHIL # BLD AUTO: 0.09 X10(3) UL (ref 0–0.7)
EOSINOPHIL NFR BLD AUTO: 0.9 %
ERYTHROCYTE [DISTWIDTH] IN BLOOD BY AUTOMATED COUNT: 11.9 % (ref 11–15)
GFR SERPLBLD BASED ON 1.73 SQ M-ARVRAT: 24 ML/MIN/1.73M2 (ref 60–?)
GLUCOSE BLD-MCNC: 79 MG/DL (ref 70–99)
GLUCOSE UR-MCNC: NEGATIVE MG/DL
GRAN CASTS #/AREA URNS LPF: PRESENT /LPF
GRAN CASTS #/AREA URNS LPF: PRESENT /LPF
HCT VFR BLD AUTO: 39.3 %
HGB BLD-MCNC: 13.3 G/DL
HGB UR QL STRIP.AUTO: NEGATIVE
HYALINE CASTS #/AREA URNS AUTO: PRESENT /LPF
IMM GRANULOCYTES # BLD AUTO: 0.04 X10(3) UL (ref 0–1)
IMM GRANULOCYTES NFR BLD: 0.4 %
IRON SATN MFR SERPL: 20 %
IRON SERPL-MCNC: 78 UG/DL
KETONES UR-MCNC: NEGATIVE MG/DL
LEUKOCYTE ESTERASE UR QL STRIP.AUTO: NEGATIVE
LYMPHOCYTES # BLD AUTO: 1.64 X10(3) UL (ref 1–4)
LYMPHOCYTES NFR BLD AUTO: 16.8 %
MCH RBC QN AUTO: 31.4 PG (ref 26–34)
MCHC RBC AUTO-ENTMCNC: 33.8 G/DL (ref 31–37)
MCV RBC AUTO: 92.9 FL
MICROALBUMIN UR-MCNC: 30 MG/DL
MICROALBUMIN/CREAT 24H UR-RTO: 380.2 UG/MG (ref ?–30)
MONOCYTES # BLD AUTO: 0.99 X10(3) UL (ref 0.1–1)
MONOCYTES NFR BLD AUTO: 10.1 %
NEUTROPHILS # BLD AUTO: 6.99 X10 (3) UL (ref 1.5–7.7)
NEUTROPHILS # BLD AUTO: 6.99 X10(3) UL (ref 1.5–7.7)
NEUTROPHILS NFR BLD AUTO: 71.5 %
NITRITE UR QL STRIP.AUTO: NEGATIVE
OSMOLALITY SERPL CALC.SUM OF ELEC: 294 MOSM/KG (ref 275–295)
PH UR: 5.5 [PH] (ref 5–8)
PHOSPHATE SERPL-MCNC: 3.6 MG/DL (ref 2.5–4.9)
PLATELET # BLD AUTO: 318 10(3)UL (ref 150–450)
POTASSIUM SERPL-SCNC: 4.5 MMOL/L (ref 3.5–5.1)
PROT UR-MCNC: 62.7 MG/DL
RBC # BLD AUTO: 4.23 X10(6)UL
SODIUM SERPL-SCNC: 132 MMOL/L (ref 136–145)
SP GR UR STRIP: 1.01 (ref 1–1.03)
TIBC SERPL-MCNC: 393 UG/DL (ref 240–450)
TRANSFERRIN SERPL-MCNC: 264 MG/DL (ref 200–360)
UROBILINOGEN UR STRIP-ACNC: 0.2
WBC # BLD AUTO: 9.8 X10(3) UL (ref 4–11)

## 2023-01-17 PROCEDURE — 82570 ASSAY OF URINE CREATININE: CPT

## 2023-01-17 PROCEDURE — 80069 RENAL FUNCTION PANEL: CPT

## 2023-01-17 PROCEDURE — 82728 ASSAY OF FERRITIN: CPT

## 2023-01-17 PROCEDURE — 81001 URINALYSIS AUTO W/SCOPE: CPT

## 2023-01-17 PROCEDURE — 81015 MICROSCOPIC EXAM OF URINE: CPT

## 2023-01-17 PROCEDURE — 83540 ASSAY OF IRON: CPT

## 2023-01-17 PROCEDURE — 85025 COMPLETE CBC W/AUTO DIFF WBC: CPT

## 2023-01-17 PROCEDURE — 84466 ASSAY OF TRANSFERRIN: CPT

## 2023-01-17 PROCEDURE — 82043 UR ALBUMIN QUANTITATIVE: CPT

## 2023-01-17 PROCEDURE — 36415 COLL VENOUS BLD VENIPUNCTURE: CPT

## 2023-01-17 PROCEDURE — 84156 ASSAY OF PROTEIN URINE: CPT

## 2023-01-24 ENCOUNTER — LAB ENCOUNTER (OUTPATIENT)
Dept: LAB | Age: 68
End: 2023-01-24
Attending: INTERNAL MEDICINE
Payer: MEDICARE

## 2023-01-24 DIAGNOSIS — N18.9 ANEMIA IN CHRONIC RENAL DISEASE: ICD-10-CM

## 2023-01-24 DIAGNOSIS — E09.22: ICD-10-CM

## 2023-01-24 DIAGNOSIS — I12.9 HYPERTENSIVE KIDNEY DISEASE WITH CHRONIC KIDNEY DISEASE, STAGE 1-4 OR UNSPECIFIED CHRONIC KIDNEY DISEASE: ICD-10-CM

## 2023-01-24 DIAGNOSIS — D63.1 ANEMIA IN CHRONIC RENAL DISEASE: ICD-10-CM

## 2023-01-24 DIAGNOSIS — N18.32 STAGE 3B CHRONIC KIDNEY DISEASE (HCC): ICD-10-CM

## 2023-01-24 DIAGNOSIS — N28.9 DISORDER OF KIDNEY AND URETER: ICD-10-CM

## 2023-01-24 DIAGNOSIS — I50.32 CHRONIC DIASTOLIC HEART FAILURE (HCC): ICD-10-CM

## 2023-01-24 DIAGNOSIS — N04.1 NEPHROTIC SYNDROME WITH FOCAL AND SEGMENTAL GLOMERULAR LESIONS: ICD-10-CM

## 2023-01-24 LAB
ALBUMIN SERPL-MCNC: 3.7 G/DL (ref 3.4–5)
ANION GAP SERPL CALC-SCNC: 10 MMOL/L (ref 0–18)
BASOPHILS # BLD AUTO: 0.02 X10(3) UL (ref 0–0.2)
BASOPHILS NFR BLD AUTO: 0.2 %
BILIRUB UR QL: NEGATIVE
BUN BLD-MCNC: 67 MG/DL (ref 7–18)
BUN/CREAT SERPL: 32.1 (ref 10–20)
CALCIUM BLD-MCNC: 9.5 MG/DL (ref 8.5–10.1)
CHLORIDE SERPL-SCNC: 100 MMOL/L (ref 98–112)
CLARITY UR: CLEAR
CO2 SERPL-SCNC: 26 MMOL/L (ref 21–32)
COLOR UR: YELLOW
CREAT BLD-MCNC: 2.09 MG/DL
CREAT UR-SCNC: 47.1 MG/DL
DEPRECATED HBV CORE AB SER IA-ACNC: 168 NG/ML
DEPRECATED RDW RBC AUTO: 39.7 FL (ref 35.1–46.3)
EOSINOPHIL # BLD AUTO: 0.08 X10(3) UL (ref 0–0.7)
EOSINOPHIL NFR BLD AUTO: 0.8 %
ERYTHROCYTE [DISTWIDTH] IN BLOOD BY AUTOMATED COUNT: 11.8 % (ref 11–15)
GFR SERPLBLD BASED ON 1.73 SQ M-ARVRAT: 25 ML/MIN/1.73M2 (ref 60–?)
GLUCOSE BLD-MCNC: 68 MG/DL (ref 70–99)
GLUCOSE UR-MCNC: NEGATIVE MG/DL
HCT VFR BLD AUTO: 38.3 %
HGB BLD-MCNC: 12.7 G/DL
HGB UR QL STRIP.AUTO: NEGATIVE
HYALINE CASTS #/AREA URNS AUTO: PRESENT /LPF
IMM GRANULOCYTES # BLD AUTO: 0.06 X10(3) UL (ref 0–1)
IMM GRANULOCYTES NFR BLD: 0.6 %
IRON SATN MFR SERPL: 23 %
IRON SERPL-MCNC: 90 UG/DL
KETONES UR-MCNC: NEGATIVE MG/DL
LEUKOCYTE ESTERASE UR QL STRIP.AUTO: NEGATIVE
LYMPHOCYTES # BLD AUTO: 1.89 X10(3) UL (ref 1–4)
LYMPHOCYTES NFR BLD AUTO: 18.8 %
MCH RBC QN AUTO: 30.8 PG (ref 26–34)
MCHC RBC AUTO-ENTMCNC: 33.2 G/DL (ref 31–37)
MCV RBC AUTO: 92.7 FL
MICROALBUMIN UR-MCNC: 9.08 MG/DL
MICROALBUMIN/CREAT 24H UR-RTO: 192.8 UG/MG (ref ?–30)
MONOCYTES # BLD AUTO: 0.69 X10(3) UL (ref 0.1–1)
MONOCYTES NFR BLD AUTO: 6.9 %
NEUTROPHILS # BLD AUTO: 7.29 X10 (3) UL (ref 1.5–7.7)
NEUTROPHILS # BLD AUTO: 7.29 X10(3) UL (ref 1.5–7.7)
NEUTROPHILS NFR BLD AUTO: 72.7 %
NITRITE UR QL STRIP.AUTO: NEGATIVE
OSMOLALITY SERPL CALC.SUM OF ELEC: 300 MOSM/KG (ref 275–295)
PH UR: 6 [PH] (ref 5–8)
PHOSPHATE SERPL-MCNC: 3.5 MG/DL (ref 2.5–4.9)
PLATELET # BLD AUTO: 300 10(3)UL (ref 150–450)
POTASSIUM SERPL-SCNC: 4.8 MMOL/L (ref 3.5–5.1)
PROT UR-MCNC: 13.2 MG/DL
PROT UR-MCNC: NEGATIVE MG/DL
RBC # BLD AUTO: 4.13 X10(6)UL
SODIUM SERPL-SCNC: 136 MMOL/L (ref 136–145)
SP GR UR STRIP: 1.01 (ref 1–1.03)
TIBC SERPL-MCNC: 398 UG/DL (ref 240–450)
TRANSFERRIN SERPL-MCNC: 267 MG/DL (ref 200–360)
UROBILINOGEN UR STRIP-ACNC: 0.2
WBC # BLD AUTO: 10 X10(3) UL (ref 4–11)

## 2023-01-24 PROCEDURE — 83540 ASSAY OF IRON: CPT

## 2023-01-24 PROCEDURE — 84156 ASSAY OF PROTEIN URINE: CPT

## 2023-01-24 PROCEDURE — 81015 MICROSCOPIC EXAM OF URINE: CPT

## 2023-01-24 PROCEDURE — 81001 URINALYSIS AUTO W/SCOPE: CPT

## 2023-01-24 PROCEDURE — 80069 RENAL FUNCTION PANEL: CPT

## 2023-01-24 PROCEDURE — 82043 UR ALBUMIN QUANTITATIVE: CPT

## 2023-01-24 PROCEDURE — 82570 ASSAY OF URINE CREATININE: CPT

## 2023-01-24 PROCEDURE — 85025 COMPLETE CBC W/AUTO DIFF WBC: CPT

## 2023-01-24 PROCEDURE — 36415 COLL VENOUS BLD VENIPUNCTURE: CPT

## 2023-01-24 PROCEDURE — 82728 ASSAY OF FERRITIN: CPT

## 2023-01-24 PROCEDURE — 84466 ASSAY OF TRANSFERRIN: CPT

## 2023-01-31 ENCOUNTER — LAB ENCOUNTER (OUTPATIENT)
Dept: LAB | Age: 68
End: 2023-01-31
Attending: INTERNAL MEDICINE
Payer: MEDICARE

## 2023-01-31 DIAGNOSIS — I12.9 HYPERTENSIVE KIDNEY DISEASE WITH CHRONIC KIDNEY DISEASE, STAGE 1-4 OR UNSPECIFIED CHRONIC KIDNEY DISEASE: ICD-10-CM

## 2023-01-31 DIAGNOSIS — D63.1 ANEMIA IN CHRONIC RENAL DISEASE: ICD-10-CM

## 2023-01-31 DIAGNOSIS — N28.9 DISORDER OF KIDNEY AND URETER: ICD-10-CM

## 2023-01-31 DIAGNOSIS — N18.32 STAGE 3B CHRONIC KIDNEY DISEASE (HCC): ICD-10-CM

## 2023-01-31 DIAGNOSIS — N04.1 NEPHROTIC SYNDROME WITH FOCAL AND SEGMENTAL GLOMERULAR LESIONS: ICD-10-CM

## 2023-01-31 DIAGNOSIS — E09.22: ICD-10-CM

## 2023-01-31 DIAGNOSIS — N18.9 ANEMIA IN CHRONIC RENAL DISEASE: ICD-10-CM

## 2023-01-31 DIAGNOSIS — I50.32 CHRONIC DIASTOLIC HEART FAILURE (HCC): ICD-10-CM

## 2023-01-31 LAB
ALBUMIN SERPL-MCNC: 3.8 G/DL (ref 3.4–5)
ANION GAP SERPL CALC-SCNC: 9 MMOL/L (ref 0–18)
BASOPHILS # BLD AUTO: 0.04 X10(3) UL (ref 0–0.2)
BASOPHILS NFR BLD AUTO: 0.4 %
BILIRUB UR QL: NEGATIVE
BUN BLD-MCNC: 60 MG/DL (ref 7–18)
BUN/CREAT SERPL: 28.7 (ref 10–20)
CALCIUM BLD-MCNC: 9.8 MG/DL (ref 8.5–10.1)
CHLORIDE SERPL-SCNC: 101 MMOL/L (ref 98–112)
CLARITY UR: CLEAR
CO2 SERPL-SCNC: 24 MMOL/L (ref 21–32)
COLOR UR: YELLOW
CREAT BLD-MCNC: 2.09 MG/DL
CREAT UR-SCNC: 21.4 MG/DL
DEPRECATED HBV CORE AB SER IA-ACNC: 147.7 NG/ML
DEPRECATED RDW RBC AUTO: 39 FL (ref 35.1–46.3)
EOSINOPHIL # BLD AUTO: 0.11 X10(3) UL (ref 0–0.7)
EOSINOPHIL NFR BLD AUTO: 1.1 %
ERYTHROCYTE [DISTWIDTH] IN BLOOD BY AUTOMATED COUNT: 11.9 % (ref 11–15)
GFR SERPLBLD BASED ON 1.73 SQ M-ARVRAT: 25 ML/MIN/1.73M2 (ref 60–?)
GLUCOSE BLD-MCNC: 137 MG/DL (ref 70–99)
GLUCOSE UR-MCNC: NEGATIVE MG/DL
HCT VFR BLD AUTO: 37.9 %
HGB BLD-MCNC: 13 G/DL
HYALINE CASTS #/AREA URNS AUTO: PRESENT /LPF
HYALINE CASTS #/AREA URNS AUTO: PRESENT /LPF
IMM GRANULOCYTES # BLD AUTO: 0.06 X10(3) UL (ref 0–1)
IMM GRANULOCYTES NFR BLD: 0.6 %
IRON SATN MFR SERPL: 29 %
IRON SERPL-MCNC: 132 UG/DL
KETONES UR-MCNC: NEGATIVE MG/DL
LEUKOCYTE ESTERASE UR QL STRIP.AUTO: NEGATIVE
LYMPHOCYTES # BLD AUTO: 1.42 X10(3) UL (ref 1–4)
LYMPHOCYTES NFR BLD AUTO: 14.8 %
MCH RBC QN AUTO: 31 PG (ref 26–34)
MCHC RBC AUTO-ENTMCNC: 34.3 G/DL (ref 31–37)
MCV RBC AUTO: 90.5 FL
MICROALBUMIN UR-MCNC: 12.2 MG/DL
MICROALBUMIN/CREAT 24H UR-RTO: 570.1 UG/MG (ref ?–30)
MONOCYTES # BLD AUTO: 0.61 X10(3) UL (ref 0.1–1)
MONOCYTES NFR BLD AUTO: 6.3 %
NEUTROPHILS # BLD AUTO: 7.37 X10 (3) UL (ref 1.5–7.7)
NEUTROPHILS # BLD AUTO: 7.37 X10(3) UL (ref 1.5–7.7)
NEUTROPHILS NFR BLD AUTO: 76.8 %
NITRITE UR QL STRIP.AUTO: NEGATIVE
OSMOLALITY SERPL CALC.SUM OF ELEC: 297 MOSM/KG (ref 275–295)
PH UR: 6.5 [PH] (ref 5–8)
PHOSPHATE SERPL-MCNC: 3.8 MG/DL (ref 2.5–4.9)
PLATELET # BLD AUTO: 324 10(3)UL (ref 150–450)
POTASSIUM SERPL-SCNC: 4.2 MMOL/L (ref 3.5–5.1)
PROT UR-MCNC: 14.7 MG/DL
RBC # BLD AUTO: 4.19 X10(6)UL
SODIUM SERPL-SCNC: 134 MMOL/L (ref 136–145)
SP GR UR STRIP: 1.01 (ref 1–1.03)
TIBC SERPL-MCNC: 450 UG/DL (ref 240–450)
TRANSFERRIN SERPL-MCNC: 302 MG/DL (ref 200–360)
UROBILINOGEN UR STRIP-ACNC: 0.2
WBC # BLD AUTO: 9.6 X10(3) UL (ref 4–11)

## 2023-01-31 PROCEDURE — 80069 RENAL FUNCTION PANEL: CPT

## 2023-01-31 PROCEDURE — 81015 MICROSCOPIC EXAM OF URINE: CPT

## 2023-01-31 PROCEDURE — 82043 UR ALBUMIN QUANTITATIVE: CPT

## 2023-01-31 PROCEDURE — 36415 COLL VENOUS BLD VENIPUNCTURE: CPT

## 2023-01-31 PROCEDURE — 85025 COMPLETE CBC W/AUTO DIFF WBC: CPT

## 2023-01-31 PROCEDURE — 84466 ASSAY OF TRANSFERRIN: CPT

## 2023-01-31 PROCEDURE — 82570 ASSAY OF URINE CREATININE: CPT

## 2023-01-31 PROCEDURE — 83540 ASSAY OF IRON: CPT

## 2023-01-31 PROCEDURE — 82728 ASSAY OF FERRITIN: CPT

## 2023-01-31 PROCEDURE — 81001 URINALYSIS AUTO W/SCOPE: CPT

## 2023-01-31 PROCEDURE — 84156 ASSAY OF PROTEIN URINE: CPT

## 2023-02-08 ENCOUNTER — LAB ENCOUNTER (OUTPATIENT)
Dept: LAB | Age: 68
End: 2023-02-08
Attending: INTERNAL MEDICINE
Payer: MEDICARE

## 2023-02-08 DIAGNOSIS — I12.9 HYPERTENSIVE KIDNEY DISEASE WITH CHRONIC KIDNEY DISEASE, STAGE 1-4 OR UNSPECIFIED CHRONIC KIDNEY DISEASE: ICD-10-CM

## 2023-02-08 DIAGNOSIS — N04.1 NEPHROTIC SYNDROME WITH FOCAL AND SEGMENTAL GLOMERULAR LESIONS: ICD-10-CM

## 2023-02-08 DIAGNOSIS — N28.9 DISORDER OF KIDNEY AND URETER: ICD-10-CM

## 2023-02-08 DIAGNOSIS — N18.9 CKD (CHRONIC KIDNEY DISEASE): Primary | ICD-10-CM

## 2023-02-08 DIAGNOSIS — I50.32 CHRONIC DIASTOLIC HEART FAILURE (HCC): ICD-10-CM

## 2023-02-08 DIAGNOSIS — N18.32 STAGE 3B CHRONIC KIDNEY DISEASE (HCC): ICD-10-CM

## 2023-02-08 DIAGNOSIS — N18.9 ANEMIA IN CHRONIC RENAL DISEASE: ICD-10-CM

## 2023-02-08 DIAGNOSIS — E09.22: ICD-10-CM

## 2023-02-08 DIAGNOSIS — D63.1 ANEMIA IN CHRONIC RENAL DISEASE: ICD-10-CM

## 2023-02-08 DIAGNOSIS — I50.9 HEART FAILURE (HCC): ICD-10-CM

## 2023-02-08 LAB
ALBUMIN SERPL-MCNC: 3.8 G/DL (ref 3.4–5)
ALBUMIN SERPL-MCNC: 3.8 G/DL (ref 3.4–5)
ALBUMIN/GLOB SERPL: 0.9 {RATIO} (ref 1–2)
ALP LIVER SERPL-CCNC: 114 U/L
ALT SERPL-CCNC: 50 U/L
ANION GAP SERPL CALC-SCNC: 10 MMOL/L (ref 0–18)
ANION GAP SERPL CALC-SCNC: 10 MMOL/L (ref 0–18)
AST SERPL-CCNC: 24 U/L (ref 15–37)
BASOPHILS # BLD AUTO: 0.03 X10(3) UL (ref 0–0.2)
BASOPHILS NFR BLD AUTO: 0.3 %
BILIRUB SERPL-MCNC: 0.3 MG/DL (ref 0.1–2)
BUN BLD-MCNC: 70 MG/DL (ref 7–18)
BUN BLD-MCNC: 70 MG/DL (ref 7–18)
BUN/CREAT SERPL: 31.1 (ref 10–20)
BUN/CREAT SERPL: 31.1 (ref 10–20)
CALCIUM BLD-MCNC: 9.6 MG/DL (ref 8.5–10.1)
CALCIUM BLD-MCNC: 9.6 MG/DL (ref 8.5–10.1)
CHLORIDE SERPL-SCNC: 100 MMOL/L (ref 98–112)
CHLORIDE SERPL-SCNC: 100 MMOL/L (ref 98–112)
CO2 SERPL-SCNC: 26 MMOL/L (ref 21–32)
CO2 SERPL-SCNC: 26 MMOL/L (ref 21–32)
CREAT BLD-MCNC: 2.25 MG/DL
CREAT BLD-MCNC: 2.25 MG/DL
CREAT UR-SCNC: 49.2 MG/DL
CREAT UR-SCNC: 49.2 MG/DL
DEPRECATED HBV CORE AB SER IA-ACNC: 118.2 NG/ML
DEPRECATED RDW RBC AUTO: 40.6 FL (ref 35.1–46.3)
EOSINOPHIL # BLD AUTO: 0.09 X10(3) UL (ref 0–0.7)
EOSINOPHIL NFR BLD AUTO: 1 %
ERYTHROCYTE [DISTWIDTH] IN BLOOD BY AUTOMATED COUNT: 12 % (ref 11–15)
FASTING STATUS PATIENT QL REPORTED: NO
GFR SERPLBLD BASED ON 1.73 SQ M-ARVRAT: 23 ML/MIN/1.73M2 (ref 60–?)
GFR SERPLBLD BASED ON 1.73 SQ M-ARVRAT: 23 ML/MIN/1.73M2 (ref 60–?)
GLOBULIN PLAS-MCNC: 4.2 G/DL (ref 2.8–4.4)
GLUCOSE BLD-MCNC: 96 MG/DL (ref 70–99)
GLUCOSE BLD-MCNC: 96 MG/DL (ref 70–99)
HCT VFR BLD AUTO: 38.2 %
HGB BLD-MCNC: 12.9 G/DL
IMM GRANULOCYTES # BLD AUTO: 0.04 X10(3) UL (ref 0–1)
IMM GRANULOCYTES NFR BLD: 0.4 %
IRON SATN MFR SERPL: 18 %
IRON SERPL-MCNC: 71 UG/DL
LYMPHOCYTES # BLD AUTO: 1.54 X10(3) UL (ref 1–4)
LYMPHOCYTES NFR BLD AUTO: 17 %
MCH RBC QN AUTO: 31.3 PG (ref 26–34)
MCHC RBC AUTO-ENTMCNC: 33.8 G/DL (ref 31–37)
MCV RBC AUTO: 92.7 FL
MICROALBUMIN UR-MCNC: 8.9 MG/DL
MICROALBUMIN/CREAT 24H UR-RTO: 180.9 UG/MG (ref ?–30)
MONOCYTES # BLD AUTO: 0.58 X10(3) UL (ref 0.1–1)
MONOCYTES NFR BLD AUTO: 6.4 %
NEUTROPHILS # BLD AUTO: 6.8 X10 (3) UL (ref 1.5–7.7)
NEUTROPHILS # BLD AUTO: 6.8 X10(3) UL (ref 1.5–7.7)
NEUTROPHILS NFR BLD AUTO: 74.9 %
NT-PROBNP SERPL-MCNC: 308 PG/ML (ref ?–125)
OSMOLALITY SERPL CALC.SUM OF ELEC: 302 MOSM/KG (ref 275–295)
OSMOLALITY SERPL CALC.SUM OF ELEC: 302 MOSM/KG (ref 275–295)
PHOSPHATE SERPL-MCNC: 3.7 MG/DL (ref 2.5–4.9)
PLATELET # BLD AUTO: 315 10(3)UL (ref 150–450)
POTASSIUM SERPL-SCNC: 4.5 MMOL/L (ref 3.5–5.1)
POTASSIUM SERPL-SCNC: 4.5 MMOL/L (ref 3.5–5.1)
PROT SERPL-MCNC: 8 G/DL (ref 6.4–8.2)
PROT UR-MCNC: 18.4 MG/DL
RBC # BLD AUTO: 4.12 X10(6)UL
SODIUM SERPL-SCNC: 136 MMOL/L (ref 136–145)
SODIUM SERPL-SCNC: 136 MMOL/L (ref 136–145)
TIBC SERPL-MCNC: 399 UG/DL (ref 240–450)
TRANSFERRIN SERPL-MCNC: 268 MG/DL (ref 200–360)
WBC # BLD AUTO: 9.1 X10(3) UL (ref 4–11)

## 2023-02-08 PROCEDURE — 84466 ASSAY OF TRANSFERRIN: CPT

## 2023-02-08 PROCEDURE — 82570 ASSAY OF URINE CREATININE: CPT

## 2023-02-08 PROCEDURE — 36415 COLL VENOUS BLD VENIPUNCTURE: CPT

## 2023-02-08 PROCEDURE — 85025 COMPLETE CBC W/AUTO DIFF WBC: CPT

## 2023-02-08 PROCEDURE — 83540 ASSAY OF IRON: CPT

## 2023-02-08 PROCEDURE — 82728 ASSAY OF FERRITIN: CPT

## 2023-02-08 PROCEDURE — 80053 COMPREHEN METABOLIC PANEL: CPT

## 2023-02-08 PROCEDURE — 84100 ASSAY OF PHOSPHORUS: CPT

## 2023-02-08 PROCEDURE — 83880 ASSAY OF NATRIURETIC PEPTIDE: CPT

## 2023-02-08 PROCEDURE — 82043 UR ALBUMIN QUANTITATIVE: CPT

## 2023-02-08 PROCEDURE — 84156 ASSAY OF PROTEIN URINE: CPT

## 2023-02-08 PROCEDURE — 81015 MICROSCOPIC EXAM OF URINE: CPT

## 2023-02-09 LAB
BILIRUB UR QL: NEGATIVE
COLOR UR: YELLOW
GLUCOSE UR-MCNC: NEGATIVE MG/DL
HGB UR QL STRIP.AUTO: NEGATIVE
HYALINE CASTS #/AREA URNS AUTO: PRESENT /LPF
HYALINE CASTS #/AREA URNS AUTO: PRESENT /LPF
KETONES UR-MCNC: NEGATIVE MG/DL
LEUKOCYTE ESTERASE UR QL STRIP.AUTO: NEGATIVE
NITRITE UR QL STRIP.AUTO: NEGATIVE
PH UR: 6 [PH] (ref 5–8)
PROT UR-MCNC: 30 MG/DL
SP GR UR STRIP: 1.01 (ref 1–1.03)
UROBILINOGEN UR STRIP-ACNC: <2
VIT C UR-MCNC: NEGATIVE MG/DL

## 2023-02-22 ENCOUNTER — LAB ENCOUNTER (OUTPATIENT)
Dept: LAB | Age: 68
End: 2023-02-22
Attending: INTERNAL MEDICINE
Payer: MEDICARE

## 2023-02-22 DIAGNOSIS — N04.1 NEPHROTIC SYNDROME WITH FOCAL AND SEGMENTAL GLOMERULAR LESIONS: ICD-10-CM

## 2023-02-22 DIAGNOSIS — D63.1 ANEMIA IN CHRONIC RENAL DISEASE: ICD-10-CM

## 2023-02-22 DIAGNOSIS — N18.32 STAGE 3B CHRONIC KIDNEY DISEASE (HCC): ICD-10-CM

## 2023-02-22 DIAGNOSIS — N18.9 ANEMIA IN CHRONIC RENAL DISEASE: ICD-10-CM

## 2023-02-22 DIAGNOSIS — N28.9 DISORDER OF KIDNEY AND URETER: ICD-10-CM

## 2023-02-22 DIAGNOSIS — I12.9 HYPERTENSIVE KIDNEY DISEASE WITH CHRONIC KIDNEY DISEASE, STAGE 1-4 OR UNSPECIFIED CHRONIC KIDNEY DISEASE: ICD-10-CM

## 2023-02-22 DIAGNOSIS — E09.22: ICD-10-CM

## 2023-02-22 DIAGNOSIS — I50.32 CHRONIC DIASTOLIC HEART FAILURE (HCC): ICD-10-CM

## 2023-02-22 LAB
ALBUMIN SERPL-MCNC: 3.6 G/DL (ref 3.4–5)
ANION GAP SERPL CALC-SCNC: 9 MMOL/L (ref 0–18)
BASOPHILS # BLD AUTO: 0.03 X10(3) UL (ref 0–0.2)
BASOPHILS NFR BLD AUTO: 0.3 %
BILIRUB UR QL: NEGATIVE
BUN BLD-MCNC: 70 MG/DL (ref 7–18)
BUN/CREAT SERPL: 32 (ref 10–20)
CALCIUM BLD-MCNC: 9.1 MG/DL (ref 8.5–10.1)
CHLORIDE SERPL-SCNC: 101 MMOL/L (ref 98–112)
CLARITY UR: CLEAR
CO2 SERPL-SCNC: 24 MMOL/L (ref 21–32)
CREAT BLD-MCNC: 2.19 MG/DL
CREAT UR-SCNC: 54.9 MG/DL
CREAT UR-SCNC: 54.9 MG/DL
DEPRECATED HBV CORE AB SER IA-ACNC: 107.1 NG/ML
DEPRECATED RDW RBC AUTO: 41.2 FL (ref 35.1–46.3)
EOSINOPHIL # BLD AUTO: 0.11 X10(3) UL (ref 0–0.7)
EOSINOPHIL NFR BLD AUTO: 1.2 %
ERYTHROCYTE [DISTWIDTH] IN BLOOD BY AUTOMATED COUNT: 12.4 % (ref 11–15)
GFR SERPLBLD BASED ON 1.73 SQ M-ARVRAT: 24 ML/MIN/1.73M2 (ref 60–?)
GLUCOSE BLD-MCNC: 223 MG/DL (ref 70–99)
GLUCOSE UR-MCNC: NORMAL MG/DL
HCT VFR BLD AUTO: 37.1 %
HGB BLD-MCNC: 12.4 G/DL
HGB UR QL STRIP.AUTO: NEGATIVE
HYALINE CASTS #/AREA URNS AUTO: PRESENT /LPF
HYALINE CASTS #/AREA URNS AUTO: PRESENT /LPF
IMM GRANULOCYTES # BLD AUTO: 0.05 X10(3) UL (ref 0–1)
IMM GRANULOCYTES NFR BLD: 0.6 %
IRON SATN MFR SERPL: 20 %
IRON SERPL-MCNC: 88 UG/DL
KETONES UR-MCNC: NEGATIVE MG/DL
LEUKOCYTE ESTERASE UR QL STRIP.AUTO: NEGATIVE
LYMPHOCYTES # BLD AUTO: 1.46 X10(3) UL (ref 1–4)
LYMPHOCYTES NFR BLD AUTO: 16.2 %
MCH RBC QN AUTO: 30.4 PG (ref 26–34)
MCHC RBC AUTO-ENTMCNC: 33.4 G/DL (ref 31–37)
MCV RBC AUTO: 90.9 FL
MICROALBUMIN UR-MCNC: 14.8 MG/DL
MICROALBUMIN/CREAT 24H UR-RTO: 269.6 UG/MG (ref ?–30)
MONOCYTES # BLD AUTO: 0.66 X10(3) UL (ref 0.1–1)
MONOCYTES NFR BLD AUTO: 7.3 %
NEUTROPHILS # BLD AUTO: 6.68 X10 (3) UL (ref 1.5–7.7)
NEUTROPHILS # BLD AUTO: 6.68 X10(3) UL (ref 1.5–7.7)
NEUTROPHILS NFR BLD AUTO: 74.4 %
NITRITE UR QL STRIP.AUTO: NEGATIVE
OSMOLALITY SERPL CALC.SUM OF ELEC: 305 MOSM/KG (ref 275–295)
PH UR: 5 [PH] (ref 5–8)
PHOSPHATE SERPL-MCNC: 3 MG/DL (ref 2.5–4.9)
PLATELET # BLD AUTO: 302 10(3)UL (ref 150–450)
POTASSIUM SERPL-SCNC: 4.4 MMOL/L (ref 3.5–5.1)
PROT UR-MCNC: 20 MG/DL
PROT UR-MCNC: 27.7 MG/DL
RBC # BLD AUTO: 4.08 X10(6)UL
SODIUM SERPL-SCNC: 134 MMOL/L (ref 136–145)
SP GR UR STRIP: 1.01 (ref 1–1.03)
TIBC SERPL-MCNC: 432 UG/DL (ref 240–450)
TRANSFERRIN SERPL-MCNC: 290 MG/DL (ref 200–360)
UROBILINOGEN UR STRIP-ACNC: NORMAL
WBC # BLD AUTO: 9 X10(3) UL (ref 4–11)

## 2023-02-22 PROCEDURE — 84466 ASSAY OF TRANSFERRIN: CPT

## 2023-02-22 PROCEDURE — 83540 ASSAY OF IRON: CPT

## 2023-02-22 PROCEDURE — 80069 RENAL FUNCTION PANEL: CPT

## 2023-02-22 PROCEDURE — 82570 ASSAY OF URINE CREATININE: CPT

## 2023-02-22 PROCEDURE — 81015 MICROSCOPIC EXAM OF URINE: CPT

## 2023-02-22 PROCEDURE — 85025 COMPLETE CBC W/AUTO DIFF WBC: CPT

## 2023-02-22 PROCEDURE — 82043 UR ALBUMIN QUANTITATIVE: CPT

## 2023-02-22 PROCEDURE — 36415 COLL VENOUS BLD VENIPUNCTURE: CPT

## 2023-02-22 PROCEDURE — 81001 URINALYSIS AUTO W/SCOPE: CPT

## 2023-02-22 PROCEDURE — 82728 ASSAY OF FERRITIN: CPT

## 2023-02-22 PROCEDURE — 84156 ASSAY OF PROTEIN URINE: CPT

## 2023-03-01 ENCOUNTER — LAB ENCOUNTER (OUTPATIENT)
Dept: LAB | Age: 68
End: 2023-03-01
Attending: INTERNAL MEDICINE
Payer: MEDICARE

## 2023-03-01 DIAGNOSIS — E09.22: ICD-10-CM

## 2023-03-01 DIAGNOSIS — I50.32 CHRONIC DIASTOLIC HEART FAILURE (HCC): ICD-10-CM

## 2023-03-01 DIAGNOSIS — N18.32 STAGE 3B CHRONIC KIDNEY DISEASE (HCC): ICD-10-CM

## 2023-03-01 DIAGNOSIS — N28.9 DISORDER OF KIDNEY AND URETER: ICD-10-CM

## 2023-03-01 DIAGNOSIS — I12.9 HYPERTENSIVE KIDNEY DISEASE WITH CHRONIC KIDNEY DISEASE, STAGE 1-4 OR UNSPECIFIED CHRONIC KIDNEY DISEASE: ICD-10-CM

## 2023-03-01 DIAGNOSIS — N18.9 ANEMIA IN CHRONIC RENAL DISEASE: ICD-10-CM

## 2023-03-01 DIAGNOSIS — N04.1 NEPHROTIC SYNDROME WITH FOCAL AND SEGMENTAL GLOMERULAR LESIONS: ICD-10-CM

## 2023-03-01 DIAGNOSIS — D63.1 ANEMIA IN CHRONIC RENAL DISEASE: ICD-10-CM

## 2023-03-01 LAB
ALBUMIN SERPL-MCNC: 3.8 G/DL (ref 3.4–5)
ANION GAP SERPL CALC-SCNC: 8 MMOL/L (ref 0–18)
BASOPHILS # BLD AUTO: 0.04 X10(3) UL (ref 0–0.2)
BASOPHILS NFR BLD AUTO: 0.3 %
BUN BLD-MCNC: 64 MG/DL (ref 7–18)
BUN/CREAT SERPL: 34.8 (ref 10–20)
CALCIUM BLD-MCNC: 9.6 MG/DL (ref 8.5–10.1)
CHLORIDE SERPL-SCNC: 104 MMOL/L (ref 98–112)
CO2 SERPL-SCNC: 25 MMOL/L (ref 21–32)
CREAT BLD-MCNC: 1.84 MG/DL
CREAT UR-SCNC: 16.3 MG/DL
CREAT UR-SCNC: 16.3 MG/DL
DEPRECATED HBV CORE AB SER IA-ACNC: 67.5 NG/ML
DEPRECATED RDW RBC AUTO: 40.3 FL (ref 35.1–46.3)
EOSINOPHIL # BLD AUTO: 0.09 X10(3) UL (ref 0–0.7)
EOSINOPHIL NFR BLD AUTO: 0.8 %
ERYTHROCYTE [DISTWIDTH] IN BLOOD BY AUTOMATED COUNT: 12.2 % (ref 11–15)
GFR SERPLBLD BASED ON 1.73 SQ M-ARVRAT: 30 ML/MIN/1.73M2 (ref 60–?)
GLUCOSE BLD-MCNC: 75 MG/DL (ref 70–99)
HCT VFR BLD AUTO: 39 %
HGB BLD-MCNC: 13.1 G/DL
IMM GRANULOCYTES # BLD AUTO: 0.06 X10(3) UL (ref 0–1)
IMM GRANULOCYTES NFR BLD: 0.5 %
IRON SATN MFR SERPL: 15 %
IRON SERPL-MCNC: 73 UG/DL
LYMPHOCYTES # BLD AUTO: 2.2 X10(3) UL (ref 1–4)
LYMPHOCYTES NFR BLD AUTO: 19.1 %
MCH RBC QN AUTO: 30.5 PG (ref 26–34)
MCHC RBC AUTO-ENTMCNC: 33.6 G/DL (ref 31–37)
MCV RBC AUTO: 90.9 FL
MICROALBUMIN UR-MCNC: 10.4 MG/DL
MICROALBUMIN/CREAT 24H UR-RTO: 638 UG/MG (ref ?–30)
MONOCYTES # BLD AUTO: 0.73 X10(3) UL (ref 0.1–1)
MONOCYTES NFR BLD AUTO: 6.3 %
NEUTROPHILS # BLD AUTO: 8.42 X10 (3) UL (ref 1.5–7.7)
NEUTROPHILS # BLD AUTO: 8.42 X10(3) UL (ref 1.5–7.7)
NEUTROPHILS NFR BLD AUTO: 73 %
OSMOLALITY SERPL CALC.SUM OF ELEC: 301 MOSM/KG (ref 275–295)
PHOSPHATE SERPL-MCNC: 2.9 MG/DL (ref 2.5–4.9)
PLATELET # BLD AUTO: 331 10(3)UL (ref 150–450)
POTASSIUM SERPL-SCNC: 3.8 MMOL/L (ref 3.5–5.1)
PROT UR-MCNC: 18.3 MG/DL
RBC # BLD AUTO: 4.29 X10(6)UL
SODIUM SERPL-SCNC: 137 MMOL/L (ref 136–145)
TIBC SERPL-MCNC: 483 UG/DL (ref 240–450)
TRANSFERRIN SERPL-MCNC: 324 MG/DL (ref 200–360)
WBC # BLD AUTO: 11.5 X10(3) UL (ref 4–11)

## 2023-03-01 PROCEDURE — 36415 COLL VENOUS BLD VENIPUNCTURE: CPT

## 2023-03-01 PROCEDURE — 84156 ASSAY OF PROTEIN URINE: CPT

## 2023-03-01 PROCEDURE — 85025 COMPLETE CBC W/AUTO DIFF WBC: CPT

## 2023-03-01 PROCEDURE — 81001 URINALYSIS AUTO W/SCOPE: CPT

## 2023-03-01 PROCEDURE — 82570 ASSAY OF URINE CREATININE: CPT

## 2023-03-01 PROCEDURE — 81015 MICROSCOPIC EXAM OF URINE: CPT

## 2023-03-01 PROCEDURE — 82043 UR ALBUMIN QUANTITATIVE: CPT

## 2023-03-01 PROCEDURE — 82728 ASSAY OF FERRITIN: CPT

## 2023-03-01 PROCEDURE — 84466 ASSAY OF TRANSFERRIN: CPT

## 2023-03-01 PROCEDURE — 83540 ASSAY OF IRON: CPT

## 2023-03-01 PROCEDURE — 80069 RENAL FUNCTION PANEL: CPT

## 2023-03-02 LAB
BILIRUB UR QL: NEGATIVE
CLARITY UR: CLEAR
COLOR UR: YELLOW
GLUCOSE UR-MCNC: NEGATIVE MG/DL
HGB UR QL STRIP.AUTO: NEGATIVE
HYALINE CASTS #/AREA URNS AUTO: PRESENT /LPF
KETONES UR-MCNC: NEGATIVE MG/DL
LEUKOCYTE ESTERASE UR QL STRIP.AUTO: NEGATIVE
NITRITE UR QL STRIP.AUTO: NEGATIVE
PH UR: 5 [PH] (ref 5–8)
PROT UR-MCNC: NEGATIVE MG/DL
SP GR UR STRIP: 1.01 (ref 1–1.03)
UROBILINOGEN UR STRIP-ACNC: <2
VIT C UR-MCNC: NEGATIVE MG/DL

## 2023-03-09 ENCOUNTER — PATIENT MESSAGE (OUTPATIENT)
Dept: PULMONOLOGY | Facility: CLINIC | Age: 68
End: 2023-03-09

## 2023-03-09 NOTE — TELEPHONE ENCOUNTER
From: Marta Pacheco  To: Suleiman Kingsley,   Sent: 3/9/2023 8:34 AM CST  Subject: Lung test    Do I need to schedule a lung scan? Is an order in the system and I just have to call the scheduling department? And exactly what do I need. Please advise.     Thank you,  Fernanda Poag

## 2023-03-13 ENCOUNTER — LAB ENCOUNTER (OUTPATIENT)
Dept: LAB | Age: 68
End: 2023-03-13
Attending: INTERNAL MEDICINE
Payer: MEDICARE

## 2023-03-13 DIAGNOSIS — N18.32 CHRONIC KIDNEY DISEASE (CKD) STAGE G3B/A1, MODERATELY DECREASED GLOMERULAR FILTRATION RATE (GFR) BETWEEN 30-44 ML/MIN/1.73 SQUARE METER AND ALBUMINURIA CREATININE RATIO LESS THAN 30 MG/G (HCC): Primary | ICD-10-CM

## 2023-03-13 LAB
BILIRUB UR QL: NEGATIVE
COLOR UR: YELLOW
GLUCOSE UR-MCNC: NEGATIVE MG/DL
HYALINE CASTS #/AREA URNS AUTO: PRESENT /LPF
HYALINE CASTS #/AREA URNS AUTO: PRESENT /LPF
KETONES UR-MCNC: NEGATIVE MG/DL
NITRITE UR QL STRIP.AUTO: POSITIVE
PH UR: 5 [PH] (ref 5–8)
PROT UR-MCNC: 100 MG/DL
RBC #/AREA URNS AUTO: >10 /HPF
SP GR UR STRIP: 1.01 (ref 1–1.03)
UROBILINOGEN UR STRIP-ACNC: <2
VIT C UR-MCNC: NEGATIVE MG/DL
WBC #/AREA URNS AUTO: >50 /HPF
WBC #/AREA URNS AUTO: >50 /HPF
WBC CLUMPS UR QL AUTO: PRESENT /HPF
WBC CLUMPS UR QL AUTO: PRESENT /HPF

## 2023-03-13 PROCEDURE — 81015 MICROSCOPIC EXAM OF URINE: CPT

## 2023-03-13 PROCEDURE — 81001 URINALYSIS AUTO W/SCOPE: CPT

## 2023-04-01 PROBLEM — M25.462 KNEE EFFUSION, LEFT: Status: ACTIVE | Noted: 2023-01-01

## 2023-04-01 NOTE — PATIENT INSTRUCTIONS
Follow up in 2 weeks- Dec 3rd 1:00 pm. Friday    Start metolazone 2.5 mg three times per week   Kidney biopsy as ordered   Daily weights
yes...

## 2023-04-05 ENCOUNTER — TELEPHONE (OUTPATIENT)
Dept: PULMONOLOGY | Facility: CLINIC | Age: 68
End: 2023-04-05

## 2023-05-10 ENCOUNTER — APPOINTMENT (OUTPATIENT)
Dept: CARDIOLOGY | Age: 68
End: 2023-05-10

## 2023-06-16 ENCOUNTER — TELEPHONE (OUTPATIENT)
Dept: PULMONOLOGY | Facility: CLINIC | Age: 68
End: 2023-06-16

## 2023-06-16 NOTE — TELEPHONE ENCOUNTER
Patient has not completed CT Lung, Expected date was 5/2023. Reminder letter sent to patient through my chart.

## 2024-01-05 NOTE — TELEPHONE ENCOUNTER
Contacted pt. Advised to increase KCl 20 meq to TID. She agrees and states understanding. Med list updated. See FRANCIS    *Final recommendations pending staff attestation*    -continue Tolvaptan 15 mg goal correction 4-6 mEq in 24 hrs  -renal fuction panel q 4 hr  -drink to thirst

## 2024-01-11 NOTE — TELEPHONE ENCOUNTER
Was addressed on telephone encounter on 3/1/17 The patient is a 86y Female complaining of see chief complaint quote.

## 2024-06-18 NOTE — ASSESSMENT & PLAN NOTE
Lipid panel and liver function tests have been stable on Crestor 10 mg daily–the dose was reduced to help with the muscle aches and pains. We will continue the same dose at this time and continue to monitor labs. Office had received cbc, progesterone, prolactin, tsh that were drawn on Phuong 15, 2024 at [facility] [place][note 2]- see Care Everywhere.  See office visit note from May 10,2024.    Message routed to Dr Crowe to review the above labs and advise.

## (undated) DIAGNOSIS — Z87.891 PERSONAL HISTORY OF TOBACCO USE, PRESENTING HAZARDS TO HEALTH: Primary | ICD-10-CM

## (undated) DEVICE — CUP MED PLASTIC GRAD 2OZ STRL LF DISP

## (undated) DEVICE — KIT MICROINTRODUCER 4FR .018IN 40CM 7CM .018IN SFTP MNDRL

## (undated) DEVICE — ELECTRODE DFBR UNV 15.2X10.8CM ADH PAD RDTRNS POUCH PADPRO

## (undated) DEVICE — SET XTN 3ML 21IN FEMALE LL DIST CNCT STD BORE DEHP-FR LF

## (undated) DEVICE — DRESSING GAUZE 1.5X1.5IN HMST QUIKCLOT

## (undated) DEVICE — Device

## (undated) DEVICE — CANNULA NSL ADPR SET NOMOLINE

## (undated) DEVICE — DECANTER FLUID DSPNSR BAG BAJ DISP STRL LF ASEPTIC TRNSF

## (undated) DEVICE — GOWN SURG XL L4 IMPRV REINFORCE SET IN SLV STRL LF DISP BLUE

## (undated) DEVICE — SYRINGE 3ML GRAD N-PYRG DEHP-FR PVC FREE STRL MED LF DISP LL

## (undated) DEVICE — SHEATH 10FR 10CM 2.5CM INTRO SNAP ON DIL LOCK KINK RST SMTH

## (undated) DEVICE — COVER PROBE FLX-FEEL 58X6IN OR 4 FLAG 2 SHT 24 PRINT STRL LF

## (undated) DEVICE — GUIDEWIRE GUIDERIGHT 5CM 150CM 3MM RDS STD J CRV .035IN VASC

## (undated) DEVICE — GLOVE SURG 7 PROTEXIS LF CRM PF BEAD CUFF STRL PLISPRN 12IN

## (undated) DEVICE — KIT SYRINGE 3ML ABG LL TIP LN DRAW FLTRPRO DRY LIHEP DISP

## (undated) DEVICE — GUIDEWIRE HTORQ STLCR 300CM .018IN VASC SS PERIPH CORE TO

## (undated) DEVICE — NEEDLE HPO 18GA 1.5IN REG WALL REG BVL LL HUB CLR CD DEHP-FR

## (undated) DEVICE — CATHETER PRSS MNTR 110CM 7FR PULM WDG

## (undated) DEVICE — GLOVE SURG 5.5 PROTEXIS LF CRM PF BEAD CUFF STRL PLISPRN

## (undated) DEVICE — SYRINGE 10ML GRAD N-PYRG DEHP-FR PVC FREE STRL MED LF DISP

## (undated) DEVICE — DRESSING TRANS 4.75X4IN ADH HPOAL WTPRF TEGADERM PU STD STRL

## (undated) DEVICE — VALVE GUARDIAN 2 VSI HEMOSTASIS 8FR GW INS TOOL ROTOLOCK

## (undated) DEVICE — DILATOR VASC 7FR 20CM RADOPQ STRL DISP VSL STD JCD CRV

## (undated) DEVICE — RULER VESSELSIZER 710

## (undated) DEVICE — SYRINGE 6ML STD LL TIP GRAD STRL MED LF DISP MNJCT PP

## (undated) NOTE — MR AVS SNAPSHOT
[facility]  [address] [place] [address] [phone]               Thank you for choosing us for your health care visit with Luiza Aguirre MD.  We are glad to serve you and happy to provide you with this summary of your visit. ADVAIR DISKUS 250-50 MCG/DOSE Aepb   Generic drug:  fluticasone-salmeterol   INHALE 1 PUFF BY MOUTH DAILY           aspirin 81 MG Tabs   Take 81 mg by mouth daily.            carvedilol 6.25 MG Tabs   Take 0.5 tablets (3.125 mg total) by mouth 2 (two) time Commonly known as:  KLOR-CON M10           Pramipexole Dihydrochloride 1 MG Tabs   TAKE 1 TABLET BY MOUTH 3 TIMES A DAY   What changed:  See the new instructions.    Commonly known as:  MIRAPEX           * predniSONE 10 MG Tabs   Take 1 tablet (10 mg total) - carvedilol 6.25 MG Tabs            MyChart     Visit MyChart  You can access your MyChart to more actively manage your health care and view more details from this visit by going to [email or web address].   If you've recently had a stay at the [facility]

## (undated) NOTE — ED AVS SNAPSHOT
[facility] Emergency Department    [address]     [place] [address]    Phone:  [phone]    Fax:  [facility]   MRN: [id number]    Department:  [facility] Emergency Department   Date of Visit:  1/3 Medication Information       Follow the directions for taking your medications provided by your doctor. Please ask your health care provider, pharmacist or nurse if you have any questions regarding your home medications, including potential side effects. primary care or specialist physician may see patients referred from the [facility] Emergency Department. Follow-up care is at the discretion of that Physician.   If you need additional assistance selecting a physician, you may call the Libby Menjivar Additional Information       We are concerned for your overall well being:    - If you are a smoker or have smoked in the last 12 months, we encourage you to explore options for quitting.     - If you have concerns related to behavioral health issues or th

## (undated) NOTE — ED AVS SNAPSHOT
[place] [place]   MRN: [id number]    Department:  [facility] Emergency Department   Date of Visit:  12/7/2019           Disclosure     Insurance plans vary and the physician(s) referred by the ER may not be covered by your plan.  Please contact within the next three months to obtain basic health screening including reassessment of your blood pressure.     IF THERE IS ANY CHANGE OR WORSENING OF YOUR CONDITION, CALL YOUR PRIMARY CARE PHYSICIAN AT ONCE OR RETURN IMMEDIATELY TO THE EMERGENCY DEPARTMEN

## (undated) NOTE — LETTER
[facility], [place]  Authorization for Invasive Procedures  1.  I hereby authorize  Dr. Demetrio Art  , my physician and whomever may be designated as the doctor's assistant, to perform the following operation and/or procedure: Right He performed for the purposes of advancing medicine, science, and/or education, provided my identity is not revealed. If the procedure has been videotaped, the physician/surgeon will obtain the original videotape.  The hospital will not be responsible for stor My signature below affirms that prior to the time of the procedure, I have explained to the patient and/or her legal representative, the risks and benefits involved in the proposed treatment and any reasonable alternative to the proposed treatment.  I have

## (undated) NOTE — MR AVS SNAPSHOT
[facility]  [address] [place] [address]  [phone]               Thank you for choosing us for your health care visit with Tierra Marroquin MD.  We are glad to serve you and happy to provide you with this summary of your visit. Today's Vital Signs     BP Pulse Temp Height Weight BMI    115/80 mmHg 97 97.8 °F (36.6 °C) (Oral) 5' 4\" (1.626 m) 190 lb (86.183 kg) 32.60 kg/m2         Current Medications          This list is accurate as of: 3/23/17  1:33 PM.  Always use Take 1 tablet (25 mg total) by mouth every evening. In addition to 50 mg in morning   Commonly known as:  COZAAR           * Losartan Potassium 50 MG Tabs   Take 1 tablet (50 mg total) by mouth daily.    Commonly known as:  COZAAR           magnesium 250 MG your doctor or other care provider to review them with you.          Where to Get Your Medications      These medications were sent to [facility], [address] AT [address], [phone], [phone]

## (undated) NOTE — MR AVS SNAPSHOT
[facility]  [address] [place] [address] [phone]               Thank you for choosing us for your health care visit with Conchita Rice MD.  We are glad to serve you and happy to provide you with this summary of your visit. This list is accurate as of: 1/26/17  2:15 PM.  Always use your most recent med list.                Kaylen Leslie 250-50 MCG/DOSE Aepb   Generic drug:  fluticasone-salmeterol   INHALE 1 PUFF BY MOUTH DAILY           aspirin 81 MG Tabs   Take 81 mg by mout Take 15 mg by mouth 2 (two) times daily. Commonly known as:  DELTASONE           RaNITidine HCl 150 MG Tabs   Take 150 mg by mouth as needed for Heartburn.    Commonly known as:  ZANTAC           Rosuvastatin Calcium 20 MG Tabs   Take 1 tablet (20 mg tota

## (undated) NOTE — LETTER
[facility] SURGERY SCHEDULING FORM   [address] [place] [address] [place] [address]   [phone] (scheduling phone) [phone] (scheduling fax)     PATIENT INFORMATION   Last Name:      Ariel Yadav      First Name:    Jake Deleon Allergies: Patient has no known allergies.          Completed by:   Jessica Sepulveda      Date:   2/28/2019

## (undated) NOTE — MR AVS SNAPSHOT
[address] [address]  [phone]               Thank you for choosing us for your health care visit with iTm Ruiz MD.  We are glad to serve you and happy to provide you with this summary Comp Metabolic Panel (14) [E]    Complete by: May 25, 2017 (Approximate)    Assoc Dx:  Type 2 diabetes mellitus without complication, with long-term current use of insulin (HCC) [E11.9, Z79.4]           Hemoglobin A1C [E]    Complete by:   May 25, 2                                                        Electronically Verified                                           Era Torres,  [id number] 09/23/2016    Patient continues to smoke and is aware that she needs to quit smoking–she has been advised to Kidney functions–creatinine and EGFR have remained stable. We will continue to monitor carefully. Advised to follow-up with Dr. Kamron Elizabeth as requested. Steroid myopathy     With fatigue, lower extremity weakness and decreased exercise tolerance.   Savi Huggins Take 1 tablet (0.5 mg total) by mouth nightly as needed for Anxiety.    Commonly known as:  KLONOPIN           Dicyclomine HCl 20 MG Tabs   TAKE 1 TABLET BY MOUTH 3 TIMES A DAY AS NEEDED FOR ABDOMINAL PAIN   Commonly known as:  BENTYL           * furosemide TAKE 1 TABLET BY MOUTH 3 TIMES A DAY   What changed:  See the new instructions.    Commonly known as:  MIRAPEX           predniSONE 5 MG Tabs   Take 5mg in the morning and 2.5mg in the evening   Commonly known as:  DELTASONE           RaNITidine HCl 150 MG Visits < Visit Summaries. MyChart questions? Call [phone] for help. Bottlehart is NOT to be used for urgent needs. For medical emergencies, dial 911.            Visit [employer] Safe Case online at  [email or web address]

## (undated) NOTE — LETTER
11/06/19        Martina Halina Diana Engeldonna Godfrey  [place] [facility] [address]      Dear Theresa Randle,    [facility] records indicate that you have outstanding lab work and or testing that was ordered for you and has not yet been completed:  Orders Placed This Encounter

## (undated) NOTE — MR AVS SNAPSHOT
[address] [address]  [phone]               Thank you for choosing us for your health care visit with Quinn Hein MD.  We are glad to serve you and happy to provide you with this summary Return in about 4 weeks (around 2/28/2017).       Your Appointments     Feb 06, 2017  3:30 PM   Exam - Established with Zoila Belle MD   [facility] ([facility])    [facility] [place] [address] TAKE 1 TABLET BY MOUTH 3 TIMES A DAY AS NEEDED FOR ABDOMINAL PAIN   Commonly known as:  BENTYL           fluconazole 150 MG Tabs   Take 1 tablet (150 mg total) by mouth once.    Commonly known as:  DIFLUCAN           furosemide 40 MG Tabs   Take 1 tablet (4 Rosuvastatin Calcium 20 MG Tabs   Take 1 tablet (20 mg total) by mouth nightly.    Commonly known as:  CRESTOR           SPIRIVA HANDIHALER 18 MCG Caps   Generic drug:  Tiotropium Bromide Monohydrate   INHALE THE CONTENTS OF 1 CAPSULE VIA HANDIHALER SAME T

## (undated) NOTE — LETTER
AUTHORIZATION FOR SURGICAL OPERATION OR OTHER PROCEDURE    1.  I hereby authorize Dr. Sherlyn Renee and the [facility] staff assigned to my case to perform the following operation and/or procedure at the [facility]:    Bilateral knee joint injections under Relationship to Patient:           []  Parent    Responsible person                          []  Spouse  In case of minor or                    [] Other  _____________   Incompetent name:  __________________________________________________

## (undated) NOTE — LETTER
8/14/2017      You have been scheduled for surgery on 8/21/17 at [facility]) with Dr. Shante Muñoz. Please plan to arrive 2 hours before your procedure UNLESS instructed to do otherwise.   Your procedure is right L2 and L5 trans Persantine® (dipyridamole) are medications increase bleeding. If you are taking any of these medications, please contact your medical/heart doctor about stopping these 5-7 days prior to surgery.   DO NOT STOP taking any of these medications without your do ? [employer] card and ID

## (undated) NOTE — MR AVS SNAPSHOT
[facility] [address]               Thank you for choosing us for your health care visit with Lida Garcia MD.  We are glad to serve you and happy to provide you with this summary of your visit.   Please h recent med list.                Bryan Rascon 250-50 MCG/DOSE Aepb   Generic drug:  fluticasone-salmeterol   INHALE 1 PUFF BY MOUTH DAILY           aspirin 81 MG Tabs   Take 81 mg by mouth daily.            carvedilol 3.125 MG Tabs   TAKE 1 TABLET BY MOUTH Take 1 tablet (10 mEq total) by mouth 2 (two) times daily. Commonly known as:  KLOR-CON M10           Pramipexole Dihydrochloride 1 MG Tabs   TAKE 1 TABLET BY MOUTH 3 TIMES A DAY   What changed:  See the new instructions.    Commonly known as:  MIRAPEX For medical emergencies, dial 911.            Visit [facility] online at  [email or web address]

## (undated) NOTE — ED AVS SNAPSHOT
[facility] Emergency Department    [address]     [place] [address]    Phone:  [phone]    Fax:  [phone] [facility]   MRN: [id number]    Department:  [facility] Emergency Department   Date of Visit:  1/3 and Class Registration line at [phone] or find a doctor online by visiting [email or web address].    IF THERE IS ANY CHANGE OR WORSENING OF YOUR CONDITION, CALL YOUR PRIMARY CARE PHYSICIAN AT ONCE OR RETURN IMMEDIATELY TO [address] [place] [address].     If

## (undated) NOTE — MR AVS SNAPSHOT
[facility]  [address] [place] [address]  [phone]               Thank you for choosing us for your health care visit with Devon Bowie MD.  We are glad to serve you and happy to provide you with this summary of your time of your appointment. FOR FEMALES HAVING GADOLINIUM EXAMS :If you are breastfeeding and your exam requires an IV Contrast (Gadolinium) injection, you need to stop breastfeeding for 24-48 hours after the procedure.      IF A CHILD is scheduled for this 2 puffs 2 tiumes a day   Commonly known as:  SYMBICORT           carvedilol 3.125 MG Tabs   Take 1 tablet (3.125 mg total) by mouth 2 (two) times daily with meals.    Commonly known as:  COREG           CITRUCEL 500 MG Tabs   Generic drug:  Methylcellulose Take 1 tablet (10 mEq total) by mouth 2 (two) times daily. Commonly known as:  KLOR-CON M10           Pramipexole Dihydrochloride 1 MG Tabs   TAKE 1 TABLET BY MOUTH 3 TIMES A DAY   What changed:  See the new instructions.    Commonly known as:  MIRAPEX

## (undated) NOTE — Clinical Note
[facility] SURGERY SCHEDULING FORM   [address]  [address] [facility]   [phone] (scheduling phone) [phone] (scheduling fax)     PATIENT INFORMATION   Patient Name:    Campos Rooney   :    1955 Allergies: Review of patient's allergies indicates no known allergies. Completed by:    Idania Portillo      Date:    5/3/2017    [facility] will follow its Pre-Admission Assessment and Screening Policy for pre-admission testing.   Physicians that require

## (undated) NOTE — MR AVS SNAPSHOT
[facility]  [address] [facility] [place] [id number] [phone]               Thank you for choosing us for your health care visit with Slick Herrera MD.  We are glad to serve you and happy to provide you with this summary of your visit. Medical Issues Discussed Today     FSGS (focal segmental glomerulosclerosis)    -  Primary      Instructions and Information about Your Health      Metolazone 2.5 mg everyother day for 3 doses   Daily weight at home - call back in 10 days with home * Losartan Potassium 50 MG Tabs   Take 1 tablet (50 mg total) by mouth daily. Commonly known as:  COZAAR           * Losartan Potassium 25 MG Tabs   Take 1 tablet (25 mg total) by mouth every evening.  In addition to 50 mg in morning   Commonly known your doctor or other care provider to review them with you.          Where to Get Your Medications      These medications were sent to [address], [address] AT [address], [phone], [phone]

## (undated) NOTE — LETTER
[address] [facility]   Date:   6/30/2022     Name:   Gerard Duarte    YOB: 1955   MRN:   [id number]       [facility]? Jose Rafael the areas on your body where you feel the described sensations. Use the appropriate symbol. Tereasa Makenzie the areas of radiation. Include all affected areas. Just to complete the picture, please draw in the face. ACHE:  ^ ^ ^   NUMBNESS:  0000   PINS & NEEDLES:  = = = =                              ^ ^ ^                       0000              = = = =                                    ^ ^ ^                       0000            = = = =      BURNING:  XXXX   STABBING: ////                  XXXX                ////                         XXXX          ////     Please jose rafael the line below indicating your degree of pain right now  with 0 being no pain 10 being the worst pain possible.                                          0             1             2              3             4              5              6              7             8             9             10         Patient Signature:

## (undated) NOTE — LETTER
4/5/2023          [address] [facility] [address]         Dear Kristine Olvera,    It has come to our attention that a required CT Lung test is due in May. Please call central scheduling at [phone] to schedule the test.     If you have any other questions, please contact our office at [phone].          Sincerely,    Kaylynn Story, DO [facility], [address] [place] [address]  [address] [facility] [address]. [address] [facility] [id number] [phone]

## (undated) NOTE — MR AVS SNAPSHOT
[facility] [place] [address]  [address] [phone]               Thank you for choosing us for your health care visit with Mikey Pool MD.  We are glad to serve you and happy to provide you with this summary of Take 500 mg by mouth as needed. ClonazePAM 0.5 MG Tabs   TAKE 1 TABLET BY MOUTH TWICE A DAY   What changed:  See the new instructions.    Commonly known as:  KLONOPIN           Dicyclomine HCl 20 MG Tabs   TAKE 1 TABLET BY MOUTH 3 TIMES A DAY AS N Take 1 tablet (20 mg total) by mouth nightly.    Commonly known as:  CRESTOR           SPIRIVA HANDIHALER 18 MCG Caps   Generic drug:  Tiotropium Bromide Monohydrate   INHALE THE CONTENTS OF 1 CAPSULE VIA HANDIHALER SAME TIME EVERY DAY           TraZODone H

## (undated) NOTE — LETTER
[address] [place] [address]  Authorization for Invasive Procedures  1.  I hereby authorize Dr. Karina Fair , my physician and whomever may be designated as the doctor's assistant, to perform the following operation and/or procedure:  COMPUTERIZE that can occur: fever and allergic reactions, hemolytic reactions, transmission of disease such as hepatitis, AIDS, cytomegalovirus (CMV), and flluid overload.  In the event that I wish to have autologous transfusions of my own blood, or a directed donor tr physician.      Signature of Patient:  ________________________________________________ Date: _________Time: _________    Responsible person in case of minor or unconscious: _____________________________Relationship: ____________     Witness Signature: ____

## (undated) NOTE — LETTER
[address] [facility] [place]  [address], [address]  [address] [place] [address] [phone]        Dear Evette Lopez MD,      I had the pleasure of seeing your patient, Jamaal Murguia on 7/11/2017.      Below please find a summ in her right hip in 2010. She did have an injection at that time, but she does not recall if it was helpful to control her symptoms. She was diagnosed with multiple sclerosis in the 1980s.   She was followed by Dr. Devon Kayser at the [facility] MS clinic for ma Losartan Potassium 50 MG Oral Tab Take 1 tablet (50 mg total) by mouth daily. Disp: 90 tablet Rfl: 2   RaNITidine HCl 150 MG Oral Tab Take 150 mg by mouth as needed for Heartburn.  Disp:  Rfl:    LEVOTHYROXINE SODIUM 25 MCG Oral Tab TAKE 1 TABLET BY MOUTH O Past Surgical History:  : APPENDECTOMY  No date: APPENDECTOMY  No date:   5: KNEE SURGERY   Family History   Problem Relation Age of Onset   • Stroke Father    • Cancer Mother 64     APPENDIX,   • Colon Cancer Sister       Social History: Abdomen:  No tenderness. Skin: No rash or lesions. Extremities: Mild edema distally in the lowers. Has pain with hip rotation on the right. Straight leg raising was performed well.   Neuro:  Higher Integrative Functions:  Alert and cooperative, with no She has a history of multiple sclerosis and has been seen at [facility], and [facility] in the past.  At [facility] they advised against using immunomodulators.   There is been no recent exacerbations of her MS, so the plan at the present time is

## (undated) NOTE — Clinical Note
[facility] SURGERY SCHEDULING FORM   [address]  [address] [facility]   [phone] (scheduling phone) [phone] (scheduling fax)     PATIENT INFORMATION   Patient Name:    Lopez Miller   :    1955 Completed by:    Loraine December      Date:    6/14/2017    [facility] will follow its Pre-Admission Assessment and Screening Policy for pre-admission testing.   Physicians that require   additional testing must order this directly and have results faxed to [facility] at

## (undated) NOTE — MR AVS SNAPSHOT
[facility] [employer] [facility]  [address], [address] [place] [address]  [address] [facility] [phone]               Thank you for choosing us for your health care visit with Arabella Montaño MD.  We are glad to serve you and happy to provide you with this summary of your Comment:  L3-4 interlaminar epidural steroid injection under fluoroscopy    local      Referral Orders      Normal Orders This Visit    SPECIALTY (OTHER) - EXTERNAL [123412 CPT(R)]  Order #:  [id number]         **REFERRAL REQUEST**    Your physician has re - try the melatonin at the same time every night  - keep a sleep diary- for one week just write down what you did and what time you went to bed. Then try to change one thing the next week.          As of October 6th 2014, the Drug Enforcement Agency ([address]) i member will be picking up prescription, office must be given name of individual in advance and they must present an ID as well. · The name of the person picking up your prescription must be documented in your chart.          Allergies as of Jun 14, 2017 Take 1 tablet (25 mg total) by mouth every evening. In addition to 50 mg in morning   Commonly known as:  COZAAR           magnesium 250 MG Tabs   Take 250 mg by mouth daily.            OXcarbazepine 300 MG Tabs   TAKE 2 TABLETS BY MOUTH EVERY MORNING AND 2 If you've recently had a stay at the Hospital you can access your discharge instructions in [employer] by going to Visits < Admission Summaries.  If you've been to the Emergency Department or your doctor's office, you can view your past visit information in My Visit [facility] online at  [email or web address].tn

## (undated) NOTE — LETTER
[facility] SURGERY SCHEDULING FORM   [address]  [address] [facility]   [phone] (scheduling phone) [phone] (scheduling fax)     PATIENT INFORMATION   Patient Name:    Maximino Santiago   :    1955 Completed by:    Jina Zavala      Date:    9/13/2017    [facility] will follow its Pre-Admission Assessment and Screening Policy for pre-admission testing.   Physicians that require   additional testing must order this directly and have results faxed to [facility] at 3

## (undated) NOTE — MR AVS SNAPSHOT
[facility]  [address] [place] [address] [phone]               Thank you for choosing us for your health care visit with Garth Loyola MD.  We are glad to serve you and happy to provide you with this summary of your visit. Restrict water to 1.5 liters a day   Blood and urine test ordered         Allergies as of Apr 21, 2017     No Known Allergies                Today's Vital Signs     BP Pulse Temp Height Weight BMI    96/62 mmHg 76 98 °F (36.7 °C) 5' 4\" (1.626 m) 191 lb (8 Commonly known as:  COZAAR           magnesium 250 MG Tabs   Take 250 mg by mouth 2 (two) times daily. Melatonin 3 MG Caps   Take 2 each by mouth nightly. Total of 6 mg.            OXcarbazepine 300 MG Tabs   TAKE 2 TABLETS BY MOUTH EVERY MORNING discharge instructions in [employer] by going to Visits < Admission Summaries. If you've been to the Emergency Department or your doctor's office, you can view your past visit information in [employer] by going to Visits < Visit Summaries. [employer] questions?

## (undated) NOTE — LETTER
04/04/18        [address] [place] [address]      Dear Benja Caceres,    [address] records indicate that you have outstanding lab work and or testing that was ordered for you and has not yet been completed:          CBC W Differential W

## (undated) NOTE — LETTER
Valorie Cherry  [address] [place] [address]      1/28/2022    Dear Amanda Has  It has come to our attention that you are due for: CT Lung in March 2022. This test was ordered by Dr. Enoch Meier.         If you are allergic to iodine or have

## (undated) NOTE — LETTER
[facility]. Notifier: Terrence/[employer]rombo[employer]   LIV. Patient Name: Joseph Galvan.  Identification Number: [id number]      Advance Beneficiary Notice of Noncoverage (ABN)  NOTE:  If Medicare doesn’t pay for D.Bilateral knee joint injections under ultrasound guidanc directions on the MSN. If Medicare does pay, you will refund any payments I made to you, less co-pays or deductibles. ? OPTION 2. I want the D.Bilateral knee joint injections under ultrasound guidance listed above, but do not bill Medicare.  You may ask

## (undated) NOTE — LETTER
7/5/2018          To Whom It May Concern:    Emeterio Page is currently under my medical care . Eat within 1-2 hours of waking. Eat meals and snacks 4-5 hours apart.  Meals should consist of half plate vegetables, palm size lean protein, and a fist size Vegetables include: Broccoli, brussel sprouts, green beans, cabbage, carrots, cauliflower, cucumber, onions, greens, peppers, asparagus, beets, spinach, lettuce, kale, tomatoes, turnips, zucchini, mushrooms, summer squash, radishes, and artichokes Protein* 1 hard boiled egg (protein) + 1 cup unsweetened vanilla almond milk (healthy fat)     2 rice cakes (carb) spread with 1 Light “Laughing Cow” cheese wedge (protein)    2 celery sticks + 1 tablespoon raisins (carb) & 1 tablespoon of peanut butter (protein) o High protein meal bar like [employer]  o High protein shake like Muscle Milk Light or Special K Protein shake + 1 piece of fruit  o Make your own protein shake:  o 1 cup unsweetened vanilla almond milk  o 1 cup fresh or frozen berries  o 1 serving Add your extra cut up veggie scraps (carrots, celery, onions, etc) with last night’s chicken to a low sodium broth or fill up the crock pot! Salads are great to bring to work and an easy way to rotate veggies and protein throughout your week.  Mix leftov Step 5: Sodium: > 300mg = Danger food! Meals < 600mg  Step 6: Fat: No more than 3g Saturated Fat & 0g Trans Fat              If you require additional information please contact our office.         Sincerely,    Valerio Choi MD          Document generated

## (undated) NOTE — LETTER
AUTHORIZATION FOR SURGICAL OPERATION OR OTHER PROCEDURE    1.  I hereby authorize Dr. Rufino Bowman and the [facility] staff assigned to my case to perform the following operation and/or procedure at the [facility]:    Right hip joint steroid injection under ultr Patient signature:  ___________________________________________________             Relationship to Patient:             Parent    Responsible person                            Spouse  In case of minor or                     Other  _____________   Incompet

## (undated) NOTE — MR AVS SNAPSHOT
After Visit Summary   10/11/2018    Hoang Pradhan    MRN: [id number]           Visit Information     Date & Time  10/11/2018  1:20 PM Provider  Rachel Grayson MD [address] [place] [address], [address] [place] [address] Dept.  Phone  [phone] Fluticasone Furoate-Vilanterol (BREO ELLIPTA) 200-25 MCG/INH Inhalation Aerosol Powder, Breath Activated Inhale 1 puff into the lungs daily. ipratropium-albuterol 0.5-2.5 (3) MG/3ML Inhalation Solution Take 3 mL by nebulization 2 (two) times daily. will help us do so. For more information on [employer], please visit [email or web address]                   DO YOU KNOW WHERE TO GO? Injury & illness are never convenient.  If you are dealing with a   non-emergency, consider your o

## (undated) NOTE — LETTER
[address] [facility], [address] [place] [address], [facility] [address]  [address] [place] [address] [phone]            November 1, 2017    The purpose of this Agreement is to prevent misunderstandings about certain medications you will be character and intensity of my pain, the effect of the pain on my daily life, and how well medicine is helping to relieve pain.    _______ I will not use any illegal controlled substances, including marijuana, cocaine, etc., nor will I misuse or self-prescr Date                 Time                 Signature of Witness

## (undated) NOTE — LETTER
[facility] SURGERY SCHEDULING FORM   [address]  [address] [facility]   [phone] (scheduling phone) [phone] (scheduling fax)     PATIENT INFORMATION   Last Name:      Sunday Scott      First Name:    Brittny Martin []  No or using our own   Allergies: Patient has no known allergies.          Completed by:    Debora Villeda      Date:    7/31/2019

## (undated) NOTE — MR AVS SNAPSHOT
[facility]  [address], [address] [facility]  [address] [facility] [phone]               Thank you for choosing us for your health care visit with Zita Gaffney MD.  We are glad to serve you and happy to provide you with this summary of your [address] [facility]   Phone:  [phone]   Fax:  [phone]    Diagnoses:  Lumbar radiculopathy   Lumbar stenosis   Order:  Specialty (Other) - External        Comment:  Right L3 + L4 transforaminal epidural steroid injectio products. Including any medications combined with ibuprofen. Call Dr Ellsworth Dates office to schedule. You need a  to get home even if you are not being sedated. If you are being sedated, stop eating and drinking for 8 hours before the injection.    - tra physicians rotate between multiple offices and procedure days in the hospitals. · Patient must present photo ID at time of .  If a designated family member will be picking up prescription, office must be given name of individual in advance and they Levothyroxine Sodium 25 MCG Tabs   TAKE 1 TABLET BY MOUTH ONCE DAILY. Commonly known as:  SYNTHROID, LEVOTHROID           * Losartan Potassium 50 MG Tabs   Take 1 tablet (50 mg total) by mouth daily.    Commonly known as:  COZAAR           * Losartan Pot medications prescribed for you. Read the directions carefully, and ask your doctor or other care provider to review them with you.          Where to Get Your Medications      You can get these medications from any pharmacy     Bring a paper prescription for

## (undated) NOTE — MR AVS SNAPSHOT
[facility]  [address] [place] [address] [phone]               Thank you for choosing us for your health care visit with [facility]. We are glad to serve you and happy to provide you with this summary of your visit.   Pl breastfeeding and your exam requires an IV Contrast (Gadolinium) injection, you need to stop breastfeeding for 24-48 hours after the procedure.      IF A CHILD is scheduled for this procedure, parents should be advised that they will not be able to accompan OR \"Milk of Magnesia\" 1-2 TBSP once or twice daily, sometimes as back-up  OR \"Magnesium Oxide\" pills - 2-4 pills per day    D.  Stronger but cause cramping - Senekot, Ducolax/bisacodyl    E.  Stronger natural remedy - Aloe Vera liquid/syrup - health timothy * furosemide 20 MG Tabs   Take 1 tablet (20 mg total) by mouth daily. Commonly known as:  LASIX           Glucose Blood Strp   1 each by Other route 2 (two) times daily.    Commonly known as:  FREESTYLE LITE TEST           Insulin Lispro Prot & Lispro (7 TAKE 1 TABLET BY MOUTH AT BEDTIME   What changed:  See the new instructions. Commonly known as:  DESYREL           Vitamin D 2000 units Caps   Take 2,000 Units by mouth daily. * Notice:   This list has 4 medication(s) that are the same as other

## (undated) NOTE — LETTER
Pre-Admission Testing Department  Phone: [phone]  Right Fax: [phone]    ADDRESSEE INFORMATION: SENDER INFORMATION:   To:   Dr Katerin Montgomery From: Cathy Logan     Department: [address]   Fax Number: [phone] Date: 11/22/2021     Phone Numb and return the original message to us at [address] via the [employer] [facility].    10/5/2018

## (undated) NOTE — LETTER
[facility] SURGERY SCHEDULING FORM   [address] [place] [address]   [phone] (scheduling phone) [phone] (scheduling fax)     PATIENT INFORMATION   Patient Name:    Cory Crawford   :    1955 Completed by:    Amarilys Arellano      Date:    8/14/2017    [facility] will follow its Pre-Admission Assessment and Screening Policy for pre-admission testing.   Physicians that require   additional testing must order this directly and have results faxed to [facility] at

## (undated) NOTE — MR AVS SNAPSHOT
[facility] SPECIALTY [facility] [place] [address] [address] [phone]               Thank you for choosing us for your health care visit with Bety Calvillo MD.  We are glad to serve you and happy to provide you with this summary of yo Take 20 units of the Humalog mix with breakfast as well as with dinner. Follow a sliding scale if the blood sugars prior to breakfast and prior to dinner are elevated as follows. For blood sugars between 100-150 add 2 units.   For blood sugars between 151 Take 1 tablet (0.5 mg total) by mouth nightly as needed for Anxiety. What changed:  See the new instructions.    Commonly known as:  KLONOPIN           Dicyclomine HCl 20 MG Tabs   TAKE 1 TABLET BY MOUTH 3 TIMES A DAY AS NEEDED FOR ABDOMINAL PAIN   Common Commonly known as:  CRESTOR           SPIRIVA HANDIHALER 18 MCG Caps   Generic drug:  Tiotropium Bromide Monohydrate   INHALE THE CONTENTS OF 1 CAPSULE VIA HANDIHALER SAME TIME EVERY DAY           TraZODone HCl 100 MG Tabs   TAKE 1 TABLET BY MOUTH AT BEDTI MyChart questions? Call [phone] for help. [employer] is NOT to be used for urgent needs. For medical emergencies, dial 911. Educational Information     TOP FALL PREVENTION TIPS    INSIDE YOUR HOME   KITCHEN:  ? Use non skid mats only. ?  Cl

## (undated) NOTE — LETTER
Myron Dub 37   Date:   6/24/2021     Name:   Chidi Carrillo    YOB: 1955   MRN:   [id number]       WHERE IS YOUR PAIN NOW? Jose Rafael the areas on your body where you feel the described sensations.   Use the appropriate sy

## (undated) NOTE — MR AVS SNAPSHOT
[facility] [place] [address] [address] [phone]               Thank you for choosing us for your health care visit with Rob Pugh MD.  We are glad to serve you and happy to provide you with this summary of yo muscle aches and pains–gradually worsening weakness after starting on the steroids. Referral for evaluation per Dr. POPE–[phone] for an appointment. Steroid myopathy     Increasing weakness both limb as well as pelvic girdle.   Patient can [place] Generic drug:  Methylcellulose (Laxative)   Take 500 mg by mouth as needed. ClonazePAM 0.5 MG Tabs   Take 1 tablet (0.5 mg total) by mouth nightly as needed for Anxiety.    Commonly known as:  KLONOPIN           Dicyclomine HCl 20 MG Tabs   TAKE 1 TAKE 1 TABLET BY MOUTH 3 TIMES A DAY   What changed:  See the new instructions. Commonly known as:  MIRAPEX           * predniSONE 10 MG Tabs   Take 1 tablet (10 mg total) by mouth 2 (two) times daily.    Commonly known as:  DELTASONE           * PredniSO If you've recently had a stay at the Hospital you can access your discharge instructions in [employer] by going to Visits < Admission Summaries.  If you've been to the Emergency Department or your doctor's office, you can view your past visit information in My

## (undated) NOTE — MR AVS SNAPSHOT
[facility]  [address], [address]  [address] [phone]               Thank you for choosing us for your health care visit with Pepe Bolivar MD.  We are glad to serve you and happy to provide you with this summary of your 64\" 190 lb 32.60 kg/m2           Current Medications          This list is accurate as of: 6/8/17  8:02 PM.  Always use your most recent med list.                Sharda Ace 250-50 MCG/DOSE Aepb   Generic drug:  fluticasone-salmeterol   INHALE 1 PUFF B OXcarbazepine 300 MG Tabs   TAKE 2 TABLETS BY MOUTH EVERY MORNING AND 2 & 1/2 TABLETS AT BEDTIME   Commonly known as:  TRILEPTAL           Pen Needles 31G X 8 MM Misc   15 units BID  PLEASE DISPENSE PEN NEEDLES APPROPRIATE FOR NOVOLOG FLEXPEN           Po Summaries. If you've been to the Emergency Department or your doctor's office, you can view your past visit information in [employer] by going to Visits < Visit Summaries. [employer] questions? Call [phone] for help.   [employer] is NOT to be used for urge

## (undated) NOTE — LETTER
Myron Dub 37   Date:   5/20/2021     Name:   Bernice Garcia    YOB: 1955   MRN:   [id number]       WHERE IS YOUR PAIN NOW? Jose Rafael the areas on your body where you feel the described sensations.   Use the appropriate sy